# Patient Record
Sex: MALE | Race: WHITE | NOT HISPANIC OR LATINO | Employment: FULL TIME | URBAN - METROPOLITAN AREA
[De-identification: names, ages, dates, MRNs, and addresses within clinical notes are randomized per-mention and may not be internally consistent; named-entity substitution may affect disease eponyms.]

---

## 2019-01-24 ENCOUNTER — TELEPHONE (OUTPATIENT)
Dept: NEPHROLOGY | Facility: CLINIC | Age: 39
End: 2019-01-24

## 2019-01-24 NOTE — TELEPHONE ENCOUNTER
----- Message from Charissa Nesbitt sent at 1/24/2019 11:16 AM CST -----  Contact: pt  I made a consultl CKD 3     Tracy Collins MD [facility]    Going to fax referral and labs     appt 2/5    Thanks

## 2019-02-05 ENCOUNTER — OFFICE VISIT (OUTPATIENT)
Dept: NEPHROLOGY | Facility: CLINIC | Age: 39
End: 2019-02-05
Payer: COMMERCIAL

## 2019-02-05 VITALS
SYSTOLIC BLOOD PRESSURE: 158 MMHG | DIASTOLIC BLOOD PRESSURE: 94 MMHG | HEART RATE: 56 BPM | BODY MASS INDEX: 31.47 KG/M2 | HEIGHT: 73 IN | OXYGEN SATURATION: 98 % | WEIGHT: 237.44 LBS

## 2019-02-05 DIAGNOSIS — E10.21 TYPE 1 DIABETES MELLITUS WITH NEPHROPATHY: ICD-10-CM

## 2019-02-05 DIAGNOSIS — I10 ESSENTIAL HYPERTENSION: ICD-10-CM

## 2019-02-05 DIAGNOSIS — N18.30 CKD (CHRONIC KIDNEY DISEASE) STAGE 3, GFR 30-59 ML/MIN: Primary | ICD-10-CM

## 2019-02-05 PROCEDURE — 99244 OFF/OP CNSLTJ NEW/EST MOD 40: CPT | Mod: S$GLB,,, | Performed by: INTERNAL MEDICINE

## 2019-02-05 PROCEDURE — 99244 PR OFFICE CONSULTATION,LEVEL IV: ICD-10-PCS | Mod: S$GLB,,, | Performed by: INTERNAL MEDICINE

## 2019-02-05 PROCEDURE — 99999 PR PBB SHADOW E&M-EST. PATIENT-LVL III: CPT | Mod: PBBFAC,,, | Performed by: INTERNAL MEDICINE

## 2019-02-05 PROCEDURE — 99999 PR PBB SHADOW E&M-EST. PATIENT-LVL III: ICD-10-PCS | Mod: PBBFAC,,, | Performed by: INTERNAL MEDICINE

## 2019-02-05 RX ORDER — NIFEDIPINE 60 MG/1
TABLET, EXTENDED RELEASE ORAL
Refills: 3 | COMMUNITY
Start: 2018-12-10 | End: 2019-05-01 | Stop reason: SDUPTHER

## 2019-02-05 RX ORDER — VIT C/E/ZN/COPPR/LUTEIN/ZEAXAN 250MG-90MG
CAPSULE ORAL DAILY
COMMUNITY
End: 2023-08-18 | Stop reason: ALTCHOICE

## 2019-02-05 RX ORDER — ASPIRIN 81 MG/1
81 TABLET ORAL DAILY
COMMUNITY

## 2019-02-05 RX ORDER — TADALAFIL 5 MG/1
TABLET ORAL
Refills: 1 | COMMUNITY
Start: 2018-12-27

## 2019-02-05 RX ORDER — ATORVASTATIN CALCIUM 40 MG/1
TABLET, FILM COATED ORAL
Refills: 1 | COMMUNITY
Start: 2018-12-27 | End: 2021-02-26

## 2019-02-05 RX ORDER — LOSARTAN POTASSIUM 100 MG/1
TABLET ORAL
Refills: 1 | COMMUNITY
Start: 2019-01-14 | End: 2019-11-06 | Stop reason: SDUPTHER

## 2019-02-05 RX ORDER — CALCIUM CARBONATE/VITAMIN D3 500-10/5ML
400 LIQUID (ML) ORAL ONCE
COMMUNITY
End: 2021-02-26

## 2019-02-05 RX ORDER — INSULIN ASPART 100 [IU]/ML
INJECTION, SOLUTION INTRAVENOUS; SUBCUTANEOUS
Refills: 3 | COMMUNITY
Start: 2018-12-28 | End: 2023-04-26

## 2019-02-05 RX ORDER — NEBIVOLOL HYDROCHLORIDE 20 MG/1
TABLET ORAL
Refills: 3 | COMMUNITY
Start: 2019-01-23 | End: 2019-11-06 | Stop reason: SDUPTHER

## 2019-02-05 RX ORDER — FLASH GLUCOSE SCANNING READER
EACH MISCELLANEOUS
Refills: 0 | COMMUNITY
Start: 2018-12-21 | End: 2023-04-26

## 2019-02-05 RX ORDER — FENOFIBRATE 160 MG/1
TABLET ORAL
Refills: 1 | COMMUNITY
Start: 2018-11-27 | End: 2023-08-18

## 2019-02-05 RX ORDER — LECITHIN 1200 MG
1200 CAPSULE ORAL DAILY
COMMUNITY
End: 2023-11-29 | Stop reason: ALTCHOICE

## 2019-02-05 RX ORDER — FLASH GLUCOSE SENSOR
KIT MISCELLANEOUS
Refills: 5 | COMMUNITY
Start: 2018-12-21 | End: 2023-04-25

## 2019-02-05 RX ORDER — ACETAMINOPHEN 160 MG/5ML
200 SUSPENSION, ORAL (FINAL DOSE FORM) ORAL DAILY
COMMUNITY

## 2019-02-05 RX ORDER — GUAIFENESIN AND PHENYLEPHRINE HCL 400; 10 MG/1; MG/1
500 TABLET ORAL DAILY
COMMUNITY
End: 2023-11-29 | Stop reason: ALTCHOICE

## 2019-02-05 RX ORDER — NIFEDIPINE 30 MG/1
TABLET, EXTENDED RELEASE ORAL
Refills: 3 | COMMUNITY
Start: 2019-01-14 | End: 2019-05-01 | Stop reason: DRUGHIGH

## 2019-02-05 RX ORDER — LINEZOLID 600 MG/1
TABLET, FILM COATED ORAL
Refills: 1 | COMMUNITY
Start: 2019-01-07 | End: 2019-05-01

## 2019-02-05 RX ORDER — ACETAMINOPHEN 500 MG
5000 TABLET ORAL DAILY
COMMUNITY
End: 2019-05-01 | Stop reason: ALTCHOICE

## 2019-02-05 NOTE — LETTER
February 5, 2019      Lillie Gloria, JAVED  [address]  [facility] [address]           [facility] [address] [facility] [address] [place] [address]  Phone: [phone]          Patient: Dejuan Diaz Jr.   MR Number: [id number]   YOB: 1980   Date of Visit: 2/5/2019       Dear Lillie Gloria:    Thank you for referring Dejuan Diaz to me for evaluation. Attached you will find relevant portions of my assessment and plan of care.    If you have questions, please do not hesitate to call me. I look forward to following Dejuan Diaz along with you.    Sincerely,    Jose Mckinney MD    Enclosure  CC:  No Recipients    If you would like to receive this communication electronically, please contact [email or web address] or [phone] to request more information on ix[employer] Link access.    For providers and/or their staff who would like to refer a patient to [facility], please contact us through our one-stop-shop provider referral line, Brian Gama, at [phone].    If you feel you have received this communication in error or would no longer like to receive these types of communications, please e-mail [email or web address]

## 2019-02-05 NOTE — PROGRESS NOTES
Subjective:       Patient ID: Dejuan Diaz Jr. is a 38 y.o. White male who presents for new patient evaluation for chronic renal failure.    Dejuan Diaz Jr. is referred by Elizabeth Oshea MD to be evaluated for chronic renal failure.  He reports that he started having more headaches and elevated blood pressure since March of last year.  His creatinine was as high as 2.45 in November but has more recently dropped to 1.6.  He has no uremic or urinary symptoms and is in his usual state of health.  He does occasionally take NSAIDs.      Review of Systems   Constitutional: Negative for appetite change, chills and fever.   Eyes: Negative for visual disturbance.   Respiratory: Positive for shortness of breath (VICTORIA on occasion). Negative for cough.    Cardiovascular: Positive for leg swelling. Negative for chest pain.   Gastrointestinal: Negative for diarrhea, nausea and vomiting.   Genitourinary: Negative for difficulty urinating, dysuria and hematuria.        ED   Musculoskeletal: Positive for gait problem (R toe). Negative for myalgias.   Skin: Negative for rash.   Neurological: Positive for headaches.   Psychiatric/Behavioral: Negative for sleep disturbance.       The past medical, family and social histories were reviewed for this encounter.     History reviewed. No pertinent past medical history.  History reviewed. No pertinent surgical history.  Social History     Socioeconomic History    Marital status:      Spouse name: Not on file    Number of children: Not on file    Years of education: Not on file    Highest education level: Not on file   Social Needs    Financial resource strain: Not on file    Food insecurity - worry: Not on file    Food insecurity - inability: Not on file    Transportation needs - medical: Not on file    Transportation needs - non-medical: Not on file   Occupational History    Not on file   Tobacco Use    Smoking status: Never Smoker    Smokeless tobacco: Never Used  "  Substance and Sexual Activity    Alcohol use: No     Frequency: Never    Drug use: No    Sexual activity: Yes   Other Topics Concern    Not on file   Social History Narrative    Not on file     Current Outpatient Medications   Medication Sig    apple cider vinegar 600 mg Cap Take 1,200 mg by mouth once daily.    aspirin (ECOTRIN) 81 MG EC tablet Take 81 mg by mouth once daily.    atorvastatin (LIPITOR) 40 MG tablet TK 1 T PO D    BYSTOLIC 20 mg Tab TK ONE T PO BID    cholecalciferol, vitamin D3, (VITAMIN D3) 5,000 unit Tab Take 5,000 Units by mouth once daily.    cinnamon bark (CINNAMON ORAL) Take 2,000 mg by mouth once daily.    coenzyme Q10 200 mg capsule Take 200 mg by mouth once daily.    fenofibrate 160 MG Tab TK 1 T PO D    FREESTYLE MARK 14 DAY READER Misc USE UTD    FREESTYLE MARK 14 DAY SENSOR Kit USE UTD. CHANGE SENSOR EVERY 14 DAYS    linezolid (ZYVOX) 600 mg Tab TK 1 T PO Q 12 H FOR 30 DAYS    losartan (COZAAR) 100 MG tablet TK 1 T PO D    magnesium oxide 400 mg Cap Take 400 mg by mouth once.    multivit-min/folic/vit K/lycop (ONE-A-DAY MEN'S MULTIVITAMIN ORAL) Take by mouth once daily.    NIFEdipine (PROCARDIA-XL) 30 MG (OSM) 24 hr tablet TK 1 T PO D IN THE EVENING. CONT 60 MG AM DOSE    NIFEdipine (PROCARDIA-XL) 60 MG (OSM) 24 hr tablet TK 1 T PO D  IN THE MORNING - TK WITH 30 MG ATN    NOVOLOG U-100 INSULIN ASPART 100 unit/mL injection Ss through pump    omega-3 fatty acids-fish oil 360-1,200 mg Cap Take by mouth once daily.    tadalafil (CIALIS) 5 MG tablet TK 1 T PO D PRF ERECTILE DYSFUNCTION    turmeric root extract 500 mg Cap Take 500 mg by mouth once daily.     No current facility-administered medications for this visit.        BP (!) 158/94 (BP Location: Left arm, Patient Position: Sitting)   Pulse (!) 56   Ht 6' 1" (1.854 m)   Wt 107.7 kg (237 lb 7 oz)   SpO2 98%   BMI 31.33 kg/m²     Objective:      Physical Exam   Constitutional: He is oriented to person, " place, and time. He appears well-developed and well-nourished. No distress.   HENT:   Head: Normocephalic and atraumatic.   Eyes: Conjunctivae are normal.   Neck: Neck supple. No JVD present.   Cardiovascular: Normal rate, regular rhythm and normal heart sounds. Exam reveals no gallop and no friction rub.   No murmur heard.  Pulmonary/Chest: Effort normal and breath sounds normal. No respiratory distress. He has no wheezes. He has no rales.   Abdominal: Soft. Bowel sounds are normal. He exhibits no distension. There is no tenderness.   Musculoskeletal: He exhibits no edema (1+ BLE).   Neurological: He is alert and oriented to person, place, and time.   Skin: Skin is warm and dry. No rash noted.   Psychiatric: He has a normal mood and affect.   Vitals reviewed.      Assessment:       1. CKD (chronic kidney disease) stage 3, GFR 30-59 ml/min    2. Essential hypertension    3. Type 1 diabetes mellitus with nephropathy        Plan:   Return to clinic in 3 months.  Labs for next visit include rp, pth, ua, upc.  He gets his labs at [facility].  Baseline creatinine is unclear.  Renal US shows R 11.4 cm, L 12.2 cm.  We discussed dietary modification and weight loss.  Please avoid or minimize all NSAIDS (ibuprofen, motrin, aleve, indocin, naprosyn) to minimize the risk to your kidneys.  Aspirin in a dose of 325 mg or less a day is likely the safest with regards to kidney function.  If you are able to take aspirin and do not have any bleeding problems or ulcers, this may be your best therapy.  Alternatively, acetaminophen (Tylenol) is the safer than NSAIDs with regards to kidney function.  I would ask you take this as directed on the bottle.  It is best to stay under 2 grams a day (4-500 mg tablets a day maximum).

## 2019-04-25 ENCOUNTER — TELEPHONE (OUTPATIENT)
Dept: NEPHROLOGY | Facility: CLINIC | Age: 39
End: 2019-04-25

## 2019-04-26 LAB
ALBUMIN SERPL-MCNC: 4.3 G/DL (ref 3.6–5.1)
APPEARANCE UR: CLEAR
BILIRUB UR QL STRIP: NEGATIVE
BUN SERPL-MCNC: 29 MG/DL (ref 7–25)
BUN/CREAT SERPL: 11 (CALC) (ref 6–22)
CALCIUM SERPL-MCNC: 9.5 MG/DL (ref 8.6–10.3)
CHLORIDE SERPL-SCNC: 106 MMOL/L (ref 98–110)
CO2 SERPL-SCNC: 30 MMOL/L (ref 20–32)
COLOR UR: YELLOW
CREAT SERPL-MCNC: 2.74 MG/DL (ref 0.6–1.35)
CREAT UR-MCNC: 102 MG/DL (ref 20–320)
GFRSERPLBLD MDRD-ARVRAT: 28 ML/MIN/1.73M2
GLUCOSE SERPL-MCNC: 152 MG/DL (ref 65–99)
GLUCOSE UR QL STRIP: ABNORMAL
HGB UR QL STRIP: ABNORMAL
KETONES UR QL STRIP: NEGATIVE
LEUKOCYTE ESTERASE UR QL STRIP: NEGATIVE
NITRITE UR QL STRIP: NEGATIVE
PH UR STRIP: 5.5 [PH] (ref 5–8)
PHOSPHATE SERPL-MCNC: 3.9 MG/DL (ref 2.5–4.5)
POTASSIUM SERPL-SCNC: 5 MMOL/L (ref 3.5–5.3)
PROT UR QL STRIP: ABNORMAL
PROT UR-MCNC: 171 MG/DL (ref 5–25)
PROT/CREAT UR: 1676 MG/G CREAT (ref 22–128)
PTH-INTACT SERPL-MCNC: 422 PG/ML (ref 14–64)
REDUCING SUBS UR STRIP-ACNC: ABNORMAL %
SODIUM SERPL-SCNC: 142 MMOL/L (ref 135–146)
SP GR UR STRIP: 1.01 (ref 1–1.03)

## 2019-05-01 ENCOUNTER — OFFICE VISIT (OUTPATIENT)
Dept: NEPHROLOGY | Facility: CLINIC | Age: 39
End: 2019-05-01
Payer: COMMERCIAL

## 2019-05-01 VITALS
WEIGHT: 234.81 LBS | OXYGEN SATURATION: 99 % | DIASTOLIC BLOOD PRESSURE: 94 MMHG | HEART RATE: 62 BPM | BODY MASS INDEX: 31.12 KG/M2 | HEIGHT: 73 IN | SYSTOLIC BLOOD PRESSURE: 148 MMHG

## 2019-05-01 DIAGNOSIS — N18.30 CKD (CHRONIC KIDNEY DISEASE) STAGE 3, GFR 30-59 ML/MIN: Primary | ICD-10-CM

## 2019-05-01 DIAGNOSIS — I10 ESSENTIAL HYPERTENSION: ICD-10-CM

## 2019-05-01 DIAGNOSIS — E10.21 TYPE 1 DIABETES MELLITUS WITH NEPHROPATHY: ICD-10-CM

## 2019-05-01 PROCEDURE — 99214 PR OFFICE/OUTPT VISIT, EST, LEVL IV, 30-39 MIN: ICD-10-PCS | Mod: S$GLB,,, | Performed by: INTERNAL MEDICINE

## 2019-05-01 PROCEDURE — 99999 PR PBB SHADOW E&M-EST. PATIENT-LVL III: CPT | Mod: PBBFAC,,, | Performed by: INTERNAL MEDICINE

## 2019-05-01 PROCEDURE — 99999 PR PBB SHADOW E&M-EST. PATIENT-LVL III: ICD-10-PCS | Mod: PBBFAC,,, | Performed by: INTERNAL MEDICINE

## 2019-05-01 PROCEDURE — 99214 OFFICE O/P EST MOD 30 MIN: CPT | Mod: S$GLB,,, | Performed by: INTERNAL MEDICINE

## 2019-05-01 RX ORDER — CALCITRIOL 0.25 UG/1
0.25 CAPSULE ORAL DAILY
Qty: 90 CAPSULE | Refills: 1 | Status: SHIPPED | OUTPATIENT
Start: 2019-05-01 | End: 2019-10-29 | Stop reason: SDUPTHER

## 2019-05-01 RX ORDER — NIFEDIPINE 60 MG/1
60 TABLET, EXTENDED RELEASE ORAL 2 TIMES DAILY
Qty: 180 TABLET | Refills: 3 | Status: SHIPPED | OUTPATIENT
Start: 2019-05-01 | End: 2019-11-06 | Stop reason: SDUPTHER

## 2019-05-01 NOTE — PROGRESS NOTES
"Subjective:       Patient ID: Dejuan Diaz Jr. is a 38 y.o. White male who presents for new patient evaluation for chronic renal failure.    His creatinine was as high as 2.45 in November but has more recently dropped to 1.6.  He has no uremic or urinary symptoms and is in his usual state of health.  His blood pressure at home has been 150-160/.  He has fatigue but his headaches have improved.  He has not been drinking as much fluids as he needs and has been a bit more short of breath at times.      Review of Systems   Constitutional: Negative for appetite change, chills and fever.   Eyes: Negative for visual disturbance.   Respiratory: Positive for shortness of breath (VICTORIA on occasion). Negative for cough.    Cardiovascular: Positive for leg swelling. Negative for chest pain.   Gastrointestinal: Negative for diarrhea, nausea and vomiting.   Genitourinary: Negative for difficulty urinating, dysuria and hematuria.        ED   Musculoskeletal: Positive for gait problem (R toe). Negative for myalgias.   Skin: Negative for rash.   Neurological: Positive for headaches.   Psychiatric/Behavioral: Negative for sleep disturbance.       The past medical, family and social histories were reviewed for this encounter.     BP (!) 148/94   Pulse 62   Ht 6' 1" (1.854 m)   Wt 106.5 kg (234 lb 12.6 oz)   SpO2 99%   BMI 30.98 kg/m²     Objective:      Physical Exam   Constitutional: He is oriented to person, place, and time. He appears well-developed and well-nourished. No distress.   HENT:   Head: Normocephalic and atraumatic.   Eyes: Conjunctivae are normal.   Neck: Neck supple. No JVD present.   Cardiovascular: Normal rate, regular rhythm and normal heart sounds. Exam reveals no gallop and no friction rub.   No murmur heard.  Pulmonary/Chest: Effort normal and breath sounds normal. No respiratory distress. He has no wheezes. He has no rales.   Abdominal: Soft. Bowel sounds are normal. He exhibits no distension. There " is no tenderness.   Musculoskeletal: He exhibits no edema (1+ BLE).   Neurological: He is alert and oriented to person, place, and time.   Skin: Skin is warm and dry. No rash noted.   Psychiatric: He has a normal mood and affect.   Vitals reviewed.      Assessment:       1. CKD (chronic kidney disease) stage 3, GFR 30-59 ml/min    2. Essential hypertension    3. Type 1 diabetes mellitus with nephropathy        Plan:   Return to clinic in 3 months.  Labs for next visit include rp, pth, ua, upc.  He gets his labs at [facility].  Baseline creatinine is unclear.  UPC is 1.6.  PTH is 422 with a calcium of 9.5.  Renal US shows R 11.4 cm, L 12.2 cm.  We discussed dietary modification and weight loss.  Please avoid or minimize all NSAIDS (ibuprofen, motrin, aleve, indocin, naprosyn) to minimize the risk to your kidneys.  Aspirin in a dose of 325 mg or less a day is likely the safest with regards to kidney function.  If you are able to take aspirin and do not have any bleeding problems or ulcers, this may be your best therapy.  Alternatively, acetaminophen (Tylenol) is the safer than NSAIDs with regards to kidney function.  I would ask you take this as directed on the bottle.  It is best to stay under 2 grams a day (4-500 mg tablets a day maximum).  Rocaltrol 0.25 mcg daily.  Increase procardia to 60 mg BID.  Stop ergocalciferol.

## 2019-05-02 ENCOUNTER — TELEPHONE (OUTPATIENT)
Dept: NEPHROLOGY | Facility: CLINIC | Age: 39
End: 2019-05-02

## 2019-07-01 ENCOUNTER — PATIENT MESSAGE (OUTPATIENT)
Dept: NEPHROLOGY | Facility: CLINIC | Age: 39
End: 2019-07-01

## 2019-08-02 LAB
ALBUMIN SERPL-MCNC: 4.1 G/DL (ref 3.6–5.1)
APPEARANCE UR: CLEAR
BASOPHILS # BLD AUTO: 22 CELLS/UL (ref 0–200)
BASOPHILS NFR BLD AUTO: 0.5 %
BILIRUB UR QL STRIP: NEGATIVE
BLASTS # BLD: ABNORMAL CELLS/UL
BLASTS NFR BLD MANUAL: ABNORMAL %
BUN SERPL-MCNC: 41 MG/DL (ref 7–25)
BUN/CREAT SERPL: 14 (CALC) (ref 6–22)
CALCIUM SERPL-MCNC: 8.9 MG/DL (ref 8.6–10.3)
CHLORIDE SERPL-SCNC: 107 MMOL/L (ref 98–110)
CO2 SERPL-SCNC: 25 MMOL/L (ref 20–32)
COLOR UR: YELLOW
CREAT SERPL-MCNC: 2.93 MG/DL (ref 0.6–1.35)
EOSINOPHIL # BLD AUTO: 219 CELLS/UL (ref 15–500)
EOSINOPHIL NFR BLD AUTO: 5.1 %
ERYTHROCYTE [DISTWIDTH] IN BLOOD BY AUTOMATED COUNT: 13.2 % (ref 11–15)
GFRSERPLBLD MDRD-ARVRAT: 26 ML/MIN/1.73M2
GLUCOSE SERPL-MCNC: 188 MG/DL (ref 65–99)
GLUCOSE UR QL STRIP: ABNORMAL
HCT VFR BLD AUTO: 32.2 % (ref 38.5–50)
HGB BLD-MCNC: 10.8 G/DL (ref 13.2–17.1)
HGB UR QL STRIP: ABNORMAL
KETONES UR QL STRIP: NEGATIVE
LEUKOCYTE ESTERASE UR QL STRIP: NEGATIVE
LYMPHOCYTES # BLD AUTO: 1148 CELLS/UL (ref 850–3900)
LYMPHOCYTES NFR BLD AUTO: 26.7 %
MCH RBC QN AUTO: 29.3 PG (ref 27–33)
MCHC RBC AUTO-ENTMCNC: 33.5 G/DL (ref 32–36)
MCV RBC AUTO: 87.5 FL (ref 80–100)
METAMYELOCYTES # BLD: ABNORMAL CELLS/UL
METAMYELOCYTES NFR BLD MANUAL: ABNORMAL %
MONOCYTES # BLD AUTO: 396 CELLS/UL (ref 200–950)
MONOCYTES NFR BLD AUTO: 9.2 %
MYELOCYTES # BLD: ABNORMAL CELLS/UL
MYELOCYTES NFR BLD MANUAL: ABNORMAL %
NEUTROPHILS # BLD AUTO: 2516 CELLS/UL (ref 1500–7800)
NEUTROPHILS NFR BLD AUTO: 58.5 %
NEUTS BAND # BLD: ABNORMAL CELLS/UL (ref 0–750)
NEUTS BAND NFR BLD MANUAL: ABNORMAL %
NITRITE UR QL STRIP: NEGATIVE
NRBC # BLD: ABNORMAL CELLS/UL
NRBC BLD-RTO: ABNORMAL /100 WBC
PH UR STRIP: ABNORMAL [PH] (ref 5–8)
PHOSPHATE SERPL-MCNC: 3 MG/DL (ref 2.5–4.5)
PLATELET # BLD AUTO: 203 THOUSAND/UL (ref 140–400)
PMV BLD REES-ECKER: 10.8 FL (ref 7.5–12.5)
POTASSIUM SERPL-SCNC: 4.8 MMOL/L (ref 3.5–5.3)
PROMYELOCYTES # BLD: ABNORMAL CELLS/UL
PROMYELOCYTES NFR BLD MANUAL: ABNORMAL %
PROT UR QL STRIP: ABNORMAL
PTH-INTACT SERPL-MCNC: 69 PG/ML (ref 14–64)
RBC # BLD AUTO: 3.68 MILLION/UL (ref 4.2–5.8)
SERVICE CMNT-IMP: ABNORMAL
SODIUM SERPL-SCNC: 138 MMOL/L (ref 135–146)
SP GR UR STRIP: 1.01 (ref 1–1.03)
VARIANT LYMPHS NFR BLD: ABNORMAL % (ref 0–10)
WBC # BLD AUTO: 4.3 THOUSAND/UL (ref 3.8–10.8)

## 2019-08-07 ENCOUNTER — OFFICE VISIT (OUTPATIENT)
Dept: NEPHROLOGY | Facility: CLINIC | Age: 39
End: 2019-08-07
Payer: COMMERCIAL

## 2019-08-07 ENCOUNTER — PATIENT MESSAGE (OUTPATIENT)
Dept: NEPHROLOGY | Facility: CLINIC | Age: 39
End: 2019-08-07

## 2019-08-07 VITALS
DIASTOLIC BLOOD PRESSURE: 100 MMHG | OXYGEN SATURATION: 99 % | HEART RATE: 77 BPM | WEIGHT: 240.06 LBS | SYSTOLIC BLOOD PRESSURE: 170 MMHG | BODY MASS INDEX: 31.82 KG/M2 | HEIGHT: 73 IN

## 2019-08-07 DIAGNOSIS — N25.81 SECONDARY RENAL HYPERPARATHYROIDISM: ICD-10-CM

## 2019-08-07 DIAGNOSIS — I10 ESSENTIAL HYPERTENSION: ICD-10-CM

## 2019-08-07 DIAGNOSIS — E10.21 TYPE 1 DIABETES MELLITUS WITH NEPHROPATHY: ICD-10-CM

## 2019-08-07 DIAGNOSIS — N17.9 ACUTE RENAL FAILURE, UNSPECIFIED ACUTE RENAL FAILURE TYPE: ICD-10-CM

## 2019-08-07 DIAGNOSIS — N18.30 CKD (CHRONIC KIDNEY DISEASE) STAGE 3, GFR 30-59 ML/MIN: Primary | ICD-10-CM

## 2019-08-07 PROCEDURE — 99214 PR OFFICE/OUTPT VISIT, EST, LEVL IV, 30-39 MIN: ICD-10-PCS | Mod: S$GLB,,, | Performed by: INTERNAL MEDICINE

## 2019-08-07 PROCEDURE — 99999 PR PBB SHADOW E&M-EST. PATIENT-LVL III: CPT | Mod: PBBFAC,,, | Performed by: INTERNAL MEDICINE

## 2019-08-07 PROCEDURE — 99999 PR PBB SHADOW E&M-EST. PATIENT-LVL III: ICD-10-PCS | Mod: PBBFAC,,, | Performed by: INTERNAL MEDICINE

## 2019-08-07 PROCEDURE — 99214 OFFICE O/P EST MOD 30 MIN: CPT | Mod: S$GLB,,, | Performed by: INTERNAL MEDICINE

## 2019-08-07 NOTE — PROGRESS NOTES
"Subjective:       Patient ID: Dejuan Diaz Jr. is a 38 y.o. White male who presents for new patient evaluation for chronic renal failure.    His creatinine was as high as 2.45 in November but has more recently dropped to 1.6.  He has no uremic or urinary symptoms and is in his usual state of health.  His blood pressure at home has been 140s/90s.  He has fatigue but his headaches have improved.        Review of Systems   Constitutional: Positive for fatigue. Negative for appetite change, chills and fever.   Eyes: Negative for visual disturbance.   Respiratory: Positive for shortness of breath (VICTORIA on occasion). Negative for cough.    Cardiovascular: Positive for leg swelling. Negative for chest pain.   Gastrointestinal: Negative for diarrhea, nausea and vomiting.   Genitourinary: Negative for difficulty urinating, dysuria and hematuria.        ED   Musculoskeletal: Negative for myalgias.   Skin: Negative for rash.   Neurological: Negative for headaches.   Psychiatric/Behavioral: Negative for sleep disturbance.       The past medical, family and social histories were reviewed for this encounter.     BP (!) 170/100 (BP Location: Left arm, Patient Position: Sitting)   Pulse 77   Ht 6' 1" (1.854 m)   Wt 108.9 kg (240 lb 1.3 oz)   SpO2 99%   BMI 31.67 kg/m²     Objective:      Physical Exam   Constitutional: He is oriented to person, place, and time. He appears well-developed and well-nourished. No distress.   HENT:   Head: Normocephalic and atraumatic.   Eyes: Conjunctivae are normal.   Neck: Neck supple. No JVD present.   Cardiovascular: Normal rate, regular rhythm and normal heart sounds. Exam reveals no gallop and no friction rub.   No murmur heard.  Pulmonary/Chest: Effort normal and breath sounds normal. No respiratory distress. He has no wheezes. He has no rales.   Abdominal: Soft. Bowel sounds are normal. He exhibits no distension. There is no tenderness.   Musculoskeletal: He exhibits no edema (1+ BLE). "   Neurological: He is alert and oriented to person, place, and time.   Skin: Skin is warm and dry. No rash noted.   Psychiatric: He has a normal mood and affect.   Vitals reviewed.      Assessment:       1. CKD (chronic kidney disease) stage 3, GFR 30-59 ml/min    2. Essential hypertension    3. Type 1 diabetes mellitus with nephropathy        Plan:   Return to clinic in 3 months.  Labs for next visit include rp, pth, upc.  He gets his labs at [facility].  Rp in 2 weeks.  We will need to contact him with results.  Baseline creatinine is unclear.  He has had labile function for no obviuos reason.  He does not have CHF, obstructive uropathy, chronic NSAID use.  UPC is 1.6.  PTH is 69 with a calcium of 8.9.  Renal US shows R 11.4 cm, L 12.2 cm.  We discussed dietary modification and weight loss.  Please avoid or minimize all NSAIDS (ibuprofen, motrin, aleve, indocin, naprosyn) to minimize the risk to your kidneys.  Aspirin in a dose of 325 mg or less a day is likely the safest with regards to kidney function.  If you are able to take aspirin and do not have any bleeding problems or ulcers, this may be your best therapy.  Alternatively, acetaminophen (Tylenol) is the safer than NSAIDs with regards to kidney function.  I would ask you take this as directed on the bottle.  It is best to stay under 2 grams a day (4-500 mg tablets a day maximum).  Change calcitriol to QOD.

## 2019-10-29 RX ORDER — CALCITRIOL 0.25 UG/1
CAPSULE ORAL
Qty: 90 CAPSULE | Refills: 1 | Status: SHIPPED | OUTPATIENT
Start: 2019-10-29 | End: 2020-04-30

## 2019-10-30 ENCOUNTER — PATIENT MESSAGE (OUTPATIENT)
Dept: NEPHROLOGY | Facility: CLINIC | Age: 39
End: 2019-10-30

## 2019-10-30 LAB
ALBUMIN SERPL-MCNC: 3.9 G/DL (ref 3.6–5.1)
BUN SERPL-MCNC: 36 MG/DL (ref 7–25)
BUN/CREAT SERPL: 13 (CALC) (ref 6–22)
CALCIUM SERPL-MCNC: 9.1 MG/DL (ref 8.6–10.3)
CHLORIDE SERPL-SCNC: 110 MMOL/L (ref 98–110)
CO2 SERPL-SCNC: 25 MMOL/L (ref 20–32)
CREAT SERPL-MCNC: 2.84 MG/DL (ref 0.6–1.35)
CREAT UR-MCNC: 83 MG/DL (ref 20–320)
GFRSERPLBLD MDRD-ARVRAT: 27 ML/MIN/1.73M2
GLUCOSE SERPL-MCNC: 129 MG/DL (ref 65–99)
PHOSPHATE SERPL-MCNC: 3.3 MG/DL (ref 2.5–4.5)
POTASSIUM SERPL-SCNC: 4.4 MMOL/L (ref 3.5–5.3)
PROT UR-MCNC: 128 MG/DL (ref 5–25)
PROT/CREAT UR: 1.54 MG/MG CREAT (ref 0.02–0.13)
PROT/CREAT UR: 1542 MG/G CREAT (ref 22–128)
PTH-INTACT SERPL-MCNC: 85 PG/ML (ref 14–64)
SODIUM SERPL-SCNC: 141 MMOL/L (ref 135–146)

## 2019-11-06 ENCOUNTER — OFFICE VISIT (OUTPATIENT)
Dept: NEPHROLOGY | Facility: CLINIC | Age: 39
End: 2019-11-06
Payer: COMMERCIAL

## 2019-11-06 VITALS
SYSTOLIC BLOOD PRESSURE: 177 MMHG | HEIGHT: 73 IN | OXYGEN SATURATION: 100 % | WEIGHT: 233.69 LBS | BODY MASS INDEX: 30.97 KG/M2 | HEART RATE: 60 BPM | DIASTOLIC BLOOD PRESSURE: 101 MMHG

## 2019-11-06 DIAGNOSIS — N25.81 SECONDARY RENAL HYPERPARATHYROIDISM: ICD-10-CM

## 2019-11-06 DIAGNOSIS — E10.21 TYPE 1 DIABETES MELLITUS WITH NEPHROPATHY: ICD-10-CM

## 2019-11-06 DIAGNOSIS — N18.30 CKD (CHRONIC KIDNEY DISEASE) STAGE 3, GFR 30-59 ML/MIN: Primary | ICD-10-CM

## 2019-11-06 DIAGNOSIS — I10 ESSENTIAL HYPERTENSION: ICD-10-CM

## 2019-11-06 DIAGNOSIS — R80.9 PROTEINURIA, UNSPECIFIED TYPE: ICD-10-CM

## 2019-11-06 PROCEDURE — 99999 PR PBB SHADOW E&M-EST. PATIENT-LVL III: CPT | Mod: PBBFAC,,, | Performed by: INTERNAL MEDICINE

## 2019-11-06 PROCEDURE — 99999 PR PBB SHADOW E&M-EST. PATIENT-LVL III: ICD-10-PCS | Mod: PBBFAC,,, | Performed by: INTERNAL MEDICINE

## 2019-11-06 PROCEDURE — 99214 OFFICE O/P EST MOD 30 MIN: CPT | Mod: S$GLB,,, | Performed by: INTERNAL MEDICINE

## 2019-11-06 PROCEDURE — 3008F BODY MASS INDEX DOCD: CPT | Mod: S$GLB,,, | Performed by: INTERNAL MEDICINE

## 2019-11-06 PROCEDURE — 3008F PR BODY MASS INDEX (BMI) DOCUMENTED: ICD-10-PCS | Mod: S$GLB,,, | Performed by: INTERNAL MEDICINE

## 2019-11-06 PROCEDURE — 99214 PR OFFICE/OUTPT VISIT, EST, LEVL IV, 30-39 MIN: ICD-10-PCS | Mod: S$GLB,,, | Performed by: INTERNAL MEDICINE

## 2019-11-06 RX ORDER — FUROSEMIDE 40 MG/1
40 TABLET ORAL 2 TIMES DAILY
Qty: 180 TABLET | Refills: 1 | Status: SHIPPED | OUTPATIENT
Start: 2019-11-06 | End: 2020-06-08

## 2019-11-06 RX ORDER — NIFEDIPINE 60 MG/1
60 TABLET, EXTENDED RELEASE ORAL 2 TIMES DAILY
Qty: 180 TABLET | Refills: 1 | Status: SHIPPED | OUTPATIENT
Start: 2019-11-06 | End: 2020-06-08

## 2019-11-06 RX ORDER — NEBIVOLOL HYDROCHLORIDE 20 MG/1
20 TABLET ORAL 2 TIMES DAILY
Qty: 180 TABLET | Refills: 1 | Status: SHIPPED | OUTPATIENT
Start: 2019-11-06 | End: 2021-01-05 | Stop reason: SDUPTHER

## 2019-11-06 RX ORDER — LOSARTAN POTASSIUM 100 MG/1
100 TABLET ORAL DAILY
Qty: 90 TABLET | Refills: 1 | Status: SHIPPED | OUTPATIENT
Start: 2019-11-06 | End: 2021-02-26 | Stop reason: SDUPTHER

## 2019-11-06 NOTE — PROGRESS NOTES
"Subjective:       Patient ID: Dejuan Diaz Jr. is a 38 y.o. White male who presents for new patient evaluation for chronic renal failure.    He has no uremic or urinary symptoms and is in his usual state of health.  His blood pressure at home has been 170s/90s.  He has fatigue at times but his headaches have improved.        Review of Systems   Constitutional: Positive for fatigue. Negative for appetite change, chills and fever.   Eyes: Negative for visual disturbance.   Respiratory: Positive for shortness of breath (VICTORIA on occasion). Negative for cough.    Cardiovascular: Positive for leg swelling. Negative for chest pain.   Gastrointestinal: Negative for diarrhea, nausea and vomiting.   Genitourinary: Negative for difficulty urinating, dysuria and hematuria.        ED   Musculoskeletal: Negative for myalgias.   Skin: Negative for rash.   Neurological: Negative for headaches.   Psychiatric/Behavioral: Negative for sleep disturbance.       The past medical, family and social histories were reviewed for this encounter.     BP (!) 177/101   Pulse 60   Ht 6' 1" (1.854 m)   Wt 106 kg (233 lb 11 oz)   SpO2 100%   BMI 30.83 kg/m²     Objective:      Physical Exam   Constitutional: He is oriented to person, place, and time. He appears well-developed and well-nourished. No distress.   HENT:   Head: Normocephalic and atraumatic.   Eyes: Conjunctivae are normal.   Neck: Neck supple. No JVD present.   Cardiovascular: Normal rate, regular rhythm and normal heart sounds. Exam reveals no gallop and no friction rub.   No murmur heard.  Pulmonary/Chest: Effort normal and breath sounds normal. No respiratory distress. He has no wheezes. He has no rales.   Abdominal: Soft. Bowel sounds are normal. He exhibits no distension. There is no tenderness.   Musculoskeletal: He exhibits no edema (1+ BLE).   Neurological: He is alert and oriented to person, place, and time.   Skin: Skin is warm and dry. No rash noted.   Psychiatric: He " has a normal mood and affect.   Vitals reviewed.      Assessment:       1. CKD (chronic kidney disease) stage 3, GFR 30-59 ml/min    2. Essential hypertension    3. Type 1 diabetes mellitus with nephropathy    4. Secondary renal hyperparathyroidism    5. Proteinuria, unspecified type        Plan:   Return to clinic in 3 months.  Labs for next visit include rp, pth, upc.  He gets his labs at [facility] in [place].  Baseline creatinine is unclear.  He has had labile function for no obviuos reason.  He does not have CHF, obstructive uropathy, chronic NSAID use.  UPC is 1.0.  PTH is 271 with a calcium of 9.4.  Renal US shows R 11.4 cm, L 12.2 cm.  We discussed dietary modification and weight loss.  Please avoid or minimize all NSAIDS (ibuprofen, motrin, aleve, indocin, naprosyn) to minimize the risk to your kidneys.  Aspirin in a dose of 325 mg or less a day is likely the safest with regards to kidney function.  If you are able to take aspirin and do not have any bleeding problems or ulcers, this may be your best therapy.  Alternatively, acetaminophen (Tylenol) is the safer than NSAIDs with regards to kidney function.  I would ask you take this as directed on the bottle.  It is best to stay under 2 grams a day (4-500 mg tablets a day maximum).  Transplant referral.  [facility] referral.  Lasix 40 mg daily.  We discussed kidney transplant versus kidney/pancreas transplant.

## 2020-02-01 LAB
ALBUMIN SERPL-MCNC: 4.2 G/DL (ref 3.6–5.1)
BUN SERPL-MCNC: 50 MG/DL (ref 7–25)
BUN/CREAT SERPL: 17 (CALC) (ref 6–22)
CALCIUM SERPL-MCNC: 9.1 MG/DL (ref 8.6–10.3)
CHLORIDE SERPL-SCNC: 104 MMOL/L (ref 98–110)
CO2 SERPL-SCNC: 25 MMOL/L (ref 20–32)
CREAT SERPL-MCNC: 2.94 MG/DL (ref 0.6–1.35)
CREAT UR-MCNC: 67 MG/DL (ref 20–320)
GFRSERPLBLD MDRD-ARVRAT: 26 ML/MIN/1.73M2
GLUCOSE SERPL-MCNC: 173 MG/DL (ref 65–99)
PHOSPHATE SERPL-MCNC: 3.3 MG/DL (ref 2.5–4.5)
POTASSIUM SERPL-SCNC: 4.7 MMOL/L (ref 3.5–5.3)
PROT UR-MCNC: 64 MG/DL (ref 5–25)
PROT/CREAT UR: 0.95 MG/MG CREAT (ref 0.02–0.13)
PROT/CREAT UR: 955 MG/G CREAT (ref 22–128)
PTH-INTACT SERPL-MCNC: 93 PG/ML (ref 14–64)
SODIUM SERPL-SCNC: 140 MMOL/L (ref 135–146)

## 2020-02-02 ENCOUNTER — PATIENT MESSAGE (OUTPATIENT)
Dept: NEPHROLOGY | Facility: CLINIC | Age: 40
End: 2020-02-02

## 2020-02-05 ENCOUNTER — OFFICE VISIT (OUTPATIENT)
Dept: NEPHROLOGY | Facility: CLINIC | Age: 40
End: 2020-02-05
Payer: COMMERCIAL

## 2020-02-05 VITALS
HEART RATE: 60 BPM | WEIGHT: 231.25 LBS | OXYGEN SATURATION: 98 % | DIASTOLIC BLOOD PRESSURE: 83 MMHG | HEIGHT: 73 IN | BODY MASS INDEX: 30.65 KG/M2 | SYSTOLIC BLOOD PRESSURE: 144 MMHG

## 2020-02-05 DIAGNOSIS — N18.30 CKD (CHRONIC KIDNEY DISEASE) STAGE 3, GFR 30-59 ML/MIN: Primary | ICD-10-CM

## 2020-02-05 DIAGNOSIS — R80.9 PROTEINURIA, UNSPECIFIED TYPE: ICD-10-CM

## 2020-02-05 DIAGNOSIS — N25.81 SECONDARY RENAL HYPERPARATHYROIDISM: ICD-10-CM

## 2020-02-05 DIAGNOSIS — E10.21 TYPE 1 DIABETES MELLITUS WITH NEPHROPATHY: ICD-10-CM

## 2020-02-05 DIAGNOSIS — I10 ESSENTIAL HYPERTENSION: ICD-10-CM

## 2020-02-05 PROCEDURE — 3008F BODY MASS INDEX DOCD: CPT | Mod: S$GLB,,, | Performed by: INTERNAL MEDICINE

## 2020-02-05 PROCEDURE — 99214 PR OFFICE/OUTPT VISIT, EST, LEVL IV, 30-39 MIN: ICD-10-PCS | Mod: S$GLB,,, | Performed by: INTERNAL MEDICINE

## 2020-02-05 PROCEDURE — 99214 OFFICE O/P EST MOD 30 MIN: CPT | Mod: S$GLB,,, | Performed by: INTERNAL MEDICINE

## 2020-02-05 PROCEDURE — 99999 PR PBB SHADOW E&M-EST. PATIENT-LVL III: ICD-10-PCS | Mod: PBBFAC,,, | Performed by: INTERNAL MEDICINE

## 2020-02-05 PROCEDURE — 99999 PR PBB SHADOW E&M-EST. PATIENT-LVL III: CPT | Mod: PBBFAC,,, | Performed by: INTERNAL MEDICINE

## 2020-02-05 PROCEDURE — 3008F PR BODY MASS INDEX (BMI) DOCUMENTED: ICD-10-PCS | Mod: S$GLB,,, | Performed by: INTERNAL MEDICINE

## 2020-02-05 NOTE — PROGRESS NOTES
"Subjective:       Patient ID: Dejuan Diaz Jr. is a 39 y.o. White male who presents for new patient evaluation for chronic renal failure.    He has no uremic or urinary symptoms and is in his usual state of health.  His blood pressure at home has been 150s-160s/80s.  His edema is improved on the lasix.      Review of Systems   Constitutional: Positive for fatigue. Negative for appetite change, chills and fever.   Eyes: Negative for visual disturbance.   Respiratory: Positive for shortness of breath (VICTORIA on occasion). Negative for cough.    Cardiovascular: Positive for leg swelling. Negative for chest pain.   Gastrointestinal: Negative for diarrhea, nausea and vomiting.   Genitourinary: Negative for difficulty urinating, dysuria and hematuria.        ED   Musculoskeletal: Negative for myalgias.   Skin: Negative for rash.   Neurological: Negative for headaches.   Psychiatric/Behavioral: Negative for sleep disturbance.       The past medical, family and social histories were reviewed for this encounter.     BP (!) 144/83   Pulse 60   Ht 6' 1" (1.854 m)   Wt 104.9 kg (231 lb 4.2 oz)   SpO2 98%   BMI 30.51 kg/m²     Objective:      Physical Exam   Constitutional: He is oriented to person, place, and time. He appears well-developed and well-nourished. No distress.   HENT:   Head: Normocephalic and atraumatic.   Eyes: Conjunctivae are normal.   Neck: Neck supple. No JVD present.   Cardiovascular: Normal rate, regular rhythm and normal heart sounds. Exam reveals no gallop and no friction rub.   No murmur heard.  Pulmonary/Chest: Effort normal and breath sounds normal. No respiratory distress. He has no wheezes. He has no rales.   Abdominal: Soft. Bowel sounds are normal. He exhibits no distension. There is no tenderness.   Musculoskeletal: He exhibits edema (trace-1+ BLE).   Neurological: He is alert and oriented to person, place, and time.   Skin: Skin is warm and dry. No rash noted.   Psychiatric: He has a normal " mood and affect.   Vitals reviewed.      Assessment:       1. CKD (chronic kidney disease) stage 3, GFR 30-59 ml/min    2. Essential hypertension    3. Type 1 diabetes mellitus with nephropathy    4. Secondary renal hyperparathyroidism    5. Proteinuria, unspecified type        Plan:   Return to clinic in 3 months.  Labs for next visit include rp, pth, upc, cbc.  He gets his labs at [facility] in [place].  Baseline creatinine is unclear.  He has had labile function for no obviuos reason.  He does not have CHF, obstructive uropathy, chronic NSAID use.  UPC is 1.0.  PTH is 93 with a calcium of 9.1.  Renal US shows R 11.4 cm, L 12.2 cm.  We discussed dietary modification and weight loss.  Please avoid or minimize all NSAIDS (ibuprofen, motrin, aleve, indocin, naprosyn) to minimize the risk to your kidneys.  Aspirin in a dose of 325 mg or less a day is likely the safest with regards to kidney function.  If you are able to take aspirin and do not have any bleeding problems or ulcers, this may be your best therapy.  Alternatively, acetaminophen (Tylenol) is the safer than NSAIDs with regards to kidney function.  I would ask you take this as directed on the bottle.  It is best to stay under 2 grams a day (4-500 mg tablets a day maximum).  Transplant referral when GFR < 20.  He has seen [facility].  We discussed kidney transplant versus kidney/pancreas transplant.

## 2020-04-30 RX ORDER — CALCITRIOL 0.25 UG/1
CAPSULE ORAL
Qty: 90 CAPSULE | Refills: 1 | Status: SHIPPED | OUTPATIENT
Start: 2020-04-30 | End: 2021-02-26 | Stop reason: SDUPTHER

## 2020-05-13 ENCOUNTER — PATIENT MESSAGE (OUTPATIENT)
Dept: NEPHROLOGY | Facility: CLINIC | Age: 40
End: 2020-05-13

## 2020-05-14 ENCOUNTER — OFFICE VISIT (OUTPATIENT)
Dept: NEPHROLOGY | Facility: CLINIC | Age: 40
End: 2020-05-14
Payer: COMMERCIAL

## 2020-05-14 VITALS — DIASTOLIC BLOOD PRESSURE: 80 MMHG | SYSTOLIC BLOOD PRESSURE: 140 MMHG

## 2020-05-14 DIAGNOSIS — I10 ESSENTIAL HYPERTENSION: ICD-10-CM

## 2020-05-14 DIAGNOSIS — E10.21 TYPE 1 DIABETES MELLITUS WITH NEPHROPATHY: ICD-10-CM

## 2020-05-14 DIAGNOSIS — R80.9 PROTEINURIA, UNSPECIFIED TYPE: ICD-10-CM

## 2020-05-14 DIAGNOSIS — N18.4 CHRONIC KIDNEY DISEASE, STAGE IV (SEVERE): Primary | ICD-10-CM

## 2020-05-14 DIAGNOSIS — N25.81 SECONDARY RENAL HYPERPARATHYROIDISM: ICD-10-CM

## 2020-05-14 LAB
ALBUMIN SERPL-MCNC: 4 G/DL (ref 3.6–5.1)
BASOPHILS # BLD AUTO: 41 CELLS/UL (ref 0–200)
BASOPHILS NFR BLD AUTO: 0.9 %
BLASTS # BLD: ABNORMAL CELLS/UL
BLASTS NFR BLD MANUAL: ABNORMAL %
BUN SERPL-MCNC: 64 MG/DL (ref 7–25)
BUN/CREAT SERPL: 20 (CALC) (ref 6–22)
CALCIUM SERPL-MCNC: 8.9 MG/DL (ref 8.6–10.3)
CHLORIDE SERPL-SCNC: 107 MMOL/L (ref 98–110)
CO2 SERPL-SCNC: 22 MMOL/L (ref 20–32)
CREAT SERPL-MCNC: 3.25 MG/DL (ref 0.6–1.35)
CREAT UR-MCNC: 106 MG/DL (ref 20–320)
EOSINOPHIL # BLD AUTO: 51 CELLS/UL (ref 15–500)
EOSINOPHIL NFR BLD AUTO: 1.1 %
ERYTHROCYTE [DISTWIDTH] IN BLOOD BY AUTOMATED COUNT: 13.1 % (ref 11–15)
GFRSERPLBLD MDRD-ARVRAT: 23 ML/MIN/1.73M2
GLUCOSE SERPL-MCNC: 259 MG/DL (ref 65–99)
HCT VFR BLD AUTO: 32.3 % (ref 38.5–50)
HGB BLD-MCNC: 11 G/DL (ref 13.2–17.1)
LYMPHOCYTES # BLD AUTO: 1366 CELLS/UL (ref 850–3900)
LYMPHOCYTES NFR BLD AUTO: 29.7 %
MCH RBC QN AUTO: 29.5 PG (ref 27–33)
MCHC RBC AUTO-ENTMCNC: 34.1 G/DL (ref 32–36)
MCV RBC AUTO: 86.6 FL (ref 80–100)
METAMYELOCYTES # BLD: ABNORMAL CELLS/UL
METAMYELOCYTES NFR BLD MANUAL: ABNORMAL %
MONOCYTES # BLD AUTO: 428 CELLS/UL (ref 200–950)
MONOCYTES NFR BLD AUTO: 9.3 %
MYELOCYTES # BLD: ABNORMAL CELLS/UL
MYELOCYTES NFR BLD MANUAL: ABNORMAL %
NEUTROPHILS # BLD AUTO: 2714 CELLS/UL (ref 1500–7800)
NEUTROPHILS NFR BLD AUTO: 59 %
NEUTS BAND # BLD: ABNORMAL CELLS/UL (ref 0–750)
NEUTS BAND NFR BLD MANUAL: ABNORMAL %
NRBC # BLD: ABNORMAL CELLS/UL
NRBC BLD-RTO: ABNORMAL /100 WBC
PHOSPHATE SERPL-MCNC: 4.2 MG/DL (ref 2.5–4.5)
PLATELET # BLD AUTO: 224 THOUSAND/UL (ref 140–400)
PMV BLD REES-ECKER: 11.6 FL (ref 7.5–12.5)
POTASSIUM SERPL-SCNC: 5.2 MMOL/L (ref 3.5–5.3)
PROMYELOCYTES # BLD: ABNORMAL CELLS/UL
PROMYELOCYTES NFR BLD MANUAL: ABNORMAL %
PROT UR-MCNC: 29 MG/DL (ref 5–25)
PROT/CREAT UR: 0.27 MG/MG CREAT (ref 0.02–0.13)
PROT/CREAT UR: 274 MG/G CREAT (ref 22–128)
RBC # BLD AUTO: 3.73 MILLION/UL (ref 4.2–5.8)
SERVICE CMNT-IMP: ABNORMAL
SODIUM SERPL-SCNC: 138 MMOL/L (ref 135–146)
VARIANT LYMPHS NFR BLD: ABNORMAL % (ref 0–10)
WBC # BLD AUTO: 4.6 THOUSAND/UL (ref 3.8–10.8)

## 2020-05-14 PROCEDURE — 99442 PR PHYSICIAN TELEPHONE EVALUATION 11-20 MIN: ICD-10-PCS | Mod: 95,,, | Performed by: INTERNAL MEDICINE

## 2020-05-14 PROCEDURE — 99442 PR PHYSICIAN TELEPHONE EVALUATION 11-20 MIN: CPT | Mod: 95,,, | Performed by: INTERNAL MEDICINE

## 2020-05-14 NOTE — PROGRESS NOTES
Subjective:       Patient ID: Dejuan Diaz Jr. is a 39 y.o. White male who presents for return patient evaluation for chronic renal failure.    The patient location is:  Patient Home   The chief complaint leading to consultation is: ckd  Visit type: Virtual visit with synchronous audio  Total time spent with patient: 15 minutes  Each patient to whom he or she provides medical services by telemedicine is:  (1) informed of the relationship between the physician and patient and the respective role of any other health care provider with respect to management of the patient; and (2) notified that he or she may decline to receive medical services by telemedicine and may withdraw from such care at any time.     He has no uremic or urinary symptoms and is in his usual state of health.  There have been no recent illnesses, hospitalizations or procedures.  He has been feeling better and reports that he has significantly less stress since he is not doing his usual job duties.  His blood pressure is much better as well.      Review of Systems   Constitutional: Positive for fatigue. Negative for appetite change, chills and fever.   Eyes: Negative for visual disturbance.   Respiratory: Positive for shortness of breath (VICTORIA on occasion). Negative for cough.    Cardiovascular: Positive for leg swelling. Negative for chest pain.   Gastrointestinal: Negative for diarrhea, nausea and vomiting.   Genitourinary: Negative for difficulty urinating, dysuria and hematuria.        ED   Musculoskeletal: Negative for myalgias.   Skin: Negative for rash.   Neurological: Negative for headaches.   Psychiatric/Behavioral: Negative for sleep disturbance.       The past medical, family and social histories were reviewed for this encounter.     BP (!) 140/80     Objective:      Physical Exam   Vitals reviewed.     Assessment:       1. Chronic kidney disease, stage IV (severe)    2. Essential hypertension    3. Type 1 diabetes mellitus with  nephropathy    4. Secondary renal hyperparathyroidism    5. Proteinuria, unspecified type        Plan:   Return to clinic in 2 months.  Labs for next visit include rp, pth, upc, cbc.  He gets his labs at [facility] in [place].  Baseline creatinine is unclear.  He has had labile function for no obviuos reason.  He does not have CHF, obstructive uropathy, chronic NSAID use.  UPC is 1.0.  PTH is 93 with a calcium of 9.1.  Renal US shows R 11.4 cm, L 12.2 cm.  We discussed dietary modification and weight loss.  Please avoid or minimize all NSAIDS (ibuprofen, motrin, aleve, indocin, naprosyn) to minimize the risk to your kidneys.  Aspirin in a dose of 325 mg or less a day is likely the safest with regards to kidney function.  If you are able to take aspirin and do not have any bleeding problems or ulcers, this may be your best therapy.  Alternatively, acetaminophen (Tylenol) is the safer than NSAIDs with regards to kidney function.  I would ask you take this as directed on the bottle.  It is best to stay under 2 grams a day (4-500 mg tablets a day maximum).  Transplant referral when GFR < 20.  He has seen [facility].  We discussed kidney transplant versus kidney/pancreas transplant.

## 2020-05-15 LAB — PTH-INTACT SERPL-MCNC: 131 PG/ML (ref 14–64)

## 2020-06-08 ENCOUNTER — DOCUMENTATION ONLY (OUTPATIENT)
Dept: NEPHROLOGY | Facility: CLINIC | Age: 40
End: 2020-06-08

## 2020-08-06 ENCOUNTER — PATIENT MESSAGE (OUTPATIENT)
Dept: NEPHROLOGY | Facility: CLINIC | Age: 40
End: 2020-08-06

## 2020-09-11 ENCOUNTER — PATIENT MESSAGE (OUTPATIENT)
Dept: NEPHROLOGY | Facility: CLINIC | Age: 40
End: 2020-09-11

## 2020-09-11 RX ORDER — DOXYCYCLINE 100 MG/1
100 CAPSULE ORAL 2 TIMES DAILY
Qty: 14 CAPSULE | Refills: 0 | Status: SHIPPED | OUTPATIENT
Start: 2020-09-11 | End: 2023-04-25 | Stop reason: ALTCHOICE

## 2020-09-11 NOTE — TELEPHONE ENCOUNTER
Left a message for the pt to call us back or send us a my chart message on the day and time preferred for the f/u visit and if he had to have another audio only visit.

## 2020-10-05 ENCOUNTER — TELEPHONE (OUTPATIENT)
Dept: NEPHROLOGY | Facility: CLINIC | Age: 40
End: 2020-10-05

## 2020-10-05 NOTE — TELEPHONE ENCOUNTER
Left a message for the pt to call us to let us know if he had gotten his labs done at [facility] for Dr Mckinney.  He has a virtual visit scheduled for today at 4:20 pm

## 2020-12-30 RX ORDER — NEBIVOLOL HYDROCHLORIDE 20 MG/1
20 TABLET ORAL 2 TIMES DAILY
Qty: 180 TABLET | Refills: 1 | Status: CANCELLED | OUTPATIENT
Start: 2020-12-30

## 2020-12-31 RX ORDER — BISOPROLOL FUMARATE 10 MG/1
TABLET, FILM COATED ORAL
OUTPATIENT
Start: 2020-12-31 | End: 2021-12-31

## 2021-01-05 ENCOUNTER — PATIENT MESSAGE (OUTPATIENT)
Dept: ADMINISTRATIVE | Facility: OTHER | Age: 41
End: 2021-01-05

## 2021-01-05 DIAGNOSIS — N18.4 CHRONIC KIDNEY DISEASE, STAGE IV (SEVERE): Primary | ICD-10-CM

## 2021-01-05 RX ORDER — NEBIVOLOL HYDROCHLORIDE 20 MG/1
20 TABLET ORAL DAILY
Qty: 90 TABLET | Refills: 1 | Status: SHIPPED | OUTPATIENT
Start: 2021-01-05 | End: 2021-02-26 | Stop reason: SDUPTHER

## 2021-01-05 RX ORDER — NEBIVOLOL HYDROCHLORIDE 20 MG/1
20 TABLET ORAL 2 TIMES DAILY
Qty: 180 TABLET | Refills: 1 | Status: CANCELLED | OUTPATIENT
Start: 2021-01-05

## 2021-01-28 ENCOUNTER — TELEPHONE (OUTPATIENT)
Dept: NEPHROLOGY | Facility: CLINIC | Age: 41
End: 2021-01-28

## 2021-01-29 LAB
ALBUMIN SERPL-MCNC: 4.2 G/DL (ref 3.6–5.1)
BUN SERPL-MCNC: 40 MG/DL (ref 7–25)
BUN/CREAT SERPL: 15 (CALC) (ref 6–22)
CALCIUM SERPL-MCNC: 9 MG/DL (ref 8.6–10.3)
CHLORIDE SERPL-SCNC: 101 MMOL/L (ref 98–110)
CO2 SERPL-SCNC: 26 MMOL/L (ref 20–32)
CREAT SERPL-MCNC: 2.62 MG/DL (ref 0.6–1.35)
GFRSERPLBLD MDRD-ARVRAT: 29 ML/MIN/1.73M2
GLUCOSE SERPL-MCNC: 422 MG/DL (ref 65–99)
PHOSPHATE SERPL-MCNC: 3.7 MG/DL (ref 2.5–4.5)
POTASSIUM SERPL-SCNC: 4.9 MMOL/L (ref 3.5–5.3)
SODIUM SERPL-SCNC: 133 MMOL/L (ref 135–146)

## 2021-02-02 ENCOUNTER — PATIENT MESSAGE (OUTPATIENT)
Dept: NEPHROLOGY | Facility: CLINIC | Age: 41
End: 2021-02-02

## 2021-02-26 ENCOUNTER — OFFICE VISIT (OUTPATIENT)
Dept: NEPHROLOGY | Facility: CLINIC | Age: 41
End: 2021-02-26
Payer: COMMERCIAL

## 2021-02-26 DIAGNOSIS — I10 ESSENTIAL HYPERTENSION: ICD-10-CM

## 2021-02-26 DIAGNOSIS — E10.21 TYPE 1 DIABETES MELLITUS WITH NEPHROPATHY: ICD-10-CM

## 2021-02-26 DIAGNOSIS — N25.81 SECONDARY RENAL HYPERPARATHYROIDISM: ICD-10-CM

## 2021-02-26 DIAGNOSIS — R80.9 PROTEINURIA, UNSPECIFIED TYPE: ICD-10-CM

## 2021-02-26 DIAGNOSIS — N18.4 CHRONIC KIDNEY DISEASE, STAGE IV (SEVERE): Primary | ICD-10-CM

## 2021-02-26 PROCEDURE — 99214 PR OFFICE/OUTPT VISIT, EST, LEVL IV, 30-39 MIN: ICD-10-PCS | Mod: 95,,, | Performed by: INTERNAL MEDICINE

## 2021-02-26 PROCEDURE — 99214 OFFICE O/P EST MOD 30 MIN: CPT | Mod: 95,,, | Performed by: INTERNAL MEDICINE

## 2021-02-26 RX ORDER — LOSARTAN POTASSIUM 100 MG/1
100 TABLET ORAL DAILY
Qty: 90 TABLET | Refills: 1 | Status: SHIPPED | OUTPATIENT
Start: 2021-02-26 | End: 2022-03-10

## 2021-02-26 RX ORDER — CALCITRIOL 0.25 UG/1
0.25 CAPSULE ORAL DAILY
Qty: 90 CAPSULE | Refills: 1 | Status: SHIPPED | OUTPATIENT
Start: 2021-02-26 | End: 2021-07-02 | Stop reason: SDUPTHER

## 2021-02-26 RX ORDER — NEBIVOLOL HYDROCHLORIDE 20 MG/1
20 TABLET ORAL DAILY
Qty: 90 TABLET | Refills: 1 | Status: SHIPPED | OUTPATIENT
Start: 2021-02-26 | End: 2021-07-02 | Stop reason: SDUPTHER

## 2021-02-26 RX ORDER — NIFEDIPINE 60 MG/1
60 TABLET, EXTENDED RELEASE ORAL 2 TIMES DAILY
Qty: 180 TABLET | Refills: 1 | Status: SHIPPED | OUTPATIENT
Start: 2021-02-26 | End: 2023-04-25

## 2021-02-26 RX ORDER — FUROSEMIDE 40 MG/1
TABLET ORAL
Qty: 180 TABLET | Refills: 1 | Status: SHIPPED | OUTPATIENT
Start: 2021-02-26 | End: 2023-08-07

## 2021-04-21 ENCOUNTER — PATIENT MESSAGE (OUTPATIENT)
Dept: NEPHROLOGY | Facility: CLINIC | Age: 41
End: 2021-04-21

## 2021-07-02 ENCOUNTER — OFFICE VISIT (OUTPATIENT)
Dept: NEPHROLOGY | Facility: CLINIC | Age: 41
End: 2021-07-02
Payer: COMMERCIAL

## 2021-07-02 DIAGNOSIS — N25.81 SECONDARY RENAL HYPERPARATHYROIDISM: ICD-10-CM

## 2021-07-02 DIAGNOSIS — E10.21 TYPE 1 DIABETES MELLITUS WITH NEPHROPATHY: ICD-10-CM

## 2021-07-02 DIAGNOSIS — I10 ESSENTIAL HYPERTENSION: ICD-10-CM

## 2021-07-02 DIAGNOSIS — N18.4 CHRONIC KIDNEY DISEASE, STAGE IV (SEVERE): Primary | ICD-10-CM

## 2021-07-02 PROCEDURE — 99214 OFFICE O/P EST MOD 30 MIN: CPT | Mod: 95,,, | Performed by: INTERNAL MEDICINE

## 2021-07-02 PROCEDURE — 99214 PR OFFICE/OUTPT VISIT, EST, LEVL IV, 30-39 MIN: ICD-10-PCS | Mod: 95,,, | Performed by: INTERNAL MEDICINE

## 2021-07-02 RX ORDER — NEBIVOLOL HYDROCHLORIDE 20 MG/1
20 TABLET ORAL DAILY
Qty: 90 TABLET | Refills: 1 | Status: SHIPPED | OUTPATIENT
Start: 2021-07-02 | End: 2023-08-23 | Stop reason: SDUPTHER

## 2021-07-02 RX ORDER — CALCITRIOL 0.25 UG/1
0.25 CAPSULE ORAL DAILY
Qty: 90 CAPSULE | Refills: 1 | Status: SHIPPED | OUTPATIENT
Start: 2021-07-02 | End: 2023-08-23 | Stop reason: SDUPTHER

## 2021-07-20 ENCOUNTER — PATIENT MESSAGE (OUTPATIENT)
Dept: NEPHROLOGY | Facility: CLINIC | Age: 41
End: 2021-07-20

## 2021-07-31 LAB
ALBUMIN SERPL-MCNC: 3.8 G/DL (ref 3.6–5.1)
BASOPHILS # BLD AUTO: 68 CELLS/UL (ref 0–200)
BASOPHILS NFR BLD AUTO: 1.1 %
BLASTS # BLD: ABNORMAL CELLS/UL
BLASTS NFR BLD MANUAL: ABNORMAL %
BUN SERPL-MCNC: 25 MG/DL (ref 7–25)
BUN/CREAT SERPL: 11 (CALC) (ref 6–22)
CALCIUM SERPL-MCNC: 8.6 MG/DL (ref 8.6–10.3)
CHLORIDE SERPL-SCNC: 107 MMOL/L (ref 98–110)
CO2 SERPL-SCNC: 25 MMOL/L (ref 20–32)
CREAT SERPL-MCNC: 2.32 MG/DL (ref 0.6–1.35)
CREAT UR-MCNC: 63 MG/DL (ref 20–320)
EOSINOPHIL # BLD AUTO: 112 CELLS/UL (ref 15–500)
EOSINOPHIL NFR BLD AUTO: 1.8 %
ERYTHROCYTE [DISTWIDTH] IN BLOOD BY AUTOMATED COUNT: 13.4 % (ref 11–15)
GLUCOSE SERPL-MCNC: 177 MG/DL (ref 65–139)
HCT VFR BLD AUTO: 33.5 % (ref 38.5–50)
HGB BLD-MCNC: 11.5 G/DL (ref 13.2–17.1)
LYMPHOCYTES # BLD AUTO: 1587 CELLS/UL (ref 850–3900)
LYMPHOCYTES NFR BLD AUTO: 25.6 %
MCH RBC QN AUTO: 29.6 PG (ref 27–33)
MCHC RBC AUTO-ENTMCNC: 34.3 G/DL (ref 32–36)
MCV RBC AUTO: 86.1 FL (ref 80–100)
METAMYELOCYTES # BLD: ABNORMAL CELLS/UL
METAMYELOCYTES NFR BLD MANUAL: ABNORMAL %
MONOCYTES # BLD AUTO: 391 CELLS/UL (ref 200–950)
MONOCYTES NFR BLD AUTO: 6.3 %
MYELOCYTES # BLD: ABNORMAL CELLS/UL
MYELOCYTES NFR BLD MANUAL: ABNORMAL %
NEUTROPHILS # BLD AUTO: 4042 CELLS/UL (ref 1500–7800)
NEUTROPHILS NFR BLD AUTO: 65.2 %
NEUTS BAND # BLD: ABNORMAL CELLS/UL (ref 0–750)
NEUTS BAND NFR BLD MANUAL: ABNORMAL %
NRBC # BLD: ABNORMAL CELLS/UL
NRBC BLD-RTO: ABNORMAL /100 WBC
PHOSPHATE SERPL-MCNC: 3.4 MG/DL (ref 2.5–4.5)
PLATELET # BLD AUTO: 329 THOUSAND/UL (ref 140–400)
PMV BLD REES-ECKER: 9.9 FL (ref 7.5–12.5)
POTASSIUM SERPL-SCNC: 4.7 MMOL/L (ref 3.5–5.3)
PROMYELOCYTES # BLD: ABNORMAL CELLS/UL
PROMYELOCYTES NFR BLD MANUAL: ABNORMAL %
PROT UR-MCNC: 157 MG/DL (ref 5–25)
PROT/CREAT UR: 2.49 MG/MG CREAT (ref 0.02–0.13)
PROT/CREAT UR: 2492 MG/G CREAT (ref 22–128)
RBC # BLD AUTO: 3.89 MILLION/UL (ref 4.2–5.8)
SERVICE CMNT-IMP: ABNORMAL
SODIUM SERPL-SCNC: 138 MMOL/L (ref 135–146)
VARIANT LYMPHS NFR BLD: ABNORMAL % (ref 0–10)
WBC # BLD AUTO: 6.2 THOUSAND/UL (ref 3.8–10.8)

## 2021-08-02 LAB — PTH-INTACT SERPL-MCNC: 146 PG/ML (ref 14–64)

## 2023-04-25 ENCOUNTER — OFFICE VISIT (OUTPATIENT)
Dept: NEPHROLOGY | Facility: CLINIC | Age: 43
End: 2023-04-25
Payer: COMMERCIAL

## 2023-04-25 DIAGNOSIS — N25.81 SECONDARY RENAL HYPERPARATHYROIDISM: ICD-10-CM

## 2023-04-25 DIAGNOSIS — N18.4 CHRONIC KIDNEY DISEASE, STAGE IV (SEVERE): Primary | ICD-10-CM

## 2023-04-25 DIAGNOSIS — I10 PRIMARY HYPERTENSION: ICD-10-CM

## 2023-04-25 DIAGNOSIS — E10.21 TYPE 1 DIABETES MELLITUS WITH NEPHROPATHY: ICD-10-CM

## 2023-04-25 PROCEDURE — 99213 OFFICE O/P EST LOW 20 MIN: CPT | Mod: PBBFAC,PN | Performed by: INTERNAL MEDICINE

## 2023-04-25 PROCEDURE — 99215 PR OFFICE/OUTPT VISIT, EST, LEVL V, 40-54 MIN: ICD-10-PCS | Mod: S$GLB,,, | Performed by: INTERNAL MEDICINE

## 2023-04-25 PROCEDURE — 99999 PR PBB SHADOW E&M-EST. PATIENT-LVL III: ICD-10-PCS | Mod: PBBFAC,,, | Performed by: INTERNAL MEDICINE

## 2023-04-25 PROCEDURE — 99999 PR PBB SHADOW E&M-EST. PATIENT-LVL III: CPT | Mod: PBBFAC,,, | Performed by: INTERNAL MEDICINE

## 2023-04-25 PROCEDURE — 99215 OFFICE O/P EST HI 40 MIN: CPT | Mod: S$GLB,,, | Performed by: INTERNAL MEDICINE

## 2023-04-25 RX ORDER — INSULIN LISPRO 100 [IU]/ML
INJECTION, SOLUTION INTRAVENOUS; SUBCUTANEOUS
COMMUNITY

## 2023-04-25 RX ORDER — ATORVASTATIN CALCIUM 40 MG/1
40 TABLET, FILM COATED ORAL DAILY
COMMUNITY

## 2023-04-25 RX ORDER — ICOSAPENT ETHYL 1000 MG/1
2 CAPSULE ORAL 4 TIMES DAILY
COMMUNITY

## 2023-04-25 NOTE — PROGRESS NOTES
Subjective:       Patient ID: Dejuan Diaz Jr. is a 42 y.o. White male who presents for return patient evaluation for chronic renal failure.      He has been lost to follow-up.  He has no uremic or urinary symptoms and is in his usual state of health.  He had COVID in April 2021.  He reports that he has been dealing with a divorce and both of his parents having cancer.  His diabetes is much better now that he is on an omnipod.  He has had more anxiety lately due to his family issues.  He is now travelling much more now due to a change in his job as well.        Review of Systems   Constitutional:  Positive for fatigue. Negative for appetite change, chills and fever.   Eyes:  Negative for visual disturbance.   Respiratory:  Positive for shortness of breath (mild with exertion). Negative for cough.    Cardiovascular:  Negative for chest pain and leg swelling.   Gastrointestinal:  Negative for diarrhea, nausea and vomiting.   Genitourinary:  Negative for difficulty urinating, dysuria and hematuria.        ED   Musculoskeletal:  Positive for gait problem (occasional leg heaviness). Negative for myalgias.   Skin:  Negative for rash.   Neurological:  Positive for headaches.   Psychiatric/Behavioral:  Negative for sleep disturbance. The patient is nervous/anxious.        The past medical, family and social histories were reviewed for this encounter.     The patient's last visit with me was on 7/2/2021.     There were no vitals taken for this visit.    Objective:      Physical Exam  Vitals reviewed.   Constitutional:       General: He is not in acute distress.     Appearance: He is well-developed.   HENT:      Head: Normocephalic and atraumatic.   Eyes:      General: No scleral icterus.  Pulmonary:      Effort: Pulmonary effort is normal. No respiratory distress.   Neurological:      Mental Status: He is alert and oriented to person, place, and time.   Psychiatric:         Mood and Affect: Mood normal.         Behavior:  Behavior normal.      Assessment:       1. Chronic kidney disease, stage IV (severe)    2. Primary hypertension    3. Type 1 diabetes mellitus with nephropathy    4. Secondary renal hyperparathyroidism        Lab Results   Component Value Date    CREATININE 2.32 (H) 07/30/2021    BUN 25 07/30/2021     07/30/2021    K 4.7 07/30/2021     07/30/2021    CO2 25 07/30/2021     Lab Results   Component Value Date     (H) 07/30/2021    CALCIUM 8.6 07/30/2021     Lab Results   Component Value Date    HCT 33.5 (L) 07/30/2021     No results found for: UTPCR    Plan:   Return to clinic in 4 months.  Labs for next visit and today include rp, pth, upc, cbc.  He gets his labs at [facility] in [place].  Baseline creatinine is unclear.  He has had labile function for no obviuos reason.  He does not have CHF, obstructive uropathy, chronic NSAID use.  UPC is 1.0.  PTH is 93 with a calcium of 9.1.  Renal US shows R 11.4 cm, L 12.2 cm.  We discussed dietary modification and weight loss.  Please avoid or minimize all NSAIDS (ibuprofen, motrin, aleve, indocin, naprosyn) to minimize the risk to your kidneys.  Aspirin in a dose of 325 mg or less a day is likely the safest with regards to kidney function.  If you are able to take aspirin and do not have any bleeding problems or ulcers, this may be your best therapy.  Alternatively, acetaminophen (Tylenol) is the safer than NSAIDs with regards to kidney function.  I would ask you take this as directed on the bottle.  It is best to stay under 2 grams a day (4-500 mg tablets a day maximum).  Transplant referral when GFR < 20.  He has seen [facility].  We discussed kidney transplant versus kidney/pancreas transplant.  We will need to get new labs on him.  He is on losartan and bystolic currently for blood pressure and is no longer on nifedipine.

## 2023-08-05 PROBLEM — I10 ESSENTIAL HYPERTENSION: Status: ACTIVE | Noted: 2023-08-05

## 2023-08-05 PROBLEM — E78.2 MIXED HYPERLIPIDEMIA: Status: ACTIVE | Noted: 2023-08-05

## 2023-08-05 NOTE — PROGRESS NOTES
Subjective:    Patient ID:  Dejuan Diaz Jr. is a 42 y.o. male who presents for evaluation of Hyperlipidemia, Hypertension, and Shortness of Breath      Problem List Items Addressed This Visit          Cardiac/Vascular    Essential hypertension - Primary    Mixed hyperlipidemia     Other Visit Diagnoses       VICTORIA (dyspnea on exertion)        Chronic kidney disease, stage IV (severe)                HPI    Referred by Dr. Mckinney    The patient states that he has been having issues with feeling poorly recently. BP fluctuating dramatically, 120s recently, today 200.    A left sided feeling he attributes anxiety     Personal history of heart attack or stroke - None that he is aware of  Family history of heart disease - Mom with CABG x 4, dad with stents both in their 60s    History reviewed. No pertinent past medical history.    History reviewed. No pertinent surgical history.    History reviewed. No pertinent family history.    Social History     Socioeconomic History    Marital status:    Tobacco Use    Smoking status: Never    Smokeless tobacco: Never   Substance and Sexual Activity    Alcohol use: No    Drug use: No    Sexual activity: Yes       Review of patient's allergies indicates:   Allergen Reactions    Codeine     Vancomycin analogues        Review of Systems   Constitutional: Negative for decreased appetite, fever and malaise/fatigue.   Eyes:  Negative for blurred vision.   Cardiovascular:  Negative for chest pain, dyspnea on exertion, irregular heartbeat and leg swelling.   Respiratory:  Negative for cough, hemoptysis, shortness of breath and wheezing.    Endocrine: Negative for cold intolerance and heat intolerance.   Hematologic/Lymphatic: Negative for bleeding problem.   Musculoskeletal:  Negative for muscle weakness and myalgias.   Gastrointestinal:  Negative for abdominal pain, constipation and diarrhea.   Genitourinary:  Negative for bladder incontinence.   Neurological:  Negative for  "dizziness and weakness.   Psychiatric/Behavioral:  Negative for depression.         Objective:     Vitals:    08/07/23 1101 08/07/23 1110   BP: (!) 205/105 (!) 198/101   BP Location: Left arm Right arm   Patient Position: Sitting Sitting   BP Method: Medium (Automatic) Large (Automatic)   Pulse: 62 62   Weight: 104 kg (229 lb 4.5 oz)    Height: 6' 1" (1.854 m)        BP Readings from Last 5 Encounters:   08/07/23 (!) 198/101   05/14/20 (!) 140/80   02/05/20 (!) 144/83   11/06/19 (!) 177/101   08/07/19 (!) 170/100        Physical Exam  Constitutional:       Appearance: He is well-developed.   HENT:      Head: Normocephalic and atraumatic.   Neck:      Vascular: No JVD.   Cardiovascular:      Rate and Rhythm: Normal rate and regular rhythm.      Heart sounds: Normal heart sounds. No murmur heard.     No friction rub. No gallop.   Pulmonary:      Effort: Pulmonary effort is normal. No respiratory distress.      Breath sounds: Normal breath sounds. No wheezing or rales.   Abdominal:      General: Bowel sounds are normal.      Palpations: Abdomen is soft.      Tenderness: There is no abdominal tenderness. There is no guarding or rebound.   Musculoskeletal:      Cervical back: Normal range of motion and neck supple.   Skin:     General: Skin is warm and dry.   Neurological:      Mental Status: He is alert and oriented to person, place, and time.   Psychiatric:         Behavior: Behavior normal.             Current Outpatient Medications   Medication Instructions    apple cider vinegar 1,200 mg, Oral, Daily    aspirin (ECOTRIN) 81 mg, Oral, Daily    atorvastatin (LIPITOR) 40 mg, Oral, Daily    blood-glucose meter,continuous (DEXCOM G6  MISC) Misc.(Non-Drug; Combo Route)    BYSTOLIC 20 mg, Oral, Daily    calcitRIOL (ROCALTROL) 0.25 mcg, Oral, Daily    cinnamon bark (CINNAMON ORAL) 2,000 mg, Oral, Daily    coenzyme Q10 200 mg, Oral, Daily    fenofibrate 160 MG Tab TK 1 T PO D    furosemide (LASIX) 40 MG tablet TAKE 1 " "TABLET(40 MG) BY MOUTH TWICE DAILY. DO NOT TAKE THE PM DOSE AFTER 3 PM    icosapent ethyL (VASCEPA) 2 g, Oral, 4 times daily    insulin lispro 100 unit/mL injection Subcutaneous, 3 times daily before meals, 100 units    insulin pump cart,auto,BT/cntr (OMNIPOD 5 G6 INTRO KIT, GEN 5, SUBQ) Subcutaneous, 200 units per pod    losartan (COZAAR) 100 MG tablet TAKE 1 TABLET(100 MG) BY MOUTH EVERY DAY    multivit-min/folic/vit K/lycop (ONE-A-DAY MEN'S MULTIVITAMIN ORAL) Oral, Daily    omega-3 fatty acids-fish oil 360-1,200 mg Cap Oral, Daily    tadalafil (CIALIS) 5 MG tablet TK 1 T PO D PRF ERECTILE DYSFUNCTION    turmeric root extract 500 mg, Oral, Daily       Lipid Panel:   No results found for: "CHOL", "HDL", "LDLCALC", "TRIG", "CHOLHDL"      The ASCVD Risk score (Lesvia DK, et al., 2019) failed to calculate for the following reasons:    Cannot find a previous HDL lab    Cannot find a previous total cholesterol lab    All pertinent labs, imaging, and EKGs reviewed.  Patient's most recent EKG tracing was personally interpreted by this provider.    Most Recent EKG Results  No results found for this or any previous visit.    Most Recent Echocardiogram Results  No results found for this or any previous visit.      Most Recent Nuclear Stress Test Results  No results found for this or any previous visit.      Most Recent Cardiac PET Stress Test Results  No results found for this or any previous visit.      Most Recent Cardiovascular Angiogram results  No results found for this or any previous visit.      Other Most Recent Cardiology Results  No results found for this or any previous visit.      OSH Lipid Panel    HDL 24  LDL 72           Assessment:       1. Essential hypertension    2. Mixed hyperlipidemia    3. VICTORIA (dyspnea on exertion)    4. Chronic kidney disease, stage IV (severe)         Plan:     Symptoms of atypical chest pain  BP very elevated today  No Danger signs   Most recent EKG reviewed personally "     Echocardiogram    Exercise Nuclear stress test   CT Calcium Score   Start amlodipine 2.5mg PO Daily - Educated on risks/benefits of medication   Continue aspirin 81 mg PO Daily  Continue atorvastatin 40 mg PO Daily   Continue Bystolic 20 mg PO Daily  Continue fenofibrate 160 mg PO Daily  Continue losartan 100 mg PO Daily  Taking Vascepa 2g 4 times day, being managed by Elizabeth Oshea    Continue other cardiac medications  Mediterranean Diet/Cardiovascular Exercise Program    Patient queried and all questions were answered.    F/u in 4-6 months to reassess      Signed:    Dick Pitts MD  8/7/2023 3:27 PM

## 2023-08-07 ENCOUNTER — OFFICE VISIT (OUTPATIENT)
Dept: CARDIOLOGY | Facility: CLINIC | Age: 43
End: 2023-08-07
Payer: COMMERCIAL

## 2023-08-07 VITALS
BODY MASS INDEX: 30.38 KG/M2 | HEART RATE: 62 BPM | HEIGHT: 73 IN | SYSTOLIC BLOOD PRESSURE: 198 MMHG | DIASTOLIC BLOOD PRESSURE: 101 MMHG | WEIGHT: 229.25 LBS

## 2023-08-07 DIAGNOSIS — R06.09 DOE (DYSPNEA ON EXERTION): ICD-10-CM

## 2023-08-07 DIAGNOSIS — N18.4 CHRONIC KIDNEY DISEASE, STAGE IV (SEVERE): ICD-10-CM

## 2023-08-07 DIAGNOSIS — R06.02 SHORTNESS OF BREATH: ICD-10-CM

## 2023-08-07 DIAGNOSIS — I10 ESSENTIAL HYPERTENSION: Primary | ICD-10-CM

## 2023-08-07 DIAGNOSIS — R07.89 ATYPICAL CHEST PAIN: ICD-10-CM

## 2023-08-07 DIAGNOSIS — Z13.6 ENCOUNTER FOR SCREENING FOR CARDIOVASCULAR DISORDERS: ICD-10-CM

## 2023-08-07 DIAGNOSIS — E78.2 MIXED HYPERLIPIDEMIA: ICD-10-CM

## 2023-08-07 PROCEDURE — 4010F ACE/ARB THERAPY RXD/TAKEN: CPT | Mod: CPTII,S$GLB,, | Performed by: INTERNAL MEDICINE

## 2023-08-07 PROCEDURE — 99204 PR OFFICE/OUTPT VISIT, NEW, LEVL IV, 45-59 MIN: ICD-10-PCS | Mod: 25,S$GLB,, | Performed by: INTERNAL MEDICINE

## 2023-08-07 PROCEDURE — 3080F DIAST BP >= 90 MM HG: CPT | Mod: CPTII,S$GLB,, | Performed by: INTERNAL MEDICINE

## 2023-08-07 PROCEDURE — 99204 OFFICE O/P NEW MOD 45 MIN: CPT | Mod: 25,S$GLB,, | Performed by: INTERNAL MEDICINE

## 2023-08-07 PROCEDURE — 3008F PR BODY MASS INDEX (BMI) DOCUMENTED: ICD-10-PCS | Mod: CPTII,S$GLB,, | Performed by: INTERNAL MEDICINE

## 2023-08-07 PROCEDURE — 99999 PR PBB SHADOW E&M-EST. PATIENT-LVL V: ICD-10-PCS | Mod: PBBFAC,,, | Performed by: INTERNAL MEDICINE

## 2023-08-07 PROCEDURE — 93005 ELECTROCARDIOGRAM TRACING: CPT | Mod: PO

## 2023-08-07 PROCEDURE — 3080F PR MOST RECENT DIASTOLIC BLOOD PRESSURE >= 90 MM HG: ICD-10-PCS | Mod: CPTII,S$GLB,, | Performed by: INTERNAL MEDICINE

## 2023-08-07 PROCEDURE — 3077F PR MOST RECENT SYSTOLIC BLOOD PRESSURE >= 140 MM HG: ICD-10-PCS | Mod: CPTII,S$GLB,, | Performed by: INTERNAL MEDICINE

## 2023-08-07 PROCEDURE — 93010 ELECTROCARDIOGRAM REPORT: CPT | Mod: S$GLB,,, | Performed by: INTERNAL MEDICINE

## 2023-08-07 PROCEDURE — 3066F NEPHROPATHY DOC TX: CPT | Mod: CPTII,S$GLB,, | Performed by: INTERNAL MEDICINE

## 2023-08-07 PROCEDURE — 3066F PR DOCUMENTATION OF TREATMENT FOR NEPHROPATHY: ICD-10-PCS | Mod: CPTII,S$GLB,, | Performed by: INTERNAL MEDICINE

## 2023-08-07 PROCEDURE — 99999 PR PBB SHADOW E&M-EST. PATIENT-LVL V: CPT | Mod: PBBFAC,,, | Performed by: INTERNAL MEDICINE

## 2023-08-07 PROCEDURE — 93010 EKG 12-LEAD: ICD-10-PCS | Mod: S$GLB,,, | Performed by: INTERNAL MEDICINE

## 2023-08-07 PROCEDURE — 1160F RVW MEDS BY RX/DR IN RCRD: CPT | Mod: CPTII,S$GLB,, | Performed by: INTERNAL MEDICINE

## 2023-08-07 PROCEDURE — 3008F BODY MASS INDEX DOCD: CPT | Mod: CPTII,S$GLB,, | Performed by: INTERNAL MEDICINE

## 2023-08-07 PROCEDURE — 3077F SYST BP >= 140 MM HG: CPT | Mod: CPTII,S$GLB,, | Performed by: INTERNAL MEDICINE

## 2023-08-07 PROCEDURE — 1159F PR MEDICATION LIST DOCUMENTED IN MEDICAL RECORD: ICD-10-PCS | Mod: CPTII,S$GLB,, | Performed by: INTERNAL MEDICINE

## 2023-08-07 PROCEDURE — 1159F MED LIST DOCD IN RCRD: CPT | Mod: CPTII,S$GLB,, | Performed by: INTERNAL MEDICINE

## 2023-08-07 PROCEDURE — 4010F PR ACE/ARB THEARPY RXD/TAKEN: ICD-10-PCS | Mod: CPTII,S$GLB,, | Performed by: INTERNAL MEDICINE

## 2023-08-07 PROCEDURE — 1160F PR REVIEW ALL MEDS BY PRESCRIBER/CLIN PHARMACIST DOCUMENTED: ICD-10-PCS | Mod: CPTII,S$GLB,, | Performed by: INTERNAL MEDICINE

## 2023-08-07 RX ORDER — AMLODIPINE BESYLATE 2.5 MG/1
2.5 TABLET ORAL DAILY
Qty: 90 TABLET | Refills: 3 | Status: SHIPPED | OUTPATIENT
Start: 2023-08-07 | End: 2023-08-18

## 2023-08-14 ENCOUNTER — LAB VISIT (OUTPATIENT)
Dept: LAB | Facility: CLINIC | Age: 43
End: 2023-08-14
Payer: COMMERCIAL

## 2023-08-14 DIAGNOSIS — N18.4 CHRONIC KIDNEY DISEASE, STAGE IV (SEVERE): ICD-10-CM

## 2023-08-14 DIAGNOSIS — I10 PRIMARY HYPERTENSION: ICD-10-CM

## 2023-08-14 DIAGNOSIS — E10.21 TYPE 1 DIABETES MELLITUS WITH NEPHROPATHY: ICD-10-CM

## 2023-08-14 LAB
ALBUMIN SERPL BCP-MCNC: 3.3 G/DL (ref 3.5–5.2)
ANION GAP SERPL CALC-SCNC: 11 MMOL/L (ref 8–16)
BUN SERPL-MCNC: 55 MG/DL (ref 6–20)
CALCIUM SERPL-MCNC: 8.5 MG/DL (ref 8.7–10.5)
CHLORIDE SERPL-SCNC: 114 MMOL/L (ref 95–110)
CO2 SERPL-SCNC: 16 MMOL/L (ref 23–29)
CREAT SERPL-MCNC: 5.6 MG/DL (ref 0.5–1.4)
CREAT UR-MCNC: 76.5 MG/DL (ref 23–375)
EST. GFR  (NO RACE VARIABLE): 12.2 ML/MIN/1.73 M^2
GLUCOSE SERPL-MCNC: 98 MG/DL (ref 70–110)
PHOSPHATE SERPL-MCNC: 5.9 MG/DL (ref 2.7–4.5)
POTASSIUM SERPL-SCNC: 4.7 MMOL/L (ref 3.5–5.1)
PROT UR-MCNC: 229 MG/DL (ref 0–15)
PROT/CREAT UR: 2.99 MG/G{CREAT} (ref 0–0.2)
PTH-INTACT SERPL-MCNC: 369.8 PG/ML (ref 9–77)
SODIUM SERPL-SCNC: 141 MMOL/L (ref 136–145)

## 2023-08-14 PROCEDURE — 80069 RENAL FUNCTION PANEL: CPT | Performed by: INTERNAL MEDICINE

## 2023-08-14 PROCEDURE — 84156 ASSAY OF PROTEIN URINE: CPT | Performed by: INTERNAL MEDICINE

## 2023-08-14 PROCEDURE — 83970 ASSAY OF PARATHORMONE: CPT | Performed by: INTERNAL MEDICINE

## 2023-08-16 ENCOUNTER — PATIENT MESSAGE (OUTPATIENT)
Dept: NEPHROLOGY | Facility: CLINIC | Age: 43
End: 2023-08-16
Payer: COMMERCIAL

## 2023-08-18 ENCOUNTER — OFFICE VISIT (OUTPATIENT)
Dept: NEPHROLOGY | Facility: CLINIC | Age: 43
End: 2023-08-18
Payer: COMMERCIAL

## 2023-08-18 VITALS
DIASTOLIC BLOOD PRESSURE: 92 MMHG | HEART RATE: 70 BPM | BODY MASS INDEX: 30.25 KG/M2 | HEIGHT: 73 IN | SYSTOLIC BLOOD PRESSURE: 182 MMHG

## 2023-08-18 DIAGNOSIS — N25.81 SECONDARY RENAL HYPERPARATHYROIDISM: ICD-10-CM

## 2023-08-18 DIAGNOSIS — E10.21 TYPE 1 DIABETES MELLITUS WITH NEPHROPATHY: ICD-10-CM

## 2023-08-18 DIAGNOSIS — N18.4 CHRONIC KIDNEY DISEASE, STAGE IV (SEVERE): Primary | ICD-10-CM

## 2023-08-18 DIAGNOSIS — I10 PRIMARY HYPERTENSION: ICD-10-CM

## 2023-08-18 DIAGNOSIS — N17.9 AKI (ACUTE KIDNEY INJURY): ICD-10-CM

## 2023-08-18 PROCEDURE — 3077F PR MOST RECENT SYSTOLIC BLOOD PRESSURE >= 140 MM HG: ICD-10-PCS | Mod: CPTII,S$GLB,, | Performed by: INTERNAL MEDICINE

## 2023-08-18 PROCEDURE — 3066F PR DOCUMENTATION OF TREATMENT FOR NEPHROPATHY: ICD-10-PCS | Mod: CPTII,S$GLB,, | Performed by: INTERNAL MEDICINE

## 2023-08-18 PROCEDURE — 3008F PR BODY MASS INDEX (BMI) DOCUMENTED: ICD-10-PCS | Mod: CPTII,S$GLB,, | Performed by: INTERNAL MEDICINE

## 2023-08-18 PROCEDURE — 3008F BODY MASS INDEX DOCD: CPT | Mod: CPTII,S$GLB,, | Performed by: INTERNAL MEDICINE

## 2023-08-18 PROCEDURE — 99214 OFFICE O/P EST MOD 30 MIN: CPT | Mod: S$GLB,,, | Performed by: INTERNAL MEDICINE

## 2023-08-18 PROCEDURE — 3080F PR MOST RECENT DIASTOLIC BLOOD PRESSURE >= 90 MM HG: ICD-10-PCS | Mod: CPTII,S$GLB,, | Performed by: INTERNAL MEDICINE

## 2023-08-18 PROCEDURE — 3066F NEPHROPATHY DOC TX: CPT | Mod: CPTII,S$GLB,, | Performed by: INTERNAL MEDICINE

## 2023-08-18 PROCEDURE — 3077F SYST BP >= 140 MM HG: CPT | Mod: CPTII,S$GLB,, | Performed by: INTERNAL MEDICINE

## 2023-08-18 PROCEDURE — 4010F PR ACE/ARB THEARPY RXD/TAKEN: ICD-10-PCS | Mod: CPTII,S$GLB,, | Performed by: INTERNAL MEDICINE

## 2023-08-18 PROCEDURE — 4010F ACE/ARB THERAPY RXD/TAKEN: CPT | Mod: CPTII,S$GLB,, | Performed by: INTERNAL MEDICINE

## 2023-08-18 PROCEDURE — 3080F DIAST BP >= 90 MM HG: CPT | Mod: CPTII,S$GLB,, | Performed by: INTERNAL MEDICINE

## 2023-08-18 PROCEDURE — 99999 PR PBB SHADOW E&M-EST. PATIENT-LVL III: ICD-10-PCS | Mod: PBBFAC,,, | Performed by: INTERNAL MEDICINE

## 2023-08-18 PROCEDURE — 1159F PR MEDICATION LIST DOCUMENTED IN MEDICAL RECORD: ICD-10-PCS | Mod: CPTII,S$GLB,, | Performed by: INTERNAL MEDICINE

## 2023-08-18 PROCEDURE — 99999 PR PBB SHADOW E&M-EST. PATIENT-LVL III: CPT | Mod: PBBFAC,,, | Performed by: INTERNAL MEDICINE

## 2023-08-18 PROCEDURE — 1159F MED LIST DOCD IN RCRD: CPT | Mod: CPTII,S$GLB,, | Performed by: INTERNAL MEDICINE

## 2023-08-18 PROCEDURE — 99214 PR OFFICE/OUTPT VISIT, EST, LEVL IV, 30-39 MIN: ICD-10-PCS | Mod: S$GLB,,, | Performed by: INTERNAL MEDICINE

## 2023-08-18 RX ORDER — AMLODIPINE BESYLATE 5 MG/1
5 TABLET ORAL DAILY
Qty: 90 TABLET | Refills: 1 | Status: SHIPPED | OUTPATIENT
Start: 2023-08-18 | End: 2023-11-03

## 2023-08-18 NOTE — PROGRESS NOTES
"    Subjective:       Patient ID: Dejuan Diaz Jr. is a 42 y.o. White male who presents for return patient evaluation for chronic renal failure.      He has been lost to follow-up and has not had labs for two years.  He has more fatigue and reports he occasionally has chest soreness that he associated with esophageal symptoms..  He had COVID in April 2021.  He reports that he has been dealing with a divorce and both of his parents having cancer.  His diabetes is much better now that he is on an omnipod.  He has had more anxiety lately due to his family issues.  He is now travelling much more now due to a change in his job as well.        Review of Systems   Constitutional:  Positive for fatigue. Negative for appetite change, chills and fever.   Eyes:  Negative for visual disturbance.   Respiratory:  Positive for shortness of breath (mild with exertion). Negative for cough.    Cardiovascular:  Positive for chest pain (at times). Negative for leg swelling.   Gastrointestinal:  Positive for nausea (rarely associated with eosphageal symptoms). Negative for diarrhea and vomiting.   Genitourinary:  Negative for difficulty urinating, dysuria and hematuria.        ED   Musculoskeletal:  Positive for gait problem (occasional leg heaviness). Negative for myalgias.   Skin:  Negative for rash.   Neurological:  Positive for headaches.   Psychiatric/Behavioral:  Negative for sleep disturbance. The patient is nervous/anxious.        The past medical, family and social histories were reviewed for this encounter.    BP (!) 182/92 (BP Location: Left arm, Patient Position: Sitting, BP Method: X-Large (Manual))   Pulse 70   Ht 6' 1" (1.854 m)   BMI 30.25 kg/m²     Objective:      Physical Exam  Vitals reviewed.   Constitutional:       General: He is not in acute distress.     Appearance: He is well-developed.   HENT:      Head: Normocephalic and atraumatic.   Eyes:      General: No scleral icterus.     Conjunctiva/sclera: " Conjunctivae normal.   Neck:      Vascular: No JVD.   Cardiovascular:      Rate and Rhythm: Normal rate and regular rhythm.      Heart sounds: Normal heart sounds. No murmur heard.     No friction rub. No gallop.   Pulmonary:      Effort: Pulmonary effort is normal. No respiratory distress.      Breath sounds: Normal breath sounds. No wheezing.   Abdominal:      General: Bowel sounds are normal. There is no distension.      Palpations: Abdomen is soft.      Tenderness: There is no abdominal tenderness.   Musculoskeletal:      Cervical back: Normal range of motion.      Right lower leg: No edema.      Left lower leg: No edema.   Skin:     General: Skin is warm and dry.      Findings: No rash.   Neurological:      Mental Status: He is alert and oriented to person, place, and time.   Psychiatric:         Mood and Affect: Mood normal.         Behavior: Behavior normal.        Assessment:       1. Chronic kidney disease, stage IV (severe)    2. Primary hypertension    3. Type 1 diabetes mellitus with nephropathy    4. Secondary renal hyperparathyroidism    5. JONATHAN (acute kidney injury)        Lab Results   Component Value Date    CREATININE 5.6 (H) 08/14/2023    BUN 55 (H) 08/14/2023     08/14/2023    K 4.7 08/14/2023     (H) 08/14/2023    CO2 16 (L) 08/14/2023     Lab Results   Component Value Date    .8 (H) 08/14/2023    CALCIUM 8.5 (L) 08/14/2023    PHOS 5.9 (H) 08/14/2023     Lab Results   Component Value Date    HCT 33.5 (L) 07/30/2021     Prot/Creat Ratio, Urine   Date Value Ref Range Status   08/14/2023 2.99 (H) 0.00 - 0.20 Final       Plan:   Return to clinic in 1 month.  Labs for next visit include rp.  RP, ua micro US next week.  He gets his labs at [facility] in [place].  Baseline creatinine is unclear.  He has had labile function for no obviuos reason.  He does not have CHF, obstructive uropathy, chronic NSAID use.  UPC is 1.0.  PTH is 369 with a calcium of 8.5.  Renal US shows R 11.4 cm, L 12.2  cm.  We discussed dietary modification and weight loss.  Please avoid or minimize all NSAIDS (ibuprofen, motrin, aleve, indocin, naprosyn) to minimize the risk to your kidneys.  Aspirin in a dose of 325 mg or less a day is likely the safest with regards to kidney function.  If you are able to take aspirin and do not have any bleeding problems or ulcers, this may be your best therapy.  Alternatively, acetaminophen (Tylenol) is the safer than NSAIDs with regards to kidney function.  I would ask you take this as directed on the bottle.  It is best to stay under 2 grams a day (4-500 mg tablets a day maximum).  Transplant referral when GFR < 20.  He has seen [facility].  We discussed kidney transplant versus kidney/pancreas transplant.  He now has acidosis that is worsened.  KTM referral.

## 2023-08-25 ENCOUNTER — HOSPITAL ENCOUNTER (OUTPATIENT)
Dept: RADIOLOGY | Facility: HOSPITAL | Age: 43
Discharge: HOME | End: 2023-08-25
Attending: INTERNAL MEDICINE
Payer: COMMERCIAL

## 2023-08-25 DIAGNOSIS — N18.4 CHRONIC KIDNEY DISEASE, STAGE IV (SEVERE): ICD-10-CM

## 2023-08-25 PROCEDURE — 76770 US RETROPERITONEAL COMPLETE: ICD-10-PCS | Mod: 26,,, | Performed by: RADIOLOGY

## 2023-08-25 PROCEDURE — 76770 US EXAM ABDO BACK WALL COMP: CPT | Mod: TC

## 2023-08-25 PROCEDURE — 76770 US EXAM ABDO BACK WALL COMP: CPT | Mod: 26,,, | Performed by: RADIOLOGY

## 2023-08-25 RX ORDER — NEBIVOLOL HYDROCHLORIDE 20 MG/1
20 TABLET ORAL DAILY
Qty: 90 TABLET | Refills: 1 | Status: SHIPPED | OUTPATIENT
Start: 2023-08-25 | End: 2023-08-29

## 2023-08-25 RX ORDER — CALCITRIOL 0.25 UG/1
0.25 CAPSULE ORAL DAILY
Qty: 90 CAPSULE | Refills: 1 | Status: SHIPPED | OUTPATIENT
Start: 2023-08-25 | End: 2023-11-03 | Stop reason: SDUPTHER

## 2023-08-28 ENCOUNTER — TELEPHONE (OUTPATIENT)
Dept: NEPHROLOGY | Facility: CLINIC | Age: 43
End: 2023-08-28

## 2023-08-28 ENCOUNTER — PATIENT MESSAGE (OUTPATIENT)
Dept: CARDIOLOGY | Facility: HOSPITAL | Age: 43
End: 2023-08-28
Payer: COMMERCIAL

## 2023-08-28 NOTE — TELEPHONE ENCOUNTER
----- Message from Roxanne Virk sent at 8/28/2023  4:26 PM CDT -----  Regarding: RX Refill Request  Contact: [facility] at [phone]  Type:  RX Refill Request    Who Called:    [id number] - [place], [place] - [address]  [address]  [place] [address]  Phone: [phone] Fax: [phone]    Additional Information:  pharmacy needs to have the code changed to have prescription filled as a generic. Please call and advise. Thank you      BYSTOLIC 20 mg Tab 90 tablet 1 8/25/2023    Sig - Route: Take 20 mg by mouth once daily. - Oral   Sent to pharmacy as: BYSTOLIC 20 mg Tab   Notes to Pharmacy: Corrected SIG   E-Prescribing Status: Receipt confirmed by pharmacy (8/25/2023  2:47 PM CDT)

## 2023-08-29 RX ORDER — NEBIVOLOL HYDROCHLORIDE 20 MG/1
1 TABLET ORAL
Qty: 90 EACH | Refills: 1 | Status: SHIPPED | OUTPATIENT
Start: 2023-08-29 | End: 2023-09-06 | Stop reason: SDUPTHER

## 2023-08-30 ENCOUNTER — HOSPITAL ENCOUNTER (OUTPATIENT)
Dept: RADIOLOGY | Facility: HOSPITAL | Age: 43
Discharge: HOME | End: 2023-08-30
Attending: INTERNAL MEDICINE
Payer: COMMERCIAL

## 2023-08-30 ENCOUNTER — CLINICAL SUPPORT (OUTPATIENT)
Dept: CARDIOLOGY | Facility: HOSPITAL | Age: 43
End: 2023-08-30
Attending: INTERNAL MEDICINE
Payer: COMMERCIAL

## 2023-08-30 ENCOUNTER — PATIENT MESSAGE (OUTPATIENT)
Dept: NEPHROLOGY | Facility: CLINIC | Age: 43
End: 2023-08-30
Payer: COMMERCIAL

## 2023-08-30 ENCOUNTER — TELEPHONE (OUTPATIENT)
Dept: NEPHROLOGY | Facility: CLINIC | Age: 43
End: 2023-08-30
Payer: COMMERCIAL

## 2023-08-30 VITALS — WEIGHT: 229 LBS | HEIGHT: 73 IN | BODY MASS INDEX: 30.35 KG/M2

## 2023-08-30 VITALS — HEIGHT: 73 IN | WEIGHT: 229 LBS | BODY MASS INDEX: 30.35 KG/M2

## 2023-08-30 DIAGNOSIS — R06.02 SHORTNESS OF BREATH: ICD-10-CM

## 2023-08-30 DIAGNOSIS — Z13.6 ENCOUNTER FOR SCREENING FOR CARDIOVASCULAR DISORDERS: ICD-10-CM

## 2023-08-30 DIAGNOSIS — R07.89 ATYPICAL CHEST PAIN: ICD-10-CM

## 2023-08-30 LAB
ASCENDING AORTA: 3.41 CM
AV INDEX (PROSTH): 0.75
AV MEAN GRADIENT: 4 MMHG
AV PEAK GRADIENT: 7 MMHG
AV VALVE AREA BY VELOCITY RATIO: 3.02 CM²
AV VALVE AREA: 3.09 CM²
AV VELOCITY RATIO: 0.73
BSA FOR ECHO PROCEDURE: 2.31 M2
CV ECHO LV RWT: 0.48 CM
CV STRESS BASE HR: 85 BPM
DIASTOLIC BLOOD PRESSURE: 91 MMHG
DOP CALC AO PEAK VEL: 1.31 M/S
DOP CALC AO VTI: 29 CM
DOP CALC LVOT AREA: 4.1 CM2
DOP CALC LVOT DIAMETER: 2.29 CM
DOP CALC LVOT PEAK VEL: 0.96 M/S
DOP CALC LVOT STROKE VOLUME: 89.74 CM3
DOP CALCLVOT PEAK VEL VTI: 21.8 CM
E WAVE DECELERATION TIME: 185.35 MSEC
E/A RATIO: 1.12
E/E' RATIO: 9.25 M/S
ECHO LV POSTERIOR WALL: 1.1 CM (ref 0.6–1.1)
FRACTIONAL SHORTENING: 34 % (ref 28–44)
INTERVENTRICULAR SEPTUM: 1.2 CM (ref 0.6–1.1)
IVRT: 137.01 MSEC
LA MAJOR: 4.74 CM
LA MINOR: 3.96 CM
LA WIDTH: 5 CM
LEFT ATRIUM SIZE: 3.94 CM
LEFT ATRIUM VOLUME INDEX: 31.7 ML/M2
LEFT ATRIUM VOLUME: 72.26 CM3
LEFT INTERNAL DIMENSION IN SYSTOLE: 3.01 CM (ref 2.1–4)
LEFT VENTRICLE DIASTOLIC VOLUME INDEX: 42.35 ML/M2
LEFT VENTRICLE DIASTOLIC VOLUME: 96.56 ML
LEFT VENTRICLE MASS INDEX: 84 G/M2
LEFT VENTRICLE SYSTOLIC VOLUME INDEX: 15.4 ML/M2
LEFT VENTRICLE SYSTOLIC VOLUME: 35.2 ML
LEFT VENTRICULAR INTERNAL DIMENSION IN DIASTOLE: 4.58 CM (ref 3.5–6)
LEFT VENTRICULAR MASS: 191.62 G
LV LATERAL E/E' RATIO: 8.22 M/S
LV SEPTAL E/E' RATIO: 10.57 M/S
LVOT MG: 2.33 MMHG
LVOT MV: 0.72 CM/S
MV PEAK A VEL: 0.66 M/S
MV PEAK E VEL: 0.74 M/S
OHS CV CPX 1 MINUTE RECOVERY HEART RATE: 125 BPM
OHS CV CPX 85 PERCENT MAX PREDICTED HEART RATE MALE: 151
OHS CV CPX ESTIMATED METS: 11
OHS CV CPX MAX PREDICTED HEART RATE: 178
OHS CV CPX PATIENT IS FEMALE: 0
OHS CV CPX PATIENT IS MALE: 1
OHS CV CPX PEAK DIASTOLIC BLOOD PRESSURE: 60 MMHG
OHS CV CPX PEAK HEAR RATE: 151 BPM
OHS CV CPX PEAK RATE PRESSURE PRODUCT: NORMAL
OHS CV CPX PEAK SYSTOLIC BLOOD PRESSURE: 205 MMHG
OHS CV CPX PERCENT MAX PREDICTED HEART RATE ACHIEVED: 85
OHS CV CPX RATE PRESSURE PRODUCT PRESENTING: NORMAL
PISA TR MAX VEL: 2.8 M/S
PULM VEIN S/D RATIO: 1.2
PV PEAK D VEL: 0.44 M/S
PV PEAK S VEL: 0.53 M/S
RA MAJOR: 4.17 CM
RA PRESSURE ESTIMATED: 3 MMHG
RA WIDTH: 4.2 CM
RIGHT VENTRICULAR END-DIASTOLIC DIMENSION: 4.56 CM
RIGHT VENTRICULAR LENGTH IN DIASTOLE (APICAL 4-CHAMBER VIEW): 6.99 CM
RV MID DIAMA: 2.83 CM
RV TB RVSP: 6 MMHG
SINUS: 2.89 CM
STJ: 2.45 CM
STRESS ECHO POST EXERCISE DUR MIN: 7 MINUTES
STRESS ECHO POST EXERCISE DUR SEC: 0 SECONDS
SYSTOLIC BLOOD PRESSURE: 198 MMHG
TDI LATERAL: 0.09 M/S
TDI SEPTAL: 0.07 M/S
TDI: 0.08 M/S
TR MAX PG: 31 MMHG
TRICUSPID ANNULAR PLANE SYSTOLIC EXCURSION: 2.91 CM
TV REST PULMONARY ARTERY PRESSURE: 34 MMHG
Z-SCORE OF LEFT VENTRICULAR DIMENSION IN END DIASTOLE: -6.29
Z-SCORE OF LEFT VENTRICULAR DIMENSION IN END SYSTOLE: -4.31

## 2023-08-30 PROCEDURE — 78452 NUCLEAR STRESS - CARDIOLOGY INTERPRETED (CUPID ONLY): ICD-10-PCS | Mod: 26,,, | Performed by: INTERNAL MEDICINE

## 2023-08-30 PROCEDURE — 93017 CV STRESS TEST TRACING ONLY: CPT | Mod: PO

## 2023-08-30 PROCEDURE — 93306 ECHO (CUPID ONLY): ICD-10-PCS | Mod: 26,,, | Performed by: INTERNAL MEDICINE

## 2023-08-30 PROCEDURE — 93018 PR CARDIAC STRESS TST,INTERP/REPT ONLY: ICD-10-PCS | Mod: ,,, | Performed by: INTERNAL MEDICINE

## 2023-08-30 PROCEDURE — 75571 CT HRT W/O DYE W/CA TEST: CPT | Mod: 26,,, | Performed by: RADIOLOGY

## 2023-08-30 PROCEDURE — 93306 TTE W/DOPPLER COMPLETE: CPT | Mod: PO

## 2023-08-30 PROCEDURE — 93018 CV STRESS TEST I&R ONLY: CPT | Mod: ,,, | Performed by: INTERNAL MEDICINE

## 2023-08-30 PROCEDURE — 75571 CT CALCIUM SCORING CARDIAC: ICD-10-PCS | Mod: 26,,, | Performed by: RADIOLOGY

## 2023-08-30 PROCEDURE — A9502 TC99M TETROFOSMIN: HCPCS | Mod: PO

## 2023-08-30 PROCEDURE — 93016 CV STRESS TEST SUPVJ ONLY: CPT | Mod: ,,, | Performed by: INTERNAL MEDICINE

## 2023-08-30 PROCEDURE — 78452 HT MUSCLE IMAGE SPECT MULT: CPT | Mod: PO

## 2023-08-30 PROCEDURE — 75571 CT HRT W/O DYE W/CA TEST: CPT | Mod: TC,PO

## 2023-08-30 PROCEDURE — 93016 NUCLEAR STRESS - CARDIOLOGY INTERPRETED (CUPID ONLY): ICD-10-PCS | Mod: ,,, | Performed by: INTERNAL MEDICINE

## 2023-08-30 PROCEDURE — 93306 TTE W/DOPPLER COMPLETE: CPT | Mod: 26,,, | Performed by: INTERNAL MEDICINE

## 2023-08-30 PROCEDURE — 78452 HT MUSCLE IMAGE SPECT MULT: CPT | Mod: 26,,, | Performed by: INTERNAL MEDICINE

## 2023-08-30 NOTE — TELEPHONE ENCOUNTER
----- Message from Kiara Denton sent at 8/30/2023  1:00 PM CDT -----  Contact: pt  Type:  Patient Returning Call    Who Called:pt  Who Left Message for Patient:ashley  Does the patient know what this is regarding?:asking about test results  Would the patient rather a call back or a response via [facility]? Call back  Best Call Back Number:[phone]    Additional Information: Please call to advise  Thanks

## 2023-08-31 RX ORDER — CEFUROXIME AXETIL 250 MG/1
250 TABLET ORAL EVERY 12 HOURS
Qty: 14 TABLET | Refills: 1 | Status: SHIPPED | OUTPATIENT
Start: 2023-08-31 | End: 2023-09-06 | Stop reason: SDUPTHER

## 2023-08-31 RX ORDER — NEBIVOLOL HYDROCHLORIDE 20 MG/1
1 TABLET ORAL
Qty: 90 EACH | Refills: 1 | Status: CANCELLED | OUTPATIENT
Start: 2023-08-31

## 2023-08-31 RX ORDER — CEFUROXIME AXETIL 250 MG/1
250 TABLET ORAL EVERY 12 HOURS
Qty: 14 TABLET | Refills: 1 | Status: CANCELLED | OUTPATIENT
Start: 2023-08-31

## 2023-09-06 ENCOUNTER — PATIENT MESSAGE (OUTPATIENT)
Dept: NEPHROLOGY | Facility: CLINIC | Age: 43
End: 2023-09-06
Payer: COMMERCIAL

## 2023-09-06 NOTE — TELEPHONE ENCOUNTER
The ciftin needs to resent to the [facility] in [place] and the bystolic needs to be generic. I am sending this message to .Please advise.

## 2023-09-07 ENCOUNTER — TELEPHONE (OUTPATIENT)
Dept: TRANSPLANT | Facility: CLINIC | Age: 43
End: 2023-09-07
Payer: COMMERCIAL

## 2023-09-07 RX ORDER — NEBIVOLOL HYDROCHLORIDE 20 MG/1
1 TABLET ORAL DAILY
Qty: 90 EACH | Refills: 1 | Status: SHIPPED | OUTPATIENT
Start: 2023-09-07 | End: 2023-09-19 | Stop reason: SDUPTHER

## 2023-09-07 RX ORDER — CEFUROXIME AXETIL 250 MG/1
250 TABLET ORAL EVERY 12 HOURS
Qty: 14 TABLET | Refills: 1 | Status: SHIPPED | OUTPATIENT
Start: 2023-09-07 | End: 2023-11-10 | Stop reason: SDUPTHER

## 2023-09-07 NOTE — LETTER
September 7, 2023    Jose Mckinney  [address] [address] [address]  Phone: [phone]  Fax: [phone]    Dear Dr. Jose Mckinney:    Patient: Dejuan Diaz Jr.  MR Number [id number]  Date of Birth 1980    Thank you for your referral of Dejuan Diaz Jr. to our transplant program.      Your patient's information will be screened by our Referral Nurse Coordinators to determine initial medical candidacy and if any further records are required. We will contact you if further information is needed to process the referral.     Once all needed information is received it will be forwarded to our Transplant Financial Coordinators who will obtain insurance authorization. Upon insurance approval, your patient will be contacted by our  to schedule their appointments with the transplant team. When appointments are made you will receive a copy of the appointment letter that your patient will receive.     This process may take two to six weeks to complete.     In the event your patient is not a candidate a copy of our transplant programs opinion including reasons will be returned to you to comply with your yearly review regulations. A copy of that letter will also be sent to the patient.     The following information is needed to process a referral:  Medicare form 2728 for all patients on dialysis.  Dental clearance is required if your patient has primary Medicaid.  Current immunization record.  This information can be faxed to [phone].  Current Dietician evaluation can be faxed to [phone].    Thank you again for your trust in our program and the care of your patient.  If there is anything we can do for you or your staff, please feel free to contact us at [phone].    Sincerely,   [facility] [place]  Kidney & Kidney/Pancreas Transplant Team  [facility] [place], [place] [id number] [phone]

## 2023-09-07 NOTE — TELEPHONE ENCOUNTER
Contacted patient to review initial intake information. Patient reports the followin. Can you walk up a flight of stairs without getting short of breath or stopping? yes  2. Can you walk one block without getting short of breath or having to stop? yes  3. Do you use oxygen? no  4. Do you use a cane, walker, or wheel chair to assist in mobility? no  5. Have you been hospitalized or had recent surgery in the last 6 months? no   A. Stroke   B. Heart surgery or heart catheterization   C. Broken bone  6. Do you have any cuts, open sores (ulcers), or wounds anywhere on your body? No   7. Do you go to dialysis? no  8. Preferred appointment day? anyday  9. Caregiver? Not sure yet    Patient is aware the next steps will include completing records and compliance verification. Patient is aware once provider review and insurance authorization is received we will contact patient to schedule initial visit. Patient is aware that initial visit will begin prior to / at 7 am and will conclude at approximately 3 pm on date of appointment. All questions answered at this time.

## 2023-09-18 ENCOUNTER — TELEPHONE (OUTPATIENT)
Dept: TRANSPLANT | Facility: CLINIC | Age: 43
End: 2023-09-18

## 2023-09-19 ENCOUNTER — OFFICE VISIT (OUTPATIENT)
Dept: NEPHROLOGY | Facility: CLINIC | Age: 43
End: 2023-09-19
Payer: COMMERCIAL

## 2023-09-19 VITALS — SYSTOLIC BLOOD PRESSURE: 172 MMHG | BODY MASS INDEX: 30.21 KG/M2 | HEIGHT: 73 IN | DIASTOLIC BLOOD PRESSURE: 100 MMHG

## 2023-09-19 DIAGNOSIS — E10.21 TYPE 1 DIABETES MELLITUS WITH NEPHROPATHY: ICD-10-CM

## 2023-09-19 DIAGNOSIS — N18.5 CKD (CHRONIC KIDNEY DISEASE) STAGE 5, GFR LESS THAN 15 ML/MIN: Primary | ICD-10-CM

## 2023-09-19 DIAGNOSIS — N25.81 SECONDARY RENAL HYPERPARATHYROIDISM: ICD-10-CM

## 2023-09-19 DIAGNOSIS — I10 PRIMARY HYPERTENSION: ICD-10-CM

## 2023-09-19 PROCEDURE — 3066F PR DOCUMENTATION OF TREATMENT FOR NEPHROPATHY: ICD-10-PCS | Mod: CPTII,S$GLB,, | Performed by: INTERNAL MEDICINE

## 2023-09-19 PROCEDURE — 1159F MED LIST DOCD IN RCRD: CPT | Mod: CPTII,S$GLB,, | Performed by: INTERNAL MEDICINE

## 2023-09-19 PROCEDURE — 3080F DIAST BP >= 90 MM HG: CPT | Mod: CPTII,S$GLB,, | Performed by: INTERNAL MEDICINE

## 2023-09-19 PROCEDURE — 99999 PR PBB SHADOW E&M-EST. PATIENT-LVL III: ICD-10-PCS | Mod: PBBFAC,,, | Performed by: INTERNAL MEDICINE

## 2023-09-19 PROCEDURE — 3077F PR MOST RECENT SYSTOLIC BLOOD PRESSURE >= 140 MM HG: ICD-10-PCS | Mod: CPTII,S$GLB,, | Performed by: INTERNAL MEDICINE

## 2023-09-19 PROCEDURE — 99999 PR PBB SHADOW E&M-EST. PATIENT-LVL III: CPT | Mod: PBBFAC,,, | Performed by: INTERNAL MEDICINE

## 2023-09-19 PROCEDURE — 99214 OFFICE O/P EST MOD 30 MIN: CPT | Mod: S$GLB,,, | Performed by: INTERNAL MEDICINE

## 2023-09-19 PROCEDURE — 3008F BODY MASS INDEX DOCD: CPT | Mod: CPTII,S$GLB,, | Performed by: INTERNAL MEDICINE

## 2023-09-19 PROCEDURE — 4010F ACE/ARB THERAPY RXD/TAKEN: CPT | Mod: CPTII,S$GLB,, | Performed by: INTERNAL MEDICINE

## 2023-09-19 PROCEDURE — 99214 PR OFFICE/OUTPT VISIT, EST, LEVL IV, 30-39 MIN: ICD-10-PCS | Mod: S$GLB,,, | Performed by: INTERNAL MEDICINE

## 2023-09-19 PROCEDURE — 3077F SYST BP >= 140 MM HG: CPT | Mod: CPTII,S$GLB,, | Performed by: INTERNAL MEDICINE

## 2023-09-19 PROCEDURE — 3066F NEPHROPATHY DOC TX: CPT | Mod: CPTII,S$GLB,, | Performed by: INTERNAL MEDICINE

## 2023-09-19 PROCEDURE — 1159F PR MEDICATION LIST DOCUMENTED IN MEDICAL RECORD: ICD-10-PCS | Mod: CPTII,S$GLB,, | Performed by: INTERNAL MEDICINE

## 2023-09-19 PROCEDURE — 3080F PR MOST RECENT DIASTOLIC BLOOD PRESSURE >= 90 MM HG: ICD-10-PCS | Mod: CPTII,S$GLB,, | Performed by: INTERNAL MEDICINE

## 2023-09-19 PROCEDURE — 4010F PR ACE/ARB THEARPY RXD/TAKEN: ICD-10-PCS | Mod: CPTII,S$GLB,, | Performed by: INTERNAL MEDICINE

## 2023-09-19 PROCEDURE — 3008F PR BODY MASS INDEX (BMI) DOCUMENTED: ICD-10-PCS | Mod: CPTII,S$GLB,, | Performed by: INTERNAL MEDICINE

## 2023-09-19 RX ORDER — NEBIVOLOL 20 MG/1
20 TABLET ORAL DAILY
Qty: 90 TABLET | Refills: 3 | Status: SHIPPED | OUTPATIENT
Start: 2023-09-19 | End: 2024-09-18

## 2023-09-19 RX ORDER — INSULIN LISPRO 100 [IU]/ML
INJECTION, SOLUTION INTRAVENOUS; SUBCUTANEOUS
COMMUNITY
Start: 2023-02-23

## 2023-09-19 RX ORDER — BLOOD-GLUCOSE SENSOR
EACH MISCELLANEOUS
COMMUNITY
Start: 2023-08-17

## 2023-09-19 NOTE — PROGRESS NOTES
"      Subjective:       Patient ID: Dejuan Diaz Jr. is a 42 y.o. White male who presents for return patient evaluation for chronic renal failure.      He has ba fair appetite and has noticed he has had arthralgias lately.  He has not been taking bystolic lately due to his insurance not covering the name brand.  He had COVID in April 2021.  He reports that he has been dealing with a divorce and both of his parents having cancer.  His diabetes is much better now that he is on an omnipod.  He has had more anxiety lately due to his family issues.  He is now travelling much more now due to a change in his job as well.        Review of Systems   Constitutional:  Positive for fatigue. Negative for appetite change, chills and fever.   Eyes:  Negative for visual disturbance.   Respiratory:  Positive for shortness of breath (mild with exertion). Negative for cough.    Cardiovascular:  Positive for chest pain (at times). Negative for leg swelling.   Gastrointestinal:  Positive for nausea (rarely associated with eosphageal symptoms). Negative for diarrhea and vomiting.   Genitourinary:  Negative for difficulty urinating, dysuria and hematuria.        ED   Musculoskeletal:  Positive for gait problem (occasional leg heaviness). Negative for myalgias.   Skin:  Negative for rash.   Neurological:  Positive for headaches.   Psychiatric/Behavioral:  Negative for sleep disturbance. The patient is nervous/anxious.        The past medical, family and social histories were reviewed for this encounter.    BP (!) 172/100 (BP Location: Right arm, Patient Position: Sitting, BP Method: Large (Manual))   Ht 6' 1" (1.854 m)   BMI 30.21 kg/m²     Objective:      Physical Exam  Vitals reviewed.   Constitutional:       General: He is not in acute distress.     Appearance: He is well-developed.   HENT:      Head: Normocephalic and atraumatic.   Eyes:      General: No scleral icterus.     Conjunctiva/sclera: Conjunctivae normal.   Neck:      " Vascular: No JVD.   Cardiovascular:      Rate and Rhythm: Normal rate and regular rhythm.      Heart sounds: Normal heart sounds. No murmur heard.     No friction rub. No gallop.   Pulmonary:      Effort: Pulmonary effort is normal. No respiratory distress.      Breath sounds: Normal breath sounds. No wheezing.   Abdominal:      General: Bowel sounds are normal. There is no distension.      Palpations: Abdomen is soft.      Tenderness: There is no abdominal tenderness.   Musculoskeletal:      Cervical back: Normal range of motion.      Right lower leg: No edema.      Left lower leg: No edema.   Skin:     General: Skin is warm and dry.      Findings: No rash.   Neurological:      Mental Status: He is alert and oriented to person, place, and time.   Psychiatric:         Mood and Affect: Mood normal.         Behavior: Behavior normal.        Assessment:       1. CKD (chronic kidney disease) stage 5, GFR less than 15 ml/min    2. Primary hypertension    3. Type 1 diabetes mellitus with nephropathy    4. Secondary renal hyperparathyroidism          Lab Results   Component Value Date    CREATININE 5.3 (H) 08/25/2023    BUN 50 (H) 08/25/2023     08/25/2023    K 4.8 08/25/2023     (H) 08/25/2023    CO2 21 (L) 08/25/2023     Lab Results   Component Value Date    .8 (H) 08/14/2023    CALCIUM 8.8 08/25/2023    PHOS 4.2 08/25/2023     Lab Results   Component Value Date    HCT 33.5 (L) 07/30/2021     Prot/Creat Ratio, Urine   Date Value Ref Range Status   08/14/2023 2.99 (H) 0.00 - 0.20 Final       Plan:   Return to clinic in 4-6 wks.  Labs for next visit include rp.  He gets his labs at [facility] in [place].  Baseline creatinine is unclear.  He has had labile function for no obvious reason.  He does not have CHF, obstructive uropathy, chronic NSAID use.  UPC is 3.0.  PTH is 369 with a calcium of 8.8.  Renal US shows R 11.4 cm, L 12.2 cm.  Please avoid or minimize all NSAIDS (ibuprofen, motrin, aleve, indocin,  naprosyn) to minimize the risk to your kidneys.  Aspirin in a dose of 325 mg or less a day is likely the safest with regards to kidney function.  If you are able to take aspirin and do not have any bleeding problems or ulcers, this may be your best therapy.  Alternatively, acetaminophen (Tylenol) is the safer than NSAIDs with regards to kidney function.  I would ask you take this as directed on the bottle.  It is best to stay under 2 grams a day (4-500 mg tablets a day maximum).  He has seen [facility].  We discussed kidney transplant versus kidney/pancreas transplant.  KTM referral has been done.

## 2023-10-06 ENCOUNTER — TELEPHONE (OUTPATIENT)
Dept: TRANSPLANT | Facility: CLINIC | Age: 43
End: 2023-10-06
Payer: COMMERCIAL

## 2023-10-06 DIAGNOSIS — Z76.82 ORGAN TRANSPLANT CANDIDATE: Primary | ICD-10-CM

## 2023-10-27 ENCOUNTER — LAB VISIT (OUTPATIENT)
Dept: LAB | Facility: HOSPITAL | Age: 43
End: 2023-10-27
Attending: INTERNAL MEDICINE
Payer: COMMERCIAL

## 2023-10-27 DIAGNOSIS — N18.5 CKD (CHRONIC KIDNEY DISEASE) STAGE 5, GFR LESS THAN 15 ML/MIN: ICD-10-CM

## 2023-10-27 LAB
ALBUMIN SERPL BCP-MCNC: 3.2 G/DL (ref 3.5–5.2)
ANION GAP SERPL CALC-SCNC: 13 MMOL/L (ref 8–16)
BUN SERPL-MCNC: 62 MG/DL (ref 6–20)
CALCIUM SERPL-MCNC: 8.4 MG/DL (ref 8.7–10.5)
CHLORIDE SERPL-SCNC: 110 MMOL/L (ref 95–110)
CO2 SERPL-SCNC: 18 MMOL/L (ref 23–29)
CREAT SERPL-MCNC: 6.5 MG/DL (ref 0.5–1.4)
EST. GFR  (NO RACE VARIABLE): 10.2 ML/MIN/1.73 M^2
GLUCOSE SERPL-MCNC: 90 MG/DL (ref 70–110)
PHOSPHATE SERPL-MCNC: 5.2 MG/DL (ref 2.7–4.5)
POTASSIUM SERPL-SCNC: 3.9 MMOL/L (ref 3.5–5.1)
SODIUM SERPL-SCNC: 141 MMOL/L (ref 136–145)

## 2023-10-27 PROCEDURE — 80069 RENAL FUNCTION PANEL: CPT | Mod: TXP | Performed by: INTERNAL MEDICINE

## 2023-10-27 PROCEDURE — 36415 COLL VENOUS BLD VENIPUNCTURE: CPT | Mod: NTX | Performed by: INTERNAL MEDICINE

## 2023-10-30 ENCOUNTER — PATIENT MESSAGE (OUTPATIENT)
Dept: NEPHROLOGY | Facility: CLINIC | Age: 43
End: 2023-10-30
Payer: COMMERCIAL

## 2023-11-03 ENCOUNTER — OFFICE VISIT (OUTPATIENT)
Dept: NEPHROLOGY | Facility: CLINIC | Age: 43
End: 2023-11-03
Payer: COMMERCIAL

## 2023-11-03 DIAGNOSIS — E10.21 TYPE 1 DIABETES MELLITUS WITH NEPHROPATHY: ICD-10-CM

## 2023-11-03 DIAGNOSIS — I10 PRIMARY HYPERTENSION: ICD-10-CM

## 2023-11-03 DIAGNOSIS — N18.5 CKD (CHRONIC KIDNEY DISEASE) STAGE 5, GFR LESS THAN 15 ML/MIN: Primary | ICD-10-CM

## 2023-11-03 DIAGNOSIS — N25.81 SECONDARY RENAL HYPERPARATHYROIDISM: ICD-10-CM

## 2023-11-03 PROCEDURE — 1159F MED LIST DOCD IN RCRD: CPT | Mod: CPTII,95,, | Performed by: INTERNAL MEDICINE

## 2023-11-03 PROCEDURE — 4010F ACE/ARB THERAPY RXD/TAKEN: CPT | Mod: CPTII,95,, | Performed by: INTERNAL MEDICINE

## 2023-11-03 PROCEDURE — 99214 OFFICE O/P EST MOD 30 MIN: CPT | Mod: 95,,, | Performed by: INTERNAL MEDICINE

## 2023-11-03 PROCEDURE — 99214 PR OFFICE/OUTPT VISIT, EST, LEVL IV, 30-39 MIN: ICD-10-PCS | Mod: 95,,, | Performed by: INTERNAL MEDICINE

## 2023-11-03 PROCEDURE — 3066F PR DOCUMENTATION OF TREATMENT FOR NEPHROPATHY: ICD-10-PCS | Mod: CPTII,95,, | Performed by: INTERNAL MEDICINE

## 2023-11-03 PROCEDURE — 3066F NEPHROPATHY DOC TX: CPT | Mod: CPTII,95,, | Performed by: INTERNAL MEDICINE

## 2023-11-03 PROCEDURE — 4010F PR ACE/ARB THEARPY RXD/TAKEN: ICD-10-PCS | Mod: CPTII,95,, | Performed by: INTERNAL MEDICINE

## 2023-11-03 PROCEDURE — 1160F RVW MEDS BY RX/DR IN RCRD: CPT | Mod: CPTII,95,, | Performed by: INTERNAL MEDICINE

## 2023-11-03 PROCEDURE — 1159F PR MEDICATION LIST DOCUMENTED IN MEDICAL RECORD: ICD-10-PCS | Mod: CPTII,95,, | Performed by: INTERNAL MEDICINE

## 2023-11-03 PROCEDURE — 1160F PR REVIEW ALL MEDS BY PRESCRIBER/CLIN PHARMACIST DOCUMENTED: ICD-10-PCS | Mod: CPTII,95,, | Performed by: INTERNAL MEDICINE

## 2023-11-03 RX ORDER — CALCITRIOL 0.25 UG/1
0.25 CAPSULE ORAL DAILY
Qty: 90 CAPSULE | Refills: 1 | Status: SHIPPED | OUTPATIENT
Start: 2023-11-03 | End: 2024-03-08 | Stop reason: SDUPTHER

## 2023-11-03 RX ORDER — AMLODIPINE BESYLATE 10 MG/1
10 TABLET ORAL DAILY
Qty: 90 TABLET | Refills: 1 | Status: SHIPPED | OUTPATIENT
Start: 2023-11-03 | End: 2024-11-02

## 2023-11-03 NOTE — PROGRESS NOTES
Subjective:       Patient ID: Dejuan Diaz Jr. is a 42 y.o. White male who presents for return patient evaluation for chronic renal failure.    The patient location is:  Patient Home   The chief complaint leading to consultation is: CKD  Visit type: Virtual visit with synchronous audio and video  Total time spent with patient: 12 minutes  Each patient to whom he or she provides medical services by telemedicine is:  (1) informed of the relationship between the physician and patient and the respective role of any other health care provider with respect to management of the patient; and (2) notified that he or she may decline to receive medical services by telemedicine and may withdraw from such care at any time.        He had COVID in April 2021.  He reports that he has been dealing with a divorce and both of his parents having cancer.  His diabetes is much better now that he is on an omnipod.  He has had more anxiety lately due to his family issues.  He is now travelling much more now due to a change in his job as well.   His blood pressure at home has been 140s-150/90s.      Review of Systems   Constitutional:  Positive for fatigue. Negative for appetite change, chills and fever.   Eyes:  Negative for visual disturbance.   Respiratory:  Positive for shortness of breath (mild with exertion). Negative for cough.    Cardiovascular:  Positive for chest pain (at times). Negative for leg swelling.   Gastrointestinal:  Positive for nausea (rarely associated with eosphageal symptoms). Negative for diarrhea and vomiting.   Genitourinary:  Negative for difficulty urinating, dysuria and hematuria.        ED   Musculoskeletal:  Positive for gait problem (occasional leg heaviness). Negative for myalgias.   Skin:  Negative for rash.   Neurological:  Positive for headaches.   Psychiatric/Behavioral:  Negative for sleep disturbance. The patient is nervous/anxious.        The past medical, family and social histories were  reviewed for this encounter.    There were no vitals taken for this visit.    Objective:      Physical Exam  Vitals reviewed.   Constitutional:       General: He is not in acute distress.     Appearance: He is well-developed.   HENT:      Head: Normocephalic and atraumatic.   Eyes:      General: No scleral icterus.  Pulmonary:      Effort: Pulmonary effort is normal. No respiratory distress.   Neurological:      Mental Status: He is alert and oriented to person, place, and time.   Psychiatric:         Mood and Affect: Mood normal.         Behavior: Behavior normal.        Assessment:       No diagnosis found.        Lab Results   Component Value Date    CREATININE 6.5 (H) 10/27/2023    BUN 62 (H) 10/27/2023     10/27/2023    K 3.9 10/27/2023     10/27/2023    CO2 18 (L) 10/27/2023     Lab Results   Component Value Date    .8 (H) 08/14/2023    CALCIUM 8.4 (L) 10/27/2023    PHOS 5.2 (H) 10/27/2023     Lab Results   Component Value Date    HCT 33.5 (L) 07/30/2021     Prot/Creat Ratio, Urine   Date Value Ref Range Status   08/14/2023 2.99 (H) 0.00 - 0.20 Final       Plan:   Return to clinic in 4-6 wks.  Labs for next visit include rp, pth.  He gets his labs at [facility] in [place].  Baseline creatinine is unclear.  He has had labile function for no obvious reason.  He does not have CHF, obstructive uropathy, chronic NSAID use.  UPC is 3.0.  PTH is 369 with a calcium of 8.8.  Renal US shows R 11.4 cm, L 12.2 cm.  Please avoid or minimize all NSAIDS (ibuprofen, motrin, aleve, indocin, naprosyn) to minimize the risk to your kidneys.  Aspirin in a dose of 325 mg or less a day is likely the safest with regards to kidney function.  If you are able to take aspirin and do not have any bleeding problems or ulcers, this may be your best therapy.  Alternatively, acetaminophen (Tylenol) is the safer than NSAIDs with regards to kidney function.  I would ask you take this as directed on the bottle.  It is best to  stay under 2 grams a day (4-500 mg tablets a day maximum).  ESRD Treatment Choices.  We discussed kidney transplant versus kidney/pancreas transplant.  KTM referral has been done.

## 2023-11-10 ENCOUNTER — PATIENT MESSAGE (OUTPATIENT)
Dept: NEPHROLOGY | Facility: CLINIC | Age: 43
End: 2023-11-10
Payer: COMMERCIAL

## 2023-11-10 RX ORDER — CEFUROXIME AXETIL 250 MG/1
250 TABLET ORAL EVERY 12 HOURS
Qty: 14 TABLET | Refills: 1 | Status: SHIPPED | OUTPATIENT
Start: 2023-11-10 | End: 2023-11-29 | Stop reason: ALTCHOICE

## 2023-11-13 ENCOUNTER — TELEPHONE (OUTPATIENT)
Dept: NEPHROLOGY | Facility: CLINIC | Age: 43
End: 2023-11-13
Payer: COMMERCIAL

## 2023-11-14 ENCOUNTER — CLINICAL SUPPORT (OUTPATIENT)
Dept: NEPHROLOGY | Facility: CLINIC | Age: 43
End: 2023-11-14
Payer: COMMERCIAL

## 2023-11-14 ENCOUNTER — PATIENT MESSAGE (OUTPATIENT)
Dept: NEPHROLOGY | Facility: CLINIC | Age: 43
End: 2023-11-14

## 2023-11-14 DIAGNOSIS — N18.5 CKD (CHRONIC KIDNEY DISEASE) STAGE 5, GFR LESS THAN 15 ML/MIN: ICD-10-CM

## 2023-11-14 NOTE — Clinical Note
Hi Dr Mckinney - more detailed notes from our visit today. Pt is most interested in kidney transplant and possibly PD, if he is able to have a smaller cycler. He seems also open to APD with traditional cycler and possibly working with his employer to have a little less travel. Thank you!

## 2023-11-14 NOTE — PROGRESS NOTES
Dejuan Diaz Jr. was referred to Advanced CKD education by Dr. Mckinney    The patient attended class in an individual setting accompanied by his support person, Naz (called in by phone) . Class was conducted virtually via [facility] telehealth platform.     Lifestyle/Hobbies/Personal Values: pt travels appx 5 out of 7 nights per week, staying at different hotels nightly for work. 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  Psychosocial support: Naz  Living Situation: Patient lives with his parents, who moved in with him recently  Preferred learning style: not assessed    Advanced Education (Lesson 4, 5, 6)    Lesson 4 Choices for Treating Advanced Kidney Disease (The Basics)  Lesson Objectives  By the end of each session, participants will be able to:  Identify the four treatment choices for kidney failure  State how hemodialysis and peritoneal dialysis differ in frequency of treatment   Topics & Points Covered  You have treatment choices, and you can change your mind.  No renal replacement therapy also known as conservative management  Active medical care without RRT  Palliative care when necessary  Complications can be managed, but can't compensate for uremia  End of life choices - living will - telling family  Can change your mind  Hemodialysis (HD)  Two types  In-center (most common)  Home hemo: treatments are done at home, requires assistance, you have more control, can be harder on the helpers.  How it works  PROs and CONs  The diet (in-center is most restrictive, only 3 times/week, build up between treatments)  Lifestyle: social aspects  Safety/infection  Peritoneal dialysis (PD)  Two types  Continuous ambulatory PD  Continuous cycling PD  How it works  PROs and CONs  Storage space for supplies (delivered monthly)  Diet (blood is cleaned every day, diet is less restrictive)  Safety/infection control  Kidney  transplant  Types   or living donor  Eligibility  Transplant evaluation  Waiting list  Still MUST take medications including anti-rejection medications  PROs and CONs  The diet (least restrictive)  Cultural aspects of different choices  Be sensitive to cultural beliefs in regards to all of the available options  Outcome Assessment Questions  Patient states that PD is done 7 nights per week.  Patient states that PD can be done by gravity (CAPD) or by machine (APD).  Patient states that IHD is done 3x per week.  Patient states that IHD has more dietary restrictions than PD.    Lesson 5 Getting Ready for Treatment (Beyond the Basics)  Lesson Objectives  By the end of each session, participants will be able to:  Express a benefit of having access surgery months before starting dialysis  Report two ways that the non-dominant arm should be protected for dialysis access  Topics & Points Covered  When does dialysis start?  Benefits of early access placement, including choice in type of dialysis (informed decision)  Hemodialysis access  Fistula, graft, catheter  PROS and CONS of access type  How to prepare for surgery and care for the access  Peritoneal dialysis access  Catheter  How to prepare for surgery and care for the exit site  Transplant evaluation  Referral/paperwork for transplant center  Surgical referral  Immunizations, other lab tests  Emotional preparation/acceptance (depression)  Cultural considerations  Be sensitive to your audience's cultural beliefs and norms  Transportation considerations    Lesson 6 Living with Advanced Kidney Disease  Lesson Objectives  By the end of each session, participants will be able to:  Identify that diet recommendations and medications may change when dialysis treatment begins  Identify that Medicare has a special program that may pay for the cost of treatment  Distinguish between palliative care and hospice care  Explain active medical management without  dialysis  Describe the importance of creating an Advance Directive and Living Will  Topics & Points Covered  What to expect once you start dialysis  Will take time to feel better (won't happen after first treatment)  Medications may change  Reviewed signs and symptoms of uremia  Basic diet changes for dialysis  More protein is needed due to protein losses during dialysis  Maintain low sodium and phosphorus  May need potassium and fluid restrictions with hemodialysis  Dietitian is part of dialysis team  Financial concerns  Employment and rehabilitation  Cost of treatments  Cost of medications  Transportation   is part of dialysis team  Quality of life  Sexual function  Transplantation  Waiting for transplant  If transplant fails  Advance Care Planning  Deferred  Outcome Assessment Questions  Where does a donated kidney come from? Patient states a living or  donor  How long do you have to take anti-rejection medications after getting a transplant? Patient states medications are life-long    Message sent to pt in portal:  Hello,     It was great meeting with you and Naz today for our class.      As I mentioned, there are many video resources available to you. Please see the links below for more information on each type of dialysis:    Peritoneal Dialysis   [email or web address]   [email or web address]     Hemodialysis (In-Center and Home Hemodialysis)  [email or web address]   https://www.[email or web address]/watch?[email or web address]     Transplant   [email or web address]     Kidney Information   www.Values of n      Also, I looked into some of your questions:  1- What is the weight lifting restriction when you have a PD catheter? 30 pounds max, still waiting on reply but one recent study found that it was safe to lift objects weighing 6 lbs or less with the arm with the fistula (vascular access in the arm). Would recommend following up  with Dr. Mckinney for more specifics.  2 - Can I exercise on dialysis? There are many safe exercises that can be done on dialysis. Recommend following up with Dr. Mckinney for specifics.  3 - What is the average time needed to get to the hospital when a kidney becomes available? Varies by available kidney, average of about 4 hours from the time of notification. Recommend asking each individual transplant center based on your location  3. Is the new, smaller CAPD machine (LiberDi) available for trial yet? Spoke with Dr. Mckinney, who will follow up on that with you.  4. Can a hybrid model of HD/PD be followed? MANUELA Judd reports that traveling to different centers for HD requires 3 weeks advance notice to the home clinic  (who would likely be coordinating the travel). Would be very unlikely that this model would be sustainable for the clinic, given the amount of travel involved. Would recommend calling a local dialysis clinic and ask to speak with the  for more information about their specific capabilities. Also, recommend discussing further with Dr. Mckinney.    At the end of this session, the ESRD treatment modality the patient was most interested in was: Kidney transplant. 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  Areas of information that primary nephrology provider should reinforce during follow-up visit includes: exercise/weight lifting restrictions, possibility of CAPD/LiberDi . Pt would also like to know when it would be appropriate to apply for Medicare.     The content of these lessons are adapted from the Kidney Disease Education (KDE) Services benefit as defined by the Centers for Medicare & Medicaid Services.    100 minutes spent educating patient on the above content.

## 2023-11-14 NOTE — PROGRESS NOTES
Verified demographics, appt times, lab work including HIV and Hepatitis which is required for transplantation. Informed patient that a caregiver is required and to bring a light snack. Assessed for  needs. Noted patient does not require . Updated in Epic:  health/surgical history, allergies, and family history. Patient states understanding , given opportunity to ask questions and all questions answered. Reminded patient to refer to appointment slips and education information sent previously.

## 2023-11-21 ENCOUNTER — TELEPHONE (OUTPATIENT)
Dept: TRANSPLANT | Facility: CLINIC | Age: 43
End: 2023-11-21
Payer: COMMERCIAL

## 2023-11-28 ENCOUNTER — TELEPHONE (OUTPATIENT)
Dept: TRANSPLANT | Facility: CLINIC | Age: 43
End: 2023-11-28
Payer: COMMERCIAL

## 2023-11-28 NOTE — TELEPHONE ENCOUNTER
MA notes per Pre Dialysis adherence form     FOR THE PAST THREE MONTHS:    0-Appt Adhrence  0-No show    No concerns with labs, care giving, transportation, or mental health    Per Dr. Mckinney, no issues with compliance    Desiree Moreno  Abdominal Transplant MA

## 2023-11-29 ENCOUNTER — HOSPITAL ENCOUNTER (OUTPATIENT)
Dept: RADIOLOGY | Facility: HOSPITAL | Age: 43
Discharge: HOME | End: 2023-11-29
Payer: COMMERCIAL

## 2023-11-29 ENCOUNTER — OFFICE VISIT (OUTPATIENT)
Dept: TRANSPLANT | Facility: CLINIC | Age: 43
End: 2023-11-29
Payer: COMMERCIAL

## 2023-11-29 ENCOUNTER — CLINICAL SUPPORT (OUTPATIENT)
Dept: INFECTIOUS DISEASES | Facility: CLINIC | Age: 43
End: 2023-11-29
Payer: COMMERCIAL

## 2023-11-29 VITALS
SYSTOLIC BLOOD PRESSURE: 148 MMHG | HEIGHT: 72 IN | BODY MASS INDEX: 29.86 KG/M2 | OXYGEN SATURATION: 100 % | TEMPERATURE: 97 F | HEART RATE: 61 BPM | RESPIRATION RATE: 16 BRPM | DIASTOLIC BLOOD PRESSURE: 89 MMHG | WEIGHT: 220.44 LBS

## 2023-11-29 DIAGNOSIS — N18.5 CKD (CHRONIC KIDNEY DISEASE), STAGE V: ICD-10-CM

## 2023-11-29 DIAGNOSIS — Z76.82 ORGAN TRANSPLANT CANDIDATE: ICD-10-CM

## 2023-11-29 DIAGNOSIS — I10 ESSENTIAL HYPERTENSION: ICD-10-CM

## 2023-11-29 DIAGNOSIS — Z01.818 PRE-TRANSPLANT EVALUATION FOR KIDNEY TRANSPLANT: Primary | ICD-10-CM

## 2023-11-29 DIAGNOSIS — E10.21 DIABETIC NEPHROPATHY ASSOCIATED WITH TYPE 1 DIABETES MELLITUS: ICD-10-CM

## 2023-11-29 DIAGNOSIS — Z79.4 LONG TERM CURRENT USE OF INSULIN: ICD-10-CM

## 2023-11-29 DIAGNOSIS — Z01.818 PRE-TRANSPLANT EVALUATION FOR KIDNEY TRANSPLANT: ICD-10-CM

## 2023-11-29 PROBLEM — E78.5 HYPERLIPIDEMIA: Status: ACTIVE | Noted: 2023-11-29

## 2023-11-29 PROCEDURE — 72170 XR PELVIS ROUTINE AP: ICD-10-PCS | Mod: 26,TXP,, | Performed by: RADIOLOGY

## 2023-11-29 PROCEDURE — 90715 TDAP VACCINE GREATER THAN OR EQUAL TO 7YO IM: ICD-10-PCS | Mod: S$GLB,TXP,, | Performed by: INTERNAL MEDICINE

## 2023-11-29 PROCEDURE — 99999 PR PBB SHADOW E&M-EST. PATIENT-LVL I: CPT | Mod: PBBFAC,TXP,,

## 2023-11-29 PROCEDURE — 90739 HEPATITIS B (RECOMBINANT) ADJUVANTED, 2 DOSE: ICD-10-PCS | Mod: S$GLB,TXP,, | Performed by: INTERNAL MEDICINE

## 2023-11-29 PROCEDURE — 90472 PR IMMUNIZ,ADMIN,EACH ADDL: ICD-10-PCS | Mod: S$GLB,TXP,, | Performed by: INTERNAL MEDICINE

## 2023-11-29 PROCEDURE — 93978 US DOPP ILIACS BILATERAL: ICD-10-PCS | Mod: 26,TXP,, | Performed by: RADIOLOGY

## 2023-11-29 PROCEDURE — 71046 X-RAY EXAM CHEST 2 VIEWS: CPT | Mod: 26,TXP,, | Performed by: RADIOLOGY

## 2023-11-29 PROCEDURE — 99204 PR OFFICE/OUTPT VISIT, NEW, LEVL IV, 45-59 MIN: ICD-10-PCS | Mod: 25,S$GLB,TXP, | Performed by: PHYSICIAN ASSISTANT

## 2023-11-29 PROCEDURE — 72170 X-RAY EXAM OF PELVIS: CPT | Mod: 26,TXP,, | Performed by: RADIOLOGY

## 2023-11-29 PROCEDURE — 90715 TDAP VACCINE 7 YRS/> IM: CPT | Mod: S$GLB,TXP,, | Performed by: INTERNAL MEDICINE

## 2023-11-29 PROCEDURE — 99204 OFFICE O/P NEW MOD 45 MIN: CPT | Mod: S$GLB,TXP,, | Performed by: SURGERY

## 2023-11-29 PROCEDURE — 90739 HEPB VACC 2/4 DOSE ADULT IM: CPT | Mod: S$GLB,TXP,, | Performed by: INTERNAL MEDICINE

## 2023-11-29 PROCEDURE — 93978 VASCULAR STUDY: CPT | Mod: 26,TXP,, | Performed by: RADIOLOGY

## 2023-11-29 PROCEDURE — 93978 VASCULAR STUDY: CPT | Mod: TC,TXP

## 2023-11-29 PROCEDURE — 90471 PR IMMUNIZ ADMIN,1 SINGLE/COMB VAC/TOXOID: ICD-10-PCS | Mod: S$GLB,TXP,, | Performed by: INTERNAL MEDICINE

## 2023-11-29 PROCEDURE — 90471 IMMUNIZATION ADMIN: CPT | Mod: S$GLB,TXP,, | Performed by: INTERNAL MEDICINE

## 2023-11-29 PROCEDURE — 90472 IMMUNIZATION ADMIN EACH ADD: CPT | Mod: S$GLB,TXP,, | Performed by: INTERNAL MEDICINE

## 2023-11-29 PROCEDURE — 99999 PR PBB SHADOW E&M-EST. PATIENT-LVL I: ICD-10-PCS | Mod: PBBFAC,TXP,,

## 2023-11-29 PROCEDURE — 97802 PR MED NUTR THER, 1ST, INDIV, EA 15 MIN: ICD-10-PCS | Mod: S$GLB,TXP,,

## 2023-11-29 PROCEDURE — 99999 PR PBB SHADOW E&M-EST. PATIENT-LVL V: CPT | Mod: PBBFAC,TXP,, | Performed by: NURSE PRACTITIONER

## 2023-11-29 PROCEDURE — 71046 XR CHEST PA AND LATERAL: ICD-10-PCS | Mod: 26,TXP,, | Performed by: RADIOLOGY

## 2023-11-29 PROCEDURE — 90632 HEPATITIS A VACCINE ADULT IM: ICD-10-PCS | Mod: S$GLB,TXP,, | Performed by: INTERNAL MEDICINE

## 2023-11-29 PROCEDURE — 99205 OFFICE O/P NEW HI 60 MIN: CPT | Mod: S$GLB,TXP,, | Performed by: NURSE PRACTITIONER

## 2023-11-29 PROCEDURE — 97802 MEDICAL NUTRITION INDIV IN: CPT | Mod: S$GLB,TXP,,

## 2023-11-29 PROCEDURE — 72170 X-RAY EXAM OF PELVIS: CPT | Mod: TC,TXP

## 2023-11-29 PROCEDURE — 90632 HEPA VACCINE ADULT IM: CPT | Mod: S$GLB,TXP,, | Performed by: INTERNAL MEDICINE

## 2023-11-29 PROCEDURE — 99205 PR OFFICE/OUTPT VISIT, NEW, LEVL V, 60-74 MIN: ICD-10-PCS | Mod: S$GLB,TXP,, | Performed by: NURSE PRACTITIONER

## 2023-11-29 PROCEDURE — 99204 OFFICE O/P NEW MOD 45 MIN: CPT | Mod: 25,S$GLB,TXP, | Performed by: PHYSICIAN ASSISTANT

## 2023-11-29 PROCEDURE — 71046 X-RAY EXAM CHEST 2 VIEWS: CPT | Mod: TC,TXP

## 2023-11-29 PROCEDURE — 99204 PR OFFICE/OUTPT VISIT, NEW, LEVL IV, 45-59 MIN: ICD-10-PCS | Mod: S$GLB,TXP,, | Performed by: SURGERY

## 2023-11-29 PROCEDURE — 99999 PR PBB SHADOW E&M-EST. PATIENT-LVL V: ICD-10-PCS | Mod: PBBFAC,TXP,, | Performed by: NURSE PRACTITIONER

## 2023-11-29 NOTE — PROGRESS NOTES
PRE-TRANSPLANT INFECTIOUS DISEASE CONSULT    Reason for Visit:  Pre-transplant evaluation  Referring Provider: Dr. Jose Mckinney     History of Present Illness:    42 y.o. male with a history of ESRD 2/2 HTN, DMII presents for pre-kidney transplant evaluation. Pt is pre dialysis     Infectious History:  Recent hospital admissions: No  Recent infections: Yes UTI  Recent or current antibiotic use: Yes took cefdinir x 1 week for UTI in August. Denies having any symptoms   History of recurrent infections *(sinus / pneumonia / UTI / SBP)*: No  History of diabetic foot wound or bone/joint infection: Yes had to see wound care for left hallux ulcer. Healed. No osteomyelitis   Recent dental infections, issues or procedures: No  History of chicken pox: Yes  History of shingles: No  History of STI: No  History of COVID infection: Yes denies hospital admission     History of Immunosuppression:  Prior chemotherapy / immunosuppression: No  Prior transplant: No  History of splenectomy: No    Tuberculosis:  Prior screening for latent TB: Yes  Prior diagnosis of latent TB: No  Risk factors for TB *known exposure, incarceration, homelessness*: No    Geographical exposures:  Currently lives in [place] with parents   Lived in the following states: none  Lived or travelled to the [facility] [place]: No  International travel: No  Travel-associated illness: No    Social/Environmental:  Occupation:   and    Pets: Yes out door dog   Livestock: Yes, cows   Fishing / hunting: Yes fishing. Outdoor activities   Hobbies: none  Water: [place]  Consumption of raw/undercooked meat or seafood?  Yes  Tobacco: No  Alcohol: none   Recreational drug use:  No  Sexual partners: single, MSW    Past Histories:   Past Medical History:   Diagnosis Date    Anemia     Diabetes mellitus     Diabetes mellitus type I     Disorder of kidney and ureter     GERD (gastroesophageal reflux disease)     Hypertension     Proteinuria      Past Surgical  History:   Procedure Laterality Date    INNER EAR SURGERY      for tinnitus     Family History   Problem Relation Age of Onset    Kidney disease Mother     Heart disease Mother     Diabetes Mother     Cancer Father     Heart disease Sister      Social History     Tobacco Use    Smoking status: Never    Smokeless tobacco: Never   Substance Use Topics    Alcohol use: No    Drug use: No     Review of patient's allergies indicates:   Allergen Reactions    Codeine     Vancomycin analogues          Immunization History:  Received all childhood vaccines: Yes  All household members receive annual flu vaccine: No  All household members are up to date on COVID vaccine: No      Current antibiotics:  Antibiotics (From admission, onward)      None              Review of Systems  Review of Systems   Constitutional: Negative for chills, decreased appetite, fever, malaise/fatigue, night sweats, weight gain and weight loss.   HENT:  Negative for congestion, ear pain, hearing loss, hoarse voice, sore throat and tinnitus.    Eyes:  Negative for blurred vision, pain, vision loss in left eye, vision loss in right eye and visual disturbance.   Cardiovascular:  Negative for chest pain, dyspnea on exertion, leg swelling and palpitations.   Respiratory:  Negative for cough, shortness of breath, sputum production and wheezing.    Skin:  Negative for dry skin, itching, rash and suspicious lesions.   Musculoskeletal:  Negative for back pain, joint pain, myalgias and neck pain.   Gastrointestinal:  Negative for abdominal pain, constipation, diarrhea, heartburn, nausea and vomiting.   Genitourinary:  Negative for dysuria, flank pain, frequency, hematuria, hesitancy and urgency.   Neurological:  Negative for dizziness, headaches, numbness, paresthesias and weakness.   Psychiatric/Behavioral:  Negative for depression and memory loss. The patient does not have insomnia and is not nervous/anxious.           Objective  Physical Exam  Vitals and  "nursing note reviewed.   Constitutional:       General: He is not in acute distress.     Appearance: He is well-developed. He is not diaphoretic.   HENT:      Head: Normocephalic and atraumatic.   Eyes:      Pupils: Pupils are equal, round, and reactive to light.   Cardiovascular:      Rate and Rhythm: Normal rate and regular rhythm.      Heart sounds: Normal heart sounds. No murmur heard.     No friction rub. No gallop.   Pulmonary:      Effort: Pulmonary effort is normal. No respiratory distress.      Breath sounds: Normal breath sounds. No wheezing or rales.   Chest:      Chest wall: No tenderness.   Abdominal:      General: Bowel sounds are normal. There is no distension.      Palpations: There is no mass.      Tenderness: There is no abdominal tenderness. There is no guarding.   Musculoskeletal:         General: No deformity. Normal range of motion.      Cervical back: Normal range of motion and neck supple.   Skin:     General: Skin is warm and dry.      Findings: No erythema or rash.   Neurological:      Mental Status: He is alert and oriented to person, place, and time.   Psychiatric:         Behavior: Behavior normal.         Thought Content: Thought content normal.         Judgment: Judgment normal.           Labs:    CBC:   Lab Results   Component Value Date    WBC 5.04 11/29/2023    HGB 9.5 (L) 11/29/2023    HCT 27.5 (L) 11/29/2023    MCV 85 11/29/2023     11/29/2023    GRAN 3.1 11/29/2023    GRAN 61.5 11/29/2023    LYMPH 1.3 11/29/2023    LYMPH 25.2 11/29/2023    MONO 0.4 11/29/2023    MONO 8.5 11/29/2023    EOSINOPHIL 3.0 11/29/2023       Syphilis screening: No results found for: "RPR", "PRPQ", "FTAABS"     TB screening: No results found for: "TBGOLDPLUS", "TSPOTSCREN"    HIV screening:   Lab Results   Component Value Date    [id number] Non-reactive 11/29/2023       Strongyloides IgG: No results found for: "STRONGANTIGG"    Hepatitis Serologies:   Lab Results   Component Value Date    HEPAIGG " "Non-reactive 11/29/2023    HEPBCAB Non-reactive 11/29/2023    HEPBSAB <3.00 11/29/2023    HEPBSAB Non-reactive 11/29/2023    HEPCAB Non-reactive 11/29/2023        Varicella IgG: No results found for: "VARICELLAINT"      Immunization History   Administered Date(s) Administered    Pneumococcal Polysaccharide - 23 Valent 06/21/2018          Assessment and Plan    1. Risks of Infection: Available serologies were reviewed. No unusual risks of infection or significant barriers to transplantation were identified from the infectious disease standpoint given the information available at this time.    - Acute infectious issues: None   - Pending serologies: Quantiferon gold / T-spot, RPR, Strongyloides IgG, and VZV IgG   - Please call if any pending serologic testing is positive.    2. Immunizations:  Based on the patient's immunization history and serologies, the following immunizations are recommended:  - Hepatitis A    Patient does not have immunity to hepatitis A    Vaccination ordered today: Yes   - Hepatitis B    Patient does not have immunity to hepatitis B    Vaccination ordered today: Yes   - COVID    Current CDC vaccination recommendations were discussed with the patient   - Annual high dose influenza     Vaccination ordered today: Yes   - Prevnar 20    Vaccination ordered today: Yes   - Tdap    Vaccination ordered today: Yes   - Shingrix    Vaccination ordered today: Yes    Recommended Pre-Transplant Immunization Schedule   Vaccine  0m 1m 2m 6m   Pneumococcal conjugate vaccine (Prevnar 20) X      Tetanus-diphtheria-pertussis (Tdap)* X      Hepatitis A Vaccine (Havrix)** X   X   Hepatitis B Vaccine (Heplisav)** X X     Influenza (annual) X      Zoster Recombinant Vaccine (Shingrix) X  X           *Administer booster every 10 years.       **Administer if no immunity demonstrated on serologies               Patient will receive vaccines today in ID clinic, and was provided with a prescription to receive all subsequent " vaccine doses at local pharmacy.    3. Counseling:   I discussed with the patient the risk for increased susceptibility to infections following transplantation including increased risk for infection right after transplant and if rejection should occur.  The patient has been counseled on the importance of vaccinations to decrease risk of infection and severe illness. Specific guidance has been provided to the patient regarding the patient's occupation, hobbies and activities to avoid future infectious complications.     4. Transplant Candidacy: Based on available information, there are no identified significant barriers to transplantation from an infectious disease standpoint.  Final determination of transplant candidacy will be made once evaluation is complete and reviewed by the Selection Committee.      Follow up with infectious disease as needed.       The total time for evaluation and management services performed on 11/29/2023 was greater than 35 minutes.

## 2023-11-29 NOTE — PROGRESS NOTES
TRANSPLANT NUTRITIONAL ASSESSMENT    Referring Provider: Jose Mckinney MD     Reason for Visit: Pre-kidney transplant work-up (pre-dialysis)    Age: 42 y.o.  Sex: male    Patient Active Problem List   Diagnosis    Essential hypertension    Mixed hyperlipidemia    Long term current use of insulin    Hypertension    Hyperlipidemia    Pre-transplant evaluation for kidney transplant     Past Medical History:   Diagnosis Date    Anemia     Diabetes mellitus     Diabetes mellitus type I     Disorder of kidney and ureter     GERD (gastroesophageal reflux disease)     Hypertension     Proteinuria      Lab Results   Component Value Date     (H) 2023    K 4.5 2023    PHOS 4.7 (H) 2023    CHOL 165 2023    HDL 19 (L) 2023    TRIG 940 (H) 2023    ALBUMIN 3.4 (L) 2023    HGBA1C 6.1 (H) 2023    CALCIUM 8.3 (L) 2023     Other Pertinent Labs: N/A    Current Outpatient Medications   Medication Sig    amLODIPine (NORVASC) 10 MG tablet Take 1 tablet (10 mg total) by mouth once daily.    aspirin (ECOTRIN) 81 MG EC tablet Take 81 mg by mouth once daily.    atorvastatin (LIPITOR) 40 MG tablet Take 40 mg by mouth once daily.    blood-glucose meter,continuous (DEXCOM G6  MISC) by Misc.(Non-Drug; Combo Route) route.    blood-glucose transmitter (DEXCOM G6 TRANSMITTER MISC)   Dexcom G6 transmitter, See Instructions, uad with dexcom G6 sensor; change transmitter q 90 days, # 1 EA, 3 Refill(s), Pharmacy: [facility], 187, cm, 23 14:55:00 CDT, Height/Length Measured, 105.1, kg, 23 14:55:00 CDT, Weight Dosing    calcitRIOL (ROCALTROL) 0.25 MCG Cap Take 1 capsule (0.25 mcg total) by mouth once daily.    coenzyme Q10 200 mg capsule Take 200 mg by mouth once daily.    DEXCOM G6 SENSOR Adelina SMARTSI Each Topical Every 10 Days    icosapent ethyL (VASCEPA) 1 gram Cap Take 2 g by mouth 4 (four) times daily.    insulin lispro (HUMALOG U-100 INSULIN) 100 unit/mL  "injection   See Instructions, for use in insulin pump; max daily dose 100 units, # 90 mL, 1 Refill(s), Maintenance, Pharmacy: [facility], 187, cm, 02/23/23 11:24:00 CST, Height/Length Measured, 102.5, kg, 02/23/23 11:24:00 CST, Weight Dosing    insulin lispro 100 unit/mL injection Inject into the skin 3 (three) times daily before meals. 100 units    insulin pump cart,auto,BT/cntr (OMNIPOD 5 G6 INTRO KIT, GEN 5, SUBQ) Inject into the skin. 200 units per pod    insulin pump cart,auto,BT/cntr (OMNIPOD 5 G6 INTRO KIT, GEN 5, SUBQ)   Omnipod 5 G6 Intro Kit (Gen 5), See Instructions, use as directed, # 1 EA, 0 Refill(s), Pharmacy: [facility], 187, cm, 12/30/22 9:14:00 CST, Height/Length Measured, 102.3, kg, 12/30/22 9:14:00 CST, Weight Dosing    losartan (COZAAR) 100 MG tablet TAKE 1 TABLET(100 MG) BY MOUTH EVERY DAY    multivit-min/folic/vit K/lycop (ONE-A-DAY MEN'S MULTIVITAMIN ORAL) Take by mouth once daily.    nebivoloL (BYSTOLIC) 20 mg Tab Take 20 mg by mouth once daily.    tadalafil (CIALIS) 5 MG tablet TK 1 T PO D PRF ERECTILE DYSFUNCTION     No current facility-administered medications for this visit.     Allergies: Codeine and Vancomycin analogues    Ht Readings from Last 1 Encounters:   11/29/23 6' 0.05" (1.83 m)     Wt Readings from Last 1 Encounters:   11/29/23 100 kg (220 lb 7.4 oz)      BMI: Body mass index is 29.86 kg/m².    Usual Weight: 213-220 lb   Weight Change/Time: stable  Current Diet: diabetic   Appetite/Current Intake: fair   Exercise/Physical Activity: functional in ADLs; farms  Nutritional/Herbal Supplements: MVI  Chewing/Swallowing Problems: none  Symptoms: nausea occasionally, relieved with zofran     Estimated Kcal Need: 2000 kcal/day (20 kcal/kg)   Estimated Protein Need: 80-90 gm/day (0.8-0.9 gm/kg)     Nutritional History:   Pt and caregiver present. Consumes takeout/restaurant meals "98% of the time" 2/2 traveling for work. Consumes vegetables 4-5x/wk including okra, " peas, beans, turnip greens, and salad.     Diet Recall    Morning: waffle house grill cheese    Midday: [employer] salad (caesar salad with chicken or chicken salad salad)     Evening: chicken breast with steamed broccoli     Snacks: pork skins, peanuts    Desserts: peanut m&ms when sugar is low, cookie occasionally     Beverages: 3-4 16.9 oz bottled water/day, sprite zero, bottled unsweet tea       Seasonings: salt, Gustavo's     Restaurants/Fast Food: very often       Nutritional Diagnoses  Problem: food- and nutrition-related knowledge deficit  Etiology: RT need for reenforcement of previous education on pre-kidney transplant nutrition recommendations  Symptoms: AEB diet recall and questions from pt     Educational Need? yes  Barriers: none identified  Discussed with: patient and caregiver  Interventions: Patient taught nutrition information regarding Pre-kidney transplant work-up (pre-dialysis). Renal Nutrition Therapy packet reviewed (high/low food sources of K, Phos and protein, low sodium and fluid intake, emphasis on moderate protein intake). Encouraged physical activity daily, regular exercise as tolerated, stay mobile.  Goals/Recommendations: diet adherence and limit intake of high phosphorus foods  Actions Taken: instruct/provide written information  Patient and/or family comprehend instructions: yes  Outcome: Verbalizes understanding  Monitoring: Contact information provided, will f/u in clinic and communicate with the care team as needed.     Counseling Time: 20 minutes

## 2023-11-29 NOTE — PROGRESS NOTES
Transplant Recipient Adult Psychosocial Assessment    Dejuan Noble [facility] [place] [address]  Telephone Information:   Mobile [phone]   Home  [phone] (home)  Work  There is no work phone number on file.  E-mail  [email or web address]    Sex: male  YOB: 1980  Age: 42 y.o.    Encounter Date: 11/29/2023  [place] Citizen: yes  Primary Language: English   Needed: no    Emergency Contact:  Name: Naz Graham  Relationship: significant other  Address: [address] [place], [place] [address]  Phone Numbers:  [phone] (mobile)    Family/Social Support:   Number of dependents/: N/A  Marital history:  once and currently unmarried.  Other family dynamics: Patient has 1 sister and resides with parents.    Household Composition:  Name: Dejuan RITAEn Salvadorkaylee, Sr.  Age: 78  Relationship: father  Does person drive? yes    Name: Manuela Francokaylee  Age: 78  Relationship: mother  Does person drive? no      Do you and your caregivers have access to reliable transportation? yes  PRIMARY CAREGIVER: Naz Graham will be primary caregiver, phone number [phone]     Able to take time off work without financial concerns: yes.     Additional Significant Others who will Assist with Transplant:  Name: Ginger Morelris  Age: 51  City: [place] State: [place]  Relationship: sister  Does person drive? yes      Living Will: no .  Healthcare Power of : no  Advance Directives on file: <<no information> per medical record.  Verbally reviewed LW/HCPA information.   provided patient with copy of LW/HCPA documents and provided education on completion of forms    Living Donors: No.    Highest Education Level: Post-College Graduate Degree  Reading Ability: college  Reports difficulty with: hearing and reports having tinnitus from baseball injury.  Learns Best By:  multisensory engagement     Status: no  VA Benefits: no     Working for Income: yes  If yes, working  activity level: Working Full Time  Spouse/Significant Other Employment: Reports working full-time in  and having PTO available.     Disabled: no    Monthly Income:  Salary/Wages: $5,000    Insurance:   Payer/Plan Subscr  Sex Relation Sub. Ins. ID Effective Group Num   1. GENERIC COMME* FRED TORRES* 1980 Male Self [id number] 10/5/23                                    [address] [facility], [address], [place] [address]   2. UNITED MEDICA* FRED TORRES* 1980 Male Self [id number]                                    [address] [id number]     Primary Insurance (for UNOS reporting): Private Insurance  Secondary Insurance (for UNOS reporting): None    Dialysis Adherence: Patient reports being pre-dialysis.      Infusion Service: patient utilizing? no  Home Health: patient utilizing? no  DME:  [facility]  Pulmonary/Cardiac Rehab: patient denies.   ADLS:  Pt reports independent with all ADLS, drives, and handles own medication management.      Adherence: Adherence education and counseling provided.     Per History Section:  Past Medical History:   Diagnosis Date    Anemia     Diabetes mellitus     Diabetes mellitus type I     Disorder of kidney and ureter     GERD (gastroesophageal reflux disease)     Hypertension     Proteinuria      Social History     Tobacco Use    Smoking status: Never    Smokeless tobacco: Never   Substance Use Topics    Alcohol use: No     Social History     Substance and Sexual Activity   Drug Use No     Social History     Substance and Sexual Activity   Sexual Activity Yes       Per Today's Psychosocial:  Tobacco: none, patient denies any use.  Alcohol: Patient reports having one or two drinks per year.  Patient does not plan to quit.  Illicit Drugs/Non-prescribed Medications: none, patient denies any use.      Arrests/DWI/Treatment/Rehab: patient denies    Psychiatric History:    Mental Health:  patient denies.  Psychiatrist/Counselor: patient  denies.  Medications:  patient denies.  Suicide/Homicide Issues: patient denies hx of SI/HI and denies experiencing SI/HI at time of assessment.     Knowledge: Patient states having clear understanding and realistic expectations regarding the potential risks and potential benefits of organ transplantation and organ donation and agrees to discuss with health care team members and support system members, as well as to utilize available resources and express questions and/or concerns in order to further facilitate the pt informed decision-making.  Resources and information provided and reviewed.     Patient is aware of [facility]'s affiliation and/or partnership with agencies in home health care, LTAC, SNF, [facility] and other hospitals and clinics.    Understanding: Patient reports having a clear understanding of the many lifetime commitments involved with being a transplant recipient, including costs, compliance, medications, lab work, procedures, appointments, concrete and financial planning, preparedness, timely and appropriate communication of concerns, abstinence (ETOH, tobacco, illicit non-prescribed drugs), adherence to all health care team recommendations, support system and caregiver involvement, appropriate and timely resource utilization and follow-through, mental health counseling as needed/recommended, and patient and caregiver responsibilities.  Social Service Handbook, resources and detailed educational information provided and reviewed.  Educational information provided.    Patient also reports current and expected compliance with health care regime, and patient states having a clear understanding of the importance of compliance.  Patient reports a clear understanding that risks and benefits may be involved with organ transplantation and with organ donation.  Patient also reports clear understanding that psychosocial risk factors may affect patient, and include but are not limited to feelings of depression,  "generalized anxiety, anxiety regarding dependence on others, post traumatic stress disorder, feelings of guilt and other emotional and/or mental concerns, and/or exacerbation of existing mental health concerns.  Detailed resources provided and discussed.  Patient agrees to access appropriate resources in a timely manner as needed and/or as recommended, and to communicate concerns appropriately.  Patient also reports a clear understanding of treatment options available.      Patient and caregiver received education in a group setting.   reviewed education, provided additional information, and answered questions.    Feelings or Concerns:     Coping: Identify Patient & Caregiver Strategies to [place]:   1. 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 At the time of surgery - patient reports not foreseeing need to cope at time of surgery.   4. During post-Transplant & Recovery Period - patient reports not foreseeing need to cope during post-transplant recovery.    Goals: Patient reports looking forward to increased levels of energy.  Patient referred to Vocational Rehabilitation.    Interview Behavior: Patient and caregiver present as alert and oriented x 4, pleasant, good eye contact, well groomed, recall good, concentration/judgement good, average intelligence, calm, communicative, cooperative, and asking and answering questions appropriately.           Transplant Social Work - Candidacy  Assessment/Plan:     Psychosocial Suitability: Patient presents as a suitable candidate for kidney transplant at this time. Based on psychosocial risk factors, patient presents as low risk, due to financial stability, adequate insurance, adequate caregiver plans, abstinence from tobacco use, ETOH misuse, and use of illicit substances . " Patient and caregiver present without acute mental health concerns.    Recommendations/Additional Comments:  recommends patient fundraise, continue abstaining from consuming harmful substances, and remain open to mental health counseling - if needed.    Brandon Martinez LCSW

## 2023-11-29 NOTE — PROGRESS NOTES
Transplant Surgery  Kidney and Kidney PancreasTransplant Recipient Evaluation    Referring Physician: Jose Mckinney  Current Nephrologist: Jose Mckinney    Subjective:     Reason for Visit: evaluate transplant candidacy    History of Present Illness: Dejuan Diaz is a 42 y.o. year old male undergoing transplant evaluation.    Dialysis History: Dejuan is pre-dialysis.      Transplant History: N/A    Etiology of Renal Disease: Diabetes Mellitus - Type I (based on medical records from referral).    External provider notes reviewed: No    Review of Systems   Constitutional:  Negative for fatigue.   HENT:  Negative for drooling, postnasal drip and sore throat.    Eyes:  Negative for discharge and itching.   Respiratory:  Negative for choking and stridor.    Gastrointestinal:  Negative for rectal pain.   Endocrine: Negative for polydipsia.   Genitourinary:  Negative for enuresis and genital sores.   Musculoskeletal:  Negative for back pain, neck pain and neck stiffness.   Allergic/Immunologic: Negative for immunocompromised state.   Neurological:  Negative for facial asymmetry and numbness.   Hematological:  Negative for adenopathy.   Psychiatric/Behavioral:  Negative for behavioral problems, self-injury and suicidal ideas.    Objective:     Physical Exam:  Constitutional:   Vitals reviewed: yes   Well-nourished and well-groomed: yes  Eyes:   Sclerae icteric: no   Extraocular movements intact: yes  GI:    Bowel sounds normal: yes   Tenderness: no    If yes, quadrant/location: not applicable   Palpable masses: no    If yes, quadrant/location: not applicable   Hepatosplenomegaly: no   Ascites: no   Hernia: no    If yes, type/location: not applicable   Surgical scars: no    If yes, type/location: not applicable  Resp:   Effort normal: yes   Breath sounds normal: yes    CV:   Regular rate and rhythm: yes   Heart sounds normal: yes   Femoral pulses normal: yes   Extremities edematous: no  Skin:   Rashes or lesions  present: no    If yes, describe:not applicable   Jaundice:: no    Musculoskeletal:   Gait normal: yes   Strength normal: yes  Psych:   Oriented to person, place, and time: yes   Affect and mood normal: yes    Additional comments: not applicable    Diagnostics:  The following labs have been reviewed: CBC  CMP    Counseling: We provided Dejuan Diaz  with a group education session today.  We discussed kidney transplantation at length with him, including risks, potential complications, and alternatives in the management of his renal failure.  The discussion included complications related to anesthesia, bleeding, infection, primary nonfunction, and ATN.  I discussed the typical postoperative course, length of hospitalization, the need for long-term immunosuppression, and the need for long-term routine follow-up.  I discussed living-donor and -donor transplantation and the relative advantages and disadvantages of each.  I also discussed average waiting times for both living donation and  donation.  I discussed national and center-specific survival rates.  I also mentioned the potential benefit of multicenter listing to candidates listed with centers within more than one organ procurement organization.  All questions were answered.    Patient advised that it is recommended that all transplant candidates, and their close contacts and household members receive Covid vaccination.    Final determination of transplant candidacy will be made once evaluation is complete and reviewed by the Kidney & Kidney/Pancreas Selection Committee.    Coronavirus disease (COVID-19) caused by severe acute respiratory virus coronavirus 2 (SARS-C0V 2) is associated with increased mortality in solid organ transplant recipients (SOT) compared to non-transplant patients. Vaccine responses to vaccination are depressed against SARS-CoV2 compared to normal individuals but improve with third vaccination doses. Vaccination prior to  SOT provides both the best opportunity for transplant candidates to develop protective immunity and to reduce the risk of serious COVID19 infections post transplantation. Organ transplant candidates at [facility] will be required to receive SARS-CoV-2 vaccination prior to being listed with a an active status, whenever possible. Exceptions will be made for disability related reasons or for sincerely held [employer] beliefs.          Transplant Surgery - Candidacy   Assessment/Plan:   Dejuan Diaz Jr. is pre-dialysis with CKD stage 4 (GFR 15-29 mL/min). I see no surgical contraindication to placing a kidney transplant. Based on available information, Dejuan Diaz Jr. is a suitable kidney or combined kidney pancreas transplant candidate. We had a long discussion about the risks and benefit of combined kidney pancreas transplant - at this time he would prefer to obtain a live donor kidney transplant if possible.     Additional testing to be completed according to the Written Order Guidelines for Adult Pre-kidney and Pancreas Transplant Evaluation (KI-02).  Interpretation of tests and discussion of patient management involves all members of the multidisciplinary transplant team.    Raul Jean MD

## 2023-11-29 NOTE — PROGRESS NOTES
Transplant Nephrology  Kidney Transplant Recipient Evaluation    Referring Physician: Jose Mckinney  Current Nephrologist: Jose Mckinney    Subjective:   CC:  Initial evaluation of kidney transplant candidacy.    HPI:  Mr. Diaz is a 42 y.o. year old White male who has presented to be evaluated as a potential kidney transplant recipient.  He has advanced kidney disease secondary to diabetic nephropathy, does not have a dialysis access.     DM1 since age 28, has been on insulin since diagnosis. Blood sugars much easier to control now with omnipod and dexcom monitor. Using about 25-30 units insulin daily. Denies retinopathy, neuropathy, or gastroparesis.     Had echo and stress test done in August, both acceptable.    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 Looks great, not frail. Has several potential living donors.    Denies MI, CVA, DVT/PE, or malignancy history.    Current Outpatient Medications   Medication Sig Dispense Refill    amLODIPine (NORVASC) 10 MG tablet Take 1 tablet (10 mg total) by mouth once daily. 90 tablet 1    aspirin (ECOTRIN) 81 MG EC tablet Take 81 mg by mouth once daily.      atorvastatin (LIPITOR) 40 MG tablet Take 40 mg by mouth once daily.      blood-glucose meter,continuous (DEXCOM G6  MISC) by Misc.(Non-Drug; Combo Route) route.      blood-glucose transmitter (DEXCOM G6 TRANSMITTER MISC)   Dexcom G6 transmitter, See Instructions, uad with dexcom G6 sensor; change transmitter q 90 days, # 1 EA, 3 Refill(s), Pharmacy: [facility], 187, cm, 23 14:55:00 CDT, Height/Length Measured, 105.1, kg, 23 14:55:00 CDT, Weight Dosing      calcitRIOL (ROCALTROL) 0.25 MCG Cap Take 1 capsule (0.25 mcg total) by mouth once daily. 90 capsule 1    coenzyme Q10 200 mg capsule Take 200 mg by mouth once daily.      DEXCOM G6 SENSOR Adelina SMARTSI Each Topical Every 10 Days      icosapent ethyL (VASCEPA) 1 gram Cap Take 2 g  by mouth 4 (four) times daily.      insulin lispro (HUMALOG U-100 INSULIN) 100 unit/mL injection   See Instructions, for use in insulin pump; max daily dose 100 units, # 90 mL, 1 Refill(s), Maintenance, Pharmacy: [facility], 187, cm, 02/23/23 11:24:00 CST, Height/Length Measured, 102.5, kg, 02/23/23 11:24:00 CST, Weight Dosing      insulin lispro 100 unit/mL injection Inject into the skin 3 (three) times daily before meals. 100 units      insulin pump cart,auto,BT/cntr (OMNIPOD 5 G6 INTRO KIT, GEN 5, SUBQ) Inject into the skin. 200 units per pod      insulin pump cart,auto,BT/cntr (OMNIPOD 5 G6 INTRO KIT, GEN 5, SUBQ)   Omnipod 5 G6 Intro Kit (Gen 5), See Instructions, use as directed, # 1 EA, 0 Refill(s), Pharmacy: [facility], 187, cm, 12/30/22 9:14:00 CST, Height/Length Measured, 102.3, kg, 12/30/22 9:14:00 CST, Weight Dosing      losartan (COZAAR) 100 MG tablet TAKE 1 TABLET(100 MG) BY MOUTH EVERY DAY 90 tablet 1    multivit-min/folic/vit K/lycop (ONE-A-DAY MEN'S MULTIVITAMIN ORAL) Take by mouth once daily.      nebivoloL (BYSTOLIC) 20 mg Tab Take 20 mg by mouth once daily. 90 tablet 3    tadalafil (CIALIS) 5 MG tablet TK 1 T PO D PRF ERECTILE DYSFUNCTION  1     No current facility-administered medications for this visit.       Past Medical History:   Diagnosis Date    Anemia     Diabetes mellitus     Diabetes mellitus type I     Disorder of kidney and ureter     GERD (gastroesophageal reflux disease)     Hypertension     Proteinuria      Past Medical and Surgical History: Mr. Diaz  has a past medical history of Anemia, Diabetes mellitus, Diabetes mellitus type I, Disorder of kidney and ureter, GERD (gastroesophageal reflux disease), Hypertension, and Proteinuria.  He has a past surgical history that includes Inner ear surgery.    Past Social and Family History: Mr. Diaz reports that he has never smoked. He has never used smokeless tobacco. He reports that he does not drink alcohol and  "does not use drugs. His family history includes Cancer in his father; Diabetes in his mother; Heart disease in his mother and sister; Kidney disease in his mother.    Review of Systems   Constitutional:  Positive for fatigue. Negative for activity change, appetite change and fever.   HENT:  Negative for congestion, mouth sores and sore throat.    Eyes:  Negative for visual disturbance.   Respiratory:  Negative for cough, chest tightness and shortness of breath.    Cardiovascular:  Negative for chest pain, palpitations and leg swelling.   Gastrointestinal:  Negative for abdominal distention, abdominal pain, constipation, diarrhea and nausea.   Genitourinary:  Negative for difficulty urinating, frequency and hematuria.   Musculoskeletal:  Negative for arthralgias and gait problem.   Skin:  Negative for wound.   Allergic/Immunologic: Negative for environmental allergies, food allergies and immunocompromised state.   Neurological:  Negative for dizziness, weakness and numbness.   Psychiatric/Behavioral:  Negative for sleep disturbance. The patient is not nervous/anxious.        Objective:   Blood pressure (!) 148/89, pulse 61, temperature 97.3 °F (36.3 °C), temperature source Temporal, resp. rate 16, height 6' 0.05" (1.83 m), weight 100 kg (220 lb 7.4 oz), SpO2 100 %.body mass index is 29.86 kg/m².    Physical Exam  Vitals and nursing note reviewed.   Constitutional:       Appearance: Normal appearance.   HENT:      Head: Normocephalic.   Cardiovascular:      Rate and Rhythm: Normal rate and regular rhythm.      Heart sounds: Normal heart sounds.   Pulmonary:      Effort: Pulmonary effort is normal.      Breath sounds: Normal breath sounds.   Abdominal:      General: Bowel sounds are normal. There is no distension.      Palpations: Abdomen is soft.      Tenderness: There is no abdominal tenderness.   Musculoskeletal:         General: Normal range of motion.   Skin:     General: Skin is warm and dry.   Neurological:      " General: No focal deficit present.      Mental Status: He is alert.   Psychiatric:         Behavior: Behavior normal.         Labs:  Lab Results   Component Value Date    WBC 5.04 11/29/2023    HGB 9.5 (L) 11/29/2023    HCT 27.5 (L) 11/29/2023     11/29/2023    K 4.5 11/29/2023     (H) 11/29/2023    CO2 17 (L) 11/29/2023    BUN 49 (H) 11/29/2023    CREATININE 5.8 (H) 11/29/2023    EGFRNORACEVR 11.7 (A) 11/29/2023    CALCIUM 8.3 (L) 11/29/2023    PHOS 4.7 (H) 11/29/2023    ALBUMIN 3.4 (L) 11/29/2023    AST 21 11/29/2023    ALT 19 11/29/2023    UTPCR 2.99 (H) 08/14/2023    .6 (H) 11/29/2023       Lab Results   Component Value Date    BILIRUBINUA Negative 08/25/2023    PROTEINUA 3+ (A) 08/25/2023    NITRITE Negative 08/25/2023    RBCUA 10 (H) 08/25/2023    WBCUA 4 08/25/2023     Labs were reviewed with the patient.    Assessment:     1. Pre-transplant evaluation for kidney transplant    2. CKD (chronic kidney disease), stage V    3. Diabetic nephropathy associated with type 1 diabetes mellitus    4. Essential hypertension    5. Long term current use of insulin    6. BMI 29.0-29.9,adult      Plan:   42 y.o. year old White male who has presented to be evaluated as a potential kidney transplant recipient.  He has advanced kidney disease secondary to diabetic nephropathy. Echo, stress, and renal US all done in August and acceptable. Will be ready for selection following afternoon imaging. OK to start donor work up to hopefully avoid dialysis initiation. We did discuss possibility of listing for pancreas transplant in the future after living donor kidney transplant.       Transplant Candidacy:   Based on available information, Mr. Diaz is an excellent kidney transplant candidate.   Meets center eligibility for accepting HCV+ donor offer - Yes.  Patient educated on HCV+ donors. Dejuan is willing to accept HCV+ donor offer - Yes   Patient is a candidate for KDPI > 85 kidney donor offer - No (weight > 88  kg).  Final determination of transplant candidacy will be made once workup is complete and reviewed by the selection committee.    Patient advised that it is recommended that all transplant candidates, and their close contacts and household members receive Covid vaccination.    UNOS Patient Status  Functional Status: 90% - Able to carry on normal activity: minor symptoms of disease    Annemarie Corona, NP

## 2023-11-29 NOTE — LETTER
December 1, 2023        Jose Mckinney  [address]  [address]  [place] [facility] [place] [address]  Phone: [phone]  Fax: [phone]             [facility] 1st Fl  [address]  [facility] [address]  Phone: [phone]   Patient: Dejuan Diaz Jr.   MR Number: [id number]   YOB: 1980   Date of Visit: 11/29/2023       Dear Dr. Jose Mckinney    Thank you for referring Dejuan Diaz to me for evaluation. Attached you will find relevant portions of my assessment and plan of care.    If you have questions, please do not hesitate to call me. I look forward to following Dejuan Diaz along with you.    Sincerely,    Annemarie Corona, JAVED    Enclosure    If you would like to receive this communication electronically, please contact [email or web address] or [phone] to request [employer] access.    [employer] is a tool which provides read-only access to select patient information with whom you have a relationship. Its easy to use and provides real time access to review your patients record including encounter summaries, notes, results, and demographic information.    If you feel you have received this communication in error or would no longer like to receive these types of communications, please e-mail [email or web address]

## 2023-11-29 NOTE — PROGRESS NOTES
Patient received the Tdap and the first Hep A vaccine in the left deltoid, and the Heplisav B vaccine in the right deltoid.  Pt tolerated well. Pt asked to wait in the clinic 15 minutes after injection in the event of an allergic reaction. Pt verbalized understanding. Pt left in NAD.

## 2023-12-28 ENCOUNTER — CLINICAL SUPPORT (OUTPATIENT)
Dept: INFECTIOUS DISEASES | Facility: CLINIC | Age: 43
End: 2023-12-28
Payer: COMMERCIAL

## 2023-12-28 DIAGNOSIS — Z01.818 PRE-TRANSPLANT EVALUATION FOR KIDNEY TRANSPLANT: Primary | ICD-10-CM

## 2023-12-28 DIAGNOSIS — Z01.818 PRE-TRANSPLANT EVALUATION FOR KIDNEY TRANSPLANT: ICD-10-CM

## 2023-12-28 PROCEDURE — 99999 PR PBB SHADOW E&M-EST. PATIENT-LVL I: ICD-10-PCS | Mod: PBBFAC,TXP,,

## 2023-12-28 PROCEDURE — 90471 PR IMMUNIZ ADMIN,1 SINGLE/COMB VAC/TOXOID: ICD-10-PCS | Mod: S$GLB,TXP,, | Performed by: INTERNAL MEDICINE

## 2023-12-28 PROCEDURE — 99999 PR PBB SHADOW E&M-EST. PATIENT-LVL I: CPT | Mod: PBBFAC,TXP,,

## 2023-12-28 PROCEDURE — 90739 HEPATITIS B (RECOMBINANT) ADJUVANTED, 2 DOSE: ICD-10-PCS | Mod: S$GLB,TXP,, | Performed by: INTERNAL MEDICINE

## 2023-12-28 PROCEDURE — 90471 IMMUNIZATION ADMIN: CPT | Mod: S$GLB,TXP,, | Performed by: INTERNAL MEDICINE

## 2023-12-28 PROCEDURE — 90739 HEPB VACC 2/4 DOSE ADULT IM: CPT | Mod: S$GLB,TXP,, | Performed by: INTERNAL MEDICINE

## 2023-12-28 NOTE — PROGRESS NOTES
Patient received 2nd Heplisav B IM to the right deltoid.  Tolerated well and left in NAD. Message sent to provider for orders on shingrix and prevnar 20

## 2023-12-29 ENCOUNTER — TELEPHONE (OUTPATIENT)
Dept: INFECTIOUS DISEASES | Facility: CLINIC | Age: 43
End: 2023-12-29
Payer: COMMERCIAL

## 2023-12-29 NOTE — TELEPHONE ENCOUNTER
Nia Boucher PA-C Fernandez, Tiffany L., LPN  Orders placed for shingrix and prevnar 20. Pt lives in [place]. He may get these at a local pharmacy for travel convenience if he wishes to.    He may have had first shingrix dose at outside pharmacy? Would clarify this with the patient.    Thank you          Previous Messages       ----- Message -----  From: aDnya Ford LPN  Sent: 12/28/2023  10:14 AM CST  To: Nia Boucher PA-C  Subject: Inpatient Notes                                  Patient comments of appt said 2nd shingrix and prevnar 20 but not orders in system.  I do not see that he had first shingrix either.  Please add and let me know if you want him to get I will need to call him to schedule     I left a message for patient with return number incase he would like to schedule here

## 2024-01-03 ENCOUNTER — PATIENT MESSAGE (OUTPATIENT)
Dept: TRANSPLANT | Facility: CLINIC | Age: 44
End: 2024-01-03
Payer: COMMERCIAL

## 2024-01-03 ENCOUNTER — TELEPHONE (OUTPATIENT)
Dept: TRANSPLANT | Facility: CLINIC | Age: 44
End: 2024-01-03
Payer: COMMERCIAL

## 2024-01-03 ENCOUNTER — TELEPHONE (OUTPATIENT)
Dept: CARDIOLOGY | Facility: CLINIC | Age: 44
End: 2024-01-03
Payer: COMMERCIAL

## 2024-01-03 DIAGNOSIS — I10 HYPERTENSION, UNSPECIFIED TYPE: ICD-10-CM

## 2024-01-03 DIAGNOSIS — Z79.4 LONG TERM CURRENT USE OF INSULIN: ICD-10-CM

## 2024-01-03 DIAGNOSIS — Z76.82 ORGAN TRANSPLANT CANDIDATE: Primary | ICD-10-CM

## 2024-01-03 DIAGNOSIS — E78.2 MIXED HYPERLIPIDEMIA: ICD-10-CM

## 2024-01-03 DIAGNOSIS — Z01.818 PRE-TRANSPLANT EVALUATION FOR KIDNEY TRANSPLANT: ICD-10-CM

## 2024-01-03 NOTE — TELEPHONE ENCOUNTER
----- Message from Dick Pitts MD sent at 1/3/2024  2:55 PM CST -----  Regarding: RE: Other  Echo and stress test reviewed.    As long as no significant chest pain or shortness of breath with exertion, patient is at acceptable risk to proceed from a Cardiology standpoint.    Continue current cardiac meds  Okay to hold aspirin 5-7 days prior to procedure, restart as soon as safe postprocedure.    Will address lipids and TGs at next clinic visit    -Dick    ----- Message -----  From: Juliana Acharya RN  Sent: 1/3/2024   1:53 PM CST  To: Dick Pitts MD  Subject: Other

## 2024-01-03 NOTE — PROGRESS NOTES
INITIAL PATIENT EDUCATION NOTE     Mr. Dejuan Diaz Jr. was seen in pre-kidney transplant clinic for evaluation for kidney, kidney/pancreas or pancreas only transplant.  The patient attended an individual video education session that discussed/reviewed the following aspects of transplantation: evaluation including diagnostic and laboratory testing,(Chemistries, Hematology, Serologies including HIV and Hepatitis and HLA) required for transplantation and selection committee process, UNOS waitlist management/multiple listings, types of organs offered (KDPI < 85%, KDPI > 85%, PHS risk, DCD, HCV+, HIV+ for HIV+ recipients and enbloc/dual), financial aspects, surgical procedures, dietary instruction pre- and post-transplant, health maintenance pre- and post-transplant, post-transplant hospitalization and outpatient follow-up, potential to participate in a research protocol, and medication management and side effects.  A question and answer session was provided after the presentation.    The patient was seen by all members of the multi-disciplinary team to include: Nephrologist/NANCY, Surgeon, , Transplant Coordinator, , Pharmacist and Dietician (if applicable).    The patient reviewed and signed all consents for evaluation which were witnessed and sent to scanning into the [facility] chart.    The patient was given an education book and plan for further evaluation based on his individual assessment.      Reviewed program requirement for complete COVID vaccination with documentation prior to listing.  COVID education information reviewed with patient. Patient encouraged to be up to date on all vaccinations.     The patient was informed that the transplant team would manage immediate post op pain. If the patient requires long term pain management, they will need to have that pain management addressed by their PCP or previous provider who wrote for long term pain medicines.    The patient was  encouraged to call with any questions or concerns.

## 2024-01-05 ENCOUNTER — TELEPHONE (OUTPATIENT)
Dept: TRANSPLANT | Facility: CLINIC | Age: 44
End: 2024-01-05
Payer: COMMERCIAL

## 2024-01-05 ENCOUNTER — PATIENT MESSAGE (OUTPATIENT)
Dept: TRANSPLANT | Facility: CLINIC | Age: 44
End: 2024-01-05
Payer: COMMERCIAL

## 2024-01-08 ENCOUNTER — HOSPITAL ENCOUNTER (OUTPATIENT)
Dept: RADIOLOGY | Facility: HOSPITAL | Age: 44
Discharge: HOME | End: 2024-01-08
Attending: INTERNAL MEDICINE
Payer: COMMERCIAL

## 2024-01-08 ENCOUNTER — PATIENT MESSAGE (OUTPATIENT)
Dept: TRANSPLANT | Facility: CLINIC | Age: 44
End: 2024-01-08
Payer: COMMERCIAL

## 2024-01-08 ENCOUNTER — OFFICE VISIT (OUTPATIENT)
Dept: CARDIOLOGY | Facility: CLINIC | Age: 44
End: 2024-01-08
Payer: COMMERCIAL

## 2024-01-08 VITALS
HEIGHT: 72 IN | WEIGHT: 223.13 LBS | DIASTOLIC BLOOD PRESSURE: 93 MMHG | SYSTOLIC BLOOD PRESSURE: 161 MMHG | HEART RATE: 65 BPM | BODY MASS INDEX: 30.22 KG/M2

## 2024-01-08 DIAGNOSIS — Z79.4 LONG TERM CURRENT USE OF INSULIN: ICD-10-CM

## 2024-01-08 DIAGNOSIS — E78.2 MIXED HYPERLIPIDEMIA: ICD-10-CM

## 2024-01-08 DIAGNOSIS — I10 ESSENTIAL HYPERTENSION: Primary | ICD-10-CM

## 2024-01-08 DIAGNOSIS — Z01.818 PRE-TRANSPLANT EVALUATION FOR KIDNEY TRANSPLANT: ICD-10-CM

## 2024-01-08 DIAGNOSIS — I10 PRIMARY HYPERTENSION: ICD-10-CM

## 2024-01-08 DIAGNOSIS — Z76.82 ORGAN TRANSPLANT CANDIDATE: ICD-10-CM

## 2024-01-08 DIAGNOSIS — I10 HYPERTENSION, UNSPECIFIED TYPE: ICD-10-CM

## 2024-01-08 PROCEDURE — 99999 PR PBB SHADOW E&M-EST. PATIENT-LVL IV: CPT | Mod: PBBFAC,TXP,, | Performed by: INTERNAL MEDICINE

## 2024-01-08 PROCEDURE — 74176 CT ABD & PELVIS W/O CONTRAST: CPT | Mod: TC,PO,TXP

## 2024-01-08 PROCEDURE — 3080F DIAST BP >= 90 MM HG: CPT | Mod: CPTII,NTX,S$GLB, | Performed by: INTERNAL MEDICINE

## 2024-01-08 PROCEDURE — 1159F MED LIST DOCD IN RCRD: CPT | Mod: CPTII,NTX,S$GLB, | Performed by: INTERNAL MEDICINE

## 2024-01-08 PROCEDURE — 99214 OFFICE O/P EST MOD 30 MIN: CPT | Mod: NTX,S$GLB,, | Performed by: INTERNAL MEDICINE

## 2024-01-08 PROCEDURE — 93005 ELECTROCARDIOGRAM TRACING: CPT | Mod: PO,TXP

## 2024-01-08 PROCEDURE — 1160F RVW MEDS BY RX/DR IN RCRD: CPT | Mod: CPTII,NTX,S$GLB, | Performed by: INTERNAL MEDICINE

## 2024-01-08 PROCEDURE — 3008F BODY MASS INDEX DOCD: CPT | Mod: CPTII,NTX,S$GLB, | Performed by: INTERNAL MEDICINE

## 2024-01-08 PROCEDURE — 93010 ELECTROCARDIOGRAM REPORT: CPT | Mod: NTX,S$GLB,, | Performed by: INTERNAL MEDICINE

## 2024-01-08 PROCEDURE — 3077F SYST BP >= 140 MM HG: CPT | Mod: CPTII,NTX,S$GLB, | Performed by: INTERNAL MEDICINE

## 2024-01-08 PROCEDURE — 74176 CT ABD & PELVIS W/O CONTRAST: CPT | Mod: 26,TXP,, | Performed by: RADIOLOGY

## 2024-01-08 NOTE — PROGRESS NOTES
Subjective:    Patient ID:  Dejuan Diaz Jr. is a 43 y.o. male patient here for evaluation Follow-up (6 MONTH   UPDATED CLEARENCE)      History of Present Illness:  Follow-up test results.  Nuclear perfusion imaging via GXT negative for ischemia.  Echo with preserved ejection fraction.  Calcium score was 98.  Risk factors include dyslipidemia, diabetes mellitus.  Main issue with lipid panel is elevated triglycerides of 940.     No significant dyspnea.  No angina.  No arrhythmia.  No PND orthopnea.  No edema.  No major weight gains.        Review of patient's allergies indicates:   Allergen Reactions    Codeine     Vancomycin analogues        Past Medical History:   Diagnosis Date    Anemia     Diabetes mellitus     Diabetes mellitus type I     Disorder of kidney and ureter     GERD (gastroesophageal reflux disease)     Hypertension     Proteinuria      Past Surgical History:   Procedure Laterality Date    INNER EAR SURGERY      for tinnitus     Social History     Tobacco Use    Smoking status: Never    Smokeless tobacco: Never   Substance Use Topics    Alcohol use: No    Drug use: No        Review of Systems:    As noted in HPI in addition      REVIEW OF SYSTEMS  Review of Systems   Constitutional: Positive for malaise/fatigue. Negative for decreased appetite, diaphoresis, night sweats, weight gain and weight loss.   HENT:  Negative for nosebleeds and odynophagia.    Eyes:  Negative for double vision and photophobia.   Cardiovascular:  Negative for chest pain, claudication, cyanosis, dyspnea on exertion, irregular heartbeat, leg swelling, near-syncope, orthopnea, palpitations, paroxysmal nocturnal dyspnea and syncope.   Respiratory:  Negative for cough, hemoptysis, shortness of breath and wheezing.    Hematologic/Lymphatic: Negative for adenopathy.   Skin:  Negative for flushing, skin cancer and suspicious lesions.   Musculoskeletal:  Negative for gout, myalgias and neck pain.   Gastrointestinal:  Negative for  abdominal pain, heartburn, hematemesis and hematochezia.   Genitourinary:  Negative for bladder incontinence, hesitancy and nocturia.   Neurological:  Negative for focal weakness, headaches, light-headedness and paresthesias.   Psychiatric/Behavioral:  Negative for memory loss and substance abuse.               Objective:        Vitals:    01/08/24 1307   BP: (!) 161/93   Pulse: 65       Lab Results   Component Value Date    WBC 5.04 11/29/2023    HGB 9.5 (L) 11/29/2023    HCT 27.5 (L) 11/29/2023     11/29/2023    CHOL 165 11/29/2023    TRIG 940 (H) 11/29/2023    HDL 19 (L) 11/29/2023    ALT 19 11/29/2023    AST 21 11/29/2023     11/29/2023    K 4.5 11/29/2023     (H) 11/29/2023    CREATININE 5.8 (H) 11/29/2023    BUN 49 (H) 11/29/2023    CO2 17 (L) 11/29/2023    PSA 0.60 11/29/2023    INR 0.9 11/29/2023    HGBA1C 6.1 (H) 11/29/2023        ECHOCARDIOGRAM RESULTS  Results for orders placed in visit on 08/30/23    Echo    Interpretation Summary    Left Ventricle: The left ventricle is normal in size. There is concentric remodeling. Normal wall motion. There is normal systolic function with a visually estimated ejection fraction of 60 - 65%. There is normal diastolic function.    Right Ventricle: Normal right ventricular cavity size. Wall thickness is normal. Right ventricle wall motion  is normal. Systolic function is normal.    Aortic Valve: The aortic valve is a trileaflet valve.    Pulmonary Artery: The estimated pulmonary artery systolic pressure is 34 mmHg.    IVC/SVC: Normal venous pressure at 3 mmHg.        CURRENT/PREVIOUS VISIT EKG  Results for orders placed or performed in visit on 08/07/23   IN OFFICE EKG 12-LEAD (to [place])    Collection Time: 08/07/23 10:03 AM    Narrative    Test Reason : Z00.0    Vent. Rate : 059 BPM     Atrial Rate : 059 BPM     P-R Int : 148 ms          QRS Dur : 092 ms      QT Int : 406 ms       P-R-T Axes : 069 042 064 degrees     QTc Int : 401 ms    Sinus  bradycardia  Otherwise normal ECG  No previous ECGs available  Confirmed by Gisselle JUDD, Dick HERNADEZ ([id number]) on 8/7/2023 9:08:19 PM    Referred By: FABIEN HERNANDEZ           Confirmed By:Dick Pitts MD     No valid procedures specified.   Results for orders placed during the hospital encounter of 08/30/23    Nuclear Stress - Cardiology Interpreted    Interpretation Summary    Normal myocardial perfusion scan. There is no evidence of myocardial ischemia or infarction.    There is a mild intensity fixed perfusion abnormality in the anteroseptal wall of the left ventricle, secondary to soft tissue attenuation.    The visually estimated ejection fraction is normal at rest.    There is normal wall motion at rest.    The ECG portion of the study is negative for ischemia.    The patient exercised for 7 minutes 0 seconds on a high ramp protocol, corresponding to a functional capacity of 11.0 METS, achieving a peak heart rate of 151 bpm, which is 85 % of the age predicted maximum heart rate. Their exercise capacity was average.    During stress, rare PVCs are noted.    The exercise capacity was average.    There are no prior studies for comparison.    No valid procedures specified.    PHYSICAL EXAM  CONSTITUTIONAL: Well built, well nourished in no apparent distress  NECK: no carotid bruit, no JVD  LUNGS: CTA  CHEST WALL: no tenderness,  HEART: regular rate and rhythm, S1, S2 normal, no murmur, click, rub or gallop   ABDOMEN: soft, non-tender; bowel sounds normal; no masses,  no organomegaly  EXTREMITIES: Extremities normal, no edema, no calf tenderness noted  NEURO: AAO X 3    I HAVE REVIEWED :    The vital signs, nurses notes, and all the pertinent radiology and labs.         Current Outpatient Medications   Medication Instructions    amLODIPine (NORVASC) 10 mg, Oral, Daily    aspirin (ECOTRIN) 81 mg, Oral, Daily    atorvastatin (LIPITOR) 40 mg, Oral, Daily    blood-glucose meter,continuous (DEXCOM G6  MISC)  Misc.(Non-Drug; Combo Route)    blood-glucose transmitter (DEXCOM G6 TRANSMITTER MISC)   Dexcom G6 transmitter, See Instructions, uad with dexcom G6 sensor; change transmitter q 90 days, # 1 EA, 3 Refill(s), Pharmacy: [facility], 187, cm, 23 14:55:00 CDT, Height/Length Measured, 105.1, kg, 23 14:55:00 CDT, Weight Dosing    calcitRIOL (ROCALTROL) 0.25 mcg, Oral, Daily    coenzyme Q10 200 mg, Oral, Daily    DEXCOM G6 SENSOR Adelina SMARTSI Each Topical Every 10 Days    icosapent ethyL (VASCEPA) 2 g, Oral, 4 times daily    insulin lispro (HUMALOG U-100 INSULIN) 100 unit/mL injection   See Instructions, for use in insulin pump; max daily dose 100 units, # 90 mL, 1 Refill(s), Maintenance, Pharmacy: [facility], 187, cm, 23 11:24:00 CST, Height/Length Measured, 102.5, kg, 23 11:24:00 CST, Weight Dosing    insulin lispro 100 unit/mL injection Subcutaneous, 3 times daily before meals, 100 units    insulin pump cart,auto,BT/cntr (OMNIPOD 5 G6 INTRO KIT, GEN 5, SUBQ) Subcutaneous, 200 units per pod    insulin pump cart,auto,BT/cntr (OMNIPOD 5 G6 INTRO KIT, GEN 5, SUBQ)   Omnipod 5 G6 Intro Kit (Gen 5), See Instructions, use as directed, # 1 EA, 0 Refill(s), Pharmacy: [facility], 187, cm, 22 9:14:00 CST, Height/Length Measured, 102.3, kg, 22 9:14:00 CST, Weight Dosing    losartan (COZAAR) 100 MG tablet TAKE 1 TABLET(100 MG) BY MOUTH EVERY DAY    multivit-min/folic/vit K/lycop (ONE-A-DAY MEN'S MULTIVITAMIN ORAL) Oral, Daily    nebivoloL (BYSTOLIC) 20 mg, Oral, Daily    tadalafil (CIALIS) 5 MG tablet TK 1 T PO D PRF ERECTILE DYSFUNCTION          Assessment:   Pre evaluation, kidney transplant list.  Unremarkable nuclear GXT perfusion imaging with normal echo.  Calcium score of 98.  Dyslipidemia with high triglyceride level.  Last hemoglobin A1c less than 7.        Plan:   Currently insulin dependent.  Patient on Vascepa and Lipitor.  Continue aggressive risk factor  modification.  Cleared for kidney transplant list per Cardiology.  Follow up again with us in about 6 months.  Continue aggressive risk factor modification.          No follow-ups on file.

## 2024-01-09 ENCOUNTER — TELEPHONE (OUTPATIENT)
Dept: TRANSPLANT | Facility: CLINIC | Age: 44
End: 2024-01-09
Payer: COMMERCIAL

## 2024-01-09 NOTE — PROGRESS NOTES
PHARM.D. PRE-TRANSPLANT NOTE:    This patient's medication therapy was evaluated as part of his pre-transplant evaluation.      The following general pharmacologic concerns were noted: aspirin may increase the risk of perioperative bleeding,     The following concerns for post-operative pain management were noted: none    The following pharmacologic concerns related to HCV therapy were noted: none      This patient's medication profile was reviewed for considerations for DAA Hepatitis C therapy:    [x]  No current inducers of CYP 3A4 or PGP  [x]  No amiodarone on this patient's EMR profile in the last 24 months  [x]  No past or current atrial fibrillation on this patient's EMR profile       Current Outpatient Medications   Medication Sig Dispense Refill    amLODIPine (NORVASC) 10 MG tablet Take 1 tablet (10 mg total) by mouth once daily. 90 tablet 1    aspirin (ECOTRIN) 81 MG EC tablet Take 81 mg by mouth once daily.      atorvastatin (LIPITOR) 40 MG tablet Take 40 mg by mouth once daily.      blood-glucose meter,continuous (DEXCOM G6  MISC) by Misc.(Non-Drug; Combo Route) route.      blood-glucose transmitter (DEXCOM G6 TRANSMITTER MISC)   Dexcom G6 transmitter, See Instructions, uad with dexcom G6 sensor; change transmitter q 90 days, # 1 EA, 3 Refill(s), Pharmacy: [facility], 187, cm, 23 14:55:00 CDT, Height/Length Measured, 105.1, kg, 23 14:55:00 CDT, Weight Dosing      calcitRIOL (ROCALTROL) 0.25 MCG Cap Take 1 capsule (0.25 mcg total) by mouth once daily. 90 capsule 1    coenzyme Q10 200 mg capsule Take 200 mg by mouth once daily.      DEXCOM G6 SENSOR Adelina SMARTSI Each Topical Every 10 Days      icosapent ethyL (VASCEPA) 1 gram Cap Take 2 g by mouth 4 (four) times daily.      insulin lispro (HUMALOG U-100 INSULIN) 100 unit/mL injection   See Instructions, for use in insulin pump; max daily dose 100 units, # 90 mL, 1 Refill(s), Maintenance, Pharmacy: [facility], 187,  cm, 02/23/23 11:24:00 CST, Height/Length Measured, 102.5, kg, 02/23/23 11:24:00 CST, Weight Dosing      insulin lispro 100 unit/mL injection Inject into the skin 3 (three) times daily before meals. 100 units      insulin pump cart,auto,BT/cntr (OMNIPOD 5 G6 INTRO KIT, GEN 5, SUBQ) Inject into the skin. 200 units per pod      insulin pump cart,auto,BT/cntr (OMNIPOD 5 G6 INTRO KIT, GEN 5, SUBQ)   Omnipod 5 G6 Intro Kit (Gen 5), See Instructions, use as directed, # 1 EA, 0 Refill(s), Pharmacy: [facility], 187, cm, 12/30/22 9:14:00 CST, Height/Length Measured, 102.3, kg, 12/30/22 9:14:00 CST, Weight Dosing      losartan (COZAAR) 100 MG tablet TAKE 1 TABLET(100 MG) BY MOUTH EVERY DAY 90 tablet 1    multivit-min/folic/vit K/lycop (ONE-A-DAY MEN'S MULTIVITAMIN ORAL) Take by mouth once daily.      nebivoloL (BYSTOLIC) 20 mg Tab Take 20 mg by mouth once daily. 90 tablet 3    tadalafil (CIALIS) 5 MG tablet TK 1 T PO D PRF ERECTILE DYSFUNCTION  1     No current facility-administered medications for this visit.           I am available for consultation and can be contacted, as needed by the other members of the Transplant team.

## 2024-01-12 ENCOUNTER — COMMITTEE REVIEW (OUTPATIENT)
Dept: TRANSPLANT | Facility: CLINIC | Age: 44
End: 2024-01-12
Payer: COMMERCIAL

## 2024-01-12 NOTE — COMMITTEE REVIEW
Native Organ Dx: Diabetes Mellitus - Type II      SELECTION COMMITTEE NOTE    Dejuan Diaz was presented at selection committee on 1/12/24.  Patient met selection criteria for kidney or KP transplant related to CKD, Stage 5 due to    Diabetes Mellitus - Type II.  No absolute contraindications to transplant at this time.  Patient will be placed on the cadaveric wait list pending final financial approval from insurance company.  Patient will return to clinic for routine appointment in 1 year (or 6 months if KP). Patient does not meet criteria for High KDPI kidney offer. Patient meets HCV+ kidney offer. Patient does not meet criteria for dual/enbloc, due to weight  .  Planned immunosuppression Thymo.    Prefer to transplant with kidney only with LD. Clarify if patient wants  KP listing if no LRD    Note written by Juliana Acharya RN    ===============================================    I was present at the meeting and attest to the decision of the committee.    Al Martell  01/17/2024

## 2024-01-12 NOTE — LETTER
January 17, 2024    Jose Mckinney  [address] [address] [address]  Phone: [phone]  Fax: [phone]     Dear Dr. Jose Mckinney:    Patient: Dejuan Diaz Jr.   MR Number: [id number]   YOB: 1980     Your patient, Dejuan Diaz Jr., was recently discussed at the [facility] Selection Committee meeting on 1/12/2024. I am happy to inform you that Dejuan has been approved for transplantation.  He has met selection criteria for a kidney and pancreas  or kidney transplant related to CKD stage 5 secondary to primary diagnosis of Diabetes Mellitus - Type II. Your patient will be placed on the cadaveric wait list pending final financial approval from insurance company.     We appreciate your confidence in allowing us to participate in your patients care.  If you have any questions or concerns, please do not hesitate to contact me.    Sincerely,      Maritza Hinton MD  Medical Director, Kidney & Kidney/Pancreas Transplantation

## 2024-01-23 ENCOUNTER — TELEPHONE (OUTPATIENT)
Dept: NEPHROLOGY | Facility: CLINIC | Age: 44
End: 2024-01-23
Payer: COMMERCIAL

## 2024-01-23 ENCOUNTER — LAB VISIT (OUTPATIENT)
Dept: LAB | Facility: HOSPITAL | Age: 44
End: 2024-01-23
Payer: COMMERCIAL

## 2024-01-23 DIAGNOSIS — N18.5 CKD (CHRONIC KIDNEY DISEASE) STAGE 5, GFR LESS THAN 15 ML/MIN: ICD-10-CM

## 2024-01-23 LAB
ALBUMIN SERPL BCP-MCNC: 3.4 G/DL (ref 3.5–5.2)
ANION GAP SERPL CALC-SCNC: 12 MMOL/L (ref 8–16)
BUN SERPL-MCNC: 59 MG/DL (ref 6–20)
CALCIUM SERPL-MCNC: 8.2 MG/DL (ref 8.7–10.5)
CHLORIDE SERPL-SCNC: 111 MMOL/L (ref 95–110)
CO2 SERPL-SCNC: 19 MMOL/L (ref 23–29)
CREAT SERPL-MCNC: 6.5 MG/DL (ref 0.5–1.4)
EST. GFR  (NO RACE VARIABLE): 10.1 ML/MIN/1.73 M^2
GLUCOSE SERPL-MCNC: 82 MG/DL (ref 70–110)
PHOSPHATE SERPL-MCNC: 5.3 MG/DL (ref 2.7–4.5)
POTASSIUM SERPL-SCNC: 5.3 MMOL/L (ref 3.5–5.1)
SODIUM SERPL-SCNC: 142 MMOL/L (ref 136–145)

## 2024-01-23 PROCEDURE — 83970 ASSAY OF PARATHORMONE: CPT | Mod: NTX | Performed by: INTERNAL MEDICINE

## 2024-01-23 PROCEDURE — 36415 COLL VENOUS BLD VENIPUNCTURE: CPT | Mod: NTX | Performed by: INTERNAL MEDICINE

## 2024-01-23 PROCEDURE — 84156 ASSAY OF PROTEIN URINE: CPT | Mod: NTX | Performed by: INTERNAL MEDICINE

## 2024-01-23 PROCEDURE — 80069 RENAL FUNCTION PANEL: CPT | Mod: NTX | Performed by: INTERNAL MEDICINE

## 2024-01-24 LAB
CREAT UR-MCNC: 72.6 MG/DL (ref 23–375)
PROT UR-MCNC: 343 MG/DL (ref 0–15)
PROT/CREAT UR: 4.72 MG/G{CREAT} (ref 0–0.2)
PTH-INTACT SERPL-MCNC: 405.1 PG/ML (ref 9–77)

## 2024-01-25 ENCOUNTER — OFFICE VISIT (OUTPATIENT)
Dept: NEPHROLOGY | Facility: CLINIC | Age: 44
End: 2024-01-25
Payer: COMMERCIAL

## 2024-01-25 DIAGNOSIS — E10.21 TYPE 1 DIABETES MELLITUS WITH NEPHROPATHY: ICD-10-CM

## 2024-01-25 DIAGNOSIS — I10 PRIMARY HYPERTENSION: ICD-10-CM

## 2024-01-25 DIAGNOSIS — N25.81 SECONDARY RENAL HYPERPARATHYROIDISM: ICD-10-CM

## 2024-01-25 DIAGNOSIS — N18.5 CKD (CHRONIC KIDNEY DISEASE) STAGE 5, GFR LESS THAN 15 ML/MIN: Primary | ICD-10-CM

## 2024-01-25 PROCEDURE — 3066F NEPHROPATHY DOC TX: CPT | Mod: CPTII,95,, | Performed by: INTERNAL MEDICINE

## 2024-01-25 PROCEDURE — 99214 OFFICE O/P EST MOD 30 MIN: CPT | Mod: 95,,, | Performed by: INTERNAL MEDICINE

## 2024-01-25 PROCEDURE — 1160F RVW MEDS BY RX/DR IN RCRD: CPT | Mod: CPTII,95,, | Performed by: INTERNAL MEDICINE

## 2024-01-25 PROCEDURE — 1159F MED LIST DOCD IN RCRD: CPT | Mod: CPTII,95,, | Performed by: INTERNAL MEDICINE

## 2024-01-25 RX ORDER — ONDANSETRON 4 MG/1
4 TABLET, ORALLY DISINTEGRATING ORAL EVERY 12 HOURS PRN
Qty: 30 TABLET | Refills: 1 | Status: SHIPPED | OUTPATIENT
Start: 2024-01-25 | End: 2024-03-08 | Stop reason: SDUPTHER

## 2024-01-25 NOTE — PROGRESS NOTES
Subjective:       Patient ID: Dejuan Diaz Jr. is a 43 y.o. White male who presents for return patient evaluation for chronic renal failure.    The patient location is:  Patient Home   The chief complaint leading to consultation is: CKD  Visit type: Virtual visit with synchronous audio and video  Total time spent with patient: 12 minutes  Each patient to whom he or she provides medical services by telemedicine is:  (1) informed of the relationship between the physician and patient and the respective role of any other health care provider with respect to management of the patient; and (2) notified that he or she may decline to receive medical services by telemedicine and may withdraw from such care at any time.        He had COVID in April 2021.  He reports that he has been dealing with a divorce and both of his parents having cancer.  His diabetes is much better now that he is on an omnipod.  He has had more anxiety lately due to his family issues.  He is now travelling much more now due to a change in his job as well.   His blood pressure at home has been 140s-150/90s.      Review of Systems   Constitutional:  Positive for fatigue. Negative for appetite change, chills and fever.   Eyes:  Negative for visual disturbance.   Respiratory:  Positive for shortness of breath (mild with exertion). Negative for cough.    Cardiovascular:  Positive for chest pain (at times). Negative for leg swelling.   Gastrointestinal:  Positive for nausea (rarely associated with eosphageal symptoms). Negative for diarrhea and vomiting.   Genitourinary:  Negative for difficulty urinating, dysuria and hematuria.        ED   Musculoskeletal:  Positive for gait problem (occasional leg heaviness). Negative for myalgias.   Skin:  Negative for rash.   Neurological:  Positive for headaches.   Psychiatric/Behavioral:  Negative for sleep disturbance. The patient is nervous/anxious.        The past medical, family and social histories  were reviewed for this encounter.    There were no vitals taken for this visit.    Objective:      Physical Exam  Vitals reviewed.   Constitutional:       General: He is not in acute distress.     Appearance: He is well-developed.   HENT:      Head: Normocephalic and atraumatic.   Eyes:      General: No scleral icterus.  Pulmonary:      Effort: Pulmonary effort is normal. No respiratory distress.   Neurological:      Mental Status: He is alert and oriented to person, place, and time.   Psychiatric:         Mood and Affect: Mood normal.         Behavior: Behavior normal.        Assessment:       1. CKD (chronic kidney disease) stage 5, GFR less than 15 ml/min    2. Primary hypertension    3. Type 1 diabetes mellitus with nephropathy    4. Secondary renal hyperparathyroidism          Lab Results   Component Value Date    CREATININE 6.5 (H) 01/23/2024    BUN 59 (H) 01/23/2024     01/23/2024    K 5.3 (H) 01/23/2024     (H) 01/23/2024    CO2 19 (L) 01/23/2024     Lab Results   Component Value Date    .1 (H) 01/23/2024    CALCIUM 8.2 (L) 01/23/2024    PHOS 5.3 (H) 01/23/2024     Lab Results   Component Value Date    HCT 27.5 (L) 11/29/2023     Prot/Creat Ratio, Urine   Date Value Ref Range Status   01/23/2024 4.72 (H) 0.00 - 0.20 Final   08/14/2023 2.99 (H) 0.00 - 0.20 Final       Plan:   Return to clinic in 4-6 wks.  Labs for next visit include rp, pth.  [place] [facility] [place]  UACR 1 mo.  Baseline creatinine is unclear.  He has had labile function for no obvious reason.  He does not have CHF, obstructive uropathy, chronic NSAID use.  UPC is 3.0.  PTH is 405 with a calcium of 8.2.  Renal US shows R 11.4 cm, L 12.2 cm.  Please avoid or minimize all NSAIDS (ibuprofen, motrin, aleve, indocin, naprosyn) to minimize the risk to your kidneys.  Aspirin in a dose of 325 mg or less a day is likely the safest with regards to kidney function.  If you are able to take aspirin and do not have  any bleeding problems or ulcers, this may be your best therapy.  Alternatively, acetaminophen (Tylenol) is the safer than NSAIDs with regards to kidney function.  I would ask you take this as directed on the bottle.  It is best to stay under 2 grams a day (4-500 mg tablets a day maximum).  ESRD Treatment Choices.  We plan to do PD if he needs it.  We discussed kidney transplant versus kidney/pancreas transplant.  KTM referral has been done.    Computed KFRE 2-Year unavailable. Necessary lab results were not found in the last year.    Computed KFRE 5-Year unavailable. Necessary lab results were not found in the last year.

## 2024-01-30 ENCOUNTER — PATIENT MESSAGE (OUTPATIENT)
Dept: TRANSPLANT | Facility: CLINIC | Age: 44
End: 2024-01-30
Payer: COMMERCIAL

## 2024-02-02 ENCOUNTER — LAB VISIT (OUTPATIENT)
Dept: LAB | Facility: HOSPITAL | Age: 44
End: 2024-02-02
Payer: COMMERCIAL

## 2024-02-02 DIAGNOSIS — Z76.82 ORGAN TRANSPLANT CANDIDATE: Primary | ICD-10-CM

## 2024-02-02 DIAGNOSIS — Z76.82 ORGAN TRANSPLANT CANDIDATE: ICD-10-CM

## 2024-02-02 PROCEDURE — 86825 HLA X-MATH NON-CYTOTOXIC: CPT | Mod: PO,TXP | Performed by: NURSE PRACTITIONER

## 2024-02-02 PROCEDURE — 86825 HLA X-MATH NON-CYTOTOXIC: CPT | Mod: 91,PO,TXP | Performed by: NURSE PRACTITIONER

## 2024-02-02 PROCEDURE — 86826 HLA X-MATCH NONCYTOTOXC ADDL: CPT | Mod: 91,PO,TXP | Performed by: NURSE PRACTITIONER

## 2024-02-02 PROCEDURE — 86826 HLA X-MATCH NONCYTOTOXC ADDL: CPT | Mod: PO,TXP | Performed by: NURSE PRACTITIONER

## 2024-02-12 LAB
B CELL RESULTS - XM ALLO: NEGATIVE
B CELL RESULTS - XM ALLO: NEGATIVE
B MCS AVERAGE - XM ALLO: 75.5
B MCS AVERAGE - XM ALLO: 78.3
DONOR MRN: NORMAL
DONOR MRN: NORMAL
FXMAL TESTING DATE: NORMAL
FXMAL TESTING DATE: NORMAL
HLA FLOW CROSSMATCH (ALLO) INTERPRETATION: NORMAL
SERUM COLLECTION DT - XM ALLO: NORMAL
SERUM COLLECTION DT - XM ALLO: NORMAL
T CELL RESULTS - XM ALLO: NEGATIVE
T CELL RESULTS - XM ALLO: NEGATIVE
T MCS AVERAGE - XM ALLO: 4.5
T MCS AVERAGE - XM ALLO: 8.4

## 2024-02-16 ENCOUNTER — TELEPHONE (OUTPATIENT)
Dept: TRANSPLANT | Facility: CLINIC | Age: 44
End: 2024-02-16
Payer: COMMERCIAL

## 2024-02-16 DIAGNOSIS — Z76.82 ORGAN TRANSPLANT CANDIDATE: Primary | ICD-10-CM

## 2024-02-16 NOTE — LETTER
2024  Dejuan Diaz  [address] [facility] [place] [address]        Dear Dejuan Diaz:  MRN: [id number]    Congratulations! On 2024, you were placed on  the waiting list for a  donor transplant.    Your candidacy for kidney and kidney with pancreas transplant is based on the following criteria: CKD4.    Your transplant coordinator while on the waiting list is Shana Lai RN. They can be reached at [phone] or [phone] with any questions.      What to do now?    Ask your living donors to call and begin testing  Give your donors our phone number, [phone]  Make sure your donors have your name and date of birth when they call  You will get transplanted much faster if you have a living donor    Have your blood sent to our Transplant Lab every month  If you are on dialysis - our Transplant Lab will work with your dialysis unit to send your blood every month  If you are not on dialysis   If you live near an [facility] lab, we will schedule you to have blood drawn every month  If you do not live near an [facility] lab, you will be sent blood kits in the mail. You will need to take a kit to your local lab or doctor to have your blood drawn every month and mail to the Transplant Lab.     Call us with ANY CHANGES  Phone numbers - we must be able to reach you anytime of the day or night when a kidney is available  Address  Insurance coverage  Dialysis unit or kidney doctor  [place]: if you have surgery, stay in the hospital, have to get blood, or have an infection    Review your Kidney/Kidney-Pancreas Transplant Guide   This will give you detailed information about what happens when  you are on the waiting list   you are called when a kidney is available    The [facility] has a transplant surgeon and physician available 365 days a year, 24 hours a day to coordinate organ acceptance, procurement, surgical placement and to address urgent patient care issues.   You will be notified in writing of any changes to our Transplant Centers staffing plan that would impact your ability to receive a transplant.    Attached is a letter from the [employer] for Organ Sharing (UNOS). It describes the services and information offered to patients by UN[employer] and the Organ Procurement and Transplant Network. We look forward to working with you while on the waiting list.     We would like to inform you of an important OPTN (Organ Procurement and Transplant Network) Policy change that may affect the waiting time for some candidates on our waiting list.     Waiting time is important in identifying who receives offers for kidneys. A long wait time may increase your chance of getting an offer. Wait time is based on a test called eGFR that tells how well your kidneys are working. Wait time could also be based on how long you have been on dialysis. Government and health officials have changed the way this test is used. Before this year, hospitals used an eGFR that would include your race. For Black or  Americans, this eGFR could have shown that their kidneys were working better than they were.    Because of this change, we are looking at everyones record and assessing waiting time for people who are eligible. We will be reviewing everyones medical records and will contact you if you are eligible.     Who can I talk to if I have a question?  You can contact us if you have questions or send a message through [facility].     Please give us time to answer your questions. We are working on this for many patients.    How can I learn more about eGFR and this policy change?  Go to OPTN website > Patients > Kidney > FAQ: Understanding race-neutral eGFR calculations  Full URL: https://optn.transplant.hrsa.gov/patients/by-organ/kidney/understanding-the-proposal-to-require-race-neutral-egfr-calculations/  Call the Organ Procurement and Transplantation Network (OPTN) toll-free patient services line  at 1-368.612.1748    Congratulations,  Your Transplant Partner  [facility]   [facility] [place] [address]  [phone]                                                                The Organ Procurement and Transplantation Network   Toll-free patient services line: 1-755.920.2074  Your resource for organ transplant information      Staffed 8:30 am - 5:00 pm ET Monday - Friday   Leave a message 24/7 to receive a call back    The Organ Procurement and Transplantation Network (OPTN) is the national transplant system. It makes the policies that decide how donated organs are matched to patients waiting for a transplant. The OPTN:    Makes sure donated organs get matched to people on the transplant waiting list  Tells people about the donation and transplant processes  Makes sure that the public knows about the need for more organ and tissue donations    The OPTN has a free patient services line that you can call to:  Get more information about:   o Organ donation and organ transplants   o Donation and transplant policies  Get an information kit with:   o A list of transplant hospitals   o Waiting list information  Talk about any questions you may have about your transplant hospital or organ procurement organization. The staff will do their best to help you or point you to others who may help.  Find out how you can volunteer with the OPTN and help shape transplant policy    The patient services line number is: 1-484-134-2897    Patient services line staff CANNOT answer questions about your own medical care, including:  Waiting list status  Test results  Medical records  You will need to call your transplant hospital for this information.    The following websites have more information about transplantation and donation:  OPTN: https://optn.transplant.[facility].gov/  For potential living donors and transplant recipients:   o Living with transplant:  https://www.transplantliving.org/   o Living donation process: https://optn.transplant.hrsa.gov/living-donation/     o Financial assistance: https://www.livingdonorassistance.org/  Transplantation data: https://www.srtr.org/  Organ donation: https://www.organdonor.gov/    Volunteer with the OPTN: https://optn.transplant.hrsa.gov/get-involved

## 2024-02-16 NOTE — TELEPHONE ENCOUNTER
"  KIDNEY/PANCREAS WAIT LISTING NOTE    Date of Financial clearance to list (check approval of both organs): 2024    N/[facility]:     Organ: Kidney and Pancreas AND isolated Kidney    Last Name: Joe  First Name: Dejuan     : 1980    Gender:     male   MRN#: [id number]                                   State of Permanent Residence:  [address] [place] [id number]    Ethnicity: Not  or /a   Race:      White    CLINICAL INFORMATION   Candidate Medical Urgency Status: Active (1)  Number of Previous Kidney Transplants: 0  Number of Previous Pancreas Transplants:0  Number of Previous Solid Organ Transplants: 0  Did you enter number of previous kidney or other solid organ transplants? Yes  Is this Candidate a Prior Living Donor: No  (If yes, please generate letter to [facility] with patient's date of donation, recipient SSN, signed by Surgical Director after patient is listed in order to receive priority points).      ABO  ABO Blood Group:   A NEG     ABO Confirmation: (THESE DATES MUST BE PRIOR TO THE LIST DATE AND SUPPORTED BY SEPARATE LAB REPORTS)    Internal Results    Lab Results   Component Value Date    GROUPTRH A NEG 2024    GROUPTRH A NEG 2023     No results found for: "ABO"    External Results    ABO Date 1:    ABO Date 2  Are either of these ABO results based on External Labs? No  (If Yes, STOP and go to source document in Media Tab for verification).    VITALS  Height:  6'  Date taken:2023  Weight: 218  Date taken: 24  (Use height from Transplant clinic visits only).  Did you enter height/weight? Yes    HLA    Class I:  Lab Results   Component Value Date    PQQF1KJ 3 2023    BAOS2XS 11 2023    URNE7KZ 7 2023    GWTJ5RT 35 2023    EHXXS2DL 6 2023    RFZUE7GU XX 2023    BQJXP5ZZ 4 2023    JUERI6WQ 7 2023       Class II:  Lab Results   Component Value Date    NGJDOL16CG 103 2023    YKLIEV21KR 15 " "11/29/2023    NJRIDM702KX 51 11/29/2023    AKFVOJ9771 XX 11/29/2023    IFJNG5VD 5 11/29/2023    IWWCZ5YB 6 11/29/2023       Tested for HLA Antibodies: Yes, antibodies detected     If result is "Positive" antibodies are detected     If result is "Negative or questionable" no antibodies detected    No results found for: "CIPRAS", "CIIPRAS"    DIALYSIS INFORMATION  Is patient Pre-Dialysis: Yes     Report GFR being used as the criteria for placement on the kidney list. If not, leave blank  GFR < or = 20 ml/min? Yes  If Yes, Specify value  _11.7__   ml/min     Initial date GFR became 20 or less: 11/29/23  Is GFR obtained from an Outside lab Result? No  (If YES verify with source document scanned into media)    If patient on Dialysis:    Is candidate currently on dialysis for ESRD? n/a  If Yes,  Date Chronic Dialysis Started:   (Not currently on dialysis)  (verify with source document in Media Tab)   Dialysis Unit Name: (Not currently on dialysis)    DIABETES INFORMATION  Primary Native Kidney Diagnosis: Diabetes Mellitus - Type II  Primary Native Pancreas Diagnosis: Diabetes Mellitus - Type II (Pancreas)   C-Peptide Value -   Lab Results   Component Value Date    CPEPTIDE 4.52 01/08/2024     Is candidate currently on Insulin: Yes. Date Insulin started - 12/1/2008, Total insulin dosage in units - 30/day, and Insulin duration of use - 5555 days    FOR NON-KIDNEY DEPARTMENT USE ONLY:  Additional Organs Registered? none    Maximum Acceptable Number of HLA Mismatches  ABDR:     6      (0-6)               AB:               (0-4)  ADR:   _____  (0-4)              BDR: _____ (0-4)  A:        _____  (0-2)              B:      _____ (0-2)          DR: ______ (0-2)    Will Recipient Accept?   Accept HBcAB Positive Organ:            Yes  Accept HBV KAVITA Positive Organ:        No  Accept HCV Antibody Positive Organ: Yes   Accept HCV KAVITA Positive Organ: Yes    Dual Kidney and En Bloc Opt In : No  Dual  Local:   No  Dual Import:   " No  En Bloc Local:   No  En Bloc Import: No     Accept KDPI > 85: Single: No     Local: No     Import: No  Accept KDPI > 85: Dual: No     Local: No     Import: No    Unacceptible Antigens  If yes, list     Lab Results   Component Value Date    EU5ZKDM B37 01/08/2024    CIABCLM WEAK A*11:02, A34 11/29/2023    CIIAB DQ7 01/08/2024     ### DO NOT LIST IF ANTIGEN VALUE WEAK ###    Hep B Vaccine series completed: no    Blood Type x2 was verified by myself and Antionette.  Blood type determination and reporting was completed according to the programs protocols and OPTN requirements.    TCR Information  Citizenship - [place] Citizen  Highest education level - Post-College Graduate Degree  Functional status - 90% - able to carry on normal activity, minor symptoms of disease  Working for income - yes  Previous Pancreas [facility] - Unknown  Source of payment - Private Insurance  Any previous malignancy - No  Total serum albumin:   Lab Results   Component Value Date    ALBUMIN 3.4 (L) 01/23/2024     Exhausted vascular access - no  Exhausted peritoneal access - no  HbA1C -   Lab Results   Component Value Date    HGBA1C 6.1 (H) 11/29/2023

## 2024-02-19 ENCOUNTER — TELEPHONE (OUTPATIENT)
Dept: TRANSPLANT | Facility: CLINIC | Age: 44
End: 2024-02-19
Payer: COMMERCIAL

## 2024-02-19 ENCOUNTER — LAB VISIT (OUTPATIENT)
Dept: LAB | Facility: HOSPITAL | Age: 44
End: 2024-02-19
Attending: INTERNAL MEDICINE
Payer: COMMERCIAL

## 2024-02-19 VITALS — BODY MASS INDEX: 29.53 KG/M2 | WEIGHT: 218 LBS | HEIGHT: 72 IN

## 2024-02-19 DIAGNOSIS — Z76.82 ORGAN TRANSPLANT CANDIDATE: ICD-10-CM

## 2024-02-19 PROCEDURE — 86833 HLA CLASS II HIGH DEFIN QUAL: CPT | Mod: TXP | Performed by: INTERNAL MEDICINE

## 2024-02-19 PROCEDURE — 86832 HLA CLASS I HIGH DEFIN QUAL: CPT | Mod: TXP | Performed by: INTERNAL MEDICINE

## 2024-02-20 DIAGNOSIS — Z76.82 AWAITING ORGAN TRANSPLANT STATUS: Primary | ICD-10-CM

## 2024-02-23 ENCOUNTER — LAB VISIT (OUTPATIENT)
Dept: LAB | Facility: HOSPITAL | Age: 44
End: 2024-02-23
Attending: INTERNAL MEDICINE
Payer: COMMERCIAL

## 2024-02-23 DIAGNOSIS — N18.5 CKD (CHRONIC KIDNEY DISEASE) STAGE 5, GFR LESS THAN 15 ML/MIN: ICD-10-CM

## 2024-02-23 LAB
ALBUMIN SERPL BCP-MCNC: 3.2 G/DL (ref 3.5–5.2)
ALBUMIN/CREAT UR: 2819.6 UG/MG (ref 0–30)
ANION GAP SERPL CALC-SCNC: 11 MMOL/L (ref 8–16)
BUN SERPL-MCNC: 69 MG/DL (ref 6–20)
CALCIUM SERPL-MCNC: 8.1 MG/DL (ref 8.7–10.5)
CHLORIDE SERPL-SCNC: 113 MMOL/L (ref 95–110)
CO2 SERPL-SCNC: 16 MMOL/L (ref 23–29)
CREAT SERPL-MCNC: 7.3 MG/DL (ref 0.5–1.4)
CREAT UR-MCNC: 51 MG/DL (ref 23–375)
EST. GFR  (NO RACE VARIABLE): 8.8 ML/MIN/1.73 M^2
GLUCOSE SERPL-MCNC: 174 MG/DL (ref 70–110)
MICROALBUMIN UR DL<=1MG/L-MCNC: 1438 UG/ML
PHOSPHATE SERPL-MCNC: 4.8 MG/DL (ref 2.7–4.5)
POTASSIUM SERPL-SCNC: 5.5 MMOL/L (ref 3.5–5.1)
SODIUM SERPL-SCNC: 140 MMOL/L (ref 136–145)

## 2024-02-23 PROCEDURE — 82043 UR ALBUMIN QUANTITATIVE: CPT | Mod: TXP | Performed by: INTERNAL MEDICINE

## 2024-02-23 PROCEDURE — 80069 RENAL FUNCTION PANEL: CPT | Mod: TXP | Performed by: INTERNAL MEDICINE

## 2024-02-23 PROCEDURE — 36415 COLL VENOUS BLD VENIPUNCTURE: CPT | Mod: NTX | Performed by: INTERNAL MEDICINE

## 2024-02-29 ENCOUNTER — EPISODE CHANGES (OUTPATIENT)
Dept: TRANSPLANT | Facility: CLINIC | Age: 44
End: 2024-02-29

## 2024-03-06 LAB — HPRA INTERPRETATION: NORMAL

## 2024-03-08 ENCOUNTER — TELEPHONE (OUTPATIENT)
Dept: TRANSPLANT | Facility: CLINIC | Age: 44
End: 2024-03-08
Payer: COMMERCIAL

## 2024-03-08 ENCOUNTER — OFFICE VISIT (OUTPATIENT)
Dept: NEPHROLOGY | Facility: CLINIC | Age: 44
End: 2024-03-08
Payer: COMMERCIAL

## 2024-03-08 VITALS
DIASTOLIC BLOOD PRESSURE: 96 MMHG | OXYGEN SATURATION: 99 % | HEART RATE: 62 BPM | BODY MASS INDEX: 29.57 KG/M2 | HEIGHT: 72 IN | SYSTOLIC BLOOD PRESSURE: 150 MMHG

## 2024-03-08 DIAGNOSIS — E10.21 TYPE 1 DIABETES MELLITUS WITH NEPHROPATHY: ICD-10-CM

## 2024-03-08 DIAGNOSIS — N18.5 CKD (CHRONIC KIDNEY DISEASE) STAGE 5, GFR LESS THAN 15 ML/MIN: Primary | ICD-10-CM

## 2024-03-08 DIAGNOSIS — I10 PRIMARY HYPERTENSION: ICD-10-CM

## 2024-03-08 DIAGNOSIS — N25.81 SECONDARY RENAL HYPERPARATHYROIDISM: ICD-10-CM

## 2024-03-08 PROCEDURE — 99214 OFFICE O/P EST MOD 30 MIN: CPT | Mod: S$GLB,,, | Performed by: INTERNAL MEDICINE

## 2024-03-08 PROCEDURE — 99999 PR PBB SHADOW E&M-EST. PATIENT-LVL III: CPT | Mod: PBBFAC,,, | Performed by: INTERNAL MEDICINE

## 2024-03-08 RX ORDER — ONDANSETRON 4 MG/1
4 TABLET, ORALLY DISINTEGRATING ORAL EVERY 12 HOURS PRN
Qty: 30 TABLET | Refills: 1 | Status: SHIPPED | OUTPATIENT
Start: 2024-03-08 | End: 2024-06-10 | Stop reason: SDUPTHER

## 2024-03-08 RX ORDER — SODIUM BICARBONATE 650 MG/1
650 TABLET ORAL DAILY
Qty: 30 TABLET | Refills: 5 | Status: SHIPPED | OUTPATIENT
Start: 2024-03-08 | End: 2025-03-08

## 2024-03-08 RX ORDER — CALCITRIOL 0.25 UG/1
0.25 CAPSULE ORAL DAILY
Qty: 90 CAPSULE | Refills: 1 | Status: SHIPPED | OUTPATIENT
Start: 2024-03-08

## 2024-03-08 RX ORDER — CALCIUM ACETATE 667 MG/1
1334 TABLET ORAL
Qty: 300 TABLET | Refills: 1 | Status: SHIPPED | OUTPATIENT
Start: 2024-03-08 | End: 2024-04-02 | Stop reason: SDUPTHER

## 2024-03-08 NOTE — PROGRESS NOTES
Subjective:       Patient ID: Dejuan Diaz Jr. is a 43 y.o. White male who presents for return patient evaluation for chronic renal failure.      He had COVID in April 2021.  He reports that he has been dealing with a divorce and both of his parents having cancer.  His diabetes is much better now that he is on an omnipod.  He has no new uremic symptoms.  He is now travelling much more now due to a change in his job as well.   His blood pressure at home has been 140s-150/90s.      Review of Systems   Constitutional:  Positive for fatigue. Negative for appetite change, chills and fever.   Eyes:  Negative for visual disturbance.   Respiratory:  Positive for shortness of breath (mild with exertion). Negative for cough.    Cardiovascular:  Positive for chest pain (at times). Negative for leg swelling.   Gastrointestinal:  Positive for nausea (rarely associated with eosphageal symptoms). Negative for diarrhea and vomiting.   Genitourinary:  Negative for difficulty urinating, dysuria and hematuria.        ED   Musculoskeletal:  Positive for gait problem (occasional leg heaviness). Negative for myalgias.   Skin:  Negative for rash.   Neurological:  Positive for headaches.   Psychiatric/Behavioral:  Negative for sleep disturbance. The patient is nervous/anxious.        The past medical, family and social histories were reviewed for this encounter.    BP (!) 150/96 (BP Location: Right arm, Patient Position: Sitting, BP Method: Large (Manual))   Pulse 62   Ht 6' (1.829 m)   SpO2 99%   BMI 29.57 kg/m²     Objective:      Physical Exam  Vitals reviewed.   Constitutional:       General: He is not in acute distress.     Appearance: He is well-developed.   HENT:      Head: Normocephalic and atraumatic.   Eyes:      General: No scleral icterus.  Pulmonary:      Effort: Pulmonary effort is normal. No respiratory distress.   Neurological:      Mental Status: He is alert and oriented to person, place, and time.    Psychiatric:         Mood and Affect: Mood normal.         Behavior: Behavior normal.        Assessment:       1. CKD (chronic kidney disease) stage 5, GFR less than 15 ml/min    2. Primary hypertension    3. Type 1 diabetes mellitus with nephropathy    4. Secondary renal hyperparathyroidism          Lab Results   Component Value Date    CREATININE 7.3 (H) 02/23/2024    BUN 69 (H) 02/23/2024     02/23/2024    K 5.5 (H) 02/23/2024     (H) 02/23/2024    CO2 16 (L) 02/23/2024     Lab Results   Component Value Date    .1 (H) 01/23/2024    CALCIUM 8.1 (L) 02/23/2024    PHOS 4.8 (H) 02/23/2024     Lab Results   Component Value Date    HCT 27.5 (L) 11/29/2023     Prot/Creat Ratio, Urine   Date Value Ref Range Status   01/23/2024 4.72 (H) 0.00 - 0.20 Final   08/14/2023 2.99 (H) 0.00 - 0.20 Final       Plan:   Return to clinic in 4-6 wks.  Labs for next visit include rp, pth.  He gets his labs at [facility] in [place].  UACR 1 mo.  UPC is 3.0.  PTH is 405 with a calcium of 8.2.  restart calcitriol - he ran out a month ago.  Renal US shows R 11.4 cm, L 12.2 cm.  Please avoid or minimize all NSAIDS (ibuprofen, motrin, aleve, indocin, naprosyn) to minimize the risk to your kidneys.  Aspirin in a dose of 325 mg or less a day is likely the safest with regards to kidney function.  If you are able to take aspirin and do not have any bleeding problems or ulcers, this may be your best therapy.  Alternatively, acetaminophen (Tylenol) is the safer than NSAIDs with regards to kidney function.  I would ask you take this as directed on the bottle.  It is best to stay under 2 grams a day (4-500 mg tablets a day maximum).  ESRD Treatment Choices.  We plan to do PD if he needs it.  We discussed kidney transplant versus kidney/pancreas transplant.  KTM referral has been done.  We are trying to go straight to transplant.  NaHCO3 650 mg daily.  Phoslo 1 pill TID PC.    KFRE 2-Year: 97.8% at 2/23/2024  8:49 AM  Calculated  from:  Serum Creatinine: 7.3 mg/dL at 2/23/2024  8:49 AM  Urine Albumin Creatinine Ratio: 2,819.6 ug/mg at 2/23/2024  8:49 AM  Age: 43 years  Sex: Male at 2/23/2024  8:49 AM  Has CKD-3 to CKD-5: No    KFRE 5-Year: 100% at 2/23/2024  8:49 AM  Calculated from:  Serum Creatinine: 7.3 mg/dL at 2/23/2024  8:49 AM  Urine Albumin Creatinine Ratio: 2,819.6 ug/mg at 2/23/2024  8:49 AM  Age: 43 years  Sex: Male at 2/23/2024  8:49 AM  Has CKD-3 to CKD-5: No

## 2024-03-13 DIAGNOSIS — Z76.82 AWAITING ORGAN TRANSPLANT STATUS: Primary | ICD-10-CM

## 2024-03-25 ENCOUNTER — LAB VISIT (OUTPATIENT)
Dept: LAB | Facility: HOSPITAL | Age: 44
End: 2024-03-25
Payer: COMMERCIAL

## 2024-03-25 DIAGNOSIS — Z76.82 AWAITING ORGAN TRANSPLANT STATUS: ICD-10-CM

## 2024-03-25 LAB
B CELL RESULTS - XM ALLO: NEGATIVE
B CELL RESULTS - XM ALLO: NEGATIVE
B MCS AVERAGE - XM ALLO: 31.9
B MCS AVERAGE - XM ALLO: 38.4
DONOR MRN: NORMAL
DONOR MRN: NORMAL
FXMAL TESTING DATE: NORMAL
FXMAL TESTING DATE: NORMAL
HLA FLOW CROSSMATCH (ALLO) INTERPRETATION: NORMAL
SERUM COLLECTION DT - XM ALLO: NORMAL
SERUM COLLECTION DT - XM ALLO: NORMAL
T CELL RESULTS - XM ALLO: NEGATIVE
T CELL RESULTS - XM ALLO: NEGATIVE
T MCS AVERAGE - XM ALLO: 18.7
T MCS AVERAGE - XM ALLO: 20.6

## 2024-03-25 PROCEDURE — 86825 HLA X-MATH NON-CYTOTOXIC: CPT | Mod: PO,TXP | Performed by: NURSE PRACTITIONER

## 2024-03-25 PROCEDURE — 86826 HLA X-MATCH NONCYTOTOXC ADDL: CPT | Mod: PO,TXP | Performed by: NURSE PRACTITIONER

## 2024-03-25 PROCEDURE — 86826 HLA X-MATCH NONCYTOTOXC ADDL: CPT | Mod: 91,PO,TXP | Performed by: NURSE PRACTITIONER

## 2024-03-25 PROCEDURE — 86825 HLA X-MATH NON-CYTOTOXIC: CPT | Mod: 91,PO,TXP | Performed by: NURSE PRACTITIONER

## 2024-04-02 RX ORDER — CALCIUM ACETATE 667 MG/1
1334 TABLET ORAL
Qty: 300 TABLET | Refills: 2 | Status: SHIPPED | OUTPATIENT
Start: 2024-04-02 | End: 2024-08-30

## 2024-04-08 ENCOUNTER — LAB VISIT (OUTPATIENT)
Dept: LAB | Facility: HOSPITAL | Age: 44
End: 2024-04-08
Attending: INTERNAL MEDICINE
Payer: COMMERCIAL

## 2024-04-08 DIAGNOSIS — N18.5 CKD (CHRONIC KIDNEY DISEASE) STAGE 5, GFR LESS THAN 15 ML/MIN: ICD-10-CM

## 2024-04-08 DIAGNOSIS — N18.4 CHRONIC KIDNEY DISEASE, STAGE IV (SEVERE): ICD-10-CM

## 2024-04-08 DIAGNOSIS — E10.21 TYPE 1 DIABETES MELLITUS WITH NEPHROPATHY: ICD-10-CM

## 2024-04-08 DIAGNOSIS — I10 PRIMARY HYPERTENSION: ICD-10-CM

## 2024-04-08 LAB
ALBUMIN SERPL BCP-MCNC: 3.5 G/DL (ref 3.5–5.2)
ANION GAP SERPL CALC-SCNC: 9 MMOL/L (ref 8–16)
BUN SERPL-MCNC: 68 MG/DL (ref 6–20)
CALCIUM SERPL-MCNC: 8.4 MG/DL (ref 8.7–10.5)
CHLORIDE SERPL-SCNC: 114 MMOL/L (ref 95–110)
CO2 SERPL-SCNC: 18 MMOL/L (ref 23–29)
CREAT SERPL-MCNC: 7.8 MG/DL (ref 0.5–1.4)
CREAT UR-MCNC: 66.3 MG/DL (ref 23–375)
EST. GFR  (NO RACE VARIABLE): 8.1 ML/MIN/1.73 M^2
GLUCOSE SERPL-MCNC: 123 MG/DL (ref 70–110)
PHOSPHATE SERPL-MCNC: 6.6 MG/DL (ref 2.7–4.5)
POTASSIUM SERPL-SCNC: 5.4 MMOL/L (ref 3.5–5.1)
PROT UR-MCNC: 183 MG/DL (ref 0–15)
PROT/CREAT UR: 2.76 MG/G{CREAT} (ref 0–0.2)
PTH-INTACT SERPL-MCNC: 313.9 PG/ML (ref 9–77)
SODIUM SERPL-SCNC: 141 MMOL/L (ref 136–145)

## 2024-04-08 PROCEDURE — 36415 COLL VENOUS BLD VENIPUNCTURE: CPT | Mod: TXP | Performed by: INTERNAL MEDICINE

## 2024-04-08 PROCEDURE — 82570 ASSAY OF URINE CREATININE: CPT | Mod: TXP | Performed by: INTERNAL MEDICINE

## 2024-04-08 PROCEDURE — 83970 ASSAY OF PARATHORMONE: CPT | Mod: TXP | Performed by: INTERNAL MEDICINE

## 2024-04-08 PROCEDURE — 80069 RENAL FUNCTION PANEL: CPT | Mod: TXP | Performed by: INTERNAL MEDICINE

## 2024-04-10 ENCOUNTER — PATIENT MESSAGE (OUTPATIENT)
Dept: NEPHROLOGY | Facility: CLINIC | Age: 44
End: 2024-04-10
Payer: COMMERCIAL

## 2024-04-10 ENCOUNTER — PATIENT MESSAGE (OUTPATIENT)
Dept: TRANSPLANT | Facility: CLINIC | Age: 44
End: 2024-04-10
Payer: COMMERCIAL

## 2024-04-16 ENCOUNTER — TELEPHONE (OUTPATIENT)
Dept: TRANSPLANT | Facility: CLINIC | Age: 44
End: 2024-04-16
Payer: COMMERCIAL

## 2024-04-17 ENCOUNTER — HOSPITAL ENCOUNTER (INPATIENT)
Facility: HOSPITAL | Age: 44
LOS: 1 days | Discharge: HOME | DRG: 683 | End: 2024-04-18
Attending: SURGERY | Admitting: SURGERY
Payer: COMMERCIAL

## 2024-04-17 DIAGNOSIS — I10 ESSENTIAL HYPERTENSION: ICD-10-CM

## 2024-04-17 DIAGNOSIS — E10.22 TYPE 1 DIABETES MELLITUS WITH STAGE 5 CHRONIC KIDNEY DISEASE NOT ON CHRONIC DIALYSIS: ICD-10-CM

## 2024-04-17 DIAGNOSIS — N18.6 ESRD (END STAGE RENAL DISEASE): Primary | ICD-10-CM

## 2024-04-17 DIAGNOSIS — Z01.818 PRE-OP EVALUATION: ICD-10-CM

## 2024-04-17 DIAGNOSIS — N18.5 TYPE 1 DIABETES MELLITUS WITH STAGE 5 CHRONIC KIDNEY DISEASE NOT ON CHRONIC DIALYSIS: ICD-10-CM

## 2024-04-17 DIAGNOSIS — N18.5 STAGE 5 CHRONIC KIDNEY DISEASE NOT ON CHRONIC DIALYSIS: ICD-10-CM

## 2024-04-17 PROBLEM — E10.9 TYPE 1 DIABETES MELLITUS: Status: ACTIVE | Noted: 2024-04-17

## 2024-04-17 LAB
ABO + RH BLD: NORMAL
ALBUMIN SERPL BCP-MCNC: 3.7 G/DL (ref 3.5–5.2)
ALP SERPL-CCNC: 93 U/L (ref 55–135)
ALT SERPL W/O P-5'-P-CCNC: 35 U/L (ref 10–44)
AMYLASE SERPL-CCNC: 46 U/L (ref 20–110)
ANION GAP SERPL CALC-SCNC: 11 MMOL/L (ref 8–16)
APTT PPP: 27.1 SEC (ref 21–32)
AST SERPL-CCNC: 28 U/L (ref 10–40)
BASOPHILS # BLD AUTO: 0.04 K/UL (ref 0–0.2)
BASOPHILS NFR BLD: 0.6 % (ref 0–1.9)
BILIRUB SERPL-MCNC: 0.4 MG/DL (ref 0.1–1)
BLD GP AB SCN CELLS X3 SERPL QL: NORMAL
BUN SERPL-MCNC: 65 MG/DL (ref 6–20)
CALCIUM SERPL-MCNC: 8.7 MG/DL (ref 8.7–10.5)
CHLORIDE SERPL-SCNC: 112 MMOL/L (ref 95–110)
CO2 SERPL-SCNC: 19 MMOL/L (ref 23–29)
CREAT SERPL-MCNC: 7.5 MG/DL (ref 0.5–1.4)
DIFFERENTIAL METHOD BLD: ABNORMAL
EOSINOPHIL # BLD AUTO: 0.1 K/UL (ref 0–0.5)
EOSINOPHIL NFR BLD: 1.5 % (ref 0–8)
ERYTHROCYTE [DISTWIDTH] IN BLOOD BY AUTOMATED COUNT: 13.2 % (ref 11.5–14.5)
EST. GFR  (NO RACE VARIABLE): 8.5 ML/MIN/1.73 M^2
ESTIMATED AVG GLUCOSE: 117 MG/DL (ref 68–131)
GLUCOSE SERPL-MCNC: 116 MG/DL (ref 70–110)
HBA1C MFR BLD: 5.7 % (ref 4–5.6)
HBV CORE AB SERPL QL IA: NORMAL
HBV CORE IGM SERPL QL IA: NORMAL
HBV SURFACE AG SERPL QL IA: NORMAL
HCT VFR BLD AUTO: 28.2 % (ref 40–54)
HCV AB SERPL QL IA: NORMAL
HGB BLD-MCNC: 9.3 G/DL (ref 14–18)
HIV 1+2 AB+HIV1 P24 AG SERPL QL IA: NORMAL
IMM GRANULOCYTES # BLD AUTO: 0.03 K/UL (ref 0–0.04)
IMM GRANULOCYTES NFR BLD AUTO: 0.5 % (ref 0–0.5)
INR PPP: 1 (ref 0.8–1.2)
LIPASE SERPL-CCNC: 25 U/L (ref 4–60)
LYMPHOCYTES # BLD AUTO: 1.5 K/UL (ref 1–4.8)
LYMPHOCYTES NFR BLD: 23.2 % (ref 18–48)
MAGNESIUM SERPL-MCNC: 1.8 MG/DL (ref 1.6–2.6)
MCH RBC QN AUTO: 29.3 PG (ref 27–31)
MCHC RBC AUTO-ENTMCNC: 33 G/DL (ref 32–36)
MCV RBC AUTO: 89 FL (ref 82–98)
MONOCYTES # BLD AUTO: 0.6 K/UL (ref 0.3–1)
MONOCYTES NFR BLD: 8.3 % (ref 4–15)
NEUTROPHILS # BLD AUTO: 4.4 K/UL (ref 1.8–7.7)
NEUTROPHILS NFR BLD: 65.9 % (ref 38–73)
NRBC BLD-RTO: 0 /100 WBC
PLATELET # BLD AUTO: 212 K/UL (ref 150–450)
PMV BLD AUTO: 10.8 FL (ref 9.2–12.9)
POTASSIUM SERPL-SCNC: 5.1 MMOL/L (ref 3.5–5.1)
PROT SERPL-MCNC: 7.2 G/DL (ref 6–8.4)
PROTHROMBIN TIME: 10.9 SEC (ref 9–12.5)
RBC # BLD AUTO: 3.17 M/UL (ref 4.6–6.2)
SARS-COV-2 RDRP RESP QL NAA+PROBE: NEGATIVE
SODIUM SERPL-SCNC: 142 MMOL/L (ref 136–145)
SPECIMEN OUTDATE: NORMAL
WBC # BLD AUTO: 6.6 K/UL (ref 3.9–12.7)

## 2024-04-17 PROCEDURE — U0002 COVID-19 LAB TEST NON-CDC: HCPCS | Mod: NTX | Performed by: PHYSICIAN ASSISTANT

## 2024-04-17 PROCEDURE — 83036 HEMOGLOBIN GLYCOSYLATED A1C: CPT | Mod: NTX | Performed by: PHYSICIAN ASSISTANT

## 2024-04-17 PROCEDURE — 86901 BLOOD TYPING SEROLOGIC RH(D): CPT | Mod: NTX | Performed by: PHYSICIAN ASSISTANT

## 2024-04-17 PROCEDURE — 86704 HEP B CORE ANTIBODY TOTAL: CPT | Mod: NTX | Performed by: PHYSICIAN ASSISTANT

## 2024-04-17 PROCEDURE — 36415 COLL VENOUS BLD VENIPUNCTURE: CPT | Mod: NTX | Performed by: PHYSICIAN ASSISTANT

## 2024-04-17 PROCEDURE — 63600175 PHARM REV CODE 636 W HCPCS: Mod: NTX | Performed by: NURSE PRACTITIONER

## 2024-04-17 PROCEDURE — 93010 ELECTROCARDIOGRAM REPORT: CPT | Mod: NTX,,, | Performed by: INTERNAL MEDICINE

## 2024-04-17 PROCEDURE — 82150 ASSAY OF AMYLASE: CPT | Mod: NTX | Performed by: PHYSICIAN ASSISTANT

## 2024-04-17 PROCEDURE — 85610 PROTHROMBIN TIME: CPT | Mod: NTX | Performed by: PHYSICIAN ASSISTANT

## 2024-04-17 PROCEDURE — 86833 HLA CLASS II HIGH DEFIN QUAL: CPT | Mod: TXP | Performed by: PHYSICIAN ASSISTANT

## 2024-04-17 PROCEDURE — 86706 HEP B SURFACE ANTIBODY: CPT | Mod: NTX | Performed by: PHYSICIAN ASSISTANT

## 2024-04-17 PROCEDURE — 63600175 PHARM REV CODE 636 W HCPCS: Mod: NTX | Performed by: PHYSICIAN ASSISTANT

## 2024-04-17 PROCEDURE — 86705 HEP B CORE ANTIBODY IGM: CPT | Mod: NTX | Performed by: PHYSICIAN ASSISTANT

## 2024-04-17 PROCEDURE — 25000003 PHARM REV CODE 250: Mod: NTX | Performed by: PHYSICIAN ASSISTANT

## 2024-04-17 PROCEDURE — 20600001 HC STEP DOWN PRIVATE ROOM: Mod: NTX

## 2024-04-17 PROCEDURE — 85730 THROMBOPLASTIN TIME PARTIAL: CPT | Mod: NTX | Performed by: PHYSICIAN ASSISTANT

## 2024-04-17 PROCEDURE — 87517 HEPATITIS B DNA QUANT: CPT | Mod: NTX | Performed by: PHYSICIAN ASSISTANT

## 2024-04-17 PROCEDURE — 93005 ELECTROCARDIOGRAM TRACING: CPT | Mod: NTX

## 2024-04-17 PROCEDURE — 87340 HEPATITIS B SURFACE AG IA: CPT | Mod: NTX | Performed by: PHYSICIAN ASSISTANT

## 2024-04-17 PROCEDURE — 80053 COMPREHEN METABOLIC PANEL: CPT | Mod: NTX | Performed by: PHYSICIAN ASSISTANT

## 2024-04-17 PROCEDURE — 87389 HIV-1 AG W/HIV-1&-2 AB AG IA: CPT | Mod: NTX | Performed by: PHYSICIAN ASSISTANT

## 2024-04-17 PROCEDURE — 27000207 HC ISOLATION: Mod: NTX

## 2024-04-17 PROCEDURE — 83735 ASSAY OF MAGNESIUM: CPT | Mod: NTX | Performed by: PHYSICIAN ASSISTANT

## 2024-04-17 PROCEDURE — 86832 HLA CLASS I HIGH DEFIN QUAL: CPT | Mod: TXP | Performed by: PHYSICIAN ASSISTANT

## 2024-04-17 PROCEDURE — 86977 RBC SERUM PRETX INCUBJ/INHIB: CPT | Mod: TXP | Performed by: PHYSICIAN ASSISTANT

## 2024-04-17 PROCEDURE — 85025 COMPLETE CBC W/AUTO DIFF WBC: CPT | Mod: NTX | Performed by: PHYSICIAN ASSISTANT

## 2024-04-17 PROCEDURE — 83690 ASSAY OF LIPASE: CPT | Mod: NTX | Performed by: PHYSICIAN ASSISTANT

## 2024-04-17 PROCEDURE — 87522 HEPATITIS C REVRS TRNSCRPJ: CPT | Mod: NTX | Performed by: PHYSICIAN ASSISTANT

## 2024-04-17 PROCEDURE — 86644 CMV ANTIBODY: CPT | Mod: NTX | Performed by: PHYSICIAN ASSISTANT

## 2024-04-17 PROCEDURE — 86803 HEPATITIS C AB TEST: CPT | Mod: NTX | Performed by: PHYSICIAN ASSISTANT

## 2024-04-17 RX ORDER — MUPIROCIN 20 MG/G
OINTMENT TOPICAL
Status: DISCONTINUED | OUTPATIENT
Start: 2024-04-17 | End: 2024-04-18 | Stop reason: HOSPADM

## 2024-04-17 RX ORDER — IBUPROFEN 200 MG
24 TABLET ORAL
Status: DISCONTINUED | OUTPATIENT
Start: 2024-04-17 | End: 2024-04-18 | Stop reason: HOSPADM

## 2024-04-17 RX ORDER — SODIUM CHLORIDE 0.9 % (FLUSH) 0.9 %
10 SYRINGE (ML) INJECTION
Status: CANCELLED | OUTPATIENT
Start: 2024-04-17

## 2024-04-17 RX ORDER — IBUPROFEN 200 MG
16 TABLET ORAL
Status: DISCONTINUED | OUTPATIENT
Start: 2024-04-17 | End: 2024-04-18 | Stop reason: HOSPADM

## 2024-04-17 RX ORDER — INSULIN ASPART 100 [IU]/ML
3 INJECTION, SOLUTION INTRAVENOUS; SUBCUTANEOUS CONTINUOUS
Status: DISCONTINUED | OUTPATIENT
Start: 2024-04-17 | End: 2024-04-18 | Stop reason: HOSPADM

## 2024-04-17 RX ORDER — SODIUM CHLORIDE 0.9 % (FLUSH) 0.9 %
10 SYRINGE (ML) INJECTION
Status: DISCONTINUED | OUTPATIENT
Start: 2024-04-17 | End: 2024-04-18 | Stop reason: HOSPADM

## 2024-04-17 RX ORDER — MUPIROCIN 20 MG/G
OINTMENT TOPICAL
Status: CANCELLED | OUTPATIENT
Start: 2024-04-17

## 2024-04-17 RX ORDER — ACETAMINOPHEN 650 MG/20.3ML
650 LIQUID ORAL ONCE
Status: DISCONTINUED | OUTPATIENT
Start: 2024-04-17 | End: 2024-04-18 | Stop reason: HOSPADM

## 2024-04-17 RX ORDER — HYDRALAZINE HYDROCHLORIDE 20 MG/ML
10 INJECTION INTRAMUSCULAR; INTRAVENOUS EVERY 8 HOURS PRN
Status: DISCONTINUED | OUTPATIENT
Start: 2024-04-17 | End: 2024-04-18 | Stop reason: HOSPADM

## 2024-04-17 RX ORDER — NAPROXEN SODIUM 220 MG/1
81 TABLET, FILM COATED ORAL ONCE
Status: COMPLETED | OUTPATIENT
Start: 2024-04-17 | End: 2024-04-18

## 2024-04-17 RX ORDER — GLUCAGON 1 MG
1 KIT INJECTION
Status: DISCONTINUED | OUTPATIENT
Start: 2024-04-17 | End: 2024-04-18 | Stop reason: HOSPADM

## 2024-04-17 RX ORDER — DIPHENHYDRAMINE HYDROCHLORIDE 50 MG/ML
50 INJECTION INTRAMUSCULAR; INTRAVENOUS ONCE
Status: DISCONTINUED | OUTPATIENT
Start: 2024-04-17 | End: 2024-04-18 | Stop reason: HOSPADM

## 2024-04-17 RX ORDER — HYDROXYZINE HYDROCHLORIDE 25 MG/1
25 TABLET, FILM COATED ORAL ONCE
Status: DISCONTINUED | OUTPATIENT
Start: 2024-04-17 | End: 2024-04-17

## 2024-04-17 RX ORDER — HEPARIN SODIUM 5000 [USP'U]/ML
5000 INJECTION, SOLUTION INTRAVENOUS; SUBCUTANEOUS ONCE
Status: COMPLETED | OUTPATIENT
Start: 2024-04-17 | End: 2024-04-18

## 2024-04-17 RX ORDER — ALPRAZOLAM 0.25 MG/1
0.25 TABLET ORAL ONCE AS NEEDED
Status: COMPLETED | OUTPATIENT
Start: 2024-04-17 | End: 2024-04-17

## 2024-04-17 RX ADMIN — INSULIN ASPART 3 UNITS/HR: 100 INJECTION, SOLUTION INTRAVENOUS; SUBCUTANEOUS at 11:04

## 2024-04-17 RX ADMIN — HYDRALAZINE HYDROCHLORIDE 10 MG: 20 INJECTION, SOLUTION INTRAMUSCULAR; INTRAVENOUS at 10:04

## 2024-04-17 RX ADMIN — ALPRAZOLAM 0.25 MG: 0.25 TABLET ORAL at 10:04

## 2024-04-17 NOTE — TELEPHONE ENCOUNTER
ON-CALL NOTE    [facility]# [id number]    Notified by Levy Abraham, , that Dejuan Diaz is eligible for kidney/pancreas offer.  Spoke with patient and identified no acute medical issues with telephone assessment. Protocol script read to patient regarding N/A, standard donor offer. Patient verbalized understanding, all questions answered, patient accepts organ offer. Notified by Levy Abraham that virtual crossmatch is negative from sample date February 19, 2024. Patient will provide a fresh sample on admit for retrospective.  Patient reports no sensitizing event since last blood sample for PRA received. Notified [facility] in HLA Lab to perform a retrospective  crossmatch per guideline. Orders placed.    Patient was asked if they have had a positive COVID-19 test or if they have any signs or symptoms. Informed patient that they will be tested for COVID-19 upon arrival to the hospital, unless have a previous positive result. If tested and result is positive, the transplant will not be able to occur, they will be inactivated on the wait list for 21 days per protocol and required to quarantine.     Patient notified of plan to remain at home on standby as Primary and await further instructions. Patient also instructed to hold ASA until further instructed. All questions answered to patient's satisfaction.    04/16/2024 @ 2245 pm- Patient phoned and informed no new updates and remain at home as Primary and await updates. Patient verbalized understanding.    4/17/2024 @ 0920 am- Report given to Jhoan RAMEY RN

## 2024-04-18 ENCOUNTER — TELEPHONE (OUTPATIENT)
Dept: TRANSPLANT | Facility: CLINIC | Age: 44
End: 2024-04-18
Payer: COMMERCIAL

## 2024-04-18 VITALS
HEART RATE: 70 BPM | HEIGHT: 72 IN | DIASTOLIC BLOOD PRESSURE: 82 MMHG | TEMPERATURE: 98 F | OXYGEN SATURATION: 98 % | BODY MASS INDEX: 30.42 KG/M2 | WEIGHT: 224.63 LBS | SYSTOLIC BLOOD PRESSURE: 144 MMHG | RESPIRATION RATE: 18 BRPM

## 2024-04-18 PROBLEM — N18.6 ESRD (END STAGE RENAL DISEASE): Status: ACTIVE | Noted: 2024-04-18

## 2024-04-18 LAB
ANION GAP SERPL CALC-SCNC: 9 MMOL/L (ref 8–16)
BUN SERPL-MCNC: 62 MG/DL (ref 6–20)
CALCIUM SERPL-MCNC: 8.7 MG/DL (ref 8.7–10.5)
CHLORIDE SERPL-SCNC: 115 MMOL/L (ref 95–110)
CO2 SERPL-SCNC: 20 MMOL/L (ref 23–29)
CREAT SERPL-MCNC: 7.3 MG/DL (ref 0.5–1.4)
EST. GFR  (NO RACE VARIABLE): 8.8 ML/MIN/1.73 M^2
GLUCOSE SERPL-MCNC: 62 MG/DL (ref 70–110)
HCV RNA SERPL QL NAA+PROBE: NOT DETECTED
HCV RNA SPEC NAA+PROBE-ACNC: NOT DETECTED IU/ML
HEPATITIS B VIRUS DNA: NORMAL
HEPATITIS B VIRUS PCR, QUANT: NOT DETECTED IU/ML
OHS QRS DURATION: 88 MS
OHS QTC CALCULATION: 416 MS
POTASSIUM SERPL-SCNC: 5 MMOL/L (ref 3.5–5.1)
SODIUM SERPL-SCNC: 144 MMOL/L (ref 136–145)

## 2024-04-18 PROCEDURE — 99235 HOSP IP/OBS SAME DATE MOD 70: CPT | Mod: 57,NTX,, | Performed by: CLINICAL NURSE SPECIALIST

## 2024-04-18 PROCEDURE — 25000003 PHARM REV CODE 250: Mod: NTX | Performed by: PHYSICIAN ASSISTANT

## 2024-04-18 PROCEDURE — 80048 BASIC METABOLIC PNL TOTAL CA: CPT | Mod: NTX | Performed by: CLINICAL NURSE SPECIALIST

## 2024-04-18 PROCEDURE — 63600175 PHARM REV CODE 636 W HCPCS: Mod: NTX | Performed by: PHYSICIAN ASSISTANT

## 2024-04-18 PROCEDURE — 99499 UNLISTED E&M SERVICE: CPT | Mod: NTX,,, | Performed by: SURGERY

## 2024-04-18 PROCEDURE — 36415 COLL VENOUS BLD VENIPUNCTURE: CPT | Mod: NTX | Performed by: CLINICAL NURSE SPECIALIST

## 2024-04-18 RX ADMIN — HEPARIN SODIUM 5000 UNITS: 5000 INJECTION INTRAVENOUS; SUBCUTANEOUS at 04:04

## 2024-04-18 RX ADMIN — ASPIRIN 81 MG CHEWABLE TABLET 81 MG: 81 TABLET CHEWABLE at 04:04

## 2024-04-18 NOTE — HPI
Mr. Diaz is a 43 y.o. 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 He reports being in his usual state of health. He has had DM1 since age 28, has been on insulin since diagnosis. Patient has insulin pump, endocrine consulted for management. Pre op labs and imaging reviewed. To OR at 10 AM

## 2024-04-18 NOTE — CARE UPDATE
"Care Update    Admitted for kidney/ Pancreas Transplant. T1DM on Omnipod and Dexcom.   Lab Results   Component Value Date    HGBA1C 5.7 (H) 04/17/2024     No results found for: "POCTGLUCOSE"    Endocrinology consulted for BG management.   BG goal 140-180    - Continue use of home insulin pump   - BG checks q4hr  - Hypoglycemia protocol in place  - If blood glucose greater than 300, please ask patient not to eat food or drink anything other than water until correctional insulin has brought it back below 250    ** Please notify Endocrine for any change and/or advance in diet**  ** Please call Endocrine for any BG related issues **    Discharge Planning:   TBD. Please notify endocrinology prior to discharge.      Norm Lee DNP, FNP-C  Department of Endocrinology  Inpatient Glycemic Management     "

## 2024-04-18 NOTE — DISCHARGE SUMMARY
[facility] - Transplant Stepdown  Kidney Transplant  Discharge Summary    Patient Name: Dejuan Diaz Jr.  MRN: [id number]  Admission Date: 4/17/2024  Hospital Length of Stay: 1 days  Discharge Date and Time:  04/18/2024 8:55 AM  Attending Physician: Raul Jean MD   Discharging Provider: Raymundo Almonte NP  Primary Care Provider: Val, Primary Doctor    HPI:   Mr. Diaz is a 43 y.o. 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 He reports being in his usual state of health. He has had DM1 since age 28, has been on insulin since diagnosis. Patient has insulin pump, endocrine consulted for management. Pre op labs and imaging reviewed. To OR at 10 AM        Hospital Course:    Patient admitted as primary candidate for combined kidney/pancreas transplant. However, transplant cancelled due to poor donor organ quality. Patient and family notified and verbalize understanding. Patient to resume home medications and follow-up labs and appointments as previously scheduled. Patient is stable for discharge.     Goals of Care Treatment Preferences:  Code Status: Full Code      Final Active Diagnoses:    Diagnosis Date Noted POA    PRINCIPAL PROBLEM:  Stage 5 chronic kidney disease not on chronic dialysis [N18.5] 04/17/2024 Yes    ESRD (end stage renal disease) [N18.6] 04/18/2024 Unknown    Type 1 diabetes mellitus [E10.9] 04/17/2024 Yes    Essential hypertension [I10] 08/05/2023 Yes      Problems Resolved During this Admission:       Treatments: See above    Consults (From admission, onward)          Status Ordering Provider     Inpatient consult to Endocrinology  Once        Provider:  (Not yet assigned)    FRANKO Leone            Pending Diagnostic Studies:       Procedure Component Value Units Date/Time    Cytomegalovirus antibody, IgG [[id number]] Collected: 04/17/24 2158    Order Status: Sent Lab Status: In process Updated: 04/17/24      Specimen: Blood      Donor Crossmatch with PRA [[id number]] Resulted: 24    Order Status: Sent Lab Status: In process Updated: 24    Specimen: Blood     HEPATITIS B VIRAL DNA, QUANTITATIVE [[id number]] Collected: 24    Order Status: Sent Lab Status: In process Updated: 24    Specimen: Blood     Hepatitis B Surface Antibody, Qual/Quant [[id number]] Collected: 24    Order Status: Sent Lab Status: In process Updated: 24    Specimen: Blood     Hepatitis C RNA, quantitative, PCR [[id number]] Collected: 24    Order Status: Sent Lab Status: In process Updated: 24    Specimen: Blood           Significant Diagnostic Studies: Labs: CMP   Recent Labs   Lab 24  0748    144   K 5.1 5.0   * 115*   CO2 19* 20*   * 62*   BUN 65* 62*   CREATININE 7.5* 7.3*   CALCIUM 8.7 8.7   PROT 7.2  --    ALBUMIN 3.7  --    BILITOT 0.4  --    ALKPHOS 93  --    AST 28  --    ALT 35  --    ANIONGAP 11 9    and CBC   Recent Labs   Lab 24   WBC 6.60   HGB 9.3*   HCT 28.2*          Discharged Condition: stable    Disposition: Home or Self Care      Patient Instructions:      Diet diabetic     Notify your health care provider if you experience any of the following:  temperature >100.4     Notify your health care provider if you experience any of the following:  persistent nausea and vomiting or diarrhea     Notify your health care provider if you experience any of the following:  severe uncontrolled pain     Notify your health care provider if you experience any of the following:  redness, tenderness, or signs of infection (pain, swelling, redness, odor or green/yellow discharge around incision site)     Notify your health care provider if you experience any of the following:  difficulty breathing or increased cough     Notify your health care provider if you experience any of the following:   severe persistent headache     Notify your health care provider if you experience any of the following:  worsening rash     Notify your health care provider if you experience any of the following:  persistent dizziness, light-headedness, or visual disturbances     Notify your health care provider if you experience any of the following:  increased confusion or weakness     Notify your health care provider if you experience any of the following:   Order Comments: For any concerning signs or symptoms. If you have not received your scheduled follow-up within 24 hours of your discharge or for any questions or concerns, please call [phone] for further assistance.     Activity as tolerated     Medications:  Reconciled Home Medications:      Medication List        ASK your doctor about these medications      amLODIPine 10 MG tablet  Commonly known as: NORVASC  Take 1 tablet (10 mg total) by mouth once daily.     aspirin 81 MG EC tablet  Commonly known as: ECOTRIN  Take 81 mg by mouth once daily.     atorvastatin 40 MG tablet  Commonly known as: LIPITOR  Take 40 mg by mouth once daily.     calcitRIOL 0.25 MCG Cap  Commonly known as: ROCALTROL  Take 1 capsule (0.25 mcg total) by mouth once daily.     calcium acetate(phosphat bind) 667 mg tablet  Commonly known as: PHOSLO  Take 2 tablets (1,334 mg total) by mouth 3 (three) times daily with meals. Take with meals and snacks     coenzyme Q10 200 mg capsule  Take 200 mg by mouth once daily.     DEXCOM G6  MISC  by Misc.(Non-Drug; Combo Route) route.     DEXCOM G6 SENSOR Adelina  Generic drug: blood-glucose sensor  SMARTSI Each Topical Every 10 Days     DEXCOM G6 TRANSMITTER MISC  Dexcom G6 transmitter, See Instructions, uad with dexcom G6 sensor; change transmitter q 90 days, # 1 EA, 3 Refill(s), Pharmacy: [facility], 187, cm, 23 14:55:00 CDT, Height/Length Measured, 105.1, kg, 23 14:55:00 CDT, Weight Dosing     * insulin lispro 100 unit/mL  injection  Inject into the skin 3 (three) times daily before meals. 100 units     * HumaLOG U-100 Insulin 100 unit/mL injection  Generic drug: insulin lispro  See Instructions, for use in insulin pump; max daily dose 100 units, # 90 mL, 1 Refill(s), Maintenance, Pharmacy: [facility], 187, cm, 02/23/23 11:24:00 CST, Height/Length Measured, 102.5, kg, 02/23/23 11:24:00 CST, Weight Dosing     losartan 100 MG tablet  Commonly known as: COZAAR  TAKE 1 TABLET(100 MG) BY MOUTH EVERY DAY     nebivoloL 20 mg Tab  Commonly known as: BYSTOLIC  Take 20 mg by mouth once daily.     * OMNIPOD 5 G6 INTRO KIT (GEN 5) SUBQ  Inject into the skin. 200 units per pod     * OMNIPOD 5 G6 INTRO KIT (GEN 5) SUBQ  Omnipod 5 G6 Intro Kit (Gen 5), See Instructions, use as directed, # 1 EA, 0 Refill(s), Pharmacy: [facility], 187, cm, 12/30/22 9:14:00 CST, Height/Length Measured, 102.3, kg, 12/30/22 9:14:00 CST, Weight Dosing     ondansetron 4 MG Tbdl  Commonly known as: ZOFRAN-ODT  Take 1 tablet (4 mg total) by mouth every 12 (twelve) hours as needed (nausea).     ONE-A-DAY MEN'S MULTIVITAMIN ORAL  Take by mouth once daily.     sodium bicarbonate 650 MG tablet  Take 1 tablet (650 mg total) by mouth Daily.     tadalafiL 5 MG tablet  Commonly known as: CIALIS  TK 1 T PO D PRF ERECTILE DYSFUNCTION     VASCEPA 1 gram Cap  Generic drug: icosapent ethyL  Take 2 g by mouth 4 (four) times daily.           * This list has 4 medication(s) that are the same as other medications prescribed for you. Read the directions carefully, and ask your doctor or other care provider to review them with you.                Time spent caring for patient (Greater than 1/2 spent in direct face-to-face contact): > 30 minutes    Raymundo Almonte NP  Kidney Transplant  [facility] - Transplant Stepdown

## 2024-04-18 NOTE — PLAN OF CARE
Pt AAO. NPO since MN for kidney/pancreas at 10am.  BP elevated on arrival- see previous note. Endocrine following- dexcom and omnipod on pt. Accuchecks Q4- pt using his dexcom for readings- WNL.  K+ 5.1- PA Aware.  Xanax x 1 for anxiety - relief obtained.  Awaiting consents to be signed.  Family at bedside - questions answered. Aspirin and heparin given per orders.

## 2024-04-18 NOTE — TELEPHONE ENCOUNTER
Late entry from 4/17/24 at 0930:    ON-CALL NOTE    UNOS# [id number]    0930:  Case assumed from Pia Gilbert RN, at 0930.    10:00:  Patient updated on progress of case.    1600:  Patient updated on progress of case.    1935:  Notification from Levy Phelps that donor OR is now set of 0400 on 4/18/24.  Patient to be admitted for potential KP transplant.  Recipient OR for 1000 on 4/18/24.  All parties notified of patient's impending arrival to [facility] by 2130.      4/18/24 at 0815:  Notification from Levy Abraham, , that pancreas is unsuitable for re-transplant.  Patient has been informed of this directly by Dr. Jean.  NP requested to complete orders for discharge.  HLA notified to cancel previously ordered retrospective crossmatch.

## 2024-04-18 NOTE — ASSESSMENT & PLAN NOTE
- Pre op labs and imaging to be reviewed prior to surgery  - Thymo induction  - NPO at midnight  - To OR at 10 AM

## 2024-04-18 NOTE — SUBJECTIVE & OBJECTIVE
Subjective:     Chief Complaint/Reason for Admission: Kidney/pancreas transplant    History of Present Illness:  Mr. Diaz is a 43 y.o. 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 He reports being in his usual state of health. He has had DM1 since age 28, has been on insulin since diagnosis. Patient has insulin pump, endocrine consulted for management. Pre op labs and imaging reviewed. To OR at 10 AM    Dialysis History: Mr. Zaidi with CKD5, dialysis not yet started  who is pre-dialysis    Previous Transplant: no    Current Facility-Administered Medications   Medication Dose Route Frequency Provider Last Rate Last Admin    acetaminophen oral solution 650 mg  650 mg Per NG tube Once Serrato, Melisa LOPEZ PA-C        ampicillin-sulbactam (UNASYN) 3 g in sodium chloride 0.9 % 100 mL IVPB (MB+)  3 g Intravenous On Call Procedure Serrato, Melisa LOPEZ PA-C        antithymocyte globulin (rabbit) 125 mg, hydrocortisone sodium succinate (SOLU-CORTEF) 20 mg in sodium chloride 0.9% 500 mL (FOR PERIPHERAL LINE ADMINISTRATION ONLY)  1.5 mg/kg (Adjusted) Intravenous Once Serrato, Melisa LOPEZ PA-C        dextrose 10% bolus 125 mL 125 mL  12.5 g Intravenous PRN Norm Lee DNP, FNP        dextrose 10% bolus 250 mL 250 mL  25 g Intravenous PRN Norm Lee DNP, FNP        diphenhydrAMINE injection 50 mg  50 mg Intravenous Once Serrato, Melisa LOPEZ PA-C        glucagon (human recombinant) injection 1 mg  1 mg Intramuscular PRN Norm Lee DNP, FNP        glucose chewable tablet 16 g  16 g Oral PRN Norm Lee DNP, FNP        glucose chewable tablet 24 g  24 g Oral PRN Norm Lee DNP, FNP        hydrALAZINE injection 10 mg  10 mg Intravenous Q8H PRN Serrato, Melisa LOPEZ PA-C   10 mg at 04/17/24 2242    insulin aspart U-100 insulin pump from home  3 Units/hr Subcutaneous Continuous Norm Lee DNP, FNP 0.03 mL/hr at 04/17/24  2345 3 Units/hr at 04/17/24 2345    methylPREDNISolone sodium succinate (SOLU-MEDROL) 500 mg in dextrose 5 % (D5W) 100 mL IVPB  500 mg Intravenous Once Serrato, Melisa LOPEZ PA-C        mupirocin 2 % ointment   Nasal On Call Procedure Serrato, Melisa LOPEZ PA-C        sodium chloride 0.9% flush 10 mL  10 mL Intravenous PRN Serrato, Melisa LOPEZ PA-C           Review of patient's allergies indicates:   Allergen Reactions    Codeine     Vancomycin analogues        Past Medical History:   Diagnosis Date    Anemia     Diabetes mellitus type II     Disorder of kidney and ureter     GERD (gastroesophageal reflux disease)     Hypertension     Proteinuria      Past Surgical History:   Procedure Laterality Date    INNER EAR SURGERY      for tinnitus     Family History       Problem Relation (Age of Onset)    Cancer Father    Diabetes Mother    Heart disease Mother, Sister    Kidney disease Mother          Tobacco Use    Smoking status: Never    Smokeless tobacco: Never   Substance and Sexual Activity    Alcohol use: No    Drug use: No    Sexual activity: Yes        Review of Systems   Constitutional:  Negative for chills and fever.   HENT: Negative.     Respiratory:  Negative for cough and shortness of breath.    Cardiovascular:  Negative for chest pain and leg swelling.   Gastrointestinal:  Negative for abdominal pain, diarrhea and nausea.   Genitourinary:  Negative for dysuria.   Skin:  Negative for wound.   Allergic/Immunologic: Negative for immunocompromised state.   Neurological:  Negative for weakness and headaches.   Psychiatric/Behavioral:  Negative for agitation and confusion.      Objective:     Vital Signs (Most Recent):  Temp: 98.2 °F (36.8 °C) (04/17/24 2312)  Pulse: 70 (04/18/24 0415)  Resp: 18 (04/18/24 0415)  BP: (!) 144/82 (04/18/24 0415)  SpO2: 98 % (04/18/24 0415)  Height: 6' (182.9 cm)  Weight: 101.9 kg (224 lb 10.4 oz)  Body mass index is 30.47 kg/m².      Physical Exam  Vitals and nursing note reviewed.    Constitutional:       Appearance: Normal appearance.   HENT:      Mouth/Throat:      Pharynx: Oropharynx is clear.   Cardiovascular:      Rate and Rhythm: Normal rate.      Pulses: Normal pulses.   Pulmonary:      Effort: Pulmonary effort is normal.   Abdominal:      General: Bowel sounds are normal.      Palpations: Abdomen is soft.      Comments: Insulin pump LLQ   Skin:     General: Skin is warm.   Neurological:      Mental Status: He is alert and oriented to person, place, and time. Mental status is at baseline.   Psychiatric:         Mood and Affect: Mood normal.          Laboratory  CBC:   Recent Labs   Lab 04/17/24  2158   WBC 6.60   RBC 3.17*   HGB 9.3*   HCT 28.2*      MCV 89   MCH 29.3   MCHC 33.0     CMP:   Recent Labs   Lab 04/17/24  2158   *   CALCIUM 8.7   ALBUMIN 3.7   PROT 7.2      K 5.1   CO2 19*   *   BUN 65*   CREATININE 7.5*   ALKPHOS 93   ALT 35   AST 28       Diagnostic Results:  CXR reviewed    Patient was SARS-CoV-2 /COVID-19 tested with negative results.

## 2024-04-18 NOTE — NURSING
Patient discharged to home as ordered.  Kidney pancreas transplant surgery cancelled.  Discharging with no changes to medications or activity.  Reviewed when to follow up.  Patient stayed to meet with living donor coordinator prior to discharge per his request.

## 2024-04-18 NOTE — PROGRESS NOTES
PT admitted to unit with sister and girlfriend at side. Oriented to room and call light.  BP elevtaed on admit- PA Aware and ordered prn for BP control as well as anxiety. Endo cx for dexcom and omnipod in place. Pt to be NPO at midnight-verbalized understanding.  Skin intact. Allergy band placed. Covid swab sent.

## 2024-04-18 NOTE — PROGRESS NOTES
Nurses Note -- 4 Eyes      4/18/2024   12:24 AM      Skin assessed during: Admit      [x] No Altered Skin Integrity Present    []Prevention Measures Documented      [] Yes- Altered Skin Integrity Present or Discovered   [] LDA Added if Not in Epic (Describe Wound)   [] New Altered Skin Integrity was Present on Admit and Documented in LDA   [] Wound Image Taken    Wound Care Consulted? No    Attending Nurse:   Jonn Velazquez RN/Staff Member:   Bijan GARZA

## 2024-04-18 NOTE — ASSESSMENT & PLAN NOTE
- On home insulin pump  - Endocrine consulted for help with management  - Appreciate their assistance

## 2024-04-18 NOTE — PLAN OF CARE
Problem: Adult Inpatient Plan of Care  Goal: Plan of Care Review  Outcome: Met  Goal: Patient-Specific Goal (Individualized)  Outcome: Met  Goal: Absence of Hospital-Acquired Illness or Injury  Outcome: Met  Goal: Optimal Comfort and Wellbeing  Outcome: Met  Goal: Readiness for Transition of Care  Outcome: Met     Problem: Diabetes Comorbidity  Goal: Blood Glucose Level Within Targeted Range  Outcome: Met     Problem: Neutropenia  Goal: Absence of Infection  Outcome: Met

## 2024-04-18 NOTE — H&P
[facility]  Kidney Transplant  H&P      Subjective:     Chief Complaint/Reason for Admission: Kidney/pancreas transplant    History of Present Illness:  Mr. Diaz is a 43 y.o. 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 He reports being in his usual state of health. He has had DM1 since age 28, has been on insulin since diagnosis. Patient has insulin pump, endocrine consulted for management. Pre op labs and imaging reviewed. To OR at 10 AM    Dialysis History: Mr. Zaidi with CKD5, dialysis not yet started  who is pre-dialysis    Previous Transplant: no    Current Facility-Administered Medications   Medication Dose Route Frequency Provider Last Rate Last Admin    acetaminophen oral solution 650 mg  650 mg Per NG tube Once Serrato, Melisa LOPEZ PA-C        ampicillin-sulbactam (UNASYN) 3 g in sodium chloride 0.9 % 100 mL IVPB (MB+)  3 g Intravenous On Call Procedure Serrato, Melisa LOPEZ PA-C        antithymocyte globulin (rabbit) 125 mg, hydrocortisone sodium succinate (SOLU-CORTEF) 20 mg in sodium chloride 0.9% 500 mL (FOR PERIPHERAL LINE ADMINISTRATION ONLY)  1.5 mg/kg (Adjusted) Intravenous Once Serrato, Melisa LOPEZ PA-C        dextrose 10% bolus 125 mL 125 mL  12.5 g Intravenous PRN Norm Lee DNP, FNP        dextrose 10% bolus 250 mL 250 mL  25 g Intravenous PRN Norm Lee DNP, FNP        diphenhydrAMINE injection 50 mg  50 mg Intravenous Once Serrato, Melisa LOPEZ PA-C        glucagon (human recombinant) injection 1 mg  1 mg Intramuscular PRN Norm Lee DNP, FNP        glucose chewable tablet 16 g  16 g Oral PRN Norm Lee DNP, FNP        glucose chewable tablet 24 g  24 g Oral PRN Norm Lee DNP, FNP        hydrALAZINE injection 10 mg  10 mg Intravenous Q8H PRN Serrato, Melisa LOPEZ PA-C   10 mg at 04/17/24 2242    insulin aspart U-100 insulin pump from home  3 Units/hr Subcutaneous  Continuous Norm Lee, DNP, FNP 0.03 mL/hr at 04/17/24 2345 3 Units/hr at 04/17/24 2345    methylPREDNISolone sodium succinate (SOLU-MEDROL) 500 mg in dextrose 5 % (D5W) 100 mL IVPB  500 mg Intravenous Once Serrato, Melisa LOPEZ PA-C        mupirocin 2 % ointment   Nasal On Call Procedure Serrato, Melisa LOPEZ PA-C        sodium chloride 0.9% flush 10 mL  10 mL Intravenous PRN Serrato, Meilsa LOPEZ PA-C           Review of patient's allergies indicates:   Allergen Reactions    Codeine     Vancomycin analogues        Past Medical History:   Diagnosis Date    Anemia     Diabetes mellitus type II     Disorder of kidney and ureter     GERD (gastroesophageal reflux disease)     Hypertension     Proteinuria      Past Surgical History:   Procedure Laterality Date    INNER EAR SURGERY      for tinnitus     Family History       Problem Relation (Age of Onset)    Cancer Father    Diabetes Mother    Heart disease Mother, Sister    Kidney disease Mother          Tobacco Use    Smoking status: Never    Smokeless tobacco: Never   Substance and Sexual Activity    Alcohol use: No    Drug use: No    Sexual activity: Yes        Review of Systems   Constitutional:  Negative for chills and fever.   HENT: Negative.     Respiratory:  Negative for cough and shortness of breath.    Cardiovascular:  Negative for chest pain and leg swelling.   Gastrointestinal:  Negative for abdominal pain, diarrhea and nausea.   Genitourinary:  Negative for dysuria.   Skin:  Negative for wound.   Allergic/Immunologic: Negative for immunocompromised state.   Neurological:  Negative for weakness and headaches.   Psychiatric/Behavioral:  Negative for agitation and confusion.      Objective:     Vital Signs (Most Recent):  Temp: 98.2 °F (36.8 °C) (04/17/24 2312)  Pulse: 70 (04/18/24 0415)  Resp: 18 (04/18/24 0415)  BP: (!) 144/82 (04/18/24 0415)  SpO2: 98 % (04/18/24 0415)  Height: 6' (182.9 cm)  Weight: 101.9 kg (224 lb 10.4 oz)  Body mass index is 30.47  kg/m².      Physical Exam  Vitals and nursing note reviewed.   Constitutional:       Appearance: Normal appearance.   HENT:      Mouth/Throat:      Pharynx: Oropharynx is clear.   Cardiovascular:      Rate and Rhythm: Normal rate.      Pulses: Normal pulses.   Pulmonary:      Effort: Pulmonary effort is normal.   Abdominal:      General: Bowel sounds are normal.      Palpations: Abdomen is soft.      Comments: Insulin pump LLQ   Skin:     General: Skin is warm.   Neurological:      Mental Status: He is alert and oriented to person, place, and time. Mental status is at baseline.   Psychiatric:         Mood and Affect: Mood normal.          Laboratory  CBC:   Recent Labs   Lab 04/17/24 2158   WBC 6.60   RBC 3.17*   HGB 9.3*   HCT 28.2*      MCV 89   MCH 29.3   MCHC 33.0     CMP:   Recent Labs   Lab 04/17/24 2158   *   CALCIUM 8.7   ALBUMIN 3.7   PROT 7.2      K 5.1   CO2 19*   *   BUN 65*   CREATININE 7.5*   ALKPHOS 93   ALT 35   AST 28       Diagnostic Results:  CXR reviewed    Patient was SARS-CoV-2 /COVID-19 tested with negative results.   Assessment/Plan:     Cardiac/Vascular  Essential hypertension  - Will assess restarting home antihypertensives post op      Renal/  * Stage 5 chronic kidney disease not on chronic dialysis  - Pre op labs and imaging to be reviewed prior to surgery  - Thymo induction  - NPO at midnight  - To OR at 10 AM      Endocrine  Type 1 diabetes mellitus  - On home insulin pump  - Endocrine consulted for help with management  - Appreciate their assistance          The patient presents for kidney/pancreas transplant.  There are no apparent contraindications to proceeding with the planned transplant.  The patient understands that the transplant could potentially be cancelled pending detailed assessment of the donor organ.  He will receive Thymoglobulin induction.  A complete discussion of the transplant procedure, including risks, complications, and alternatives,  as well as any donor-specific risk factors requiring specific disclosure, will be carried out by the responsible staff surgeon prior to the procedure.     Discharge Planning:  Not suitable at this time    Medical decision making for this encounter includes review of pertinent labs and diagnostic studies, assessment and planning, discussions with consulting providers, discussion with patient/family, and participation in multidisciplinary rounds. Time spent caring for patient: 60 minutes    Raymundo Almonte NP  Kidney Transplant  [facility]

## 2024-04-19 LAB — CMV IGG SERPL QL IA: NORMAL

## 2024-04-20 LAB
HBV SURFACE AB SER QL IA: POSITIVE
HBV SURFACE AB SERPL IA-ACNC: 99 MIU/ML

## 2024-04-22 ENCOUNTER — PATIENT MESSAGE (OUTPATIENT)
Dept: NEPHROLOGY | Facility: CLINIC | Age: 44
End: 2024-04-22
Payer: COMMERCIAL

## 2024-05-02 ENCOUNTER — OFFICE VISIT (OUTPATIENT)
Dept: NEPHROLOGY | Facility: CLINIC | Age: 44
End: 2024-05-02
Payer: COMMERCIAL

## 2024-05-02 DIAGNOSIS — N25.81 SECONDARY RENAL HYPERPARATHYROIDISM: ICD-10-CM

## 2024-05-02 DIAGNOSIS — E10.21 TYPE 1 DIABETES MELLITUS WITH NEPHROPATHY: ICD-10-CM

## 2024-05-02 DIAGNOSIS — N18.5 CKD (CHRONIC KIDNEY DISEASE) STAGE 5, GFR LESS THAN 15 ML/MIN: Primary | ICD-10-CM

## 2024-05-02 DIAGNOSIS — I10 PRIMARY HYPERTENSION: ICD-10-CM

## 2024-05-02 PROCEDURE — 3066F NEPHROPATHY DOC TX: CPT | Mod: CPTII,95,, | Performed by: INTERNAL MEDICINE

## 2024-05-02 PROCEDURE — 1111F DSCHRG MED/CURRENT MED MERGE: CPT | Mod: CPTII,95,, | Performed by: INTERNAL MEDICINE

## 2024-05-02 PROCEDURE — 1160F RVW MEDS BY RX/DR IN RCRD: CPT | Mod: CPTII,95,, | Performed by: INTERNAL MEDICINE

## 2024-05-02 PROCEDURE — 1159F MED LIST DOCD IN RCRD: CPT | Mod: CPTII,95,, | Performed by: INTERNAL MEDICINE

## 2024-05-02 PROCEDURE — 99214 OFFICE O/P EST MOD 30 MIN: CPT | Mod: 95,,, | Performed by: INTERNAL MEDICINE

## 2024-05-02 PROCEDURE — 3044F HG A1C LEVEL LT 7.0%: CPT | Mod: CPTII,95,, | Performed by: INTERNAL MEDICINE

## 2024-05-02 PROCEDURE — 3062F POS MACROALBUMINURIA REV: CPT | Mod: CPTII,95,, | Performed by: INTERNAL MEDICINE

## 2024-05-02 RX ORDER — ALPRAZOLAM 0.5 MG/1
0.5 TABLET ORAL 2 TIMES DAILY PRN
Qty: 60 TABLET | Refills: 0 | Status: SHIPPED | OUTPATIENT
Start: 2024-05-02 | End: 2024-06-01

## 2024-05-02 NOTE — PROGRESS NOTES
Subjective:       Patient ID: Dejuan Diaz Jr. is a 43 y.o. White male who presents for return patient evaluation for chronic renal failure.    The patient location is:  Patient Home   The chief complaint leading to consultation is: CKD  Visit type: Virtual visit with synchronous audio and video  Total time spent with patient: 12 minutes  Each patient to whom he or she provides medical services by telemedicine is:  (1) informed of the relationship between the physician and patient and the respective role of any other health care provider with respect to management of the patient; and (2) notified that he or she may decline to receive medical services by telemedicine and may withdraw from such care at any time.        He had COVID in April 2021.  He reports that he has been dealing with a divorce and both of his parents having cancer.  His diabetes is much better now that he is on an omnipod.  He has no new uremic symptoms.  He is now travelling more now due to a change in his job as well.   His blood pressure at home has been 140s-150/90s.  His donor should be complete at the end of May.  He has been more anxious lately.  He is awaiting a transplant.  He has no new symptoms lately.      Review of Systems   Constitutional:  Positive for fatigue. Negative for appetite change, chills and fever.   Eyes:  Negative for visual disturbance.   Respiratory:  Positive for shortness of breath (mild with exertion). Negative for cough.    Cardiovascular:  Positive for chest pain (at times). Negative for leg swelling.   Gastrointestinal:  Positive for nausea (rarely associated with eosphageal symptoms). Negative for diarrhea and vomiting.   Genitourinary:  Negative for difficulty urinating, dysuria and hematuria.        ED   Musculoskeletal:  Positive for gait problem (occasional leg heaviness). Negative for myalgias.   Skin:  Negative for rash.   Neurological:  Positive for headaches.   Psychiatric/Behavioral:  Negative for  sleep disturbance. The patient is nervous/anxious.        The past medical, family and social histories were reviewed for this encounter.    There were no vitals taken for this visit.    Objective:      Physical Exam  Vitals reviewed.   Constitutional:       General: He is not in acute distress.     Appearance: He is well-developed.   HENT:      Head: Normocephalic and atraumatic.   Eyes:      General: No scleral icterus.  Pulmonary:      Effort: Pulmonary effort is normal. No respiratory distress.   Neurological:      Mental Status: He is alert and oriented to person, place, and time.   Psychiatric:         Mood and Affect: Mood normal.         Behavior: Behavior normal.        Assessment:       1. CKD (chronic kidney disease) stage 5, GFR less than 15 ml/min    2. Primary hypertension    3. Type 1 diabetes mellitus with nephropathy    4. Secondary renal hyperparathyroidism          Lab Results   Component Value Date    CREATININE 7.3 (H) 04/18/2024    BUN 62 (H) 04/18/2024     04/18/2024    K 5.0 04/18/2024     (H) 04/18/2024    CO2 20 (L) 04/18/2024     Lab Results   Component Value Date    .9 (H) 04/08/2024    CALCIUM 8.7 04/18/2024    PHOS 6.6 (H) 04/08/2024     Lab Results   Component Value Date    HCT 28.2 (L) 04/17/2024     Prot/Creat Ratio, Urine   Date Value Ref Range Status   04/08/2024 2.76 (H) 0.00 - 0.20 Final   01/23/2024 4.72 (H) 0.00 - 0.20 Final   08/14/2023 2.99 (H) 0.00 - 0.20 Final       Plan:   Return to clinic in 4-6 wks.  Labs for next visit include rp, pth.  He gets his labs at [facility] in [place].  UACR 1 mo.  UPC is 3.0.  PTH is 405 with a calcium of 8.2.  restart calcitriol - he ran out a month ago.  Renal US shows R 11.4 cm, L 12.2 cm.  Please avoid or minimize all NSAIDS (ibuprofen, motrin, aleve, indocin, naprosyn) to minimize the risk to your kidneys.  Aspirin in a dose of 325 mg or less a day is likely the safest with regards to kidney function.  If you are able  to take aspirin and do not have any bleeding problems or ulcers, this may be your best therapy.  Alternatively, acetaminophen (Tylenol) is the safer than NSAIDs with regards to kidney function.  I would ask you take this as directed on the bottle.  It is best to stay under 2 grams a day (4-500 mg tablets a day maximum).  ESRD Treatment Choices.  We plan to do PD if he needs it.  We discussed kidney transplant versus kidney/pancreas transplant.  KTM referral has been done.  We are trying to go straight to transplant.  Xanax 0.5 mg BID prn.    KFRE 2-Year: 97.8% at 4/18/2024  7:48 AM  Calculated from:  Serum Creatinine: 7.3 mg/dL at 4/18/2024  7:48 AM  Urine Albumin Creatinine Ratio: 2,819.6 ug/mg at 2/23/2024  8:49 AM  Age: 43 years  Sex: Male at 4/18/2024  7:48 AM  Has CKD-3 to CKD-5: Yes    KFRE 5-Year: 100% at 4/18/2024  7:48 AM  Calculated from:  Serum Creatinine: 7.3 mg/dL at 4/18/2024  7:48 AM  Urine Albumin Creatinine Ratio: 2,819.6 ug/mg at 2/23/2024  8:49 AM  Age: 43 years  Sex: Male at 4/18/2024  7:48 AM  Has CKD-3 to CKD-5: Yes

## 2024-05-03 RX ORDER — AMLODIPINE BESYLATE 10 MG/1
10 TABLET ORAL
Qty: 90 TABLET | Refills: 1 | Status: SHIPPED | OUTPATIENT
Start: 2024-05-03

## 2024-05-10 LAB
CLASS I ANTIBODIES - LUMINEX: NORMAL
CLASS I ANTIBODY COMMENTS - LUMINEX: NORMAL
CLASS II ANTIBODIES - LUMINEX: NORMAL
CLASS II ANTIBODY COMMENTS - LUMINEX: NORMAL
CPRA %: 37
SERUM COLLECTION DT - LUMINEX CLASS I: NORMAL
SERUM COLLECTION DT - LUMINEX CLASS II: NORMAL
SPCL1 TESTING DATE: NORMAL
SPCL2 TESTING DATE: NORMAL
SPLUA TESTING DATE: NORMAL

## 2024-05-23 ENCOUNTER — PATIENT MESSAGE (OUTPATIENT)
Dept: ADMINISTRATIVE | Facility: OTHER | Age: 44
End: 2024-05-23
Payer: COMMERCIAL

## 2024-05-24 ENCOUNTER — CLINICAL SUPPORT (OUTPATIENT)
Dept: INFECTIOUS DISEASES | Facility: CLINIC | Age: 44
End: 2024-05-24
Payer: COMMERCIAL

## 2024-05-24 ENCOUNTER — OFFICE VISIT (OUTPATIENT)
Dept: TRANSPLANT | Facility: CLINIC | Age: 44
End: 2024-05-24
Payer: COMMERCIAL

## 2024-05-24 VITALS
BODY MASS INDEX: 29.92 KG/M2 | OXYGEN SATURATION: 100 % | HEIGHT: 72 IN | RESPIRATION RATE: 18 BRPM | SYSTOLIC BLOOD PRESSURE: 167 MMHG | WEIGHT: 220.88 LBS | HEART RATE: 61 BPM | DIASTOLIC BLOOD PRESSURE: 88 MMHG | TEMPERATURE: 98 F

## 2024-05-24 DIAGNOSIS — Z01.818 PRE-TRANSPLANT EVALUATION FOR KIDNEY TRANSPLANT: ICD-10-CM

## 2024-05-24 DIAGNOSIS — E10.21 DIABETIC NEPHROPATHY ASSOCIATED WITH TYPE 1 DIABETES MELLITUS: ICD-10-CM

## 2024-05-24 DIAGNOSIS — I10 ESSENTIAL HYPERTENSION: ICD-10-CM

## 2024-05-24 DIAGNOSIS — Z76.82 PATIENT ON WAITING LIST FOR KIDNEY TRANSPLANT: Primary | ICD-10-CM

## 2024-05-24 DIAGNOSIS — N18.5 CKD (CHRONIC KIDNEY DISEASE), STAGE V: ICD-10-CM

## 2024-05-24 PROCEDURE — 99999 PR PBB SHADOW E&M-EST. PATIENT-LVL I: CPT | Mod: PBBFAC,TXP,,

## 2024-05-24 PROCEDURE — 90471 IMMUNIZATION ADMIN: CPT | Mod: S$GLB,TXP,, | Performed by: INTERNAL MEDICINE

## 2024-05-24 PROCEDURE — 90632 HEPA VACCINE ADULT IM: CPT | Mod: S$GLB,TXP,, | Performed by: INTERNAL MEDICINE

## 2024-05-24 PROCEDURE — 90472 IMMUNIZATION ADMIN EACH ADD: CPT | Mod: S$GLB,TXP,, | Performed by: INTERNAL MEDICINE

## 2024-05-24 PROCEDURE — 90677 PCV20 VACCINE IM: CPT | Mod: S$GLB,TXP,, | Performed by: INTERNAL MEDICINE

## 2024-05-24 PROCEDURE — 99215 OFFICE O/P EST HI 40 MIN: CPT | Mod: S$GLB,TXP,, | Performed by: NURSE PRACTITIONER

## 2024-05-24 PROCEDURE — 99999 PR PBB SHADOW E&M-EST. PATIENT-LVL V: CPT | Mod: PBBFAC,TXP,, | Performed by: NURSE PRACTITIONER

## 2024-05-24 NOTE — LETTER
May 27, 2024        Jose Mckinney  [address] [address]  [address]  [facility] [address]  Phone: [phone]  Fax: [phone]             [facility] 1st Fl  [address]  [facility] [address]  Phone: [phone]   Patient: Dejuan Diaz Jr.   MR Number: [id number]   YOB: 1980   Date of Visit: 5/24/2024       Dear Dr. Jose Mckinney    Thank you for referring Dejuan Diaz to me for evaluation. Attached you will find relevant portions of my assessment and plan of care.    If you have questions, please do not hesitate to call me. I look forward to following Dejuan Diaz along with you.    Sincerely,    Annemarie Corona, JAVED    Enclosure    If you would like to receive this communication electronically, please contact [email or web address] or [phone] to request [employer] access.    [employer] is a tool which provides read-only access to select patient information with whom you have a relationship. Its easy to use and provides real time access to review your patients record including encounter summaries, notes, results, and demographic information.    If you feel you have received this communication in error or would no longer like to receive these types of communications, please e-mail [email or web address]

## 2024-05-24 NOTE — PROGRESS NOTES
Patient received 2 vaccines IM to the right deltoid, Prevnar 20 posterior, and Hep A #2 anterior.  Tolerated well and left in NAD

## 2024-05-24 NOTE — PROGRESS NOTES
Kidney/Pancreas Transplant Recipient Reevalulation    Referring Physician: Jose Mckinney  Current Nephrologist: Jose Mckinney  Waitlist Status: active  Dialysis Start Date: (Not currently on dialysis)    Subjective:     CC:  Annual reassessment of kidney transplant candidacy.    HPI:  Mr. Diaz is a 43 y.o. year old White male with advanced kidney disease secondary to diabetic nephropathy (DM1 since age 28).  He has been on the wait list for a kidney and pancreas transplant at [facility] since 2/19/2024. Patient is currently pre-dialysis, does not have a dialysis access. Patient denies any recent hospitalizations or ED visits.    Called in for transplant last month but case cancelled due to poor organ quality.     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 Looks great, not frail. Has been more fatigued with kidney disease progression and feels like he is holding onto fluid in his abdomen. He is following closely with Dr. Mckinney and has an upcoming visit.     CXR 4/17/2024: Cardiopericardial silhouette remains mildly enlarged. Mild pulmonary vascular engorgement.  CT abd/pelvis 1/8/2024: favorable for transplant  Renal US 8/25/2023: medical renal disease.   Iliacs 11/29/2023: favorable for transplant   Echo 8/30/2023: EF 60-65%, PA 34  Stress 8/30/2023: no evidence of myocardial ischemia or infarction.     Current Outpatient Medications   Medication Sig Dispense Refill    ALPRAZolam (XANAX) 0.5 MG tablet Take 1 tablet (0.5 mg total) by mouth 2 (two) times daily as needed for Anxiety. 60 tablet 0    amLODIPine (NORVASC) 10 MG tablet Take 1 tablet by mouth once daily 90 tablet 1    aspirin (ECOTRIN) 81 MG EC tablet Take 81 mg by mouth once daily.      atorvastatin (LIPITOR) 40 MG tablet Take 40 mg by mouth once daily.      blood-glucose meter,continuous (DEXCOM G6  MISC) by Misc.(Non-Drug; Combo Route) route.      blood-glucose transmitter (DEXCOM G6  TRANSMITTER MISC)   Dexcom G6 transmitter, See Instructions, uad with dexcom G6 sensor; change transmitter q 90 days, # 1 EA, 3 Refill(s), Pharmacy: [facility], 187, cm, 23 14:55:00 CDT, Height/Length Measured, 105.1, kg, 23 14:55:00 CDT, Weight Dosing      calcitRIOL (ROCALTROL) 0.25 MCG Cap Take 1 capsule (0.25 mcg total) by mouth once daily. 90 capsule 1    calcium acetate,phosphat bind, (PHOSLO) 667 mg tablet Take 2 tablets (1,334 mg total) by mouth 3 (three) times daily with meals. Take with meals and snacks 300 tablet 2    coenzyme Q10 200 mg capsule Take 200 mg by mouth once daily.      DEXCOM G6 SENSOR Adelina SMARTSI Each Topical Every 10 Days      icosapent ethyL (VASCEPA) 1 gram Cap Take 2 g by mouth 4 (four) times daily.      insulin lispro (HUMALOG U-100 INSULIN) 100 unit/mL injection   See Instructions, for use in insulin pump; max daily dose 100 units, # 90 mL, 1 Refill(s), Maintenance, Pharmacy: [facility], 187, cm, 23 11:24:00 CST, Height/Length Measured, 102.5, kg, 23 11:24:00 CST, Weight Dosing      insulin lispro 100 unit/mL injection Inject into the skin 3 (three) times daily before meals. 100 units      insulin pump cart,auto,BT/cntr (OMNIPOD 5 G6 INTRO KIT, GEN 5, SUBQ) Inject into the skin. 200 units per pod      insulin pump cart,auto,BT/cntr (OMNIPOD 5 G6 INTRO KIT, GEN 5, SUBQ)   Omnipod 5 G6 Intro Kit (Gen 5), See Instructions, use as directed, # 1 EA, 0 Refill(s), Pharmacy: [facility], 187, cm, 22 9:14:00 CST, Height/Length Measured, 102.3, kg, 22 9:14:00 CST, Weight Dosing      losartan (COZAAR) 100 MG tablet TAKE 1 TABLET(100 MG) BY MOUTH EVERY DAY 90 tablet 1    multivit-min/folic/vit K/lycop (ONE-A-DAY MEN'S MULTIVITAMIN ORAL) Take by mouth once daily.      nebivoloL (BYSTOLIC) 20 mg Tab Take 20 mg by mouth once daily. 90 tablet 3    ondansetron (ZOFRAN-ODT) 4 MG TbDL Take 1 tablet (4 mg total) by mouth every 12  (twelve) hours as needed (nausea). 30 tablet 1    sodium bicarbonate 650 MG tablet Take 1 tablet (650 mg total) by mouth Daily. 30 tablet 5    tadalafil (CIALIS) 5 MG tablet TK 1 T PO D PRF ERECTILE DYSFUNCTION  1     No current facility-administered medications for this visit.       Past Medical History:   Diagnosis Date    Anemia     Diabetes mellitus type II     Disorder of kidney and ureter     GERD (gastroesophageal reflux disease)     Hypertension     Proteinuria        Review of Systems   Constitutional:  Positive for fatigue. Negative for activity change and fever.   Eyes:  Negative for visual disturbance.   Respiratory:  Negative for cough and shortness of breath.    Cardiovascular:  Negative for chest pain and leg swelling.   Gastrointestinal:  Negative for abdominal pain, constipation, diarrhea and nausea.   Genitourinary:  Negative for difficulty urinating, frequency and hematuria.   Musculoskeletal:  Negative for arthralgias and myalgias.   Skin:  Negative for wound.   Neurological:  Negative for weakness.   Psychiatric/Behavioral:  Negative for sleep disturbance.        Objective:   body mass index is 29.96 kg/m².  BP (!) 167/88 (BP Location: Left arm, Patient Position: Sitting, BP Method: Medium (Automatic))   Pulse 61   Temp 97.7 °F (36.5 °C) (Temporal)   Resp 18   Ht 6' (1.829 m)   Wt 100.2 kg (220 lb 14.4 oz)   SpO2 100%   BMI 29.96 kg/m²     Physical Exam  Vitals and nursing note reviewed.   Constitutional:       Appearance: Normal appearance.   Cardiovascular:      Rate and Rhythm: Normal rate and regular rhythm.      Heart sounds: Normal heart sounds.   Pulmonary:      Effort: Pulmonary effort is normal.      Breath sounds: Normal breath sounds.   Abdominal:      General: There is no distension.   Musculoskeletal:         General: Normal range of motion.   Skin:     General: Skin is warm and dry.   Neurological:      General: No focal deficit present.      Mental Status: He is alert.          Labs:  PSA, Screen (ng/mL)   Date Value   11/29/2023 0.60     Lab Results   Component Value Date    WBC 6.60 04/17/2024    HGB 9.3 (L) 04/17/2024    HCT 28.2 (L) 04/17/2024     04/18/2024    K 5.0 04/18/2024     (H) 04/18/2024    CO2 20 (L) 04/18/2024    BUN 62 (H) 04/18/2024    CREATININE 7.3 (H) 04/18/2024    EGFRNORACEVR 8.8 (A) 04/18/2024    CALCIUM 8.7 04/18/2024    PHOS 6.6 (H) 04/08/2024    MG 1.8 04/17/2024    ALBUMIN 3.7 04/17/2024    AST 28 04/17/2024    ALT 35 04/17/2024    UTPCR 2.76 (H) 04/08/2024    .9 (H) 04/08/2024       Lab Results   Component Value Date    BILIRUBINUA Negative 08/25/2023    AMYLASE 46 04/17/2024    LIPASE 25 04/17/2024    PROTEINUA 3+ (A) 08/25/2023    NITRITE Negative 08/25/2023    RBCUA 10 (H) 08/25/2023    WBCUA 4 08/25/2023       Lab Results   Component Value Date    CPRA 37 04/17/2024    HV8NKNP B37 04/17/2024    CIABCLM WEAK A*11:02 04/17/2024    CIIAB DQ7 04/17/2024    ABCMT WEAK DQ9, DP1 04/17/2024       Labs were reviewed with the patient.    Pre-transplant Workup:   Reviewed with the patient.    Assessment:     1. Patient on waiting list for kidney/pancreas transplant    2. CKD (chronic kidney disease), stage V    3. Diabetic nephropathy associated with type 1 diabetes mellitus    4. Essential hypertension    5. BMI 29.0-29.9,adult      Plan:   Would like to get HLA drawn with scheduled nephrology labs at [facility]       Transplant Candidacy:   Mr. Diaz is a suitable kidney and pancreas transplant candidate.  Meets center eligibility for accepting HCV+ donor offer - Yes.  Patient educated on HCV+ donors. Dejuan is willing  to accept HCV+ donor offer -  Yes   Patient is a candidate for KDPI > 85 kidney donor offer - No.  He remains in overall stable health, and will remain active on the transplant list.    Patient advised that it is recommended that all transplant candidates, and their close contacts and household members receive  Covid vaccination.    Annemarie Corona NP       Follow-up:   In addition to the tests noted in the plan, Mr. Diaz will continue to have reevaluation as per the standing pre-kidney transplant protocol:  Monthly blood for PRA  Annual return to clinic, except HIV positive, > 65 years of age, or pancreas transplant candidates who will be scheduled to see transplant every 6 months while in pre-transplant phase  Annual re-testing: CXR, EKG, yearly mammograms for women over 40 and PSA for males over 40, cardiology follow-up as recommended by initial cardiology pre-transplant evaluation  Renal ultrasound every 2 years  Baseline colonoscopy after age 50 and repeated as recommended    UNOS Patient Status  Functional Status: 60% - Requires occasional assistance but is able to care for needs  Physical Capacity: No Limitations

## 2024-05-24 NOTE — PROGRESS NOTES
WAITLIST  PATIENT EDUCATION NOTE    Mr. Dejuan Diaz Jr. was seen in pre-kidney transplant clinic for evaluation for kidney, kidney/pancreas or pancreas only transplant.  The patient attended a group education session that discussed/reviewed the following aspects of transplantation: evaluation and selection committee process, UNOS waitlist management/multiple listings, types of organs offered (KDPI < 85%, KDPI > 85%, PHS risk, DCD, HCV+, HIV+ for HIV+ recipients and enbloc/dual), financial aspects, surgical procedures, dietary instruction pre- and post-transplant, health maintenance pre- and post-transplant, post-transplant hospitalization and outpatient follow-up, potential to participate in a research protocol, and medication management and side effects.  A question and answer session was provided after the presentation.    The patient was seen by all members of the multi-disciplinary team to include: Nephrologist/PA, Surgeon, , Transplant Coordinator, , Pharmacist and Dietician (if applicable).    The patient reviewed and signed all consents for evaluation which were witnessed and sent to scanning into the [facility] chart.    The patient was given an education book and plan for further evaluation based on his individual assessment.      Reviewed program requirement for complete COVID vaccination with documentation prior to listing. COVID education information reviewed with patient. Patient encouraged to be up to date on all vaccinations.     The patient was encouraged to call with any questions or concerns.

## 2024-05-27 DIAGNOSIS — Z76.82 AWAITING ORGAN TRANSPLANT STATUS: Primary | ICD-10-CM

## 2024-05-27 NOTE — PROGRESS NOTES
YEARLY LIST MANAGEMENT NOTE    Dejuan Diaz's kidney transplant listing status reviewed.  Patient is due for follow-up appointments on 11/2024.  Appointments will be scheduled per protocol.

## 2024-05-28 ENCOUNTER — TELEPHONE (OUTPATIENT)
Dept: TRANSPLANT | Facility: CLINIC | Age: 44
End: 2024-05-28
Payer: COMMERCIAL

## 2024-05-28 NOTE — TELEPHONE ENCOUNTER
Spoke to pt confirming scheduled recurring monthly HLA lab appts (Jun - Nov [facility]). Appt reminders were mailed on 05/28/2024.

## 2024-05-29 ENCOUNTER — TELEPHONE (OUTPATIENT)
Dept: TRANSPLANT | Facility: CLINIC | Age: 44
End: 2024-05-29
Payer: COMMERCIAL

## 2024-05-29 NOTE — TELEPHONE ENCOUNTER
ON-CALL NOTE    OS# [id number]    Notified by Levy Mtz, , that Dejuan Diaz is eligible for kidney/pancreas offer.  Spoke with patient and identified no acute medical issues with telephone assessment. Protocol script read to patient regarding N/A, standard donor offer. Patient verbalized understanding, all questions answered, patient accepts organ offer. Notified by Levy Mtz that virtual crossmatch is negative.  Current sample of blood is available from date 4/17/2024 for crossmatch.  Patient reports no sensitizing event since last blood sample for PRA received. HLA Lab to perform a retrospective  crossmatch per guideline.    Patient was asked if they have had a positive COVID-19 test or if they have any signs or symptoms. Informed patient that they will be tested for COVID-19 upon arrival to the hospital, unless have a previous positive result. If tested and result is positive, the transplant will not be able to occur, they will be inactivated on the wait list for 21 days per protocol and required to quarantine.     Patient notified of plan to remain at home on standby and to hold next aspirin dose.  Patient states understanding.  Informed patient that if his BMI is above 30, he may be sent home and not receive transplant.

## 2024-05-30 ENCOUNTER — TELEPHONE (OUTPATIENT)
Dept: TRANSPLANT | Facility: CLINIC | Age: 44
End: 2024-05-30
Payer: COMMERCIAL

## 2024-05-30 NOTE — TELEPHONE ENCOUNTER
UNOS# [id number]    On call update:   Notified patient that the pancreas for this case was deemed not transplantable by our surgeons. Patient is released from this case and remains actively listed. Patient states understanding.

## 2024-06-10 DIAGNOSIS — N18.5 CKD (CHRONIC KIDNEY DISEASE) STAGE 5, GFR LESS THAN 15 ML/MIN: ICD-10-CM

## 2024-06-10 NOTE — PROGRESS NOTES
Transplant Recipient Adult Psychosocial Assessment    Dejuan Noble [facility] [place] [address]  Telephone Information:   Mobile [phone]   Home  [phone] (home)  Work  There is no work phone number on file.  E-mail  [email or web address]    Sex: male  YOB: 1980  Age: 43 y.o.    Encounter Date: 5/24/2024  [place] Citizen: yes  Primary Language: English   Needed: no    Emergency Contact:  Name: Naz Graham  Relationship: significant other  Address: [address] [place] [id number]  Phone Numbers:  [phone] (mobile)    Family/Social Support:   Number of dependents/: N/A  Marital history:  once and currently unmarried.  Other family dynamics: Patient has 1 sister and resides with parents.    Household Composition:  Name: Dejuan RITAEn Diaz, Sr.  Age: 79  Relationship: father  Does person drive? yes    Name: Manuela Francokaylee  Age: 79  Relationship: mother  Does person drive? no      Do you and your caregivers have access to reliable transportation? yes  PRIMARY CAREGIVER: Naz Graham will be primary caregiver, phone number [phone]     Able to take time off work without financial concerns: yes.     Additional Significant Others who will Assist with Transplant:  Name: Ginger Morelris  Age: 54  City: [place] State: [place]  Relationship: sister  Does person drive? yes      Living Will: no .  Healthcare Power of : no  Advance Directives on file: <<no information> per medical record.  Verbally reviewed LW/HCPA information.   provided patient with copy of LW/HCPA documents and provided education on completion of forms    Living Donors: No. Pt listed for     Highest Education Level: Post-College Graduate Degree  Reading Ability: college  Reports difficulty with: hearing and reports having tinnitus from baseball injury.  Learns Best By:  multisensory engagement     Status: no  VA Benefits: no     Working for Income:  yes  If yes, working activity level: Working Full Time  Spouse/Significant Other Employment: Reports working full-time in  and having PTO available.     Disabled: no    Monthly Income:  Salary/Wages: $5,000    Insurance:   Payer/Plan Subscr  Sex Relation Sub. Ins. ID Effective Group Num   1. GENERIC COMME* FRED TORRES* 1980 Male Self [id number] 10/5/23                                    [address], [address], [facility] [address]   2. UNITED MEDICA* FRED TORRES* 1980 Male Self [id number]                                   [address] [address]     Primary Insurance (for UNOS reporting): Private Insurance  Secondary Insurance (for UNOS reporting): None    Dialysis Adherence: Patient reports being pre-dialysis. Pt reports GFR 8    Infusion Service: patient utilizing? no  Home Health: patient utilizing? no  DME:  [facility]  Pulmonary/Cardiac Rehab: patient denies.   ADLS:  Pt reports independent with all ADLS, drives, and handles own medication management.      Adherence: Adherence education and counseling provided.     Per History Section:  Past Medical History:   Diagnosis Date    Anemia     Diabetes mellitus type II     Disorder of kidney and ureter     GERD (gastroesophageal reflux disease)     Hypertension     Proteinuria      Social History     Tobacco Use    Smoking status: Never    Smokeless tobacco: Never   Substance Use Topics    Alcohol use: No     Social History     Substance and Sexual Activity   Drug Use No     Social History     Substance and Sexual Activity   Sexual Activity Yes       Per Today's Psychosocial:  Tobacco: none, patient denies any use.  Alcohol: Patient reports having one or two drinks per year.  Patient does not plan to quit.  Illicit Drugs/Non-prescribed Medications: none, patient denies any use.      Arrests/DWI/Treatment/Rehab: patient denies    Psychiatric History:    Mental Health:  patient denies.  Psychiatrist/Counselor: patient  denies.  Medications:  patient denies.  Suicide/Homicide Issues: patient denies hx of SI/HI and denies experiencing SI/HI at time of assessment.     Knowledge: Patient states having clear understanding and realistic expectations regarding the potential risks and potential benefits of organ transplantation and organ donation and agrees to discuss with health care team members and support system members, as well as to utilize available resources and express questions and/or concerns in order to further facilitate the pt informed decision-making.  Resources and information provided and reviewed.     Patient is aware of [facility]'s affiliation and/or partnership with agencies in home health care, LTAC, SNF, [facility], and other hospitals and clinics.    Understanding: Patient reports having a clear understanding of the many lifetime commitments involved with being a transplant recipient, including costs, compliance, medications, lab work, procedures, appointments, concrete and financial planning, preparedness, timely and appropriate communication of concerns, abstinence (ETOH, tobacco, illicit non-prescribed drugs), adherence to all health care team recommendations, support system and caregiver involvement, appropriate and timely resource utilization and follow-through, mental health counseling as needed/recommended, and patient and caregiver responsibilities.  Social Service Handbook, resources and detailed educational information provided and reviewed.  Educational information provided.    Patient also reports current and expected compliance with health care regime, and patient states having a clear understanding of the importance of compliance.  Patient reports a clear understanding that risks and benefits may be involved with organ transplantation and with organ donation.  Patient also reports clear understanding that psychosocial risk factors may affect patient, and include but are not limited to feelings of depression,  "generalized anxiety, anxiety regarding dependence on others, post traumatic stress disorder, feelings of guilt and other emotional and/or mental concerns, and/or exacerbation of existing mental health concerns.  Detailed resources provided and discussed.  Patient agrees to access appropriate resources in a timely manner as needed and/or as recommended, and to communicate concerns appropriately.  Patient also reports a clear understanding of treatment options available.      Patient and caregiver received education in a group setting.   reviewed education, provided additional information, and answered questions.    Feelings or Concerns:     Coping: Identify Patient & Caregiver Strategies to [place]:   1. 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 At the time of surgery - patient reports not foreseeing need to cope at time of surgery.   4. During post-Transplant & Recovery Period - patient reports not foreseeing need to cope during post-transplant recovery.    Goals: Patient reports looking forward to increased levels of energy.  Patient referred to Vocational Rehabilitation.    Interview Behavior: Patient and caregiver present as alert and oriented x 4, pleasant, good eye contact, well groomed, recall good, concentration/judgement good, average intelligence, calm, communicative, cooperative, and asking and answering questions appropriately.           Transplant Social Work - Candidacy  Assessment/Plan:     Psychosocial Suitability: Patient presents as a suitable candidate for kidney transplant at this time. Based on psychosocial risk factors, patient presents as low risk, due to financial stability, adequate insurance, adequate caregiver plans, abstinence from tobacco use, ETOH misuse, and use of illicit substances . " Patient and caregiver present without acute mental health concerns.    Recommendations/Additional Comments:  recommends patient fundraise, continue abstaining from consuming harmful substances, and remain open to mental health counseling - if needed.    RAPHAEL Thornton, LMSW

## 2024-06-11 ENCOUNTER — LAB VISIT (OUTPATIENT)
Dept: LAB | Facility: HOSPITAL | Age: 44
End: 2024-06-11
Attending: INTERNAL MEDICINE
Payer: COMMERCIAL

## 2024-06-11 DIAGNOSIS — N18.5 CKD (CHRONIC KIDNEY DISEASE) STAGE 5, GFR LESS THAN 15 ML/MIN: ICD-10-CM

## 2024-06-11 LAB
ALBUMIN SERPL BCP-MCNC: 3.4 G/DL (ref 3.5–5.2)
ANION GAP SERPL CALC-SCNC: 9 MMOL/L (ref 8–16)
BASOPHILS # BLD AUTO: 0.06 K/UL (ref 0–0.2)
BASOPHILS NFR BLD: 1.1 % (ref 0–1.9)
BUN SERPL-MCNC: 64 MG/DL (ref 6–20)
CALCIUM SERPL-MCNC: 8 MG/DL (ref 8.7–10.5)
CHLORIDE SERPL-SCNC: 110 MMOL/L (ref 95–110)
CO2 SERPL-SCNC: 16 MMOL/L (ref 23–29)
CREAT SERPL-MCNC: 7.8 MG/DL (ref 0.5–1.4)
DIFFERENTIAL METHOD BLD: ABNORMAL
EOSINOPHIL # BLD AUTO: 0.1 K/UL (ref 0–0.5)
EOSINOPHIL NFR BLD: 1.9 % (ref 0–8)
ERYTHROCYTE [DISTWIDTH] IN BLOOD BY AUTOMATED COUNT: 12.3 % (ref 11.5–14.5)
EST. GFR  (NO RACE VARIABLE): 8.1 ML/MIN/1.73 M^2
GLUCOSE SERPL-MCNC: 132 MG/DL (ref 70–110)
HCT VFR BLD AUTO: 28.2 % (ref 40–54)
HGB BLD-MCNC: 9.5 G/DL (ref 14–18)
IMM GRANULOCYTES # BLD AUTO: 0.01 K/UL (ref 0–0.04)
IMM GRANULOCYTES NFR BLD AUTO: 0.2 % (ref 0–0.5)
LYMPHOCYTES # BLD AUTO: 1.2 K/UL (ref 1–4.8)
LYMPHOCYTES NFR BLD: 22.9 % (ref 18–48)
MCH RBC QN AUTO: 29.1 PG (ref 27–31)
MCHC RBC AUTO-ENTMCNC: 33.7 G/DL (ref 32–36)
MCV RBC AUTO: 86 FL (ref 82–98)
MONOCYTES # BLD AUTO: 0.4 K/UL (ref 0.3–1)
MONOCYTES NFR BLD: 8 % (ref 4–15)
NEUTROPHILS # BLD AUTO: 3.4 K/UL (ref 1.8–7.7)
NEUTROPHILS NFR BLD: 65.9 % (ref 38–73)
NRBC BLD-RTO: 0 /100 WBC
PHOSPHATE SERPL-MCNC: 6.5 MG/DL (ref 2.7–4.5)
PLATELET # BLD AUTO: 218 K/UL (ref 150–450)
PMV BLD AUTO: 10.7 FL (ref 9.2–12.9)
POTASSIUM SERPL-SCNC: 6 MMOL/L (ref 3.5–5.1)
RBC # BLD AUTO: 3.27 M/UL (ref 4.6–6.2)
SODIUM SERPL-SCNC: 135 MMOL/L (ref 136–145)
WBC # BLD AUTO: 5.23 K/UL (ref 3.9–12.7)

## 2024-06-11 PROCEDURE — 80069 RENAL FUNCTION PANEL: CPT | Mod: TXP | Performed by: INTERNAL MEDICINE

## 2024-06-11 PROCEDURE — 85025 COMPLETE CBC W/AUTO DIFF WBC: CPT | Mod: TXP | Performed by: INTERNAL MEDICINE

## 2024-06-11 PROCEDURE — 36415 COLL VENOUS BLD VENIPUNCTURE: CPT | Mod: TXP | Performed by: INTERNAL MEDICINE

## 2024-06-11 RX ORDER — ONDANSETRON 4 MG/1
4 TABLET, ORALLY DISINTEGRATING ORAL EVERY 12 HOURS PRN
Qty: 30 TABLET | Refills: 1 | Status: SHIPPED | OUTPATIENT
Start: 2024-06-11 | End: 2024-06-11

## 2024-06-11 RX ORDER — ONDANSETRON 4 MG/1
4 TABLET, ORALLY DISINTEGRATING ORAL EVERY 12 HOURS PRN
Qty: 30 TABLET | Refills: 1 | Status: SHIPPED | OUTPATIENT
Start: 2024-06-11

## 2024-06-13 ENCOUNTER — OFFICE VISIT (OUTPATIENT)
Dept: NEPHROLOGY | Facility: CLINIC | Age: 44
End: 2024-06-13
Payer: COMMERCIAL

## 2024-06-13 ENCOUNTER — PATIENT MESSAGE (OUTPATIENT)
Dept: TRANSPLANT | Facility: CLINIC | Age: 44
End: 2024-06-13
Payer: COMMERCIAL

## 2024-06-13 DIAGNOSIS — E10.21 TYPE 1 DIABETES MELLITUS WITH NEPHROPATHY: ICD-10-CM

## 2024-06-13 DIAGNOSIS — I10 PRIMARY HYPERTENSION: ICD-10-CM

## 2024-06-13 DIAGNOSIS — N25.81 SECONDARY RENAL HYPERPARATHYROIDISM: ICD-10-CM

## 2024-06-13 DIAGNOSIS — E83.39 HYPERPHOSPHATEMIA: ICD-10-CM

## 2024-06-13 DIAGNOSIS — N18.5 CKD (CHRONIC KIDNEY DISEASE) STAGE 5, GFR LESS THAN 15 ML/MIN: Primary | ICD-10-CM

## 2024-06-13 DIAGNOSIS — E87.5 HYPERKALEMIA: ICD-10-CM

## 2024-06-13 PROCEDURE — 3066F NEPHROPATHY DOC TX: CPT | Mod: CPTII,95,, | Performed by: INTERNAL MEDICINE

## 2024-06-13 PROCEDURE — 1160F RVW MEDS BY RX/DR IN RCRD: CPT | Mod: CPTII,95,, | Performed by: INTERNAL MEDICINE

## 2024-06-13 PROCEDURE — 3044F HG A1C LEVEL LT 7.0%: CPT | Mod: CPTII,95,, | Performed by: INTERNAL MEDICINE

## 2024-06-13 PROCEDURE — 99214 OFFICE O/P EST MOD 30 MIN: CPT | Mod: 95,,, | Performed by: INTERNAL MEDICINE

## 2024-06-13 PROCEDURE — 1159F MED LIST DOCD IN RCRD: CPT | Mod: CPTII,95,, | Performed by: INTERNAL MEDICINE

## 2024-06-13 PROCEDURE — 3062F POS MACROALBUMINURIA REV: CPT | Mod: CPTII,95,, | Performed by: INTERNAL MEDICINE

## 2024-06-13 NOTE — PROGRESS NOTES
Subjective:       Patient ID: Dejuan Diaz Jr. is a 43 y.o. White male who presents for return patient evaluation for chronic renal failure.    The patient location is:  Patient Home   The chief complaint leading to consultation is: CKD  Visit type: Virtual visit with synchronous audio and video  Total time spent with patient: 11 minutes  Each patient to whom he or she provides medical services by telemedicine is:  (1) informed of the relationship between the physician and patient and the respective role of any other health care provider with respect to management of the patient; and (2) notified that he or she may decline to receive medical services by telemedicine and may withdraw from such care at any time.        He had COVID in April 2021.  He reports that he has been dealing with a divorce and both of his parents having cancer.  His diabetes is much better now that he is on an omnipod.  He has no new uremic symptoms.  He is now travelling more now due to a change in his job as well.   His blood pressure at home has been 140s-150/90s.  His donor should be complete at the end of May.  He is awaiting a transplant and has had two calls lately.  He is having more nausea lately.  He is off of the aspirin.      Review of Systems   Constitutional:  Positive for fatigue. Negative for appetite change, chills and fever.   Eyes:  Negative for visual disturbance.   Respiratory:  Positive for shortness of breath (mild with exertion). Negative for cough.    Cardiovascular:  Positive for chest pain (at times). Negative for leg swelling.   Gastrointestinal:  Positive for nausea. Negative for diarrhea and vomiting.   Genitourinary:  Negative for difficulty urinating, dysuria and hematuria.        ED   Musculoskeletal:  Positive for gait problem (occasional leg heaviness). Negative for myalgias.   Skin:  Negative for rash.   Neurological:  Positive for headaches.   Psychiatric/Behavioral:  Negative for sleep disturbance. The  patient is nervous/anxious.        The past medical, family and social histories were reviewed for this encounter.    There were no vitals taken for this visit.    Objective:      Physical Exam  Vitals reviewed.   Constitutional:       General: He is not in acute distress.     Appearance: He is well-developed.   HENT:      Head: Normocephalic and atraumatic.   Eyes:      General: No scleral icterus.  Pulmonary:      Effort: Pulmonary effort is normal. No respiratory distress.   Neurological:      Mental Status: He is alert and oriented to person, place, and time.   Psychiatric:         Mood and Affect: Mood normal.         Behavior: Behavior normal.        Assessment:       1. CKD (chronic kidney disease) stage 5, GFR less than 15 ml/min    2. Primary hypertension    3. Type 1 diabetes mellitus with nephropathy    4. Secondary renal hyperparathyroidism          Lab Results   Component Value Date    CREATININE 7.8 (H) 06/11/2024    BUN 64 (H) 06/11/2024     (L) 06/11/2024    K 6.0 (H) 06/11/2024     06/11/2024    CO2 16 (L) 06/11/2024     Lab Results   Component Value Date    .9 (H) 04/08/2024    CALCIUM 8.0 (L) 06/11/2024    PHOS 6.5 (H) 06/11/2024     Lab Results   Component Value Date    HCT 28.2 (L) 06/11/2024     Prot/Creat Ratio, Urine   Date Value Ref Range Status   04/08/2024 2.76 (H) 0.00 - 0.20 Final   01/23/2024 4.72 (H) 0.00 - 0.20 Final   08/14/2023 2.99 (H) 0.00 - 0.20 Final       Plan:   Return to clinic in 4-6 wks.  Labs for next visit include rp, pth.  He gets his labs at [facility] in [place].  UACR 1 mo.  UPC is 3.0.  PTH is 405 with a calcium of 8.2.  restart calcitriol - he ran out a month ago.  Renal US shows R 11.4 cm, L 12.2 cm.  Please avoid or minimize all NSAIDS (ibuprofen, motrin, aleve, indocin, naprosyn) to minimize the risk to your kidneys.  Aspirin in a dose of 325 mg or less a day is likely the safest with regards to kidney function.  If you are able to take  aspirin and do not have any bleeding problems or ulcers, this may be your best therapy.  Alternatively, acetaminophen (Tylenol) is the safer than NSAIDs with regards to kidney function.  I would ask you take this as directed on the bottle.  It is best to stay under 2 grams a day (4-500 mg tablets a day maximum).  ESRD Treatment Choices.  We plan to do PD if he needs it.  We discussed kidney transplant versus kidney/pancreas transplant.  KTM referral has been done.  We are trying to go straight to transplant.  Xanax 0.5 mg BID prn.    KFRE 2-Year: 98.4% at 6/11/2024 10:42 AM  Calculated from:  Serum Creatinine: 7.8 mg/dL at 6/11/2024 10:42 AM  Urine Albumin Creatinine Ratio: 2,819.6 ug/mg at 2/23/2024  8:49 AM  Age: 43 years  Sex: Male at 6/11/2024 10:42 AM  Has CKD-3 to CKD-5: Yes    KFRE 5-Year: 100% at 6/11/2024 10:42 AM  Calculated from:  Serum Creatinine: 7.8 mg/dL at 6/11/2024 10:42 AM  Urine Albumin Creatinine Ratio: 2,819.6 ug/mg at 2/23/2024  8:49 AM  Age: 43 years  Sex: Male at 6/11/2024 10:42 AM  Has CKD-3 to CKD-5: Yes

## 2024-06-14 ENCOUNTER — DOCUMENTATION ONLY (OUTPATIENT)
Dept: NEPHROLOGY | Facility: CLINIC | Age: 44
End: 2024-06-14
Payer: COMMERCIAL

## 2024-06-14 ENCOUNTER — LAB VISIT (OUTPATIENT)
Dept: LAB | Facility: HOSPITAL | Age: 44
End: 2024-06-14
Attending: INTERNAL MEDICINE
Payer: COMMERCIAL

## 2024-06-14 ENCOUNTER — HOSPITAL ENCOUNTER (EMERGENCY)
Facility: HOSPITAL | Age: 44
Discharge: HOME | End: 2024-06-14
Attending: EMERGENCY MEDICINE
Payer: COMMERCIAL

## 2024-06-14 ENCOUNTER — CLINICAL SUPPORT (OUTPATIENT)
Dept: INFECTIOUS DISEASES | Facility: CLINIC | Age: 44
End: 2024-06-14
Payer: COMMERCIAL

## 2024-06-14 ENCOUNTER — TELEPHONE (OUTPATIENT)
Dept: NEPHROLOGY | Facility: CLINIC | Age: 44
End: 2024-06-14
Payer: COMMERCIAL

## 2024-06-14 VITALS
DIASTOLIC BLOOD PRESSURE: 89 MMHG | SYSTOLIC BLOOD PRESSURE: 171 MMHG | WEIGHT: 220 LBS | RESPIRATION RATE: 20 BRPM | BODY MASS INDEX: 29.16 KG/M2 | HEART RATE: 70 BPM | OXYGEN SATURATION: 100 % | TEMPERATURE: 98 F | HEIGHT: 73 IN

## 2024-06-14 DIAGNOSIS — N18.5 CKD (CHRONIC KIDNEY DISEASE) STAGE 5, GFR LESS THAN 15 ML/MIN: ICD-10-CM

## 2024-06-14 DIAGNOSIS — E87.5 HYPERKALEMIA: Primary | ICD-10-CM

## 2024-06-14 DIAGNOSIS — Z01.818 PRE-TRANSPLANT EVALUATION FOR KIDNEY TRANSPLANT: ICD-10-CM

## 2024-06-14 LAB
ALBUMIN SERPL BCP-MCNC: 3.8 G/DL (ref 3.5–5.2)
ANION GAP SERPL CALC-SCNC: 11 MMOL/L (ref 8–16)
ANION GAP SERPL CALC-SCNC: 9 MMOL/L (ref 8–16)
BUN SERPL-MCNC: 69 MG/DL (ref 6–20)
BUN SERPL-MCNC: 71 MG/DL (ref 6–20)
CALCIUM SERPL-MCNC: 8.4 MG/DL (ref 8.7–10.5)
CALCIUM SERPL-MCNC: 8.8 MG/DL (ref 8.7–10.5)
CHLORIDE SERPL-SCNC: 111 MMOL/L (ref 95–110)
CHLORIDE SERPL-SCNC: 113 MMOL/L (ref 95–110)
CO2 SERPL-SCNC: 17 MMOL/L (ref 23–29)
CO2 SERPL-SCNC: 18 MMOL/L (ref 23–29)
CREAT SERPL-MCNC: 8.7 MG/DL (ref 0.5–1.4)
CREAT SERPL-MCNC: 8.8 MG/DL (ref 0.5–1.4)
EST. GFR  (NO RACE VARIABLE): 7 ML/MIN/1.73 M^2
EST. GFR  (NO RACE VARIABLE): 7.1 ML/MIN/1.73 M^2
GLUCOSE SERPL-MCNC: 198 MG/DL (ref 70–110)
GLUCOSE SERPL-MCNC: 93 MG/DL (ref 70–110)
PHOSPHATE SERPL-MCNC: 6.2 MG/DL (ref 2.7–4.5)
POCT GLUCOSE: 136 MG/DL (ref 70–110)
POCT GLUCOSE: 203 MG/DL (ref 70–110)
POCT GLUCOSE: 62 MG/DL (ref 70–110)
POCT GLUCOSE: 84 MG/DL (ref 70–110)
POTASSIUM SERPL-SCNC: 5.5 MMOL/L (ref 3.5–5.1)
POTASSIUM SERPL-SCNC: 6.5 MMOL/L (ref 3.5–5.1)
SODIUM SERPL-SCNC: 139 MMOL/L (ref 136–145)
SODIUM SERPL-SCNC: 140 MMOL/L (ref 136–145)

## 2024-06-14 PROCEDURE — 82962 GLUCOSE BLOOD TEST: CPT | Mod: NTX

## 2024-06-14 PROCEDURE — 90471 IMMUNIZATION ADMIN: CPT | Mod: S$GLB,TXP,, | Performed by: INTERNAL MEDICINE

## 2024-06-14 PROCEDURE — 63600175 PHARM REV CODE 636 W HCPCS: Mod: NTX | Performed by: EMERGENCY MEDICINE

## 2024-06-14 PROCEDURE — 96375 TX/PRO/DX INJ NEW DRUG ADDON: CPT | Mod: NTX

## 2024-06-14 PROCEDURE — 99291 CRITICAL CARE FIRST HOUR: CPT | Mod: 25,NTX

## 2024-06-14 PROCEDURE — 93010 ELECTROCARDIOGRAM REPORT: CPT | Mod: NTX,,, | Performed by: INTERNAL MEDICINE

## 2024-06-14 PROCEDURE — 93005 ELECTROCARDIOGRAM TRACING: CPT | Mod: NTX

## 2024-06-14 PROCEDURE — 90750 HZV VACC RECOMBINANT IM: CPT | Mod: S$GLB,TXP,, | Performed by: INTERNAL MEDICINE

## 2024-06-14 PROCEDURE — 80048 BASIC METABOLIC PNL TOTAL CA: CPT | Mod: NTX | Performed by: EMERGENCY MEDICINE

## 2024-06-14 PROCEDURE — 96365 THER/PROPH/DIAG IV INF INIT: CPT | Mod: NTX

## 2024-06-14 PROCEDURE — 99999 PR PBB SHADOW E&M-EST. PATIENT-LVL I: CPT | Mod: PBBFAC,TXP,,

## 2024-06-14 PROCEDURE — 36415 COLL VENOUS BLD VENIPUNCTURE: CPT | Mod: NTX | Performed by: INTERNAL MEDICINE

## 2024-06-14 PROCEDURE — 80069 RENAL FUNCTION PANEL: CPT | Mod: TXP | Performed by: INTERNAL MEDICINE

## 2024-06-14 PROCEDURE — 25000003 PHARM REV CODE 250: Mod: NTX | Performed by: EMERGENCY MEDICINE

## 2024-06-14 RX ORDER — INDOMETHACIN 25 MG/1
25 CAPSULE ORAL
Status: COMPLETED | OUTPATIENT
Start: 2024-06-14 | End: 2024-06-14

## 2024-06-14 RX ADMIN — SODIUM BICARBONATE 25 MEQ: 84 INJECTION, SOLUTION INTRAVENOUS at 05:06

## 2024-06-14 RX ADMIN — DEXTROSE MONOHYDRATE 50 G: 25 INJECTION, SOLUTION INTRAVENOUS at 05:06

## 2024-06-14 RX ADMIN — CALCIUM GLUCONATE 1 G: 98 INJECTION, SOLUTION INTRAVENOUS at 05:06

## 2024-06-14 RX ADMIN — SODIUM ZIRCONIUM CYCLOSILICATE 10 G: 5 POWDER, FOR SUSPENSION ORAL at 05:06

## 2024-06-14 RX ADMIN — HUMAN INSULIN 9.98 UNITS: 100 INJECTION, SOLUTION SUBCUTANEOUS at 05:06

## 2024-06-14 NOTE — TELEPHONE ENCOUNTER
----- Message from Kayleen Arevalo sent at 6/14/2024 12:50 PM CDT -----  Regarding: critical value  Contact: Ginger  Type:  Needs Medical Advice    Who Called: Ginger with [facility]    Would the patient rather a call back or a response via [facility]? All back  Best Call Back Number: [phone]  Additional Information: sts she has a critical value that she needs to give--please advise

## 2024-06-14 NOTE — ED PROVIDER NOTES
History     Chief Complaint   Patient presents with    Abnormal labs     Pt. States he had bloodwork today and his nephrologist called to inform him that his potassium is 6.5.     HPI:  Dejaun Diaz Jr. is a 43 y.o. male with PMH as below who presents to the [facility] emergency department for evaluation of hyperkalemia 6.5 noted by nephrologist. Patient is a CKD5 patient on the kidney transplant list with [facility]. He is aware of low K+ diet.         External medical record reviewed: Note from Dr. Jose Mckinney, nephrologist, from 3:05 pm today:            Prelim potassium is 6.5 from today.        I did contact him to go to ER now to get medication to lower it acutely.     He understood and agreed to go.     He will need veltassa or lokelma three times a week to help maintain a normal potassium for safety.              PCP: No, Primary Doctor    Review of patient's allergies indicates:   Allergen Reactions    Codeine     Vancomycin analogues       Past Medical History:   Diagnosis Date    Anemia     Diabetes mellitus type II     Disorder of kidney and ureter     GERD (gastroesophageal reflux disease)     Hypertension     Proteinuria      Past Surgical History:   Procedure Laterality Date    INNER EAR SURGERY      for tinnitus    KIDNEY TRANSPLANT N/A 4/18/2024    Procedure: TRANSPLANT, KIDNEY;  Surgeon: Raul Jean MD;  Location: [address];  Service: Transplant;  Laterality: N/A;    TRANSPLANTATION OF PANCREAS N/A 4/18/2024    Procedure: TRANSPLANT, PANCREAS;  Surgeon: Raul Jean MD;  Location: [facility] OR [address] [place] [address];  Service: Transplant;  Laterality: N/A;       Family History   Problem Relation Name Age of Onset    Kidney disease Mother      Heart disease Mother      Diabetes Mother      Cancer Father      Heart disease Sister       Social History     Tobacco Use    Smoking status: Never    Smokeless tobacco: Never   Substance and Sexual Activity    Alcohol use: No    Drug use: No    Sexual  activity: Yes      Review of Systems     Review of Systems   Constitutional:  Positive for fatigue.   HENT: Negative.     Eyes: Negative.    Respiratory: Negative.     Cardiovascular: Negative.    Gastrointestinal:  Positive for nausea.   Endocrine: Negative.    Genitourinary: Negative.    Musculoskeletal: Negative.    Skin: Negative.    Allergic/Immunologic: Negative.    Neurological: Negative.    Hematological: Negative.    Psychiatric/Behavioral: Negative.     All other systems reviewed and are negative.       Physical Exam     Initial Vitals [06/14/24 1646]   BP Pulse Resp Temp SpO2   (!) 186/93 64 16 98.2 °F (36.8 °C) 100 %      MAP       --          Nursing notes and vital signs reviewed.  Constitutional: Patient is in no acute distress.   Head: Normocephalic. Atraumatic.   Eyes:  Conjunctivae are not pale. No scleral icterus.   ENT: Mucous membranes moist.   Neck: Supple.   Cardiovascular: Regular rate. Regular rhythm.   Pulmonary: No respiratory distress.   Abdominal: Non-distended.   Musculoskeletal: Moves all extremities. No obvious deformities.   Skin: Warm and dry.   Neurological:  Alert, awake, and appropriate. Normal speech. No acute lateralizing neurologic deficits appreciated.   Psychiatric: Normal affect.       ED Course   Critical Care    Date/Time: 6/14/2024 5:54 PM    Performed by: Uriel Stone MD  Authorized by: Uriel Stone MD  Direct patient critical care time: 15 minutes  Additional history critical care time: 5 minutes  Ordering / reviewing critical care time: 7 minutes  Documentation critical care time: 7 minutes  Total critical care time (exclusive of procedural time) : 34 minutes  Critical care time was exclusive of separately billable procedures and treating other patients and teaching time.  Critical care was necessary to treat or prevent imminent or life-threatening deterioration of the following conditions: renal failure (hyperkalemia).  Critical care was time spent  "personally by me on the following activities: blood draw for specimens, development of treatment plan with patient or surrogate, interpretation of cardiac output measurements, evaluation of patient's response to treatment, examination of patient, obtaining history from patient or surrogate, ordering and performing treatments and interventions, ordering and review of laboratory studies, ordering and review of radiographic studies, pulse oximetry, re-evaluation of patient's condition and review of old charts.        Vitals:    06/14/24 1646 06/14/24 1730   BP: (!) 186/93 (!) 188/87   Pulse: 64 63   Resp: 16 16   Temp: 98.2 °F (36.8 °C)    TempSrc: Oral    SpO2: 100% 100%   Weight: 99.8 kg (220 lb)    Height: 6' 1" (1.854 m)      Lab Results Interpreted as Abnormal:  Labs Reviewed   POCT GLUCOSE - Abnormal; Notable for the following components:       Result Value    POCT Glucose 136 (*)     All other components within normal limits   POCT GLUCOSE - Abnormal; Notable for the following components:    POCT Glucose 203 (*)     All other components within normal limits   BASIC METABOLIC PANEL   POCT GLUCOSE MONITORING CONTINUOUS      All Lab Results:  Results for orders placed or performed during the hospital encounter of 06/14/24   POCT glucose   Result Value Ref Range    POCT Glucose 136 (H) 70 - 110 mg/dL   POCT glucose   Result Value Ref Range    POCT Glucose 203 (H) 70 - 110 mg/dL     Imaging Results    None        ED Physician's independent review of the above imaging: agree with radiologist,     The EKG was ordered, reviewed, and independently interpreted by the ED Physician:  Rhythm: normal sinus  Rate: 60 bpm  No ST-T changes concerning for acute ischemia  Mildly peaked T waves    The emergency physician reviewed the vital signs and test results, which are outlined above.     ED Discussion      Patient care was handed off to Dr. Cabrera at 6:00 pm pending repeat BMP post-hyperkalemia treatment.      The patient is " stable.  Repeat potassium demonstrates 5.5.  Better than the initial.  I will discharge home the patient at this time.  I will prescribe him Veltessa 3 times a week and he will follow up with his nephrologist.  The patient understands and agrees with this plan.  He understands to maintain a low potassium diet.  He understands for that return emergency with any concerns.      ED Medication(s) Administered:  Medications   dextrose 50% injection 50 g (50 g Intravenous Given 6/14/24 1714)     And   dextrose 50% injection 25 g (has no administration in time range)     And   insulin regular injection 9.98 Units 0.0998 mL (9.98 Units Intravenous Given 6/14/24 1715)   calcium gluconate 1 g in dextrose 5 % in water (D5W) 50 mL IVPB (MB+) (1 g Intravenous New Bag 6/14/24 1721)   sodium zirconium cyclosilicate packet 10 g (10 g Oral Given 6/14/24 1715)   sodium bicarbonate solution 25 mEq (25 mEq Intravenous Given 6/14/24 1715)       Prescription Management: I performed a review of the patient's current Rx medication list as input by nursing staff.    Patient's Medications   New Prescriptions    SODIUM ZIRCONIUM CYCLOSILICATE (LOKELMA) 10 GRAM PACKET    Take 1 packet (10 g total) by mouth 3 (three) times a week. Mix entire contents of packet(s) into drinking glass containing 3 tablespoons of water; stir well and drink immediately. Add water and repeat until no powder remains to receive entire dose.   Previous Medications    ALPRAZOLAM (XANAX) 0.5 MG TABLET    Take 1 tablet (0.5 mg total) by mouth 2 (two) times daily as needed for Anxiety.    AMLODIPINE (NORVASC) 10 MG TABLET    Take 1 tablet by mouth once daily    ASPIRIN (ECOTRIN) 81 MG EC TABLET    Take 81 mg by mouth once daily.    ATORVASTATIN (LIPITOR) 40 MG TABLET    Take 40 mg by mouth once daily.    BLOOD-GLUCOSE METER,CONTINUOUS (DEXCOM G6  MISC)    by Misc.(Non-Drug; Combo Route) route.    BLOOD-GLUCOSE TRANSMITTER (DEXCOM G6 TRANSMITTER MISC)      Dexcom G6  transmitter, See Instructions, uad with dexcom G6 sensor; change transmitter q 90 days, # 1 EA, 3 Refill(s), Pharmacy: [facility], 187, cm, 23 14:55:00 CDT, Height/Length Measured, 105.1, kg, 23 14:55:00 CDT, Weight Dosing    CALCITRIOL (ROCALTROL) 0.25 MCG CAP    Take 1 capsule (0.25 mcg total) by mouth once daily.    CALCIUM ACETATE,PHOSPHAT BIND, (PHOSLO) 667 MG TABLET    Take 2 tablets (1,334 mg total) by mouth 3 (three) times daily with meals. Take with meals and snacks    COENZYME Q10 200 MG CAPSULE    Take 200 mg by mouth once daily.    DEXCOM G6 SENSOR DEMETRICE    SMARTSI Each Topical Every 10 Days    ICOSAPENT ETHYL (VASCEPA) 1 GRAM CAP    Take 2 g by mouth 4 (four) times daily.    INSULIN LISPRO (HUMALOG U-100 INSULIN) 100 UNIT/ML INJECTION      See Instructions, for use in insulin pump; max daily dose 100 units, # 90 mL, 1 Refill(s), Maintenance, Pharmacy: [facility], 187, cm, 23 11:24:00 CST, Height/Length Measured, 102.5, kg, 23 11:24:00 CST, Weight Dosing    INSULIN LISPRO 100 UNIT/ML INJECTION    Inject into the skin 3 (three) times daily before meals. 100 units    INSULIN PUMP CART,AUTO,BT/CNTR (OMNIPOD 5 G6 INTRO KIT, GEN 5, SUBQ)    Inject into the skin. 200 units per pod    INSULIN PUMP CART,AUTO,BT/CNTR (OMNIPOD 5 G6 INTRO KIT, GEN 5, SUBQ)      Omnipod 5 G6 Intro Kit (Gen 5), See Instructions, use as directed, # 1 EA, 0 Refill(s), Pharmacy: [facility], 187, cm, 22 9:14:00 CST, Height/Length Measured, 102.3, kg, 22 9:14:00 CST, Weight Dosing    LOSARTAN (COZAAR) 100 MG TABLET    TAKE 1 TABLET(100 MG) BY MOUTH EVERY DAY    MULTIVIT-MIN/FOLIC/VIT K/LYCOP (ONE-A-DAY MEN'S MULTIVITAMIN ORAL)    Take by mouth once daily.    NEBIVOLOL (BYSTOLIC) 20 MG TAB    Take 20 mg by mouth once daily.    ONDANSETRON (ZOFRAN-ODT) 4 MG TBDL    Take 1 tablet (4 mg total) by mouth every 12 (twelve) hours as needed.    SODIUM BICARBONATE 650 MG TABLET     Take 1 tablet (650 mg total) by mouth Daily.    TADALAFIL (CIALIS) 5 MG TABLET    TK 1 T PO D PRF ERECTILE DYSFUNCTION   Modified Medications    No medications on file   Discontinued Medications    No medications on file         Follow-up Information       Jose Mckinney MD. Schedule an appointment as soon as possible for a visit on 6/17/2024.    Specialty: Nephrology  Contact information:  [address]  [address] [facility] [address]  [phone]               [facility] Emergency Dept.    Specialty: Emergency Medicine  Why: As needed, If symptoms worsen  Contact information:  [address] [facility] [address] [phone]                          Clinical Impression       ICD-10-CM ICD-9-CM   1. Hyperkalemia  E87.5 276.7          Refugio Cabrera MD  06/16/24 0354

## 2024-06-14 NOTE — TELEPHONE ENCOUNTER
----- Message from Ginger Rousseau sent at 6/14/2024  2:08 PM CDT -----  Regarding: critical lab value  Hello,  This is the lab, we have a critical potassium on this patient and need someone to call us at [phone] so we can release the value to.    Thank you,  Ginger

## 2024-06-14 NOTE — PROGRESS NOTES
Prelim potassium is 6.5 from today.      I did contact him to go to ER now to get medication to lower it acutely.    He understood and agreed to go.    He will need veltassa or lokelma three times a week to help maintain a normal potassium for safety.

## 2024-06-14 NOTE — PROGRESS NOTES
I  agree with the findings and plan as documented.  karie   Patient received shingrix IM to the right deltoid.  Tolerated well and left in NAD

## 2024-06-14 NOTE — DISCHARGE INSTRUCTIONS
Take Veltassa once every other day or 3 times a week.  Maintain your low potassium diet.  Follow up with your nephrologist this coming week if possible.  Return emergency with any worsening dizziness weakness or any other concerns.

## 2024-06-17 LAB
OHS QRS DURATION: 88 MS
OHS QTC CALCULATION: 400 MS

## 2024-06-20 ENCOUNTER — PATIENT MESSAGE (OUTPATIENT)
Dept: TRANSPLANT | Facility: CLINIC | Age: 44
End: 2024-06-20
Payer: COMMERCIAL

## 2024-06-20 ENCOUNTER — TELEPHONE (OUTPATIENT)
Dept: TRANSPLANT | Facility: CLINIC | Age: 44
End: 2024-06-20
Payer: COMMERCIAL

## 2024-06-20 ENCOUNTER — LAB VISIT (OUTPATIENT)
Dept: LAB | Facility: HOSPITAL | Age: 44
End: 2024-06-20
Attending: NURSE PRACTITIONER
Payer: COMMERCIAL

## 2024-06-20 DIAGNOSIS — N18.5 CKD (CHRONIC KIDNEY DISEASE) STAGE 5, GFR LESS THAN 15 ML/MIN: ICD-10-CM

## 2024-06-20 DIAGNOSIS — Z76.82 AWAITING ORGAN TRANSPLANT STATUS: ICD-10-CM

## 2024-06-20 DIAGNOSIS — Z76.82 ORGAN TRANSPLANT CANDIDATE: Primary | ICD-10-CM

## 2024-06-20 LAB
ALBUMIN SERPL BCP-MCNC: 3.7 G/DL (ref 3.5–5.2)
ANION GAP SERPL CALC-SCNC: 12 MMOL/L (ref 8–16)
BUN SERPL-MCNC: 87 MG/DL (ref 6–20)
CALCIUM SERPL-MCNC: 8.8 MG/DL (ref 8.7–10.5)
CHLORIDE SERPL-SCNC: 111 MMOL/L (ref 95–110)
CO2 SERPL-SCNC: 17 MMOL/L (ref 23–29)
CREAT SERPL-MCNC: 9.2 MG/DL (ref 0.5–1.4)
EST. GFR  (NO RACE VARIABLE): 6.7 ML/MIN/1.73 M^2
GLUCOSE SERPL-MCNC: 107 MG/DL (ref 70–110)
PHOSPHATE SERPL-MCNC: 5.5 MG/DL (ref 2.7–4.5)
POTASSIUM SERPL-SCNC: 5.7 MMOL/L (ref 3.5–5.1)
SODIUM SERPL-SCNC: 140 MMOL/L (ref 136–145)

## 2024-06-20 PROCEDURE — 36415 COLL VENOUS BLD VENIPUNCTURE: CPT | Mod: TXP | Performed by: INTERNAL MEDICINE

## 2024-06-20 PROCEDURE — 80069 RENAL FUNCTION PANEL: CPT | Mod: TXP | Performed by: INTERNAL MEDICINE

## 2024-06-20 NOTE — TELEPHONE ENCOUNTER
Confirmed surgery date of 7/16/24 with preop testing 7/2/24. Asked patient to complete [employer] application as soon as possible. Information for FMLA/STD provided. All questions were answered.

## 2024-06-21 ENCOUNTER — TELEPHONE (OUTPATIENT)
Dept: TRANSPLANT | Facility: CLINIC | Age: 44
End: 2024-06-21
Payer: COMMERCIAL

## 2024-06-21 NOTE — TELEPHONE ENCOUNTER
PRE-SCHEDULING LD SURGERY REVIEW    Living donor surgery planned 7/16/24    Preop testing planned 7/2/24      Recipient:      Surgeon  Neph/NANCY  Stress test 8/30/23 Ok EB 6/21/24    2D Echo 8/30/23 Ok EB 6/21/24          CXR 4/17/24 Ok EB 6/21/24    Renal US 8/25/23 Ok EB 6/21/24    Imaging CT 1/8/24 - ok Ok EB 6/21/24          ABO  A Ok EB 6/21/24    Serologies 4/17/24 Ok EB 6/21/24    PTH 4/8/24 - 313 Ok EB 6/21/24    Lab 6/2024 - K+ high on Lokelma EB 6/21/24    **Pre-dialysis with recent high Ks started on lokalma. Does he need to initiate HD? Special check of K pre-op**          Cancer Screenings UTD Ok EB 6/21/24          Pt. Specific issues Pre dialysis See above  EB 6/21/24        Donor:       Surgeon  Neph/NANCY  ABO compatible A Ok EB 6/21/24          Serologies/Lab 5/29/24 Ok EB 6/21/24          Imaging Left Ok EB 6/21/24

## 2024-06-28 ENCOUNTER — TELEPHONE (OUTPATIENT)
Dept: TRANSPLANT | Facility: CLINIC | Age: 44
End: 2024-06-28
Payer: COMMERCIAL

## 2024-06-28 ENCOUNTER — PATIENT MESSAGE (OUTPATIENT)
Dept: TRANSPLANT | Facility: CLINIC | Age: 44
End: 2024-06-28
Payer: COMMERCIAL

## 2024-06-28 NOTE — TELEPHONE ENCOUNTER
SW returned pt's call regarding NLDAC application.     Pt inquired if he will be able to complete application during clinic visit on 7/2/2024. SW verbalized understanding and advised pt to have recent check stubs available. Pt verbalized understanding and agreement.     SW remains available to pt and family.           ----- Message from Karin Cohen sent at 6/28/2024  1:46 PM CDT -----  Regarding: Patient advice  Contact: Pt  [phone]              Caller:    Dejuan     Returning call to:   Janey      Caller can be reached at: [phone]    Nature of the call:  Returning missed call

## 2024-06-28 NOTE — TELEPHONE ENCOUNTER
SW received the below [employer] message. MANUELA attempted to reach out to pt to provide assistance with NLD[employer] application. MANUELA was only able to leave a message and contact information.     MANUELA remains available to pt and family.              Good Morning Paige,   I am needing assistance with filling out paperwork.  I opened the  website link and filled in the request, but the site gave me a notice that my email had already been used and would not let me proceed further with paperwork for my donor.   Just wanted to know if I could fill out Tuesday when I am over there or can you help me with it.     Have a Great Day   [place].

## 2024-07-02 ENCOUNTER — OFFICE VISIT (OUTPATIENT)
Dept: TRANSPLANT | Facility: CLINIC | Age: 44
End: 2024-07-02
Payer: COMMERCIAL

## 2024-07-02 ENCOUNTER — ANESTHESIA EVENT (OUTPATIENT)
Dept: SURGERY | Facility: HOSPITAL | Age: 44
End: 2024-07-02
Payer: COMMERCIAL

## 2024-07-02 ENCOUNTER — HOSPITAL ENCOUNTER (OUTPATIENT)
Dept: PREADMISSION TESTING | Facility: HOSPITAL | Age: 44
Discharge: HOME | End: 2024-07-02
Attending: TRANSPLANT SURGERY | Admitting: TRANSPLANT SURGERY
Payer: COMMERCIAL

## 2024-07-02 ENCOUNTER — CLINICAL SUPPORT (OUTPATIENT)
Dept: TRANSPLANT | Facility: CLINIC | Age: 44
End: 2024-07-02
Payer: COMMERCIAL

## 2024-07-02 ENCOUNTER — TELEPHONE (OUTPATIENT)
Dept: TRANSPLANT | Facility: CLINIC | Age: 44
End: 2024-07-02

## 2024-07-02 ENCOUNTER — HOSPITAL ENCOUNTER (OUTPATIENT)
Dept: CARDIOLOGY | Facility: CLINIC | Age: 44
Discharge: HOME | End: 2024-07-02
Payer: COMMERCIAL

## 2024-07-02 ENCOUNTER — SOCIAL WORK (OUTPATIENT)
Dept: TRANSPLANT | Facility: CLINIC | Age: 44
End: 2024-07-02
Payer: COMMERCIAL

## 2024-07-02 ENCOUNTER — PATIENT MESSAGE (OUTPATIENT)
Dept: TRANSPLANT | Facility: CLINIC | Age: 44
End: 2024-07-02

## 2024-07-02 ENCOUNTER — HOSPITAL ENCOUNTER (OUTPATIENT)
Dept: RADIOLOGY | Facility: HOSPITAL | Age: 44
Discharge: HOME | End: 2024-07-02
Attending: STUDENT IN AN ORGANIZED HEALTH CARE EDUCATION/TRAINING PROGRAM
Payer: COMMERCIAL

## 2024-07-02 VITALS
BODY MASS INDEX: 29.15 KG/M2 | SYSTOLIC BLOOD PRESSURE: 182 MMHG | OXYGEN SATURATION: 100 % | TEMPERATURE: 98 F | HEART RATE: 61 BPM | DIASTOLIC BLOOD PRESSURE: 88 MMHG | WEIGHT: 215.19 LBS | HEIGHT: 72 IN

## 2024-07-02 VITALS
WEIGHT: 215.19 LBS | OXYGEN SATURATION: 100 % | DIASTOLIC BLOOD PRESSURE: 98 MMHG | DIASTOLIC BLOOD PRESSURE: 98 MMHG | HEART RATE: 61 BPM | RESPIRATION RATE: 18 BRPM | SYSTOLIC BLOOD PRESSURE: 209 MMHG | TEMPERATURE: 98 F | HEIGHT: 72 IN | HEIGHT: 72 IN | WEIGHT: 215.19 LBS | OXYGEN SATURATION: 100 % | OXYGEN SATURATION: 100 % | TEMPERATURE: 98 F | SYSTOLIC BLOOD PRESSURE: 209 MMHG | HEART RATE: 61 BPM | DIASTOLIC BLOOD PRESSURE: 98 MMHG | RESPIRATION RATE: 18 BRPM | BODY MASS INDEX: 29.15 KG/M2 | WEIGHT: 215.19 LBS | HEART RATE: 61 BPM | BODY MASS INDEX: 29.15 KG/M2 | HEIGHT: 72 IN | TEMPERATURE: 98 F | SYSTOLIC BLOOD PRESSURE: 209 MMHG | RESPIRATION RATE: 18 BRPM | BODY MASS INDEX: 29.15 KG/M2

## 2024-07-02 VITALS
TEMPERATURE: 98 F | SYSTOLIC BLOOD PRESSURE: 209 MMHG | OXYGEN SATURATION: 100 % | WEIGHT: 215.19 LBS | BODY MASS INDEX: 29.15 KG/M2 | RESPIRATION RATE: 18 BRPM | DIASTOLIC BLOOD PRESSURE: 98 MMHG | HEIGHT: 72 IN | HEART RATE: 61 BPM

## 2024-07-02 DIAGNOSIS — I10 HYPERTENSION, UNSPECIFIED TYPE: ICD-10-CM

## 2024-07-02 DIAGNOSIS — N18.6 ESRD (END STAGE RENAL DISEASE): Primary | ICD-10-CM

## 2024-07-02 DIAGNOSIS — Z01.818 PRE-TRANSPLANT EVALUATION FOR KIDNEY TRANSPLANT: Primary | ICD-10-CM

## 2024-07-02 DIAGNOSIS — Z76.82 ORGAN TRANSPLANT CANDIDATE: ICD-10-CM

## 2024-07-02 DIAGNOSIS — Z76.82 AWAITING ORGAN TRANSPLANT: Primary | ICD-10-CM

## 2024-07-02 DIAGNOSIS — N18.6 ESRD (END STAGE RENAL DISEASE): ICD-10-CM

## 2024-07-02 DIAGNOSIS — N18.5 CKD (CHRONIC KIDNEY DISEASE) STAGE 5, GFR LESS THAN 15 ML/MIN: ICD-10-CM

## 2024-07-02 DIAGNOSIS — E87.5 HYPERKALEMIA: ICD-10-CM

## 2024-07-02 DIAGNOSIS — Z79.4 LONG TERM CURRENT USE OF INSULIN: ICD-10-CM

## 2024-07-02 DIAGNOSIS — N25.81 SECONDARY RENAL HYPERPARATHYROIDISM: ICD-10-CM

## 2024-07-02 LAB
OHS QRS DURATION: 94 MS
OHS QTC CALCULATION: 414 MS

## 2024-07-02 PROCEDURE — 99999 PR PBB SHADOW E&M-EST. PATIENT-LVL III: CPT | Mod: PBBFAC,TXP,,

## 2024-07-02 PROCEDURE — 71046 X-RAY EXAM CHEST 2 VIEWS: CPT | Mod: TC,TXP

## 2024-07-02 PROCEDURE — 99999 PR PBB SHADOW E&M-EST. PATIENT-LVL I: CPT | Mod: PBBFAC,TXP,,

## 2024-07-02 PROCEDURE — 99999 PR PBB SHADOW E&M-EST. PATIENT-LVL III: CPT | Mod: PBBFAC,TXP,, | Performed by: STUDENT IN AN ORGANIZED HEALTH CARE EDUCATION/TRAINING PROGRAM

## 2024-07-02 PROCEDURE — 99999 PR PBB SHADOW E&M-EST. PATIENT-LVL IV: CPT | Mod: PBBFAC,TXP,,

## 2024-07-02 RX ORDER — ACETAMINOPHEN 650 MG/20.3ML
650 LIQUID ORAL ONCE
OUTPATIENT
Start: 2024-07-02 | End: 2024-07-02

## 2024-07-02 RX ORDER — CEFAZOLIN SODIUM 2 G/50ML
2 SOLUTION INTRAVENOUS
OUTPATIENT
Start: 2024-07-02

## 2024-07-02 RX ORDER — SODIUM CHLORIDE 0.9 % (FLUSH) 0.9 %
10 SYRINGE (ML) INJECTION
OUTPATIENT
Start: 2024-07-02

## 2024-07-02 RX ORDER — PREGABALIN 75 MG/1
75 CAPSULE ORAL
OUTPATIENT
Start: 2024-07-02

## 2024-07-02 RX ORDER — HEPARIN SODIUM 5000 [USP'U]/ML
5000 INJECTION, SOLUTION INTRAVENOUS; SUBCUTANEOUS ONCE
OUTPATIENT
Start: 2024-07-02 | End: 2024-07-02

## 2024-07-02 RX ORDER — MUPIROCIN 20 MG/G
OINTMENT TOPICAL
OUTPATIENT
Start: 2024-07-02

## 2024-07-02 RX ORDER — DIPHENHYDRAMINE HYDROCHLORIDE 50 MG/ML
50 INJECTION INTRAMUSCULAR; INTRAVENOUS ONCE
OUTPATIENT
Start: 2024-07-02 | End: 2024-07-02

## 2024-07-02 NOTE — ANESTHESIA PREPROCEDURE EVALUATION
07/02/2024  Dejuan Diaz Jr. is a 43 y.o., male.    Pre-operative evaluation for Procedure(s) (LRB):  TRANSPLANT, KIDNEY (N/A)    Patient Active Problem List   Diagnosis    Essential hypertension    Mixed hyperlipidemia    Long term current use of insulin    Hypertension    Hyperlipidemia    Pre-transplant evaluation for kidney transplant    CKD (chronic kidney disease) stage 5, GFR less than 15 ml/min    Type 1 diabetes mellitus    ESRD (end stage renal disease)    Secondary renal hyperparathyroidism    Hyperkalemia       Review of patient's allergies indicates:   Allergen Reactions    Codeine Hallucinations    Vancomycin analogues Other (See Comments)     Burning and  itchig of skin       Current Outpatient Medications on File Prior to Encounter   Medication Sig Dispense Refill    ALPRAZolam (XANAX) 0.5 MG tablet Take 1 tablet (0.5 mg total) by mouth 2 (two) times daily as needed for Anxiety. (Patient not taking: Reported on 7/2/2024) 60 tablet 0    amLODIPine (NORVASC) 10 MG tablet Take 1 tablet by mouth once daily 90 tablet 1    aspirin (ECOTRIN) 81 MG EC tablet Take 81 mg by mouth once daily. (Patient not taking: Reported on 7/2/2024)      atorvastatin (LIPITOR) 40 MG tablet Take 40 mg by mouth once daily.      blood-glucose meter,continuous (DEXCOM G6  MISC) by Misc.(Non-Drug; Combo Route) route.      blood-glucose transmitter (DEXCOM G6 TRANSMITTER MISC)   Dexcom G6 transmitter, See Instructions, uad with dexcom G6 sensor; change transmitter q 90 days, # 1 EA, 3 Refill(s), Pharmacy: [id number], 187, cm, 08/03/23 14:55:00 CDT, Height/Length Measured, 105.1, kg, 08/03/23 14:55:00 CDT, Weight Dosing      calcitRIOL (ROCALTROL) 0.25 MCG Cap Take 1 capsule (0.25 mcg total) by mouth once daily. 90 capsule 1    calcium acetate,phosphat bind, (PHOSLO) 667 mg tablet Take 2 tablets (1,334 mg  total) by mouth 3 (three) times daily with meals. Take with meals and snacks 300 tablet 2    coenzyme Q10 200 mg capsule Take 200 mg by mouth once daily. (Patient not taking: Reported on 2024)      DEXCOM G6 SENSOR Adelina SMARTSI Each Topical Every 10 Days      icosapent ethyL (VASCEPA) 1 gram Cap Take 2 g by mouth 4 (four) times daily.      insulin lispro (HUMALOG U-100 INSULIN) 100 unit/mL injection   See Instructions, for use in insulin pump; max daily dose 100 units, # 90 mL, 1 Refill(s), Maintenance, Pharmacy: [id number], 187, cm, 23 11:24:00 CST, Height/Length Measured, 102.5, kg, 23 11:24:00 CST, Weight Dosing      insulin pump cart,auto,BT/cntr (OMNIPOD 5 G6 INTRO KIT, GEN 5, SUBQ) Inject into the skin. 200 units per pod      insulin pump cart,auto,BT/cntr (OMNIPOD 5 G6 INTRO KIT, GEN 5, SUBQ)   Omnipod 5 G6 Intro Kit (Gen 5), See Instructions, use as directed, # 1 EA, 0 Refill(s), Pharmacy: [id number], 187, cm, 22 9:14:00 CST, Height/Length Measured, 102.3, kg, 22 9:14:00 CST, Weight Dosing      losartan (COZAAR) 100 MG tablet TAKE 1 TABLET(100 MG) BY MOUTH EVERY DAY 90 tablet 1    multivit-min/folic/vit K/lycop (ONE-A-DAY MEN'S MULTIVITAMIN ORAL) Take by mouth once daily. (Patient not taking: Reported on 2024)      nebivoloL (BYSTOLIC) 20 mg Tab Take 20 mg by mouth once daily. 90 tablet 3    ondansetron (ZOFRAN-ODT) 4 MG TbDL Take 1 tablet (4 mg total) by mouth every 12 (twelve) hours as needed. 30 tablet 1    patiromer calcium sorbitex (VELTASSA) 8.4 gram PwPk Take 1 packet (8.4 g total) by mouth every Mon, Wed, Fri, Sun. (Patient not taking: Reported on 2024) 15 packet 0    sodium bicarbonate 650 MG tablet Take 1 tablet (650 mg total) by mouth Daily. 30 tablet 5    sodium zirconium cyclosilicate (LOKELMA) 10 gram packet Take 1 packet (10 g total) by mouth 3 (three) times a week. Mix entire contents of packet(s) into drinking glass containing 3  tablespoons of water; stir well and drink immediately. Add water and repeat until no powder remains to receive entire dose. (Patient not taking: Reported on 7/2/2024) 12 packet 0    tadalafil (CIALIS) 5 MG tablet TK 1 T PO D PRF ERECTILE DYSFUNCTION (Patient not taking: Reported on 7/2/2024)  1     No current facility-administered medications on file prior to encounter.       Past Surgical History:   Procedure Laterality Date    INNER EAR SURGERY      for tinnitus    KIDNEY TRANSPLANT N/A 4/18/2024    Procedure: TRANSPLANT, KIDNEY;  Surgeon: Raul Jean MD;  Location: [address];  Service: Transplant;  Laterality: N/A;    TRANSPLANTATION OF PANCREAS N/A 4/18/2024    Procedure: TRANSPLANT, PANCREAS;  Surgeon: Raul Jean MD;  Location: [facility] OR [address] [place] [address];  Service: Transplant;  Laterality: N/A;       CBC:   Recent Labs     07/02/24  0732   WBC 5.94   RBC 3.11*   HGB 9.3*   HCT 26.6*      MCV 86   MCH 29.9   MCHC 35.0       CMP:   Recent Labs     07/02/24  0732      K 4.8   *   CO2 18*   BUN 78*   CREATININE 7.9*   GLU 93   PHOS 6.3*   CALCIUM 9.3   ALBUMIN 3.7   PROT 7.4   ALKPHOS 95   ALT 51*   AST 30   BILITOT 0.4       INR  Recent Labs     07/02/24  0732   INR 1.0   APTT 27.1           Diagnostic Studies:  Interpretation Summary from exercise stress test with myocardial perfusion SPECT on 8/30/23         Normal myocardial perfusion scan. There is no evidence of myocardial ischemia or infarction.    There is a mild intensity fixed perfusion abnormality in the anteroseptal wall of the left ventricle, secondary to soft tissue attenuation.    The visually estimated ejection fraction is normal at rest.    There is normal wall motion at rest.    The ECG portion of the study is negative for ischemia.    The patient exercised for 7 minutes 0 seconds on a high ramp protocol, corresponding to a functional capacity of 11.0 METS, achieving a peak heart rate of 151 bpm, which is 85 % of the age  predicted maximum heart rate. Their exercise capacity was average.    During stress, rare PVCs are noted.    The exercise capacity was average.    There are no prior studies for comparison.    EKG:  Normal sinus rhythm   Normal ECG   When compared with ECG of 17-APR-2024 22:01,   No significant change was found   Confirmed by Juan R Dailey MD ([id number]) on 6/17/2024 8:37:28 AM     2D Echo:  Summary from echo on 8/30/23         Left Ventricle: The left ventricle is normal in size. There is concentric remodeling. Normal wall motion. There is normal systolic function with a visually estimated ejection fraction of 60 - 65%. There is normal diastolic function.    Right Ventricle: Normal right ventricular cavity size. Wall thickness is normal. Right ventricle wall motion  is normal. Systolic function is normal.    Aortic Valve: The aortic valve is a trileaflet valve.    Pulmonary Artery: The estimated pulmonary artery systolic pressure is 34 mmHg.    IVC/SVC: Normal venous pressure at 3 mmHg.         Pre-op Assessment    I have reviewed the Patient Summary Reports.       I have reviewed the Medications.     Review of Systems  Anesthesia Hx:    No personal problems with anesthesia but he has a strong family history of PONV History of prior surgery of interest to airway management or planning:          Family Hx of Anesthesia complications:  Family Anesthesia Complications are Significant family history of PONV.  Denies Personal Hx of Anesthesia complications.                    Social:  Non-Smoker, No Alcohol Use       Hematology/Oncology:    Oncology Normal    -- Anemia:                                  EENT/Dental:  EENT/Dental Normal           Cardiovascular:  Exercise tolerance: good   Hypertension       Denies Angina.     hyperlipidemia  Denies VICTORIA.  ECG has been reviewed.                          Pulmonary:  Pulmonary Normal      Denies Shortness of breath.                  Renal/:  Chronic Renal Disease, ESRD, CKD    Patient has ESRD but is not and has never been on dialysis. He has no existing access for HD.             Hepatic/GI:     GERD, well controlled             Musculoskeletal:  Musculoskeletal Normal                Neurological:  Neurology Normal                                      Endocrine:  Diabetes, type 1           Dermatological:  Skin Normal    Psych:  Psychiatric Normal                    Physical Exam  General: Well nourished, Cooperative, Alert and Oriented    Airway:  Mallampati: III / II  Mouth Opening: Normal  TM Distance: Normal  Tongue: Normal  Neck ROM: Normal ROM    Dental:  Intact    Chest/Lungs:  Normal Respiratory Rate    Heart:  Rate: Normal        Anesthesia Plan  Type of Anesthesia, risks & benefits discussed:    Anesthesia Type: Gen ETT  Intra-op Monitoring Plan: Standard ASA Monitors and Art Line  Post Op Pain Control Plan: multimodal analgesia and IV/PO Opioids PRN  Induction:  IV  Airway Plan: Video, Post-Induction  Informed Consent: Informed consent signed with the Patient and all parties understand the risks and agree with anesthesia plan.  All questions answered.   ASA Score: 3  Day of Surgery Review of History & Physical: H&P Update referred to the surgeon/provider.  Anesthesia Plan Notes:   1. Anesthesia e-consent signed with patient in POC  2. Patient was scheduled twice prior for a kidney-pancreas transplant but was cancelled 2/2 poor donor quality  3. Patient has a strong family history of PONV (no personal history but he's never had GETA). We discussed our standard multimodal approach.  4. Patient's family member was concerned about post-operative cognitive decline. We discussed that he is at low risk, however, we will use mitigating strategies when appropriate/available.  5. He has ESRD but he has never been on dialysis.   -FLOWER Nagy MD     Ready For Surgery From Anesthesia Perspective.     .

## 2024-07-02 NOTE — PROGRESS NOTES
"   Kidney Transplant Recipient Preoperative Evaluation    Subjective:     CC:  Preoperative evaluation of kidney transplant candidacy.    HPI:  Mr. Diaz is a 43 y.o. year old White male who has been approved to receive a living unrelated kidney transplant.  He has advanced kidney disease secondary to diabetic nephropathy and HTN.  Patient is predialysis.  He has presented for his final preoperative medical evaluation.  He denies any recent hospitalizations or ED visits.      He reports potassium levels have been increasing recently. He monitors his potassium intake, avoiding high potassium foods like cherries and eating lower potassium foods like blueberries, strawberries, apples, and raspberries. He takes Veltassa as instructed for managing potassium levels.    He is not currently on dialysis and has no dialysis access in place. He denies fluid overload symptoms such as significant leg swelling or fluid buildup in the lungs requiring hospitalization. He continues to make adequate urine output.    He reports anemia with recent hemoglobin levels around 9.5.     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 He is concerned about potential risks of exposure to pathogens like E. coli from cattle, but is reassured that most cattle pathogens do not transmit to humans. He acknowledges that strenuous physical activity like herding cattle may need to be avoided for the first 2-3 months post-transplant to avoid stressing the surgical incision site. However, lighter duties like administering medications should be okay with proper handwashing and masking precautions. 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    ROS:  Constitutional: -weight loss           Objective:     Blood pressure (!) 209/98, pulse 61, temperature 97.7 °F (36.5 °C), temperature source Temporal, resp. rate 18, height 6' 0.01" (1.829 m), weight " 97.6 kg (215 lb 2.7 oz), SpO2 100%.  Physical Exam  Vitals and nursing note reviewed.   Constitutional:       General: He is not in acute distress.  Eyes:      General: No scleral icterus.  Cardiovascular:      Rate and Rhythm: Normal rate.   Pulmonary:      Effort: Pulmonary effort is normal. No respiratory distress.   Musculoskeletal:      Right lower leg: No edema.      Left lower leg: No edema.   Skin:     Coloration: Skin is not jaundiced.   Neurological:      Mental Status: He is alert.         Labs:  7/2/2024: Prothrombin Time 10.7 sec (Ref range: 9.0 - 12.5 sec)  Labs were reviewed with the patient.    ABO type: A NEG    Assessment:     1. Pre-transplant evaluation for kidney transplant    2. CKD (chronic kidney disease) stage 5, GFR less than 15 ml/min    3. ESRD (end stage renal disease)    4. Hyperkalemia    5. Long term current use of insulin    6. Secondary renal hyperparathyroidism    7. 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  Z51.81 Encounter for therapeutic drug level monitoring  Z71.89 Other specified counseling  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  - Continued current management with Veltassa.  - Continued Veltassa to manage potassium levels.  HYPERPHOSPHATEMIA:  - Phosphorus is elevated but parathyroid hormone is acceptable at 300.  - Will address phosphorus management after transplant.  - Educated that transplanted kidneys are not as effective at maintaining phosphorus levels, so a high phosphorus diet will be needed after transplant, including dark sodas, yogurt, nuts, and preserved/frozen foods.  METABOLIC ACIDOSIS:  - Bicarbonate is low at 18 but slowly improving with sodium bicarbonate.  - Goal is >22 for CKD progression.  ANEMIA:  - Mild anemia with Hgb 9.5, likely due to decreased erythropoietin production from CKD.  - May require EPO injections if worsens but currently stable.  DIABETES:  - Explained that diabetes control will worsen considerably after transplant due to loss of insulin-clearing native kidneys and effects of immunosuppression.  - Insulin requirements will increase for the first few weeks.  - Discussed that prednisone 5 mg after transplant has not been shown to significantly worsen diabetes control or increase risks in trials.  RISK OF CANCER:  - Explained that all immunosuppressants carry an increased risk of cancer, especially skin cancer.  - Close monitoring with dermatology is critical.  INFECTION RISK POST-TRANSPLANT:  - Discussed that rabbit antithymocyte globulin induction will be given at time of transplant, significantly increasing infection risk for 9-12 months.  - Educated that transplant significantly increases infection risk, especially in the first 2-3 months.  KIDNEY TRANSPLANT:  - Explained that the transplanted kidney is placed in the pelvis, allowing for easier biopsies compared to native kidneys if needed to evaluate for rejection.  - Discussed that average lifespan on dialysis is 5 years, while average lifespan with a kidney  transplant is 10 years.  - A living donor kidney may last 10-20 years.  PANCREAS TRANSPLANT:  - Explained that pancreas transplant does not provide significant mortality benefit compared to kidney transplant alone in most cases.           Plan:      Transplant Candidacy:   Based on available information, Mr. Diaz remains a suitable kidney transplant candidate.  He may proceed with transplant surgery as planned. Must continue low K diet and veltassa daily. Starting 3 days before transplant, instructed to take veltassa twice a day and sodium bicarbonate twice a day.    Mitch Simmons Jr., MD       Follow-up:  After the transplant, the patient will follow-up with the kidney transplant team and get blood work per standard protocol as described below.    Clinic: return to transplant clinic weekly for the first month after transplant; every 2 weeks during months 2-3; then at 6-, 9-, 12-, 18-, 24-, and 36- months post-transplant to reassess for complications from immunosuppression and monitor for rejection.  Annually thereafter.    Labs: continue twice weekly CBC, renal panel, and drug level for first month; then same labs once weekly through 3rd month post-transplant.  Urine for UA, protein/creatinine ratio monthly.  Add urine BK - PCR at 1-, 3-, 6-, 9-, 12-, 18-, 24-, and 36- months post-transplant.  Hepatic panel at 1-, 2-, 3-, 6-, 9-, 12-, 18-, 24-, and 36- months post-transplant.    Counseling:     Patient advised that it is recommended that all transplant candidates, and their close contacts and household members receive Covid vaccination.    I spent a total of 96 minutes on the day of the visit.

## 2024-07-02 NOTE — PROGRESS NOTES
PRE-OP TEACHING NOTE    Dejuan Diaz is here today for pre-op appointments.  Pre-op instructions reviewed and written information was provided. Post kidney transplant education book provided.  All questions were answered.  Discussed possibility that surgery may be rescheduled if the program is busy with  donor transplants.  Patient agreed and verbalized understanding of all instructions.

## 2024-07-02 NOTE — TELEPHONE ENCOUNTER
----- Message from Mitch Simmons Jr., MD sent at 7/2/2024 11:43 AM CDT -----  These labs are acceptable pre transplant labs. We discussed that he needs to continue to take the veltassa and stay on low k diet prior to transplant.

## 2024-07-02 NOTE — PROGRESS NOTES
Kidney Transplant Recipient Preoperative Evaluation    Subjective:     CC:  Preoperative evaluation of kidney transplant candidacy.    HPI:  Mr. Diaz is a 43 y.o. year old White male who has been approved to receive a living unrelated kidney transplant.  He has ESRD secondary to diabetic nephropathy.  Patient is predialysis.  He has presented for his final preoperative medical evaluation.  He denies any recent hospitalizations or ED visits.    External provider notes reviewed: Yes    Past Medical History:   Diagnosis Date    Anemia     Diabetes mellitus type II     Disorder of kidney and ureter     GERD (gastroesophageal reflux disease)     Hypertension     Proteinuria      Past Surgical History:   Procedure Laterality Date    INNER EAR SURGERY      for tinnitus    KIDNEY TRANSPLANT N/A 4/18/2024    Procedure: TRANSPLANT, KIDNEY;  Surgeon: Raul Jean MD;  Location: [facility] OR [address] [place] [address];  Service: Transplant;  Laterality: N/A;    TRANSPLANTATION OF PANCREAS N/A 4/18/2024    Procedure: TRANSPLANT, PANCREAS;  Surgeon: Raul Jean MD;  Location: [facility] OR [address] [place] [address];  Service: Transplant;  Laterality: N/A;     Review of patient's allergies indicates:   Allergen Reactions    Codeine Hallucinations    Vancomycin analogues Other (See Comments)     Burning and  itchig of skin     Review of Systems   Constitutional:  Negative for activity change, appetite change, chills, diaphoresis, fatigue, fever and unexpected weight change.   HENT:  Negative for congestion, dental problem, ear pain, facial swelling, mouth sores, nosebleeds, sore throat, tinnitus, trouble swallowing and voice change.    Eyes:  Negative for photophobia, pain and visual disturbance.   Respiratory:  Negative for apnea, cough, choking, chest tightness and shortness of breath.    Cardiovascular:  Negative for chest pain, palpitations and leg swelling.   Gastrointestinal:  Negative for abdominal distention, abdominal pain, blood in stool, constipation,  "diarrhea, nausea and vomiting.   Endocrine: Negative for cold intolerance and heat intolerance.   Genitourinary:  Negative for difficulty urinating, dysuria, flank pain, hematuria and urgency.   Musculoskeletal:  Negative for arthralgias and gait problem.   Skin:  Negative for color change, pallor and rash.   Neurological:  Negative for dizziness, tremors, seizures, syncope and light-headedness.   Hematological:  Negative for adenopathy. Does not bruise/bleed easily.   Psychiatric/Behavioral:  Negative for agitation and confusion.      Objective:     Blood pressure (!) 209/98, pulse 61, temperature 97.7 °F (36.5 °C), temperature source Temporal, resp. rate 18, height 6' 0.01" (1.829 m), weight 97.6 kg (215 lb 2.7 oz), SpO2 100%.  Physical Exam  Constitutional:       General: He is not in acute distress.     Appearance: He is well-developed.   HENT:      Head: Normocephalic and atraumatic.      Mouth/Throat:      Pharynx: No oropharyngeal exudate.   Eyes:      General: No scleral icterus.     Conjunctiva/sclera: Conjunctivae normal.      Pupils: Pupils are equal, round, and reactive to light.   Cardiovascular:      Rate and Rhythm: Normal rate and regular rhythm.      Heart sounds: Normal heart sounds.   Pulmonary:      Effort: Pulmonary effort is normal. No respiratory distress.      Breath sounds: Normal breath sounds.   Abdominal:      General: Bowel sounds are normal. There is no distension.      Palpations: Abdomen is soft.      Tenderness: There is no abdominal tenderness. There is no guarding or rebound.   Musculoskeletal:         General: No swelling. Normal range of motion.      Cervical back: Normal range of motion and neck supple.   Lymphadenopathy:      Cervical: No cervical adenopathy.   Skin:     General: Skin is warm and dry.      Findings: No erythema or rash.   Neurological:      Mental Status: He is alert and oriented to person, place, and time.   Psychiatric:         Mood and Affect: Mood normal.   "       Behavior: Behavior normal.         Thought Content: Thought content normal.         Labs:  7/2/2024: Prothrombin Time 10.7 sec (Ref range: 9.0 - 12.5 sec)  Labs were reviewed with the patient.    Diagnostics:  The following radiology images have been independently reviewed and interpreted: CT Abd/Pelvis    ABO type: A NEG    Assessment:     1. ESRD (end stage renal disease)        Plan:      Transplant Candidacy:   Based on available information, Mr. Diaz remains a suitable kidney transplant candidate.  He may proceed with transplant surgery as planned. Place kidney on left for possible future pancreas on right.    Counseling: I discussed kidney transplantation at length with him, including risks, potential complications, and alternatives in the management of his renal failure.  The discussion included complications related to anesthesia, bleeding, infection, primary nonfunction, and ATN. I discussed the typical postoperative course, length of hospitalization, the need for long-term immunosuppression, and the need for long-term routine follow-up.    Patient advised that it is recommended that all transplant candidates, and their close contacts and household members receive Covid vaccination.    Interpretation of tests and discussion of patient management involves all members of the multidisciplinary transplant team.    Erma Gómez MD

## 2024-07-02 NOTE — H&P (VIEW-ONLY)
Kidney Transplant Recipient Preoperative Evaluation    Subjective:     CC:  Preoperative evaluation of kidney transplant candidacy.    HPI:  Mr. Diaz is a 43 y.o. year old White male who has been approved to receive a living unrelated kidney transplant.  He has ESRD secondary to diabetic nephropathy.  Patient is predialysis.  He has presented for his final preoperative medical evaluation.  He denies any recent hospitalizations or ED visits.    External provider notes reviewed: Yes    Past Medical History:   Diagnosis Date    Anemia     Diabetes mellitus type II     Disorder of kidney and ureter     GERD (gastroesophageal reflux disease)     Hypertension     Proteinuria      Past Surgical History:   Procedure Laterality Date    INNER EAR SURGERY      for tinnitus    KIDNEY TRANSPLANT N/A 4/18/2024    Procedure: TRANSPLANT, KIDNEY;  Surgeon: Raul Jean MD;  Location: [facility] OR [address] [place] [address];  Service: Transplant;  Laterality: N/A;    TRANSPLANTATION OF PANCREAS N/A 4/18/2024    Procedure: TRANSPLANT, PANCREAS;  Surgeon: Raul Jean MD;  Location: [facility] OR [address] [place] [address];  Service: Transplant;  Laterality: N/A;     Review of patient's allergies indicates:   Allergen Reactions    Codeine Hallucinations    Vancomycin analogues Other (See Comments)     Burning and  itchig of skin     Review of Systems   Constitutional:  Negative for activity change, appetite change, chills, diaphoresis, fatigue, fever and unexpected weight change.   HENT:  Negative for congestion, dental problem, ear pain, facial swelling, mouth sores, nosebleeds, sore throat, tinnitus, trouble swallowing and voice change.    Eyes:  Negative for photophobia, pain and visual disturbance.   Respiratory:  Negative for apnea, cough, choking, chest tightness and shortness of breath.    Cardiovascular:  Negative for chest pain, palpitations and leg swelling.   Gastrointestinal:  Negative for abdominal distention, abdominal pain, blood in stool, constipation,  "diarrhea, nausea and vomiting.   Endocrine: Negative for cold intolerance and heat intolerance.   Genitourinary:  Negative for difficulty urinating, dysuria, flank pain, hematuria and urgency.   Musculoskeletal:  Negative for arthralgias and gait problem.   Skin:  Negative for color change, pallor and rash.   Neurological:  Negative for dizziness, tremors, seizures, syncope and light-headedness.   Hematological:  Negative for adenopathy. Does not bruise/bleed easily.   Psychiatric/Behavioral:  Negative for agitation and confusion.      Objective:     Blood pressure (!) 209/98, pulse 61, temperature 97.7 °F (36.5 °C), temperature source Temporal, resp. rate 18, height 6' 0.01" (1.829 m), weight 97.6 kg (215 lb 2.7 oz), SpO2 100%.  Physical Exam  Constitutional:       General: He is not in acute distress.     Appearance: He is well-developed.   HENT:      Head: Normocephalic and atraumatic.      Mouth/Throat:      Pharynx: No oropharyngeal exudate.   Eyes:      General: No scleral icterus.     Conjunctiva/sclera: Conjunctivae normal.      Pupils: Pupils are equal, round, and reactive to light.   Cardiovascular:      Rate and Rhythm: Normal rate and regular rhythm.      Heart sounds: Normal heart sounds.   Pulmonary:      Effort: Pulmonary effort is normal. No respiratory distress.      Breath sounds: Normal breath sounds.   Abdominal:      General: Bowel sounds are normal. There is no distension.      Palpations: Abdomen is soft.      Tenderness: There is no abdominal tenderness. There is no guarding or rebound.   Musculoskeletal:         General: No swelling. Normal range of motion.      Cervical back: Normal range of motion and neck supple.   Lymphadenopathy:      Cervical: No cervical adenopathy.   Skin:     General: Skin is warm and dry.      Findings: No erythema or rash.   Neurological:      Mental Status: He is alert and oriented to person, place, and time.   Psychiatric:         Mood and Affect: Mood normal.   "       Behavior: Behavior normal.         Thought Content: Thought content normal.         Labs:  7/2/2024: Prothrombin Time 10.7 sec (Ref range: 9.0 - 12.5 sec)  Labs were reviewed with the patient.    Diagnostics:  The following radiology images have been independently reviewed and interpreted: CT Abd/Pelvis    ABO type: A NEG    Assessment:     1. ESRD (end stage renal disease)        Plan:      Transplant Candidacy:   Based on available information, Mr. Diaz remains a suitable kidney transplant candidate.  He may proceed with transplant surgery as planned. Place kidney on left for possible future pancreas on right.    Counseling: I discussed kidney transplantation at length with him, including risks, potential complications, and alternatives in the management of his renal failure.  The discussion included complications related to anesthesia, bleeding, infection, primary nonfunction, and ATN. I discussed the typical postoperative course, length of hospitalization, the need for long-term immunosuppression, and the need for long-term routine follow-up.    Patient advised that it is recommended that all transplant candidates, and their close contacts and household members receive Covid vaccination.    Interpretation of tests and discussion of patient management involves all members of the multidisciplinary transplant team.    Erma Gómez MD

## 2024-07-02 NOTE — PROGRESS NOTES
Clinic Note: Pre-op Transplant Note    Met with Dejuan Diaz  in the clinic as part of her pre-transplant clearance appointment.    1) Performed a complete medication reconciliation.  Reminded Mr Diaz to bring his insulin, pump, Dexcom and all supplies with him when he travels to [facility] for his transplant  2) Obtained copies of insurance cards, patient understood that the Pharm.D. will order medications, and that medications must be available prior to discharge   3) Discussed medication education that will occur post-transplant   4) Patient will use [facility] for first fill.  5)  was checked: no  6) Co-pays were assessed: his Valcyte is $0, however immunosuppressants must come from [facility]-- will discuss and send scripts ahead of his transplant    Current Outpatient Medications   Medication Sig Dispense Refill    amLODIPine (NORVASC) 10 MG tablet Take 1 tablet by mouth once daily 90 tablet 1    atorvastatin (LIPITOR) 40 MG tablet Take 40 mg by mouth once daily.      blood-glucose meter,continuous (DEXCOM G6  MISC) by Misc.(Non-Drug; Combo Route) route.      blood-glucose transmitter (DEXCOM G6 TRANSMITTER MISC)   Dexcom G6 transmitter, See Instructions, uad with dexcom G6 sensor; change transmitter q 90 days, # 1 EA, 3 Refill(s), Pharmacy: [facility], 187, cm, 23 14:55:00 CDT, Height/Length Measured, 105.1, kg, 23 14:55:00 CDT, Weight Dosing      calcitRIOL (ROCALTROL) 0.25 MCG Cap Take 1 capsule (0.25 mcg total) by mouth once daily. 90 capsule 1    calcium acetate,phosphat bind, (PHOSLO) 667 mg tablet Take 2 tablets (1,334 mg total) by mouth 3 (three) times daily with meals. Take with meals and snacks 300 tablet 2    DEXCOM G6 SENSOR Adelina SMARTSI Each Topical Every 10 Days      icosapent ethyL (VASCEPA) 1 gram Cap Take 2 g by mouth 4 (four) times daily.      insulin lispro (HUMALOG U-100 INSULIN) 100 unit/mL injection   See Instructions, for use in insulin pump;  max daily dose 100 units, # 90 mL, 1 Refill(s), Maintenance, Pharmacy: [facility], 187, cm, 02/23/23 11:24:00 CST, Height/Length Measured, 102.5, kg, 02/23/23 11:24:00 CST, Weight Dosing      insulin pump cart,auto,BT/cntr (OMNIPOD 5 G6 INTRO KIT, GEN 5, SUBQ) Inject into the skin. 200 units per pod      insulin pump cart,auto,BT/cntr (OMNIPOD 5 G6 INTRO KIT, GEN 5, SUBQ)   Omnipod 5 G6 Intro Kit (Gen 5), See Instructions, use as directed, # 1 EA, 0 Refill(s), Pharmacy: [facility], 187, cm, 12/30/22 9:14:00 CST, Height/Length Measured, 102.3, kg, 12/30/22 9:14:00 CST, Weight Dosing      losartan (COZAAR) 100 MG tablet TAKE 1 TABLET(100 MG) BY MOUTH EVERY DAY 90 tablet 1    nebivoloL (BYSTOLIC) 20 mg Tab Take 20 mg by mouth once daily. 90 tablet 3    ondansetron (ZOFRAN-ODT) 4 MG TbDL Take 1 tablet (4 mg total) by mouth every 12 (twelve) hours as needed. 30 tablet 1    sodium bicarbonate 650 MG tablet Take 1 tablet (650 mg total) by mouth Daily. 30 tablet 5    ALPRAZolam (XANAX) 0.5 MG tablet Take 1 tablet (0.5 mg total) by mouth 2 (two) times daily as needed for Anxiety. (Patient not taking: Reported on 7/2/2024) 60 tablet 0    aspirin (ECOTRIN) 81 MG EC tablet Take 81 mg by mouth once daily. (Patient not taking: Reported on 7/2/2024)      coenzyme Q10 200 mg capsule Take 200 mg by mouth once daily. (Patient not taking: Reported on 7/2/2024)      multivit-min/folic/vit K/lycop (ONE-A-DAY MEN'S MULTIVITAMIN ORAL) Take by mouth once daily. (Patient not taking: Reported on 7/2/2024)      patiromer calcium sorbitex (VELTASSA) 8.4 gram PwPk Take 1 packet (8.4 g total) by mouth every Mon, Wed, Fri, Sun. (Patient not taking: Reported on 7/2/2024) 15 packet 0    sodium zirconium cyclosilicate (LOKELMA) 10 gram packet Take 1 packet (10 g total) by mouth 3 (three) times a week. Mix entire contents of packet(s) into drinking glass containing 3 tablespoons of water; stir well and drink immediately. Add water  and repeat until no powder remains to receive entire dose. (Patient not taking: Reported on 7/2/2024) 12 packet 0    tadalafil (CIALIS) 5 MG tablet TK 1 T PO D PRF ERECTILE DYSFUNCTION (Patient not taking: Reported on 7/2/2024)  1     No current facility-administered medications for this visit.       Patient verbalized understanding and had the opportunity to ask questions

## 2024-07-02 NOTE — H&P
Kidney Transplant Recipient Preoperative Evaluation    Subjective:     CC:  Preoperative evaluation of kidney transplant candidacy.    HPI:  Mr. Diaz is a 43 y.o. year old White male who has been approved to receive a living unrelated kidney transplant.  He has ESRD secondary to diabetic nephropathy.  Patient is predialysis.  He has presented for his final preoperative medical evaluation.  He denies any recent hospitalizations or ED visits.    External provider notes reviewed: Yes    Past Medical History:   Diagnosis Date    Anemia     Diabetes mellitus type II     Disorder of kidney and ureter     GERD (gastroesophageal reflux disease)     Hypertension     Proteinuria      Past Surgical History:   Procedure Laterality Date    INNER EAR SURGERY      for tinnitus    KIDNEY TRANSPLANT N/A 4/18/2024    Procedure: TRANSPLANT, KIDNEY;  Surgeon: Raul Jean MD;  Location: [facility] OR [address] [place] [address];  Service: Transplant;  Laterality: N/A;    TRANSPLANTATION OF PANCREAS N/A 4/18/2024    Procedure: TRANSPLANT, PANCREAS;  Surgeon: Raul Jean MD;  Location: [facility] OR [address] [place] [address];  Service: Transplant;  Laterality: N/A;     Review of patient's allergies indicates:   Allergen Reactions    Codeine Hallucinations    Vancomycin analogues Other (See Comments)     Burning and  itchig of skin     Review of Systems   Constitutional:  Negative for activity change, appetite change, chills, diaphoresis, fatigue, fever and unexpected weight change.   HENT:  Negative for congestion, dental problem, ear pain, facial swelling, mouth sores, nosebleeds, sore throat, tinnitus, trouble swallowing and voice change.    Eyes:  Negative for photophobia, pain and visual disturbance.   Respiratory:  Negative for apnea, cough, choking, chest tightness and shortness of breath.    Cardiovascular:  Negative for chest pain, palpitations and leg swelling.   Gastrointestinal:  Negative for abdominal distention, abdominal pain, blood in stool, constipation,  "diarrhea, nausea and vomiting.   Endocrine: Negative for cold intolerance and heat intolerance.   Genitourinary:  Negative for difficulty urinating, dysuria, flank pain, hematuria and urgency.   Musculoskeletal:  Negative for arthralgias and gait problem.   Skin:  Negative for color change, pallor and rash.   Neurological:  Negative for dizziness, tremors, seizures, syncope and light-headedness.   Hematological:  Negative for adenopathy. Does not bruise/bleed easily.   Psychiatric/Behavioral:  Negative for agitation and confusion.      Objective:     Blood pressure (!) 209/98, pulse 61, temperature 97.7 °F (36.5 °C), temperature source Temporal, resp. rate 18, height 6' 0.01" (1.829 m), weight 97.6 kg (215 lb 2.7 oz), SpO2 100%.  Physical Exam  Constitutional:       General: He is not in acute distress.     Appearance: He is well-developed.   HENT:      Head: Normocephalic and atraumatic.      Mouth/Throat:      Pharynx: No oropharyngeal exudate.   Eyes:      General: No scleral icterus.     Conjunctiva/sclera: Conjunctivae normal.      Pupils: Pupils are equal, round, and reactive to light.   Cardiovascular:      Rate and Rhythm: Normal rate and regular rhythm.      Heart sounds: Normal heart sounds.   Pulmonary:      Effort: Pulmonary effort is normal. No respiratory distress.      Breath sounds: Normal breath sounds.   Abdominal:      General: Bowel sounds are normal. There is no distension.      Palpations: Abdomen is soft.      Tenderness: There is no abdominal tenderness. There is no guarding or rebound.   Musculoskeletal:         General: No swelling. Normal range of motion.      Cervical back: Normal range of motion and neck supple.   Lymphadenopathy:      Cervical: No cervical adenopathy.   Skin:     General: Skin is warm and dry.      Findings: No erythema or rash.   Neurological:      Mental Status: He is alert and oriented to person, place, and time.   Psychiatric:         Mood and Affect: Mood normal.   "       Behavior: Behavior normal.         Thought Content: Thought content normal.         Labs:  7/2/2024: Prothrombin Time 10.7 sec (Ref range: 9.0 - 12.5 sec)  Labs were reviewed with the patient.    Diagnostics:  The following radiology images have been independently reviewed and interpreted: CT Abd/Pelvis    ABO type: A NEG    Assessment:     1. ESRD (end stage renal disease)        Plan:      Transplant Candidacy:   Based on available information, Mr. Diaz remains a suitable kidney transplant candidate.  He may proceed with transplant surgery as planned. Place kidney on left for possible future pancreas on right.    Counseling: I discussed kidney transplantation at length with him, including risks, potential complications, and alternatives in the management of his renal failure.  The discussion included complications related to anesthesia, bleeding, infection, primary nonfunction, and ATN. I discussed the typical postoperative course, length of hospitalization, the need for long-term immunosuppression, and the need for long-term routine follow-up.    Patient advised that it is recommended that all transplant candidates, and their close contacts and household members receive Covid vaccination.    Interpretation of tests and discussion of patient management involves all members of the multidisciplinary transplant team.    Erma Gómez MD

## 2024-07-02 NOTE — LETTER
July 2, 2024        Jose Mckinney  [address]  [address]  [facility] [address]  Phone: [phone]  Fax: [phone]             [facility] 1st Fl  [address]  [facility] [address]  Phone: [phone]   Patient: Dejuan Diaz Jr.   MR Number: [id number]   YOB: 1980   Date of Visit: 7/2/2024       Dear Dr. Jose Mckinney    Thank you for referring Dejuan Diaz to me for evaluation. Attached you will find relevant portions of my assessment and plan of care.    If you have questions, please do not hesitate to call me. I look forward to following Dejuan Diaz along with you.    Sincerely,    Mitch Simmons Jr., MD    Enclosure    If you would like to receive this communication electronically, please contact [email or web address] or [phone] to request [employer] access.    [employer] is a tool which provides read-only access to select patient information with whom you have a relationship. Its easy to use and provides real time access to review your patients record including encounter summaries, notes, results, and demographic information.    If you feel you have received this communication in error or would no longer like to receive these types of communications, please e-mail [email or web address]

## 2024-07-03 NOTE — PROGRESS NOTES
RECIPIENT PRE-OP NOTE    Potential Recipient Name: Dejuan Diaz Jr., [id number]  Encounter Date: 7/2/2024    Sex: male  YOB: 1980  Age: 43 y.o.    Housing/Contact Info:  [address] [facility] [place] [address]  Telephone Information:   Mobile [phone]    Home: [phone] (home)  Work: There is no work phone number on file.  E-mail: [email or web address]    Potential Surgery Date: July 16,2024  Potential Donor Name: Cathi Beach, Clinic Number: [id number]  Potential Donor Relationship: friend    Patient presents as alert and oriented x 4, pleasant, good eye contact, well groomed, recall good, concentration/judgement good, average intelligence, calm, communicative, cooperative, and asking and answering questions appropriately. Patient presents as a 43 y.o. year old male to recipient pre-op appointment for scheduled kidney living donor surgery. Patient's sister accompanies patient.  Patient states that he is independent with ADLs at this time.  Patient states motivation to pursue organ transplant at this time.    Does patient drive?  Patient is aware will not be able to drive until medically cleared by the transplant team. Patient verbalizes understanding that patient will need assistance for all transportation needs until medically cleared to drive.    Caregivers/Transportation:  Name: Ginger Carrero   Age: 54  Phone: [phone]  Relationship: sister  Does person drive? yes  Does person have own/reliable transportation? yes    Name: Naz Graham   Age: None provided   Phone: [phone]  Relationship: significant other  Does person drive? yes  Does person have own/reliable transportation? yes    Dependents/Others who rely on Patient/Caregiver for care: pt reports caring for parents on their farm. Pt's parents will be cared for by their siblings and sister Ginger.     Cognitive:  Education: college degree   Reading Level: college  Reports difficulty with: seeing  Denies difficulty with:  reading, writing, hearing, comprehension, learning, and memory    Infusion Service: patient utilizing? no  Home Health: patient utilizing? no  DME:  patient utilizing? no    Living Will: no  Healthcare Power of : no Pt reports trusting sister and parents with medical decisions as needed  Written LW/HCPA and verbal information presented to patient today.    Insurance: Payor: [employer] / Plan: [employer] ([employer]) / Product Type: Commercial /   Possible concerns regarding insurance post-donation reviewed. Patient verbalizes clear understanding.    Financial:  Employment: Patient is currently employed. Patient does plan to return to work once medically cleared. Patient referred to vocational rehabilitation.  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  Patient states has not conducted fundraising to assist with post-transplant costs.    Tobacco/Alcohol/Illicit Drug Abuse: Patient reports does not use tobacco products, alcohol, illicit drugs, and non-prescription medications and that patient does plan to remain abstinent.     Psychiatric History: patient denies    Coping: Patient states that he is coping well with having kidney living donor surgery. Patient states will call as needed and does understand how to access resources including the  as needed, both inpatient and outpatient.    Resources, information and support provided. Psychosocial aspects regarding organ donation and transplantation were discussed. Patient reports having a clear understanding of resources, information, support and psychosocial aspects.    Discharge Plan: Patient to discharge to the [facility] complex under the care of patient's significant other post-organ transplant. Patient states that patient's significant other and sister will be present as caregiver in the hospital.  Patient's significant other and sister will transport patient home. Patient states agreement with not driving and not returning to work until medically cleared to do so.    Patient states having clear understanding and realistic expectations regarding the potential risks and potential benefits of organ transplantation and organ donation. Patient agrees to further discuss with health care team members and support system members, as well as to utilize available resources and express questions and/or concerns. Resources and information provided and reviewed.    Patient reports motivation to proceed with living organ donor transplantation as scheduled.     Suitability for Transplant: Patient presents as a suitable candidate for organ transplant at this time.  Patient states does have a caregiver plan, transportation plan, and lodging plan in place. Patient states that patient does have medical and prescription medicine insurance in place and does have a plan in place to afford post-transplant costs.    Patient provided verbal permission to release any necessary information to outside resources for patient care and discharge planning.  Resources and information provided and reviewed.  Patient is choosing specialty transplant mail order pharmacy.    [place] [facility] [place]: [place] [id number]                                                         Outpt: [id number] [address] [id number]    Patient states that he is aware of what patient's normal copays and deductibles are for prescription medicines.       provided psychosocial support, counseling, resources, education, assistance, and discharge planning.  remains available.    Recommendations/Additional Comments: SW expressed no concerns at this time.    Patient states is aware of [facility]'s affiliation and/or partnership with agencies in home health care, LTAC, SNF, DME, and  other [facility] and clinics.

## 2024-07-05 ENCOUNTER — LAB VISIT (OUTPATIENT)
Dept: LAB | Facility: HOSPITAL | Age: 44
End: 2024-07-05
Attending: INTERNAL MEDICINE
Payer: COMMERCIAL

## 2024-07-05 DIAGNOSIS — N18.5 CKD (CHRONIC KIDNEY DISEASE) STAGE 5, GFR LESS THAN 15 ML/MIN: ICD-10-CM

## 2024-07-05 LAB
ALBUMIN SERPL BCP-MCNC: 3.6 G/DL (ref 3.5–5.2)
ANION GAP SERPL CALC-SCNC: 11 MMOL/L (ref 8–16)
BUN SERPL-MCNC: 84 MG/DL (ref 6–20)
CALCIUM SERPL-MCNC: 8.7 MG/DL (ref 8.7–10.5)
CHLORIDE SERPL-SCNC: 114 MMOL/L (ref 95–110)
CO2 SERPL-SCNC: 16 MMOL/L (ref 23–29)
CREAT SERPL-MCNC: 8.1 MG/DL (ref 0.5–1.4)
CREAT UR-MCNC: 103.9 MG/DL (ref 23–375)
EST. GFR  (NO RACE VARIABLE): 7.8 ML/MIN/1.73 M^2
GLUCOSE SERPL-MCNC: 84 MG/DL (ref 70–110)
PHOSPHATE SERPL-MCNC: 5.7 MG/DL (ref 2.7–4.5)
POTASSIUM SERPL-SCNC: 5.1 MMOL/L (ref 3.5–5.1)
PROT UR-MCNC: 147 MG/DL (ref 0–15)
PROT/CREAT UR: 1.41 MG/G{CREAT} (ref 0–0.2)
PTH-INTACT SERPL-MCNC: 293.6 PG/ML (ref 9–77)
SODIUM SERPL-SCNC: 141 MMOL/L (ref 136–145)

## 2024-07-05 PROCEDURE — 80069 RENAL FUNCTION PANEL: CPT | Mod: TXP | Performed by: INTERNAL MEDICINE

## 2024-07-05 PROCEDURE — 83970 ASSAY OF PARATHORMONE: CPT | Mod: TXP | Performed by: INTERNAL MEDICINE

## 2024-07-05 PROCEDURE — 82570 ASSAY OF URINE CREATININE: CPT | Mod: TXP | Performed by: INTERNAL MEDICINE

## 2024-07-05 PROCEDURE — 36415 COLL VENOUS BLD VENIPUNCTURE: CPT | Mod: TXP | Performed by: INTERNAL MEDICINE

## 2024-07-08 ENCOUNTER — OFFICE VISIT (OUTPATIENT)
Dept: NEPHROLOGY | Facility: CLINIC | Age: 44
End: 2024-07-08
Payer: COMMERCIAL

## 2024-07-08 ENCOUNTER — TELEPHONE (OUTPATIENT)
Dept: CARDIOLOGY | Facility: CLINIC | Age: 44
End: 2024-07-08
Payer: COMMERCIAL

## 2024-07-08 DIAGNOSIS — E83.39 HYPERPHOSPHATEMIA: ICD-10-CM

## 2024-07-08 DIAGNOSIS — N25.81 SECONDARY RENAL HYPERPARATHYROIDISM: ICD-10-CM

## 2024-07-08 DIAGNOSIS — N18.5 CKD (CHRONIC KIDNEY DISEASE) STAGE 5, GFR LESS THAN 15 ML/MIN: Primary | ICD-10-CM

## 2024-07-08 DIAGNOSIS — E10.21 TYPE 1 DIABETES MELLITUS WITH NEPHROPATHY: ICD-10-CM

## 2024-07-08 DIAGNOSIS — I10 PRIMARY HYPERTENSION: ICD-10-CM

## 2024-07-08 PROCEDURE — 1159F MED LIST DOCD IN RCRD: CPT | Mod: CPTII,95,, | Performed by: INTERNAL MEDICINE

## 2024-07-08 PROCEDURE — 3066F NEPHROPATHY DOC TX: CPT | Mod: CPTII,95,, | Performed by: INTERNAL MEDICINE

## 2024-07-08 PROCEDURE — 3044F HG A1C LEVEL LT 7.0%: CPT | Mod: CPTII,95,, | Performed by: INTERNAL MEDICINE

## 2024-07-08 PROCEDURE — 1160F RVW MEDS BY RX/DR IN RCRD: CPT | Mod: CPTII,95,, | Performed by: INTERNAL MEDICINE

## 2024-07-08 PROCEDURE — 3062F POS MACROALBUMINURIA REV: CPT | Mod: CPTII,95,, | Performed by: INTERNAL MEDICINE

## 2024-07-08 PROCEDURE — 99214 OFFICE O/P EST MOD 30 MIN: CPT | Mod: 95,,, | Performed by: INTERNAL MEDICINE

## 2024-07-08 RX ORDER — MYCOPHENOLATE MOFETIL 250 MG/1
1000 CAPSULE ORAL 2 TIMES DAILY
Qty: 240 CAPSULE | Refills: 11 | Status: SHIPPED | OUTPATIENT
Start: 2024-07-08 | End: 2024-07-12

## 2024-07-08 RX ORDER — TACROLIMUS 1 MG/1
6 CAPSULE ORAL EVERY 12 HOURS
Qty: 360 CAPSULE | Refills: 11 | Status: SHIPPED | OUTPATIENT
Start: 2024-07-08 | End: 2024-07-12

## 2024-07-08 NOTE — PROGRESS NOTES
Subjective:       Patient ID: Dejuan Diaz Jr. is a 43 y.o. White male who presents for return patient evaluation for chronic renal failure.    The patient location is:  Patient Home   The chief complaint leading to consultation is: CKD  Visit type: Virtual visit with synchronous audio and video  Total time spent with patient: 11 minutes  Each patient to whom he or she provides medical services by telemedicine is:  (1) informed of the relationship between the physician and patient and the respective role of any other health care provider with respect to management of the patient; and (2) notified that he or she may decline to receive medical services by telemedicine and may withdraw from such care at any time.        He had COVID in April 2021.  He reports that he has been dealing with a divorce and both of his parents having cancer.  His diabetes is much better now that he is on an omnipod.  He has no new uremic symptoms.  He is now travelling more now due to a change in his job as well.   His blood pressure at home has been 140s-150/90s.  His donor should be complete at the end of May.  He is awaiting a transplant on Tuesday of next week.      Review of Systems   Constitutional:  Positive for fatigue. Negative for appetite change, chills and fever.   Eyes:  Negative for visual disturbance.   Respiratory:  Positive for shortness of breath (mild with exertion). Negative for cough.    Cardiovascular:  Positive for chest pain (at times). Negative for leg swelling.   Gastrointestinal:  Positive for nausea. Negative for diarrhea and vomiting.   Genitourinary:  Negative for difficulty urinating, dysuria and hematuria.        ED   Musculoskeletal:  Positive for gait problem (occasional leg heaviness). Negative for myalgias.   Skin:  Negative for rash.   Neurological:  Positive for headaches.   Psychiatric/Behavioral:  Negative for sleep disturbance. The patient is nervous/anxious.        The past medical, family and  social histories were reviewed for this encounter.    There were no vitals taken for this visit.    Objective:      Physical Exam  Vitals reviewed.   Constitutional:       General: He is not in acute distress.     Appearance: He is well-developed.   HENT:      Head: Normocephalic and atraumatic.   Eyes:      General: No scleral icterus.  Pulmonary:      Effort: Pulmonary effort is normal. No respiratory distress.   Neurological:      Mental Status: He is alert and oriented to person, place, and time.   Psychiatric:         Mood and Affect: Mood normal.         Behavior: Behavior normal.        Assessment:       1. CKD (chronic kidney disease) stage 5, GFR less than 15 ml/min    2. Primary hypertension    3. Type 1 diabetes mellitus with nephropathy    4. Secondary renal hyperparathyroidism    5. Hyperphosphatemia          Lab Results   Component Value Date    CREATININE 8.1 (H) 07/05/2024    BUN 84 (H) 07/05/2024     07/05/2024    K 5.1 07/05/2024     (H) 07/05/2024    CO2 16 (L) 07/05/2024     Lab Results   Component Value Date    .6 (H) 07/05/2024    CALCIUM 8.7 07/05/2024    PHOS 5.7 (H) 07/05/2024     Lab Results   Component Value Date    HCT 26.6 (L) 07/02/2024     Prot/Creat Ratio, Urine   Date Value Ref Range Status   07/05/2024 1.41 (H) 0.00 - 0.20 Final   04/08/2024 2.76 (H) 0.00 - 0.20 Final   01/23/2024 4.72 (H) 0.00 - 0.20 Final       Plan:   Return to clinic in 4 months.   Labs for next visit include transplant labs per standing orders.  UPC is 1.4.  PTH is 293 with a calcium of 8.7.  Continue calcitriol.  Renal US shows R 11.4 cm, L 12.2 cm.  Please avoid or minimize all NSAIDS (ibuprofen, motrin, aleve, indocin, naprosyn) to minimize the risk to your kidneys.  Aspirin in a dose of 325 mg or less a day is likely the safest with regards to kidney function.  If you are able to take aspirin and do not have any bleeding problems or ulcers, this may be your best therapy.  Alternatively,  acetaminophen (Tylenol) is the safer than NSAIDs with regards to kidney function.  I would ask you take this as directed on the bottle.  It is best to stay under 2 grams a day (4-500 mg tablets a day maximum).  ESRD Treatment Choices.    We discussed kidney transplant versus kidney/pancreas transplant.  KTM referral has been done.  We will be going straight to transplant.    KFRE 2-Year: 98.6% at 7/5/2024 10:29 AM  Calculated from:  Serum Creatinine: 8.1 mg/dL at 7/5/2024 10:29 AM  Urine Albumin Creatinine Ratio: 2,819.6 ug/mg at 2/23/2024  8:49 AM  Age: 43 years  Sex: Male at 7/5/2024 10:29 AM  Has CKD-3 to CKD-5: Yes    KFRE 5-Year: 100% at 7/5/2024 10:29 AM  Calculated from:  Serum Creatinine: 8.1 mg/dL at 7/5/2024 10:29 AM  Urine Albumin Creatinine Ratio: 2,819.6 ug/mg at 2/23/2024  8:49 AM  Age: 43 years  Sex: Male at 7/5/2024 10:29 AM  Has CKD-3 to CKD-5: Yes

## 2024-07-08 NOTE — TELEPHONE ENCOUNTER
Pt having kidney transplant next week and all testing has been completed and is satisfactory; Pt has follow up scheduled with you tomorrow--pt asking if this visit is necessary?

## 2024-07-09 PROCEDURE — 86832 HLA CLASS I HIGH DEFIN QUAL: CPT | Mod: TXP | Performed by: INTERNAL MEDICINE

## 2024-07-09 PROCEDURE — 86829 HLA CLASS I/II ANTIBODY QUAL: CPT | Mod: 59,TXP | Performed by: INTERNAL MEDICINE

## 2024-07-09 PROCEDURE — 86829 HLA CLASS I/II ANTIBODY QUAL: CPT | Mod: 91,TXP | Performed by: INTERNAL MEDICINE

## 2024-07-09 PROCEDURE — 86833 HLA CLASS II HIGH DEFIN QUAL: CPT | Mod: TXP | Performed by: INTERNAL MEDICINE

## 2024-07-10 ENCOUNTER — DOCUMENTATION ONLY (OUTPATIENT)
Dept: TRANSPLANT | Facility: CLINIC | Age: 44
End: 2024-07-10
Payer: COMMERCIAL

## 2024-07-10 ENCOUNTER — TELEPHONE (OUTPATIENT)
Dept: TRANSPLANT | Facility: CLINIC | Age: 44
End: 2024-07-10
Payer: COMMERCIAL

## 2024-07-10 NOTE — TELEPHONE ENCOUNTER
"Recipient Information  Name: Dejuan Diaz  MRN: [id number]  Age: 43 y.o. BMI: 29       Primary Surgeon:Rico     Dialysis status: pre-dialysis  GFR: 8  K+ at pre-op:   Lab Results   Component Value Date    K 5.1 07/05/2024         Plan for K+: Nephrologist is managing- on Velt[place]    Re-Transplant: No  Currently on Blood thinners? Yes Reason: Stopped Aspirin one week before surgery    Induction: Thymoglobulin    Other Considerations: Place kidney on left for possible pancreas transplant.  Cross match was falsely positive.  Additional HLA testing has confirmed that cross match was actually negative, according to Dr. Yanez and Dr. Li.  "So, from HLA standpoint, our opinion is that this donor/recipient pair is good to go. Post transplant DSA studies will have to be done on the Immucor platform (which shows that the antibody is not real)."    Donor Information  Name: Cathi Beach   MRN: [id number]  Age: 42    Cr Cl: 145  BMI: 32    Surgeon: Aldair JOSEPH ID: [id number]  Kidney to be donated: the left side          Final CXM Results:  HLA Flow Crossmatch (Allo) Interpretation (no units)   Date Value   07/02/2024     FINAL CROSSMATCH    Results reported to Kiara Solorzano on 07/08/2024 at 9:30 AM. Obtained VRB.     The previously reported DSA to DQ7 was determined to be false positive reactivity.  Dr. Gómez was notified about amended results by Dr. Li on 07/10/24 @ 0948.       T MCS Average - XM Allo (no units)   Date Value   07/02/2024 10.70   07/02/2024 1.90     T Cell Results - XM Allo (no units)   Date Value   07/02/2024 Negative   07/02/2024 Negative     B MCS Average - XM Allo (no units)   Date Value   07/02/2024 24.50   07/02/2024 -1.10     B Cell Results - XM Allo (no units)   Date Value   07/02/2024 Negative   07/02/2024 Negative        T MCS Average - XM Auto (no units)   Date Value   07/02/2024 5.70     T Cell Results - XM Auto (no units)   Date Value   07/02/2024 Negative     B MCS Average - XM " Auto (no units)   Date Value   07/02/2024 -4.40     B Cell Results - XM Auto (no units)   Date Value   07/02/2024 Negative       Comments:  See note above

## 2024-07-10 NOTE — TELEPHONE ENCOUNTER
"Recipient Information  Name: Dejuan Diaz  MRN: [id number]  Age: 43 y.o. BMI: 29       Primary Surgeon:Rico     Dialysis status: pre-dialysis  GFR: 8  K+ at pre-op:   Lab Results   Component Value Date    K 5.1 07/05/2024         Plan for K+: Nephrologist is managing- on V[place]    Re-Transplant: No  Currently on Blood thinners? Yes Reason: Stopped Aspirin one week before surgery    Induction: Thymoglobulin    Other Considerations: Place kidney on left for possible pancreas transplant.  Cross match was falsely positive.  Additional HLA testing has confirmed that cross match was actually negative, according to Dr. Yanez and Dr. Li.  "So, from HLA standpoint, our opinion is that this donor/recipient pair is good to go. Post transplant DSA studies will have to be done on the Immucor platform (which shows that the antibody is not real)."    Donor Information  Name: Cathi Beach   MRN: [id number]  Age: 42    Cr Cl: 145  BMI: 32    Surgeon: Aldair JOSEPH ID: [id number]  Kidney to be donated: the left side          Final CXM Results:  HLA Flow Crossmatch (Allo) Interpretation (no units)   Date Value   07/02/2024     FINAL CROSSMATCH    Results reported to Kiara Solorzano on 07/08/2024 at 9:30 AM. Obtained VRB.     The previously reported DSA to DQ7 was determined to be false positive reactivity.  Dr. Gómez was notified about amended results by Dr. Li on 07/10/24 @ 0948.       T MCS Average - XM Allo (no units)   Date Value   07/02/2024 10.70   07/02/2024 1.90     T Cell Results - XM Allo (no units)   Date Value   07/02/2024 Negative   07/02/2024 Negative     B MCS Average - XM Allo (no units)   Date Value   07/02/2024 24.50   07/02/2024 -1.10     B Cell Results - XM Allo (no units)   Date Value   07/02/2024 Negative   07/02/2024 Negative        T MCS Average - XM Auto (no units)   Date Value   07/02/2024 5.70     T Cell Results - XM Auto (no units)   Date Value   07/02/2024 Negative     B MCS Average - XM " Auto (no units)   Date Value   07/02/2024 -4.40     B Cell Results - XM Auto (no units)   Date Value   07/02/2024 Negative       Comments:  See note above

## 2024-07-10 NOTE — PROGRESS NOTES
"Note received from Dr. Fournier and Guillermo regarding HLA crossmatch results from 7/2/2024:  "The additional testing by two different platforms is negative- that is, the DQ7/DAQ03 antibody is a false positive. That is further substantiated by the completely negative crossmatches. With that additional information, the concern about increased risk is obviated. So, from HLA standpoint, our opinion is that this donor/recipient pair is good to go. Post transplant DSA studies will have to be done on the Immucor platform (which shows that the antibody is not real)."      "

## 2024-07-10 NOTE — TELEPHONE ENCOUNTER
"Donor/Recipient Compatibility    Test/Item Donor Recipient Acceptable/Not Acceptable   ABO A POS A NEG Acceptable   HBsAb (Hepatitis B Surface Antibody) 23.47, Reactive Hep B S Ab   Date Value   11/29/2023 <3.00 mIU/mL   11/29/2023 Non-reactive    Acceptable   HBsAg (Hepatitis B Surface Antigen) Non-reactive Hepatitis B Surface Ag (no units)   Date Value   07/02/2024 Non-reactive    Acceptable   HBcAb (Hepatitis Core Antibody) Non-reactive Hep B Core Total Ab (no units)   Date Value   07/02/2024 Non-reactive    Acceptable   HCVab (Hepatitis C antibody) Non-reactive Hepatitis C Ab (no units)   Date Value   04/17/2024 Non-reactive    Acceptable   HIV  Non-reactive HIV 1/2 Ag/Ab (no units)   Date Value   07/02/2024 Non-reactive    Acceptable   CMV Non-reactive CMV IgG Interpretation (no units)   Date Value   07/02/2024 Non-Reactive     If no results found, check Results Review for Sendouts Acceptable   RPR Non-reactive RPR (no units)   Date Value   11/29/2023 Non-reactive    Acceptable     Potassium 4.6 Potassium (mmol/L)   Date Value   07/05/2024 5.1    Acceptable   Creatinine 0.7 Creatinine (mg/dL)   Date Value   07/05/2024 8.1 (H)    Acceptable   eGFR  >60.0 eGFR (mL/min/1.73 m^2)   Date Value   07/05/2024 7.8 (A)    Acceptable   Creatinine Clearance 145 No results found for: "CRCLEARANCE" Acceptable   BUN 8 BUN (mg/dL)   Date Value   07/05/2024 84 (H)    Acceptable   WBC 6.60 WBC (K/uL)   Date Value   07/02/2024 5.94    Acceptable   Hemoglobin 15.9 Hemoglobin (g/dL)   Date Value   07/02/2024 9.3 (L)    Acceptable   Hematocrit 47.6 Hematocrit (%)   Date Value   07/02/2024 26.6 (L)    Acceptable   Platelet count 230 Platelets (K/uL)   Date Value   07/02/2024 218    Acceptable   COVID Vax status Unvaccinated Unvaccinated      Recipient Only    X Match Reviewed      The previously reported DSA to DQ7 was determined to be false positive reactivity.      Donor Only    Imaging reviewed imaging and agree with selection " coordinator for left kidney   KAVITA Testing Acceptable  Look in the Labs tab of Chart Review to find the results or Results Review activity.     Erma Gómez MD

## 2024-07-12 DIAGNOSIS — Z76.82 AWAITING ORGAN TRANSPLANT: ICD-10-CM

## 2024-07-12 RX ORDER — TACROLIMUS 1 MG/1
6 CAPSULE ORAL EVERY 12 HOURS
Qty: 360 CAPSULE | Refills: 11 | Status: SHIPPED | OUTPATIENT
Start: 2024-07-12

## 2024-07-12 RX ORDER — TACROLIMUS 1 MG/1
6 CAPSULE ORAL EVERY 12 HOURS
Qty: 360 CAPSULE | Refills: 11 | Status: SHIPPED | OUTPATIENT
Start: 2024-07-12 | End: 2024-07-12 | Stop reason: CLARIF

## 2024-07-12 RX ORDER — TACROLIMUS 1 MG/1
6 CAPSULE ORAL EVERY 12 HOURS
Qty: 360 CAPSULE | Refills: 11 | Status: SHIPPED | OUTPATIENT
Start: 2024-07-12 | End: 2024-07-12

## 2024-07-12 RX ORDER — MYCOPHENOLATE MOFETIL 250 MG/1
1000 CAPSULE ORAL 2 TIMES DAILY
Qty: 240 CAPSULE | Refills: 11 | Status: SHIPPED | OUTPATIENT
Start: 2024-07-12 | End: 2024-07-12

## 2024-07-12 RX ORDER — MYCOPHENOLATE MOFETIL 250 MG/1
1000 CAPSULE ORAL 2 TIMES DAILY
Qty: 240 CAPSULE | Refills: 11 | Status: SHIPPED | OUTPATIENT
Start: 2024-07-12 | End: 2024-07-12 | Stop reason: CLARIF

## 2024-07-12 RX ORDER — MYCOPHENOLATE MOFETIL 250 MG/1
1000 CAPSULE ORAL 2 TIMES DAILY
Qty: 240 CAPSULE | Refills: 11 | Status: SHIPPED | OUTPATIENT
Start: 2024-07-12

## 2024-07-12 NOTE — NURSING
Spoke with the patient's Sharon Swenson P:[phone] / F:[phone];  was told that the patient's immunos, antivirals, and other medications must come from [facility] P: [phone], Option 1; Per Sharon direct contacts are [phone] and [facility] [phone]; She is sending the referral over today.  She assures that [facility] will OVERNIGHT medications to the desired address after transplant.

## 2024-07-15 ENCOUNTER — TELEPHONE (OUTPATIENT)
Dept: TRANSPLANT | Facility: CLINIC | Age: 44
End: 2024-07-15
Payer: COMMERCIAL

## 2024-07-15 LAB
CLASS I ANTIBODIES - LUMINEX: NORMAL
CLASS I ANTIBODY COMMENTS - LUMINEX: NORMAL
CLASS II ANTIBODIES - LUMINEX: NORMAL
CLASS II ANTIBODY COMMENTS - LUMINEX: NORMAL
CPRA %: 37
SERUM COLLECTION DT - LUMINEX CLASS I: NORMAL
SERUM COLLECTION DT - LUMINEX CLASS II: NORMAL
SPCL1 TESTING DATE: NORMAL
SPCL2 TESTING DATE: NORMAL
SPCLU TESTING DATE: NORMAL

## 2024-07-15 NOTE — TELEPHONE ENCOUNTER
I called patient and reviewed his BP medications. Patient asked what his BP is in the AM prior to taking his B/P medications. Patient stated that his BP is usually around 140/80. We discussed patient taking his BP in the morning prior to leaving his home to come in for transplant. I will discuss with transplant nephrologist regarding which BP med to take if BP is elevated.   Dr. Simmons sent a text requesting he call me. Routed note to SANDOVAL Cowart

## 2024-07-16 ENCOUNTER — HOSPITAL ENCOUNTER (INPATIENT)
Facility: HOSPITAL | Age: 44
LOS: 2 days | Discharge: HOME | DRG: 652 | End: 2024-07-18
Attending: TRANSPLANT SURGERY | Admitting: TRANSPLANT SURGERY
Payer: COMMERCIAL

## 2024-07-16 ENCOUNTER — ANESTHESIA (OUTPATIENT)
Dept: SURGERY | Facility: HOSPITAL | Age: 44
End: 2024-07-16
Payer: COMMERCIAL

## 2024-07-16 DIAGNOSIS — N18.5 TYPE 1 DIABETES MELLITUS WITH STAGE 5 CHRONIC KIDNEY DISEASE NOT ON CHRONIC DIALYSIS: ICD-10-CM

## 2024-07-16 DIAGNOSIS — I10 ESSENTIAL HYPERTENSION: ICD-10-CM

## 2024-07-16 DIAGNOSIS — Z94.0 STATUS POST LIVING-DONOR KIDNEY TRANSPLANTATION: ICD-10-CM

## 2024-07-16 DIAGNOSIS — Z96.41 INSULIN PUMP STATUS: ICD-10-CM

## 2024-07-16 DIAGNOSIS — Z94.0 STATUS POST KIDNEY TRANSPLANT: Primary | ICD-10-CM

## 2024-07-16 DIAGNOSIS — N18.6 ESRD (END STAGE RENAL DISEASE): ICD-10-CM

## 2024-07-16 DIAGNOSIS — Z79.4 LONG TERM CURRENT USE OF INSULIN: ICD-10-CM

## 2024-07-16 DIAGNOSIS — E83.51 HYPOCALCEMIA: ICD-10-CM

## 2024-07-16 DIAGNOSIS — D63.8 ANEMIA OF CHRONIC DISEASE: ICD-10-CM

## 2024-07-16 DIAGNOSIS — Z29.89 PROPHYLACTIC IMMUNOTHERAPY: ICD-10-CM

## 2024-07-16 DIAGNOSIS — Z91.89 AT RISK FOR OPPORTUNISTIC INFECTIONS: ICD-10-CM

## 2024-07-16 DIAGNOSIS — R53.1 WEAKNESS: ICD-10-CM

## 2024-07-16 DIAGNOSIS — D62 ACUTE ON CHRONIC BLOOD LOSS ANEMIA: ICD-10-CM

## 2024-07-16 DIAGNOSIS — E87.20 METABOLIC ACIDOSIS: ICD-10-CM

## 2024-07-16 DIAGNOSIS — E10.22 TYPE 1 DIABETES MELLITUS WITH STAGE 5 CHRONIC KIDNEY DISEASE NOT ON CHRONIC DIALYSIS: ICD-10-CM

## 2024-07-16 DIAGNOSIS — Z79.60 LONG-TERM USE OF IMMUNOSUPPRESSANT MEDICATION: ICD-10-CM

## 2024-07-16 LAB
25(OH)D3+25(OH)D2 SERPL-MCNC: 30 NG/ML (ref 30–96)
ABO + RH BLD: NORMAL
ALBUMIN SERPL BCP-MCNC: 3.1 G/DL (ref 3.5–5.2)
ALBUMIN SERPL BCP-MCNC: 3.3 G/DL (ref 3.5–5.2)
ALBUMIN SERPL BCP-MCNC: 3.6 G/DL (ref 3.5–5.2)
ANION GAP SERPL CALC-SCNC: 11 MMOL/L (ref 8–16)
ANION GAP SERPL CALC-SCNC: 11 MMOL/L (ref 8–16)
ANION GAP SERPL CALC-SCNC: 12 MMOL/L (ref 8–16)
APTT PPP: 27 SEC (ref 21–32)
BASOPHILS # BLD AUTO: 0 K/UL (ref 0–0.2)
BASOPHILS NFR BLD: 0 % (ref 0–1.9)
BLD GP AB SCN CELLS X3 SERPL QL: NORMAL
BUN SERPL-MCNC: 63 MG/DL (ref 6–20)
BUN SERPL-MCNC: 78 MG/DL (ref 6–20)
BUN SERPL-MCNC: 87 MG/DL (ref 6–20)
CALCIUM SERPL-MCNC: 7.2 MG/DL (ref 8.7–10.5)
CALCIUM SERPL-MCNC: 7.3 MG/DL (ref 8.7–10.5)
CALCIUM SERPL-MCNC: 8.5 MG/DL (ref 8.7–10.5)
CHLORIDE SERPL-SCNC: 112 MMOL/L (ref 95–110)
CHLORIDE SERPL-SCNC: 114 MMOL/L (ref 95–110)
CHLORIDE SERPL-SCNC: 114 MMOL/L (ref 95–110)
CO2 SERPL-SCNC: 14 MMOL/L (ref 23–29)
CO2 SERPL-SCNC: 14 MMOL/L (ref 23–29)
CO2 SERPL-SCNC: 15 MMOL/L (ref 23–29)
CREAT SERPL-MCNC: 4.9 MG/DL (ref 0.5–1.4)
CREAT SERPL-MCNC: 6.9 MG/DL (ref 0.5–1.4)
CREAT SERPL-MCNC: 8.2 MG/DL (ref 0.5–1.4)
CREAT UR-MCNC: 54 MG/DL (ref 23–375)
DIFFERENTIAL METHOD BLD: ABNORMAL
EOSINOPHIL # BLD AUTO: 0 K/UL (ref 0–0.5)
EOSINOPHIL NFR BLD: 0 % (ref 0–8)
ERYTHROCYTE [DISTWIDTH] IN BLOOD BY AUTOMATED COUNT: 13 % (ref 11.5–14.5)
EST. GFR  (NO RACE VARIABLE): 14.2 ML/MIN/1.73 M^2
EST. GFR  (NO RACE VARIABLE): 7.7 ML/MIN/1.73 M^2
EST. GFR  (NO RACE VARIABLE): 9.4 ML/MIN/1.73 M^2
ESTIMATED AVG GLUCOSE: 105 MG/DL (ref 68–131)
GLUCOSE SERPL-MCNC: 141 MG/DL (ref 70–110)
GLUCOSE SERPL-MCNC: 160 MG/DL (ref 70–110)
GLUCOSE SERPL-MCNC: 408 MG/DL (ref 70–110)
HBA1C MFR BLD: 5.3 % (ref 4–5.6)
HBV CORE AB SERPL QL IA: NORMAL
HBV SURFACE AB SER-ACNC: 162.94 MIU/ML
HBV SURFACE AB SER-ACNC: REACTIVE M[IU]/ML
HBV SURFACE AG SERPL QL IA: NORMAL
HCT VFR BLD AUTO: 24.1 % (ref 40–54)
HCT VFR BLD AUTO: 26.1 % (ref 40–54)
HCT VFR BLD AUTO: 26.5 % (ref 40–54)
HCV AB SERPL QL IA: NORMAL
HCV RNA SERPL QL NAA+PROBE: NOT DETECTED
HCV RNA SPEC NAA+PROBE-ACNC: NOT DETECTED IU/ML
HGB BLD-MCNC: 9.1 G/DL (ref 14–18)
HIV 1+2 AB+HIV1 P24 AG SERPL QL IA: NORMAL
IMM GRANULOCYTES # BLD AUTO: 0.02 K/UL (ref 0–0.04)
IMM GRANULOCYTES NFR BLD AUTO: 0.3 % (ref 0–0.5)
INR PPP: 0.9 (ref 0.8–1.2)
LYMPHOCYTES # BLD AUTO: 0.1 K/UL (ref 1–4.8)
LYMPHOCYTES NFR BLD: 0.8 % (ref 18–48)
MCH RBC QN AUTO: 29.2 PG (ref 27–31)
MCHC RBC AUTO-ENTMCNC: 34.3 G/DL (ref 32–36)
MCV RBC AUTO: 85 FL (ref 82–98)
MONOCYTES # BLD AUTO: 0.3 K/UL (ref 0.3–1)
MONOCYTES NFR BLD: 4.2 % (ref 4–15)
NEUTROPHILS # BLD AUTO: 6.2 K/UL (ref 1.8–7.7)
NEUTROPHILS NFR BLD: 94.7 % (ref 38–73)
NRBC BLD-RTO: 0 /100 WBC
PHOSPHATE SERPL-MCNC: 3.7 MG/DL (ref 2.7–4.5)
PHOSPHATE SERPL-MCNC: 5.1 MG/DL (ref 2.7–4.5)
PHOSPHATE SERPL-MCNC: 6.7 MG/DL (ref 2.7–4.5)
PLATELET # BLD AUTO: 169 K/UL (ref 150–450)
PMV BLD AUTO: 10.7 FL (ref 9.2–12.9)
POCT GLUCOSE: 162 MG/DL (ref 70–110)
POCT GLUCOSE: 168 MG/DL (ref 70–110)
POCT GLUCOSE: 226 MG/DL (ref 70–110)
POCT GLUCOSE: 230 MG/DL (ref 70–110)
POCT GLUCOSE: 366 MG/DL (ref 70–110)
POCT GLUCOSE: 372 MG/DL (ref 70–110)
POCT GLUCOSE: 390 MG/DL (ref 70–110)
POCT GLUCOSE: 406 MG/DL (ref 70–110)
POCT GLUCOSE: 413 MG/DL (ref 70–110)
POTASSIUM SERPL-SCNC: 4.1 MMOL/L (ref 3.5–5.1)
POTASSIUM SERPL-SCNC: 4.1 MMOL/L (ref 3.5–5.1)
POTASSIUM SERPL-SCNC: 4.9 MMOL/L (ref 3.5–5.1)
PROT UR-MCNC: 87 MG/DL (ref 0–15)
PROT/CREAT UR: 1.61 MG/G{CREAT} (ref 0–0.2)
PROTHROMBIN TIME: 10.4 SEC (ref 9–12.5)
PTH-INTACT SERPL-MCNC: 342.1 PG/ML (ref 9–77)
RBC # BLD AUTO: 3.12 M/UL (ref 4.6–6.2)
SODIUM SERPL-SCNC: 138 MMOL/L (ref 136–145)
SODIUM SERPL-SCNC: 139 MMOL/L (ref 136–145)
SODIUM SERPL-SCNC: 140 MMOL/L (ref 136–145)
SPECIMEN OUTDATE: NORMAL
WBC # BLD AUTO: 6.5 K/UL (ref 3.9–12.7)

## 2024-07-16 PROCEDURE — 86704 HEP B CORE ANTIBODY TOTAL: CPT | Mod: NTX | Performed by: TRANSPLANT SURGERY

## 2024-07-16 PROCEDURE — S5010 5% DEXTROSE AND 0.45% SALINE: HCPCS | Mod: NTX

## 2024-07-16 PROCEDURE — 27201037 HC PRESSURE MONITORING SET UP: Mod: NTX

## 2024-07-16 PROCEDURE — 25000003 PHARM REV CODE 250: Mod: NTX | Performed by: TRANSPLANT SURGERY

## 2024-07-16 PROCEDURE — 82962 GLUCOSE BLOOD TEST: CPT | Mod: NTX | Performed by: TRANSPLANT SURGERY

## 2024-07-16 PROCEDURE — 71000033 HC RECOVERY, INTIAL HOUR: Mod: NTX | Performed by: TRANSPLANT SURGERY

## 2024-07-16 PROCEDURE — 71000039 HC RECOVERY, EACH ADD'L HOUR: Mod: NTX | Performed by: TRANSPLANT SURGERY

## 2024-07-16 PROCEDURE — 71000016 HC POSTOP RECOV ADDL HR: Mod: NTX | Performed by: TRANSPLANT SURGERY

## 2024-07-16 PROCEDURE — 94799 UNLISTED PULMONARY SVC/PX: CPT | Mod: NTX

## 2024-07-16 PROCEDURE — 63600175 PHARM REV CODE 636 W HCPCS: Mod: NTX | Performed by: NURSE ANESTHETIST, CERTIFIED REGISTERED

## 2024-07-16 PROCEDURE — 50605 INSERT URETERAL SUPPORT: CPT | Mod: 51,LT,NTX, | Performed by: TRANSPLANT SURGERY

## 2024-07-16 PROCEDURE — 85014 HEMATOCRIT: CPT | Mod: NTX | Performed by: TRANSPLANT SURGERY

## 2024-07-16 PROCEDURE — 83036 HEMOGLOBIN GLYCOSYLATED A1C: CPT | Mod: NTX | Performed by: TRANSPLANT SURGERY

## 2024-07-16 PROCEDURE — 36415 COLL VENOUS BLD VENIPUNCTURE: CPT | Mod: NTX | Performed by: PHYSICIAN ASSISTANT

## 2024-07-16 PROCEDURE — 86900 BLOOD TYPING SEROLOGIC ABO: CPT | Mod: NTX | Performed by: TRANSPLANT SURGERY

## 2024-07-16 PROCEDURE — 36000930 HC OR TIME LEV VII 1ST 15 MIN: Mod: NTX | Performed by: TRANSPLANT SURGERY

## 2024-07-16 PROCEDURE — 0TY10Z0 TRANSPLANTATION OF LEFT KIDNEY, ALLOGENEIC, OPEN APPROACH: ICD-10-PCS | Performed by: TRANSPLANT SURGERY

## 2024-07-16 PROCEDURE — 20600001 HC STEP DOWN PRIVATE ROOM: Mod: NTX

## 2024-07-16 PROCEDURE — 63600175 PHARM REV CODE 636 W HCPCS: Mod: NTX | Performed by: TRANSPLANT SURGERY

## 2024-07-16 PROCEDURE — 71000015 HC POSTOP RECOV 1ST HR: Mod: NTX | Performed by: TRANSPLANT SURGERY

## 2024-07-16 PROCEDURE — 87522 HEPATITIS C REVRS TRNSCRPJ: CPT | Mod: NTX | Performed by: TRANSPLANT SURGERY

## 2024-07-16 PROCEDURE — 80069 RENAL FUNCTION PANEL: CPT | Mod: NTX | Performed by: TRANSPLANT SURGERY

## 2024-07-16 PROCEDURE — 50360 RNL ALTRNSPLJ W/O RCP NFRCT: CPT | Mod: LT,NTX,, | Performed by: TRANSPLANT SURGERY

## 2024-07-16 PROCEDURE — 37000008 HC ANESTHESIA 1ST 15 MINUTES: Mod: NTX | Performed by: TRANSPLANT SURGERY

## 2024-07-16 PROCEDURE — 80069 RENAL FUNCTION PANEL: CPT | Mod: 91,NTX | Performed by: PHYSICIAN ASSISTANT

## 2024-07-16 PROCEDURE — 36000931 HC OR TIME LEV VII EA ADD 15 MIN: Mod: NTX | Performed by: TRANSPLANT SURGERY

## 2024-07-16 PROCEDURE — 82570 ASSAY OF URINE CREATININE: CPT | Mod: NTX | Performed by: TRANSPLANT SURGERY

## 2024-07-16 PROCEDURE — 25000003 PHARM REV CODE 250: Mod: NTX | Performed by: INTERNAL MEDICINE

## 2024-07-16 PROCEDURE — 27201423 OPTIME MED/SURG SUP & DEVICES STERILE SUPPLY: Mod: NTX | Performed by: TRANSPLANT SURGERY

## 2024-07-16 PROCEDURE — 94761 N-INVAS EAR/PLS OXIMETRY MLT: CPT | Mod: NTX

## 2024-07-16 PROCEDURE — 99900035 HC TECH TIME PER 15 MIN (STAT): Mod: NTX

## 2024-07-16 PROCEDURE — 86901 BLOOD TYPING SEROLOGIC RH(D): CPT | Mod: NTX | Performed by: TRANSPLANT SURGERY

## 2024-07-16 PROCEDURE — 25000003 PHARM REV CODE 250: Mod: NTX | Performed by: NURSE ANESTHETIST, CERTIFIED REGISTERED

## 2024-07-16 PROCEDURE — 81100000 HC KIDNEY ACQUISITION-LIVE DONOR

## 2024-07-16 PROCEDURE — 85730 THROMBOPLASTIN TIME PARTIAL: CPT | Mod: NTX | Performed by: TRANSPLANT SURGERY

## 2024-07-16 PROCEDURE — 25000003 PHARM REV CODE 250: Mod: NTX

## 2024-07-16 PROCEDURE — 86803 HEPATITIS C AB TEST: CPT | Mod: NTX | Performed by: TRANSPLANT SURGERY

## 2024-07-16 PROCEDURE — 83970 ASSAY OF PARATHORMONE: CPT | Mod: NTX | Performed by: TRANSPLANT SURGERY

## 2024-07-16 PROCEDURE — 86706 HEP B SURFACE ANTIBODY: CPT | Mod: NTX | Performed by: TRANSPLANT SURGERY

## 2024-07-16 PROCEDURE — 36415 COLL VENOUS BLD VENIPUNCTURE: CPT | Mod: NTX | Performed by: TRANSPLANT SURGERY

## 2024-07-16 PROCEDURE — 85610 PROTHROMBIN TIME: CPT | Mod: NTX | Performed by: TRANSPLANT SURGERY

## 2024-07-16 PROCEDURE — 27000207 HC ISOLATION: Mod: NTX

## 2024-07-16 PROCEDURE — 50325 PREP DONOR RENAL GRAFT: CPT | Mod: 51,LT,NTX, | Performed by: TRANSPLANT SURGERY

## 2024-07-16 PROCEDURE — C2617 STENT, NON-COR, TEM W/O DEL: HCPCS | Mod: NTX | Performed by: TRANSPLANT SURGERY

## 2024-07-16 PROCEDURE — 85014 HEMATOCRIT: CPT | Mod: 91,NTX

## 2024-07-16 PROCEDURE — 87389 HIV-1 AG W/HIV-1&-2 AB AG IA: CPT | Mod: NTX | Performed by: TRANSPLANT SURGERY

## 2024-07-16 PROCEDURE — 27000221 HC OXYGEN, UP TO 24 HOURS: Mod: NTX

## 2024-07-16 PROCEDURE — 63600175 PHARM REV CODE 636 W HCPCS: Mod: NTX

## 2024-07-16 PROCEDURE — S5010 5% DEXTROSE AND 0.45% SALINE: HCPCS | Mod: NTX | Performed by: INTERNAL MEDICINE

## 2024-07-16 PROCEDURE — 37000009 HC ANESTHESIA EA ADD 15 MINS: Mod: NTX | Performed by: TRANSPLANT SURGERY

## 2024-07-16 PROCEDURE — 82306 VITAMIN D 25 HYDROXY: CPT | Mod: NTX | Performed by: TRANSPLANT SURGERY

## 2024-07-16 PROCEDURE — 87340 HEPATITIS B SURFACE AG IA: CPT | Mod: NTX | Performed by: TRANSPLANT SURGERY

## 2024-07-16 PROCEDURE — 85025 COMPLETE CBC W/AUTO DIFF WBC: CPT | Mod: NTX | Performed by: PHYSICIAN ASSISTANT

## 2024-07-16 PROCEDURE — 86850 RBC ANTIBODY SCREEN: CPT | Mod: NTX | Performed by: TRANSPLANT SURGERY

## 2024-07-16 PROCEDURE — 25000003 PHARM REV CODE 250: Mod: NTX | Performed by: STUDENT IN AN ORGANIZED HEALTH CARE EDUCATION/TRAINING PROGRAM

## 2024-07-16 PROCEDURE — 80069 RENAL FUNCTION PANEL: CPT | Mod: 91,NTX

## 2024-07-16 DEVICE — SOF-FLEX DOUBLE PIGTAIL URETERAL STENT SET
Type: IMPLANTABLE DEVICE | Site: KIDNEY | Status: FUNCTIONAL
Brand: SOF-FLEX

## 2024-07-16 RX ORDER — VALGANCICLOVIR 450 MG/1
450 TABLET, FILM COATED ORAL EVERY MORNING
Status: DISCONTINUED | OUTPATIENT
Start: 2024-07-26 | End: 2024-07-18 | Stop reason: HOSPADM

## 2024-07-16 RX ORDER — ACYCLOVIR 400 MG/1
400 TABLET ORAL 2 TIMES DAILY
Qty: 60 TABLET | Refills: 2 | Status: SHIPPED | OUTPATIENT
Start: 2024-07-16 | End: 2024-07-19

## 2024-07-16 RX ORDER — ONDANSETRON HYDROCHLORIDE 2 MG/ML
4 INJECTION, SOLUTION INTRAVENOUS DAILY PRN
Status: DISCONTINUED | OUTPATIENT
Start: 2024-07-16 | End: 2024-07-16 | Stop reason: HOSPADM

## 2024-07-16 RX ORDER — ONDANSETRON HYDROCHLORIDE 2 MG/ML
4 INJECTION, SOLUTION INTRAVENOUS EVERY 6 HOURS PRN
Status: DISCONTINUED | OUTPATIENT
Start: 2024-07-16 | End: 2024-07-18 | Stop reason: HOSPADM

## 2024-07-16 RX ORDER — DIPHENHYDRAMINE HCL 25 MG
50 CAPSULE ORAL ONCE AS NEEDED
Status: DISCONTINUED | OUTPATIENT
Start: 2024-07-16 | End: 2024-07-18 | Stop reason: HOSPADM

## 2024-07-16 RX ORDER — BISACODYL 5 MG
10 TABLET, DELAYED RELEASE (ENTERIC COATED) ORAL NIGHTLY
Status: DISCONTINUED | OUTPATIENT
Start: 2024-07-16 | End: 2024-07-18 | Stop reason: HOSPADM

## 2024-07-16 RX ORDER — ACETAMINOPHEN 650 MG/20.3ML
650 LIQUID ORAL ONCE
Status: COMPLETED | OUTPATIENT
Start: 2024-07-16 | End: 2024-07-16

## 2024-07-16 RX ORDER — FAMOTIDINE 20 MG/1
20 TABLET, FILM COATED ORAL NIGHTLY
Status: DISCONTINUED | OUTPATIENT
Start: 2024-07-16 | End: 2024-07-18 | Stop reason: HOSPADM

## 2024-07-16 RX ORDER — GLUCAGON 1 MG
1 KIT INJECTION CONTINUOUS PRN
Status: DISCONTINUED | OUTPATIENT
Start: 2024-07-16 | End: 2024-07-18 | Stop reason: HOSPADM

## 2024-07-16 RX ORDER — MANNITOL 20 G/100ML
INJECTION, SOLUTION INTRAVENOUS
Status: DISCONTINUED | OUTPATIENT
Start: 2024-07-16 | End: 2024-07-16

## 2024-07-16 RX ORDER — MYCOPHENOLATE MOFETIL 250 MG/1
1000 CAPSULE ORAL 2 TIMES DAILY
Status: DISCONTINUED | OUTPATIENT
Start: 2024-07-16 | End: 2024-07-18 | Stop reason: HOSPADM

## 2024-07-16 RX ORDER — EPINEPHRINE 1 MG/ML
1 INJECTION, SOLUTION, CONCENTRATE INTRAVENOUS ONCE AS NEEDED
Status: DISCONTINUED | OUTPATIENT
Start: 2024-07-16 | End: 2024-07-18 | Stop reason: HOSPADM

## 2024-07-16 RX ORDER — DIPHENHYDRAMINE HCL 25 MG
25 CAPSULE ORAL
Status: COMPLETED | OUTPATIENT
Start: 2024-07-17 | End: 2024-07-18

## 2024-07-16 RX ORDER — PHENYLEPHRINE HYDROCHLORIDE 10 MG/ML
INJECTION INTRAVENOUS
Status: DISCONTINUED | OUTPATIENT
Start: 2024-07-16 | End: 2024-07-16

## 2024-07-16 RX ORDER — SULFAMETHOXAZOLE AND TRIMETHOPRIM 400; 80 MG/1; MG/1
1 TABLET ORAL EVERY MORNING
Status: DISCONTINUED | OUTPATIENT
Start: 2024-07-26 | End: 2024-07-18 | Stop reason: HOSPADM

## 2024-07-16 RX ORDER — IBUPROFEN 200 MG
24 TABLET ORAL
Status: DISCONTINUED | OUTPATIENT
Start: 2024-07-16 | End: 2024-07-18 | Stop reason: HOSPADM

## 2024-07-16 RX ORDER — MUPIROCIN 20 MG/G
1 OINTMENT TOPICAL 2 TIMES DAILY
Status: DISCONTINUED | OUTPATIENT
Start: 2024-07-16 | End: 2024-07-18 | Stop reason: HOSPADM

## 2024-07-16 RX ORDER — SODIUM CHLORIDE 9 MG/ML
INJECTION, SOLUTION INTRAVENOUS CONTINUOUS
Status: DISCONTINUED | OUTPATIENT
Start: 2024-07-16 | End: 2024-07-17

## 2024-07-16 RX ORDER — PREGABALIN 75 MG/1
75 CAPSULE ORAL
Status: COMPLETED | OUTPATIENT
Start: 2024-07-16 | End: 2024-07-16

## 2024-07-16 RX ORDER — SULFAMETHOXAZOLE AND TRIMETHOPRIM 400; 80 MG/1; MG/1
1 TABLET ORAL EVERY MORNING
Qty: 30 TABLET | Refills: 5 | Status: SHIPPED | OUTPATIENT
Start: 2024-07-16 | End: 2024-07-19

## 2024-07-16 RX ORDER — GLUCAGON 1 MG
1 KIT INJECTION
Status: DISCONTINUED | OUTPATIENT
Start: 2024-07-16 | End: 2024-07-16 | Stop reason: HOSPADM

## 2024-07-16 RX ORDER — BUPIVACAINE HYDROCHLORIDE 2.5 MG/ML
INJECTION, SOLUTION EPIDURAL; INFILTRATION; INTRACAUDAL
Status: DISCONTINUED | OUTPATIENT
Start: 2024-07-16 | End: 2024-07-16 | Stop reason: HOSPADM

## 2024-07-16 RX ORDER — PREDNISONE 20 MG/1
20 TABLET ORAL DAILY
Status: DISCONTINUED | OUTPATIENT
Start: 2024-07-19 | End: 2024-07-18 | Stop reason: HOSPADM

## 2024-07-16 RX ORDER — TACROLIMUS 1 MG/1
3 CAPSULE ORAL 2 TIMES DAILY
Status: DISCONTINUED | OUTPATIENT
Start: 2024-07-16 | End: 2024-07-17

## 2024-07-16 RX ORDER — KETAMINE HCL IN 0.9 % NACL 50 MG/5 ML
SYRINGE (ML) INTRAVENOUS
Status: DISCONTINUED | OUTPATIENT
Start: 2024-07-16 | End: 2024-07-16

## 2024-07-16 RX ORDER — HEPARIN SODIUM 10000 [USP'U]/ML
INJECTION, SOLUTION INTRAVENOUS; SUBCUTANEOUS
Status: DISCONTINUED | OUTPATIENT
Start: 2024-07-16 | End: 2024-07-16 | Stop reason: HOSPADM

## 2024-07-16 RX ORDER — DIPHENHYDRAMINE HYDROCHLORIDE 50 MG/ML
50 INJECTION INTRAMUSCULAR; INTRAVENOUS ONCE
Status: COMPLETED | OUTPATIENT
Start: 2024-07-16 | End: 2024-07-16

## 2024-07-16 RX ORDER — MIDAZOLAM HYDROCHLORIDE 1 MG/ML
INJECTION INTRAMUSCULAR; INTRAVENOUS
Status: DISCONTINUED | OUTPATIENT
Start: 2024-07-16 | End: 2024-07-16

## 2024-07-16 RX ORDER — HEPARIN SODIUM 5000 [USP'U]/ML
5000 INJECTION, SOLUTION INTRAVENOUS; SUBCUTANEOUS ONCE
Status: COMPLETED | OUTPATIENT
Start: 2024-07-16 | End: 2024-07-16

## 2024-07-16 RX ORDER — METHYLPREDNISOLONE SOD SUCC 125 MG
250 VIAL (EA) INJECTION ONCE
Status: COMPLETED | OUTPATIENT
Start: 2024-07-17 | End: 2024-07-17

## 2024-07-16 RX ORDER — DOCUSATE SODIUM 100 MG/1
100 CAPSULE, LIQUID FILLED ORAL 3 TIMES DAILY
Status: DISCONTINUED | OUTPATIENT
Start: 2024-07-16 | End: 2024-07-18 | Stop reason: HOSPADM

## 2024-07-16 RX ORDER — SODIUM CHLORIDE 9 MG/ML
INJECTION, SOLUTION INTRAVENOUS CONTINUOUS
Status: DISCONTINUED | OUTPATIENT
Start: 2024-07-16 | End: 2024-07-16

## 2024-07-16 RX ORDER — FENTANYL CITRATE 50 UG/ML
25 INJECTION, SOLUTION INTRAMUSCULAR; INTRAVENOUS EVERY 5 MIN PRN
Status: DISCONTINUED | OUTPATIENT
Start: 2024-07-16 | End: 2024-07-16 | Stop reason: HOSPADM

## 2024-07-16 RX ORDER — ONDANSETRON HYDROCHLORIDE 2 MG/ML
INJECTION, SOLUTION INTRAVENOUS
Status: DISCONTINUED | OUTPATIENT
Start: 2024-07-16 | End: 2024-07-16

## 2024-07-16 RX ORDER — DEXTROSE MONOHYDRATE AND SODIUM CHLORIDE 5; .45 G/100ML; G/100ML
INJECTION, SOLUTION INTRAVENOUS CONTINUOUS
Status: DISCONTINUED | OUTPATIENT
Start: 2024-07-16 | End: 2024-07-16

## 2024-07-16 RX ORDER — PREDNISONE 5 MG/1
TABLET ORAL
Qty: 75 TABLET | Refills: 5 | Status: SHIPPED | OUTPATIENT
Start: 2024-07-19 | End: 2024-07-19

## 2024-07-16 RX ORDER — EPHEDRINE SULFATE 50 MG/ML
INJECTION, SOLUTION INTRAVENOUS
Status: DISCONTINUED | OUTPATIENT
Start: 2024-07-16 | End: 2024-07-16

## 2024-07-16 RX ORDER — SODIUM CHLORIDE 0.9 % (FLUSH) 0.9 %
10 SYRINGE (ML) INJECTION
Status: DISCONTINUED | OUTPATIENT
Start: 2024-07-16 | End: 2024-07-16

## 2024-07-16 RX ORDER — SODIUM CHLORIDE 0.9 % (FLUSH) 0.9 %
10 SYRINGE (ML) INJECTION
Status: DISCONTINUED | OUTPATIENT
Start: 2024-07-16 | End: 2024-07-18 | Stop reason: HOSPADM

## 2024-07-16 RX ORDER — FUROSEMIDE 10 MG/ML
INJECTION INTRAMUSCULAR; INTRAVENOUS
Status: DISCONTINUED | OUTPATIENT
Start: 2024-07-16 | End: 2024-07-16

## 2024-07-16 RX ORDER — OXYCODONE HYDROCHLORIDE 5 MG/1
5 TABLET ORAL EVERY 6 HOURS PRN
Status: DISCONTINUED | OUTPATIENT
Start: 2024-07-16 | End: 2024-07-18 | Stop reason: HOSPADM

## 2024-07-16 RX ORDER — IBUPROFEN 200 MG
16 TABLET ORAL
Status: DISCONTINUED | OUTPATIENT
Start: 2024-07-16 | End: 2024-07-18 | Stop reason: HOSPADM

## 2024-07-16 RX ORDER — PROPOFOL 10 MG/ML
VIAL (ML) INTRAVENOUS
Status: DISCONTINUED | OUTPATIENT
Start: 2024-07-16 | End: 2024-07-16

## 2024-07-16 RX ORDER — HEPARIN SODIUM 5000 [USP'U]/ML
5000 INJECTION, SOLUTION INTRAVENOUS; SUBCUTANEOUS EVERY 8 HOURS
Status: DISCONTINUED | OUTPATIENT
Start: 2024-07-16 | End: 2024-07-18 | Stop reason: HOSPADM

## 2024-07-16 RX ORDER — FENTANYL CITRATE 50 UG/ML
INJECTION, SOLUTION INTRAMUSCULAR; INTRAVENOUS
Status: DISCONTINUED | OUTPATIENT
Start: 2024-07-16 | End: 2024-07-16

## 2024-07-16 RX ORDER — ACETAMINOPHEN 325 MG/1
650 TABLET ORAL EVERY 8 HOURS
Status: COMPLETED | OUTPATIENT
Start: 2024-07-16 | End: 2024-07-18

## 2024-07-16 RX ORDER — MUPIROCIN 20 MG/G
OINTMENT TOPICAL
Status: DISCONTINUED | OUTPATIENT
Start: 2024-07-16 | End: 2024-07-16 | Stop reason: HOSPADM

## 2024-07-16 RX ORDER — METHYLPREDNISOLONE SOD SUCC 125 MG
125 VIAL (EA) INJECTION ONCE
Status: COMPLETED | OUTPATIENT
Start: 2024-07-18 | End: 2024-07-18

## 2024-07-16 RX ORDER — INSULIN ASPART 100 [IU]/ML
0-5 INJECTION, SOLUTION INTRAVENOUS; SUBCUTANEOUS
Status: DISCONTINUED | OUTPATIENT
Start: 2024-07-16 | End: 2024-07-18

## 2024-07-16 RX ORDER — MEPERIDINE HYDROCHLORIDE 50 MG/ML
12.5 INJECTION INTRAMUSCULAR; INTRAVENOUS; SUBCUTANEOUS ONCE AS NEEDED
Status: DISCONTINUED | OUTPATIENT
Start: 2024-07-16 | End: 2024-07-16 | Stop reason: HOSPADM

## 2024-07-16 RX ORDER — CEFAZOLIN SODIUM 1 G/3ML
INJECTION, POWDER, FOR SOLUTION INTRAMUSCULAR; INTRAVENOUS
Status: DISCONTINUED | OUTPATIENT
Start: 2024-07-16 | End: 2024-07-16 | Stop reason: HOSPADM

## 2024-07-16 RX ORDER — ROCURONIUM BROMIDE 10 MG/ML
INJECTION, SOLUTION INTRAVENOUS
Status: DISCONTINUED | OUTPATIENT
Start: 2024-07-16 | End: 2024-07-16

## 2024-07-16 RX ORDER — ACETAMINOPHEN 325 MG/1
650 TABLET ORAL
Status: DISCONTINUED | OUTPATIENT
Start: 2024-07-17 | End: 2024-07-18 | Stop reason: HOSPADM

## 2024-07-16 RX ADMIN — MIDAZOLAM HYDROCHLORIDE 2 MG: 2 INJECTION, SOLUTION INTRAMUSCULAR; INTRAVENOUS at 09:07

## 2024-07-16 RX ADMIN — DOCUSATE SODIUM 100 MG: 100 CAPSULE, LIQUID FILLED ORAL at 02:07

## 2024-07-16 RX ADMIN — INSULIN HUMAN 1 UNITS/HR: 1 INJECTION, SOLUTION INTRAVENOUS at 09:07

## 2024-07-16 RX ADMIN — SODIUM BICARBONATE: 84 INJECTION, SOLUTION INTRAVENOUS at 05:07

## 2024-07-16 RX ADMIN — METHYLPREDNISOLONE SODIUM SUCCINATE 500 MG: 500 INJECTION INTRAMUSCULAR; INTRAVENOUS at 09:07

## 2024-07-16 RX ADMIN — INSULIN ASPART 5 UNITS: 100 INJECTION, SOLUTION INTRAVENOUS; SUBCUTANEOUS at 06:07

## 2024-07-16 RX ADMIN — HEPARIN SODIUM 5000 UNITS: 5000 INJECTION INTRAVENOUS; SUBCUTANEOUS at 08:07

## 2024-07-16 RX ADMIN — FAMOTIDINE 20 MG: 20 TABLET ORAL at 08:07

## 2024-07-16 RX ADMIN — SODIUM BICARBONATE: 84 INJECTION, SOLUTION INTRAVENOUS at 08:07

## 2024-07-16 RX ADMIN — SODIUM CHLORIDE, SODIUM GLUCONATE, SODIUM ACETATE, POTASSIUM CHLORIDE, MAGNESIUM CHLORIDE, SODIUM PHOSPHATE, DIBASIC, AND POTASSIUM PHOSPHATE: .53; .5; .37; .037; .03; .012; .00082 INJECTION, SOLUTION INTRAVENOUS at 10:07

## 2024-07-16 RX ADMIN — OXYCODONE HYDROCHLORIDE 5 MG: 5 TABLET ORAL at 08:07

## 2024-07-16 RX ADMIN — ACETAMINOPHEN 650 MG: 160 SOLUTION ORAL at 08:07

## 2024-07-16 RX ADMIN — SODIUM CHLORIDE 250 ML: 9 INJECTION, SOLUTION INTRAVENOUS at 12:07

## 2024-07-16 RX ADMIN — ROCURONIUM BROMIDE 20 MG: 10 INJECTION, SOLUTION INTRAVENOUS at 11:07

## 2024-07-16 RX ADMIN — HEPARIN SODIUM 5000 UNITS: 5000 INJECTION INTRAVENOUS; SUBCUTANEOUS at 06:07

## 2024-07-16 RX ADMIN — PHENYLEPHRINE HYDROCHLORIDE 0.5 MCG: 10 INJECTION INTRAVENOUS at 11:07

## 2024-07-16 RX ADMIN — BISACODYL 10 MG: 5 TABLET, COATED ORAL at 08:07

## 2024-07-16 RX ADMIN — ROCURONIUM BROMIDE 50 MG: 10 INJECTION, SOLUTION INTRAVENOUS at 09:07

## 2024-07-16 RX ADMIN — DIPHENHYDRAMINE HYDROCHLORIDE 50 MG: 50 INJECTION, SOLUTION INTRAMUSCULAR; INTRAVENOUS at 09:07

## 2024-07-16 RX ADMIN — SODIUM CHLORIDE: 9 INJECTION, SOLUTION INTRAVENOUS at 10:07

## 2024-07-16 RX ADMIN — MYCOPHENOLATE MOFETIL 1000 MG: 250 CAPSULE ORAL at 08:07

## 2024-07-16 RX ADMIN — PHENYLEPHRINE HYDROCHLORIDE 200 MCG: 10 INJECTION INTRAVENOUS at 09:07

## 2024-07-16 RX ADMIN — PHENYLEPHRINE HYDROCHLORIDE 100 MCG: 10 INJECTION INTRAVENOUS at 10:07

## 2024-07-16 RX ADMIN — ONDANSETRON 4 MG: 2 INJECTION INTRAMUSCULAR; INTRAVENOUS at 10:07

## 2024-07-16 RX ADMIN — SODIUM CHLORIDE: 0.9 INJECTION, SOLUTION INTRAVENOUS at 09:07

## 2024-07-16 RX ADMIN — PREGABALIN 75 MG: 75 CAPSULE ORAL at 06:07

## 2024-07-16 RX ADMIN — SODIUM CHLORIDE, SODIUM GLUCONATE, SODIUM ACETATE, POTASSIUM CHLORIDE, MAGNESIUM CHLORIDE, SODIUM PHOSPHATE, DIBASIC, AND POTASSIUM PHOSPHATE: .53; .5; .37; .037; .03; .012; .00082 INJECTION, SOLUTION INTRAVENOUS at 12:07

## 2024-07-16 RX ADMIN — MUPIROCIN 1 TUBE: 20 OINTMENT TOPICAL at 06:07

## 2024-07-16 RX ADMIN — PHENYLEPHRINE HYDROCHLORIDE 0.25 MCG: 10 INJECTION INTRAVENOUS at 11:07

## 2024-07-16 RX ADMIN — PHENYLEPHRINE HYDROCHLORIDE 50 MCG: 10 INJECTION INTRAVENOUS at 11:07

## 2024-07-16 RX ADMIN — MANNITOL 25 G: 20 INJECTION, SOLUTION INTRAVENOUS at 11:07

## 2024-07-16 RX ADMIN — SUGAMMADEX 200 MG: 100 INJECTION, SOLUTION INTRAVENOUS at 12:07

## 2024-07-16 RX ADMIN — HEPARIN SODIUM 5000 UNITS: 5000 INJECTION INTRAVENOUS; SUBCUTANEOUS at 02:07

## 2024-07-16 RX ADMIN — THERA TABS 1 TABLET: TAB at 02:07

## 2024-07-16 RX ADMIN — ROCURONIUM BROMIDE 30 MG: 10 INJECTION, SOLUTION INTRAVENOUS at 10:07

## 2024-07-16 RX ADMIN — EPHEDRINE SULFATE 10 MG: 50 INJECTION INTRAVENOUS at 11:07

## 2024-07-16 RX ADMIN — DOCUSATE SODIUM 100 MG: 100 CAPSULE, LIQUID FILLED ORAL at 08:07

## 2024-07-16 RX ADMIN — CEFAZOLIN 2 G: 2 INJECTION, POWDER, FOR SOLUTION INTRAMUSCULAR; INTRAVENOUS at 09:07

## 2024-07-16 RX ADMIN — OXYCODONE HYDROCHLORIDE 5 MG: 5 TABLET ORAL at 02:07

## 2024-07-16 RX ADMIN — FUROSEMIDE 100 MG: 10 INJECTION, SOLUTION INTRAMUSCULAR; INTRAVENOUS at 11:07

## 2024-07-16 RX ADMIN — DEXTROSE AND SODIUM CHLORIDE 50 ML: 5; 450 INJECTION, SOLUTION INTRAVENOUS at 12:07

## 2024-07-16 RX ADMIN — ONDANSETRON 4 MG: 2 INJECTION INTRAMUSCULAR; INTRAVENOUS at 12:07

## 2024-07-16 RX ADMIN — INSULIN ASPART 2 UNITS: 100 INJECTION, SOLUTION INTRAVENOUS; SUBCUTANEOUS at 02:07

## 2024-07-16 RX ADMIN — Medication 25 MG: at 09:07

## 2024-07-16 RX ADMIN — EPHEDRINE SULFATE 10 MG: 50 INJECTION INTRAVENOUS at 10:07

## 2024-07-16 RX ADMIN — SODIUM CHLORIDE: 9 INJECTION, SOLUTION INTRAVENOUS at 08:07

## 2024-07-16 RX ADMIN — TACROLIMUS 3 MG: 1 CAPSULE ORAL at 06:07

## 2024-07-16 RX ADMIN — CEFAZOLIN 2 G: 2 INJECTION, POWDER, FOR SOLUTION INTRAMUSCULAR; INTRAVENOUS at 04:07

## 2024-07-16 RX ADMIN — MUPIROCIN 1 G: 20 OINTMENT TOPICAL at 08:07

## 2024-07-16 RX ADMIN — SODIUM CHLORIDE: 9 INJECTION, SOLUTION INTRAVENOUS at 06:07

## 2024-07-16 RX ADMIN — ACETAMINOPHEN 650 MG: 325 TABLET ORAL at 04:07

## 2024-07-16 RX ADMIN — PROPOFOL 150 MG: 10 INJECTION, EMULSION INTRAVENOUS at 09:07

## 2024-07-16 RX ADMIN — EPHEDRINE SULFATE 15 MG: 50 INJECTION INTRAVENOUS at 10:07

## 2024-07-16 RX ADMIN — FENTANYL CITRATE 100 MCG: 50 INJECTION, SOLUTION INTRAMUSCULAR; INTRAVENOUS at 09:07

## 2024-07-16 RX ADMIN — PHENYLEPHRINE HYDROCHLORIDE 0.25 MCG/KG/MIN: 10 INJECTION INTRAVENOUS at 11:07

## 2024-07-16 NOTE — PROGRESS NOTES
"Pt arrived to TSU [id number] from PACU at 1532. S/p living unrelated kidney transplant today. Pt is AAOx4, VSS on bedside monitor, afebrile, on RA. LLQ incision MILAD with dermabond, C/D/I. Pt expressing eagerness to ambulate upon arrival to TSU. Pt assisted with standing - he did endorse dizziness and orthostatic hypotension noted. Pt assisted to chair and fall risk reviewed with him. VINNIE Roberto notified and orders received for bolus. Pt reporting feeling "shaky", requested BG to be checked - 230. Ortiz in place, pt putting out excessive uop. Is=Os continued per orders. Scheduled tylenol and PRN oxy for pain. Pain reassessed throughout the shift and treated accordingly. Pt able to eat some jello this afternoon. Instructed to notify RN of nausea. Pt currently up in chair, wheels locked. Family at bedside. SCDs and TEDs on. POC ongoing.     Nurses Note -- 4 Eyes      7/16/2024   5:24 PM      Skin assessed during: Transfer      [] No Altered Skin Integrity Present    [x]Prevention Measures Documented      [x] Yes- Altered Skin Integrity Present or Discovered (LLQ incision)   [] LDA Added if Not in Epic (Describe Wound)   [] New Altered Skin Integrity was Present on Admit and Documented in LDA   [] Wound Image Taken    Wound Care Consulted? No    Attending Nurse:  Aurora Velazquez RN/Staff Member:   Abigail Rocha RN          "

## 2024-07-16 NOTE — OP NOTE
Operative Report    Date of Procedure: 7/16/2024  Date of Transplant (UNOS): 7/16/24    Surgeons:  Surgeons and Role:     * Erma Gómez MD - Primary     * Marya Rick MD - Resident - Assisting    First Assistant Attestation:  The indicated resident served as first assistant for this procedure.    Pre-operative Diagnosis: CKD5, dialysis not yet started  secondary to Diabetes Mellitus - Type II  Post-operative Diagnosis: Same    Procedure(s) Performed:   Back Table Preparation of Left Kidney     Living Kidney transplant    Anesthesia: General endotracheal    Preamble  Indications and Patient Counseling: The patient is a 43 y.o. year-old male with end-stage kidney disease secondary to Diabetes Mellitus - Type II who has been evaluated for a kidney transplant.  The procedure was thoroughly discussed with the patient, including potential risks, complications, and alternatives.  Specific complications mentioned included death, graft non-function, bleeding, infection, and rejection, as well as the possibility of other complications not specifically mentioned.    Donor Risk Factors: Prior to the operation, the patient was advised of any donor-specific risk factors requiring specific disclosure.  Factors in this case included nothing that required specific disclosure.      Specific [facility] donor risk criteria for the organ donor include:  None    All questions were answered, the patient voiced appropriate understanding, and he agreed to proceed with the planned procedure.    ABO Confirmation: Immediately following arrival of the donor organ and prior to implantation, a formal ABO confirmation was done according to hospital and UNOS policies.  I confirmed the UNOS ID number ([id number]) of the donor organ and the donor and recipient ABO types, directly verifying these data by comparison with the UNOS Match Run report ( ).  This confirmation was personally done by an attending surgeon and circulating nurse, and is  officially documented elsewhere.    Time-Out: A complete time out was carried out prior to incision, with confirmation of patient identity, correct procedure, correct operative site, appropriate antibiotic prophylaxis, review of any known allergies, and presence of all needed equipment.    Procedure in Detail  Prior to starting the operation, the left kidney  was prepared on the back table. Arterial anatomy was single. Venous anatomy was single. Ureteral anatomy was single. Back table vascular reconstruction was not required  .  Unneeded fat was removed from the kidney, the vessels were cleaned of adherent tissue and tested for leaks, and the kidney was maintained at ice temperature in organ preservation solution until it was brought to the operative field.     The patient was brought into the operating room and placed in a supine position on the OR table.  After the induction of general endotracheal anesthesia, lines were placed by the anesthesiologist.  The urinary bladder was catheterized and irrigated with antibiotic solution.  There was no tension on the axillae and all pressure points were padded.  Sequential compression boots were used as were Mehnaz Huggers.  The abdomen was prepped and draped in the usual sterile fashion.  Skin was incised over the left with a knife and deepened with electrocautery.  The peritoneum and its contents were swept medially, exposing the left external iliac artery and the left external iliac vein.  The Bookwalter retractor was used to provide exposure.  Overlying lymphatics were ligated or cauterized and the vessels were dissected free for a length compatible with anastomosis.  The kidney was brought to the OR table at 7/16/2024 10:57 AM.  Venous control was obtained with a vascular clamp.  A venotomy was made, the vein irrigated, and an end renal to left external iliac vein anastomosis was created with 5-0 polypropylene.  Arterial control was obtained with a vascular clamp.   Arteriotomy was made, the artery irrigated, and an end renal to left external iliac artery anastomosis was created with 6-0 polypropylene.  The kidney was unclamped and reperfused at 7/16/2024 11:25 AM.  Reperfusion quality was good. Intraoperative urine production was observed.  After hemostasis was obtained, a Lich uretero-neocystostomy was created.  The bladder was filled and identified, opened, and the anastomosis created using 6-0 PDS.  The bladder muscle was closed over the distal ureter to create an antireflux tunnel.  A ureteral stent was used.  With the kidney well perfused and sitting appropriately without tension on the anastomoses, viscera were replaced in their usual position.  The wound was closed after a final check for hemostasis.  Overall, the graft quality was assessed to be good. At the end of the case the needle, sponge and instrument counts were all correct.  Sterile dressings were applied and the patient was brought to the recovery room/ICU in good condition.    Estimated Blood Loss: 50 mL  Fluids Administered:   Crystalloid (mL) 3,000      Drains: none  Specimens: none    Findings:    Organ Transplanted: Left Kidney    Arterial Anatomy: single  Number of Arteries: 1  Configuration of Multiple Arteries: not applicable  Venous Anatomy: single  Number of Veins: 1  Ureteral Anatomy: single  Number of Ureters: 1  Reperfusion Quality: good  Overall Graft Quality: good  Intraoperative Urine Production: yes  Ortiz: not to be removed before 2 days.  Ureteral Stent: Yes    Ischemic Times:   Anastomosis (warm ischemia) time: 28 minutes   Cold ischemia time: 32 minutes  Total ischemia time: 60 minutes    Donor Data:  UNOS ID: [id number]    UNOS Match Run:      Donor Type: Living    Donor CMV Status:      Donor HBcAB:      Donor HCV Status:

## 2024-07-16 NOTE — ANESTHESIA PROCEDURE NOTES
Arterial    Diagnosis: ESRD    Patient location during procedure: done in OR  Procedure end time: 7/16/2024 9:57 AM    Staffing  Authorizing Provider: Dragan Cortez Jr., MD  Performing Provider: Dragan Cortez Jr., MD    Staffing  Performed by: Dragan Cortez Jr., MD  Authorized by: Dragan Cortez Jr., MD    Anesthesiologist was present at the time of the procedure.    Preanesthetic Checklist  Completed: patient identified, IV checked, site marked, risks and benefits discussed, surgical consent, monitors and equipment checked, pre-op evaluation, timeout performed and anesthesia consent givenArterial  Skin Prep: chlorhexidine gluconate  Orientation: right  Location: radial    Catheter Size: 4 Fr Cook  Catheter placement by Ultrasound guidance. Heme positive aspiration all ports.   Vessel Caliber: small, patent, compressibility normal  Vascular Doppler:  not done  Needle advanced into vessel with real time Ultrasound guidance.Insertion Attempts: 3  Assessment  Dressing: tegaderm and secured with tape  Patient: Tolerated poorly

## 2024-07-16 NOTE — NURSING TRANSFER
Nursing Transfer Note      7/16/2024   1:32 PM    Nurse giving handoff:Rajiv BURDICK Rn  Nurse receiving handoff: Arabella Gonzalez    Reason patient is being transferred: postop/Kidney transpplant    Transfer To: [id number]    Transfer via bed    Transfer with n/a    Transported by pacu pct    Transfer Vital Signs:  Blood Pressure:see flowsheet  Heart Rate:  O2:  Temperature:  Respirations:    Telemetry: n/a  Order for Tele Monitor? No    Additional Lines: Ortiz Catheter        Medicines sent: IVF    Any special needs or follow-up needed: n/a    Patient belongings transferred with patient:  n/a    Chart send with patient: Yes    Notified: family    Patient reassessed at: 7/16/24  1  Upon arrival to floor: bed in lowest position

## 2024-07-16 NOTE — TRANSFER OF CARE
Anesthesia Transfer of Care Note    Patient: Dejuan Diaz Jr.    Procedure(s) Performed: Procedure(s) (LRB):  TRANSPLANT, KIDNEY (Left)    Patient location: PACU    Anesthesia Type: general    Transport from OR: Transported from OR on 6-10 L/min O2 by face mask with adequate spontaneous ventilation    Post pain: adequate analgesia    Post assessment: no apparent anesthetic complications    Post vital signs: stable    Level of consciousness: sedated    Complications: none    Transfer of care protocol was followed      Last vitals: Visit Vitals  BP (!) 191/95   Pulse 69   Temp 36.6 °C (97.8 °F) (Oral)   Resp 18   Ht 6' (1.829 m)   Wt 97.6 kg (215 lb 2.7 oz)   SpO2 100%   BMI 29.18 kg/m²

## 2024-07-16 NOTE — PROGRESS NOTES
TRANSPLANT NOTE:      ORGAN: LEFT KIDNEY      Disease Etiology: Diabetes Mellitus - Type II  Donor Type: Living      [facility] High Risk: No      Donor CMV Status:   Negative  Donor HBcAB:      Negative  Donor HCV Status:      Negative  Peak cPRA % (within 1 year of transplant): 37      Dejuan Diaz Jr. is a 43 y.o. male s/p  Living     kidney transplant on 7/16/2024 (Kidney / Pancreas) for Diabetes Mellitus - Type II.   This patient will receive 3 doses of Thymoglobulin for induction.  This patients maintenance immunosuppression will include a steroid taper per protocol to 5mg daily, Prograf, and Cellcept maintenance.  Opportunistic infection prophylaxis will include Acyclovir for 3 months (CMV D - , R - ) and Bactrim for 6 months.  Patient is to begin self medications upon transfer to the TSU, and I plan to meet with this patient and his/her support person prior to discharge to review the medication section of the Kidney Transplant Education Manual.  I have reviewed the pre-op medications and have restarted those, as appropriate.

## 2024-07-16 NOTE — PROGRESS NOTES
Pt remained stable during pacu stay. Pt AAOX4. Pt following commands appropriately. VSS. Patient states pain is tolerable. No N&V. Pt on renal diet. Incision to left abdomen intact with dermabond, Ortiz in place with clear yellow urine with good hourly outputs. Teds/SCD's in place throughout duration in PACU. Transferred to the next Phase of Care.

## 2024-07-16 NOTE — PROGRESS NOTES
MANUELA On Call Note  MANUELA received secure chat from nurse, Aurora, stating that patient's sister, Ginger, would like to speak with a SW ASAP as she was told to speak with SW about patient's insurance and finances as soon as patient was transplanted. Aurora informed her that SW would be by patient's room tomorrow to speak with her but reached out to SW with Ginger's number ([phone] ). MANUELA called patient's sister and left VM informing her that SW would be at bedside tomorrow to check in with her. Also stated that insurance issues are to be directed to , who can be reached through the transplant .

## 2024-07-16 NOTE — ANESTHESIA PROCEDURE NOTES
Intubation    Date/Time: 7/16/2024 9:13 AM    Performed by: Reynaldo Cardona CRNA  Authorized by: Dragan Cortez Jr., MD    Intubation:     Induction:  Intravenous    Intubated:  Postinduction    Mask Ventilation:  Easy mask    Attempts:  1    Attempted By:  Student    Method of Intubation:  Direct    Blade:  Leslie 4    Laryngeal View Grade: Grade I - full view of cords      Difficult Airway Encountered?: No      Complications:  None    Airway Device:  Oral endotracheal tube    Airway Device Size:  7.5    Style/Cuff Inflation:  Cuffed (inflated to minimal occlusive pressure)    Tube secured:  22    Secured at:  The teeth    Placement Verified By:  Capnometry    Complicating Factors:  None    Findings Post-Intubation:  BS equal bilateral and atraumatic/condition of teeth unchanged

## 2024-07-17 DIAGNOSIS — Z94.0 KIDNEY REPLACED BY TRANSPLANT: Primary | ICD-10-CM

## 2024-07-17 PROBLEM — Z79.60 LONG-TERM USE OF IMMUNOSUPPRESSANT MEDICATION: Status: ACTIVE | Noted: 2024-07-17

## 2024-07-17 PROBLEM — E87.20 METABOLIC ACIDOSIS: Status: ACTIVE | Noted: 2024-07-17

## 2024-07-17 PROBLEM — D63.8 ANEMIA OF CHRONIC DISEASE: Status: ACTIVE | Noted: 2024-07-17

## 2024-07-17 PROBLEM — E83.51 HYPOCALCEMIA: Status: ACTIVE | Noted: 2024-07-17

## 2024-07-17 PROBLEM — Z91.89 AT RISK FOR OPPORTUNISTIC INFECTIONS: Status: ACTIVE | Noted: 2024-07-17

## 2024-07-17 PROBLEM — Z29.89 PROPHYLACTIC IMMUNOTHERAPY: Status: ACTIVE | Noted: 2024-07-17

## 2024-07-17 PROBLEM — D62 ACUTE ON CHRONIC BLOOD LOSS ANEMIA: Status: ACTIVE | Noted: 2024-07-17

## 2024-07-17 PROBLEM — R53.1 WEAKNESS: Status: ACTIVE | Noted: 2024-07-17

## 2024-07-17 LAB
ALBUMIN SERPL BCP-MCNC: 3 G/DL (ref 3.5–5.2)
ALBUMIN SERPL BCP-MCNC: 3 G/DL (ref 3.5–5.2)
ANION GAP SERPL CALC-SCNC: 10 MMOL/L (ref 8–16)
ANION GAP SERPL CALC-SCNC: 6 MMOL/L (ref 8–16)
BASOPHILS # BLD AUTO: 0 K/UL (ref 0–0.2)
BASOPHILS NFR BLD: 0 % (ref 0–1.9)
BUN SERPL-MCNC: 21 MG/DL (ref 6–20)
BUN SERPL-MCNC: 34 MG/DL (ref 6–20)
CALCIUM SERPL-MCNC: 6.9 MG/DL (ref 8.7–10.5)
CALCIUM SERPL-MCNC: 8 MG/DL (ref 8.7–10.5)
CHLORIDE SERPL-SCNC: 113 MMOL/L (ref 95–110)
CHLORIDE SERPL-SCNC: 114 MMOL/L (ref 95–110)
CO2 SERPL-SCNC: 17 MMOL/L (ref 23–29)
CO2 SERPL-SCNC: 22 MMOL/L (ref 23–29)
CREAT SERPL-MCNC: 1.5 MG/DL (ref 0.5–1.4)
CREAT SERPL-MCNC: 2.4 MG/DL (ref 0.5–1.4)
DIFFERENTIAL METHOD BLD: ABNORMAL
EOSINOPHIL # BLD AUTO: 0 K/UL (ref 0–0.5)
EOSINOPHIL NFR BLD: 0 % (ref 0–8)
ERYTHROCYTE [DISTWIDTH] IN BLOOD BY AUTOMATED COUNT: 13.2 % (ref 11.5–14.5)
EST. GFR  (NO RACE VARIABLE): 33.5 ML/MIN/1.73 M^2
EST. GFR  (NO RACE VARIABLE): 58.9 ML/MIN/1.73 M^2
GLUCOSE SERPL-MCNC: 106 MG/DL (ref 70–110)
GLUCOSE SERPL-MCNC: 106 MG/DL (ref 70–110)
GLUCOSE SERPL-MCNC: 127 MG/DL (ref 70–110)
GLUCOSE SERPL-MCNC: 130 MG/DL (ref 70–110)
GLUCOSE SERPL-MCNC: 188 MG/DL (ref 70–110)
GLUCOSE SERPL-MCNC: 256 MG/DL (ref 70–110)
HCO3 UR-SCNC: 14.5 MMOL/L (ref 24–28)
HCO3 UR-SCNC: 14.6 MMOL/L (ref 24–28)
HCO3 UR-SCNC: 15 MMOL/L (ref 24–28)
HCO3 UR-SCNC: 16.8 MMOL/L (ref 24–28)
HCT VFR BLD AUTO: 24.3 % (ref 40–54)
HCT VFR BLD CALC: 21 %PCV (ref 36–54)
HCT VFR BLD CALC: 21 %PCV (ref 36–54)
HCT VFR BLD CALC: 22 %PCV (ref 36–54)
HCT VFR BLD CALC: 26 %PCV (ref 36–54)
HGB BLD-MCNC: 8.1 G/DL (ref 14–18)
IMM GRANULOCYTES # BLD AUTO: 0.02 K/UL (ref 0–0.04)
IMM GRANULOCYTES NFR BLD AUTO: 0.3 % (ref 0–0.5)
LYMPHOCYTES # BLD AUTO: 0 K/UL (ref 1–4.8)
LYMPHOCYTES NFR BLD: 0.4 % (ref 18–48)
MAGNESIUM SERPL-MCNC: 1.7 MG/DL (ref 1.6–2.6)
MAGNESIUM SERPL-MCNC: 2 MG/DL (ref 1.6–2.6)
MCH RBC QN AUTO: 29 PG (ref 27–31)
MCHC RBC AUTO-ENTMCNC: 33.3 G/DL (ref 32–36)
MCV RBC AUTO: 87 FL (ref 82–98)
MONOCYTES # BLD AUTO: 0.4 K/UL (ref 0.3–1)
MONOCYTES NFR BLD: 5.8 % (ref 4–15)
NEUTROPHILS # BLD AUTO: 6.5 K/UL (ref 1.8–7.7)
NEUTROPHILS NFR BLD: 93.5 % (ref 38–73)
NRBC BLD-RTO: 0 /100 WBC
PCO2 BLDA: 31.8 MMHG (ref 35–45)
PCO2 BLDA: 35.4 MMHG (ref 35–45)
PCO2 BLDA: 37.1 MMHG (ref 35–45)
PCO2 BLDA: 39.7 MMHG (ref 35–45)
PH SMN: 7.17 [PH] (ref 7.35–7.45)
PH SMN: 7.21 [PH] (ref 7.35–7.45)
PH SMN: 7.27 [PH] (ref 7.35–7.45)
PH SMN: 7.28 [PH] (ref 7.35–7.45)
PHOSPHATE SERPL-MCNC: 2.4 MG/DL (ref 2.7–4.5)
PHOSPHATE SERPL-MCNC: 3.1 MG/DL (ref 2.7–4.5)
PLATELET # BLD AUTO: 167 K/UL (ref 150–450)
PMV BLD AUTO: 11.5 FL (ref 9.2–12.9)
PO2 BLDA: 102 MMHG (ref 80–100)
PO2 BLDA: 103 MMHG (ref 80–100)
PO2 BLDA: 156 MMHG (ref 80–100)
PO2 BLDA: 35 MMHG (ref 40–60)
POC BE: -10 MMOL/L
POC BE: -12 MMOL/L
POC BE: -13 MMOL/L
POC BE: -14 MMOL/L
POC IONIZED CALCIUM: 1.02 MMOL/L (ref 1.06–1.42)
POC IONIZED CALCIUM: 1.12 MMOL/L (ref 1.06–1.42)
POC IONIZED CALCIUM: 1.14 MMOL/L (ref 1.06–1.42)
POC IONIZED CALCIUM: 1.19 MMOL/L (ref 1.06–1.42)
POC SATURATED O2: 61 % (ref 95–100)
POC SATURATED O2: 96 % (ref 95–100)
POC SATURATED O2: 96 % (ref 95–100)
POC SATURATED O2: 99 % (ref 95–100)
POC TCO2: 16 MMOL/L (ref 23–27)
POC TCO2: 18 MMOL/L (ref 24–29)
POCT GLUCOSE: 101 MG/DL (ref 70–110)
POCT GLUCOSE: 122 MG/DL (ref 70–110)
POCT GLUCOSE: 158 MG/DL (ref 70–110)
POCT GLUCOSE: 161 MG/DL (ref 70–110)
POCT GLUCOSE: 163 MG/DL (ref 70–110)
POCT GLUCOSE: 168 MG/DL (ref 70–110)
POCT GLUCOSE: 171 MG/DL (ref 70–110)
POCT GLUCOSE: 172 MG/DL (ref 70–110)
POCT GLUCOSE: 176 MG/DL (ref 70–110)
POCT GLUCOSE: 186 MG/DL (ref 70–110)
POCT GLUCOSE: 190 MG/DL (ref 70–110)
POCT GLUCOSE: 198 MG/DL (ref 70–110)
POCT GLUCOSE: 204 MG/DL (ref 70–110)
POCT GLUCOSE: 209 MG/DL (ref 70–110)
POCT GLUCOSE: 219 MG/DL (ref 70–110)
POCT GLUCOSE: 228 MG/DL (ref 70–110)
POCT GLUCOSE: 251 MG/DL (ref 70–110)
POCT GLUCOSE: 265 MG/DL (ref 70–110)
POCT GLUCOSE: 279 MG/DL (ref 70–110)
POCT GLUCOSE: 287 MG/DL (ref 70–110)
POCT GLUCOSE: 310 MG/DL (ref 70–110)
POCT GLUCOSE: 314 MG/DL (ref 70–110)
POCT GLUCOSE: 352 MG/DL (ref 70–110)
POTASSIUM BLD-SCNC: 4.6 MMOL/L (ref 3.5–5.1)
POTASSIUM BLD-SCNC: 4.6 MMOL/L (ref 3.5–5.1)
POTASSIUM BLD-SCNC: 4.8 MMOL/L (ref 3.5–5.1)
POTASSIUM BLD-SCNC: 4.9 MMOL/L (ref 3.5–5.1)
POTASSIUM SERPL-SCNC: 3.7 MMOL/L (ref 3.5–5.1)
POTASSIUM SERPL-SCNC: 4 MMOL/L (ref 3.5–5.1)
RBC # BLD AUTO: 2.79 M/UL (ref 4.6–6.2)
SAMPLE: ABNORMAL
SODIUM BLD-SCNC: 142 MMOL/L (ref 136–145)
SODIUM BLD-SCNC: 142 MMOL/L (ref 136–145)
SODIUM BLD-SCNC: 143 MMOL/L (ref 136–145)
SODIUM BLD-SCNC: 143 MMOL/L (ref 136–145)
SODIUM SERPL-SCNC: 141 MMOL/L (ref 136–145)
SODIUM SERPL-SCNC: 141 MMOL/L (ref 136–145)
TACROLIMUS BLD-MCNC: <2 NG/ML (ref 5–15)
WBC # BLD AUTO: 6.92 K/UL (ref 3.9–12.7)

## 2024-07-17 PROCEDURE — 97116 GAIT TRAINING THERAPY: CPT | Mod: NTX

## 2024-07-17 PROCEDURE — 99233 SBSQ HOSP IP/OBS HIGH 50: CPT | Mod: 24,NTX,, | Performed by: PHYSICIAN ASSISTANT

## 2024-07-17 PROCEDURE — 83735 ASSAY OF MAGNESIUM: CPT | Mod: 91,NTX | Performed by: TRANSPLANT SURGERY

## 2024-07-17 PROCEDURE — 97530 THERAPEUTIC ACTIVITIES: CPT | Mod: NTX

## 2024-07-17 PROCEDURE — 25000003 PHARM REV CODE 250: Mod: NTX | Performed by: CLINICAL NURSE SPECIALIST

## 2024-07-17 PROCEDURE — 25000003 PHARM REV CODE 250: Mod: NTX | Performed by: STUDENT IN AN ORGANIZED HEALTH CARE EDUCATION/TRAINING PROGRAM

## 2024-07-17 PROCEDURE — 80197 ASSAY OF TACROLIMUS: CPT | Mod: NTX

## 2024-07-17 PROCEDURE — 97161 PT EVAL LOW COMPLEX 20 MIN: CPT | Mod: NTX

## 2024-07-17 PROCEDURE — 63600175 PHARM REV CODE 636 W HCPCS: Mod: NTX | Performed by: PHYSICIAN ASSISTANT

## 2024-07-17 PROCEDURE — 36415 COLL VENOUS BLD VENIPUNCTURE: CPT | Mod: NTX

## 2024-07-17 PROCEDURE — 25000003 PHARM REV CODE 250: Mod: NTX

## 2024-07-17 PROCEDURE — 80069 RENAL FUNCTION PANEL: CPT | Mod: 91,NTX | Performed by: PHYSICIAN ASSISTANT

## 2024-07-17 PROCEDURE — 83735 ASSAY OF MAGNESIUM: CPT | Mod: NTX

## 2024-07-17 PROCEDURE — 36415 COLL VENOUS BLD VENIPUNCTURE: CPT | Mod: NTX | Performed by: PHYSICIAN ASSISTANT

## 2024-07-17 PROCEDURE — 63600175 PHARM REV CODE 636 W HCPCS: Mod: NTX

## 2024-07-17 PROCEDURE — 20600001 HC STEP DOWN PRIVATE ROOM: Mod: NTX

## 2024-07-17 PROCEDURE — 25000003 PHARM REV CODE 250: Mod: NTX | Performed by: PHYSICIAN ASSISTANT

## 2024-07-17 PROCEDURE — 97165 OT EVAL LOW COMPLEX 30 MIN: CPT | Mod: NTX

## 2024-07-17 PROCEDURE — 80069 RENAL FUNCTION PANEL: CPT | Mod: NTX

## 2024-07-17 PROCEDURE — 85025 COMPLETE CBC W/AUTO DIFF WBC: CPT | Mod: NTX

## 2024-07-17 PROCEDURE — 27000207 HC ISOLATION: Mod: NTX

## 2024-07-17 PROCEDURE — 99223 1ST HOSP IP/OBS HIGH 75: CPT | Mod: NTX,,, | Performed by: PHYSICIAN ASSISTANT

## 2024-07-17 RX ORDER — FAMOTIDINE 20 MG/1
20 TABLET, FILM COATED ORAL NIGHTLY
Qty: 30 TABLET | Refills: 0 | Status: SHIPPED | OUTPATIENT
Start: 2024-07-16 | End: 2024-08-17

## 2024-07-17 RX ORDER — SODIUM CHLORIDE 9 MG/ML
INJECTION, SOLUTION INTRAVENOUS CONTINUOUS
Status: DISCONTINUED | OUTPATIENT
Start: 2024-07-17 | End: 2024-07-18

## 2024-07-17 RX ORDER — SODIUM BICARBONATE 650 MG/1
1300 TABLET ORAL 2 TIMES DAILY
Status: DISCONTINUED | OUTPATIENT
Start: 2024-07-17 | End: 2024-07-18 | Stop reason: HOSPADM

## 2024-07-17 RX ORDER — SODIUM BICARBONATE 650 MG/1
1300 TABLET ORAL 2 TIMES DAILY
Qty: 30 TABLET | Refills: 5 | Status: SHIPPED | OUTPATIENT
Start: 2024-07-17 | End: 2025-07-17

## 2024-07-17 RX ORDER — TACROLIMUS 1 MG/1
2 CAPSULE ORAL ONCE
Status: COMPLETED | OUTPATIENT
Start: 2024-07-17 | End: 2024-07-17

## 2024-07-17 RX ORDER — CALCITRIOL 0.5 UG/1
0.5 CAPSULE ORAL 2 TIMES DAILY
Status: DISCONTINUED | OUTPATIENT
Start: 2024-07-17 | End: 2024-07-18 | Stop reason: HOSPADM

## 2024-07-17 RX ORDER — CALCITRIOL 0.5 UG/1
0.5 CAPSULE ORAL 2 TIMES DAILY
Qty: 60 CAPSULE | Refills: 5 | Status: SHIPPED | OUTPATIENT
Start: 2024-07-17 | End: 2024-07-19

## 2024-07-17 RX ORDER — TACROLIMUS 1 MG/1
5 CAPSULE ORAL 2 TIMES DAILY
Status: DISCONTINUED | OUTPATIENT
Start: 2024-07-17 | End: 2024-07-18

## 2024-07-17 RX ADMIN — INSULIN HUMAN 3 UNITS/HR: 1 INJECTION, SOLUTION INTRAVENOUS at 03:07

## 2024-07-17 RX ADMIN — SODIUM BICARBONATE 1300 MG: 650 TABLET ORAL at 08:07

## 2024-07-17 RX ADMIN — TACROLIMUS 3 MG: 1 CAPSULE ORAL at 08:07

## 2024-07-17 RX ADMIN — ACETAMINOPHEN 650 MG: 325 TABLET ORAL at 04:07

## 2024-07-17 RX ADMIN — SODIUM BICARBONATE: 84 INJECTION, SOLUTION INTRAVENOUS at 06:07

## 2024-07-17 RX ADMIN — CEFAZOLIN 2 G: 2 INJECTION, POWDER, FOR SOLUTION INTRAMUSCULAR; INTRAVENOUS at 01:07

## 2024-07-17 RX ADMIN — MUPIROCIN 1 G: 20 OINTMENT TOPICAL at 08:07

## 2024-07-17 RX ADMIN — OXYCODONE HYDROCHLORIDE 5 MG: 5 TABLET ORAL at 08:07

## 2024-07-17 RX ADMIN — BISACODYL 10 MG: 5 TABLET, COATED ORAL at 08:07

## 2024-07-17 RX ADMIN — MYCOPHENOLATE MOFETIL 1000 MG: 250 CAPSULE ORAL at 08:07

## 2024-07-17 RX ADMIN — SODIUM BICARBONATE: 84 INJECTION, SOLUTION INTRAVENOUS at 10:07

## 2024-07-17 RX ADMIN — SODIUM CHLORIDE: 9 INJECTION, SOLUTION INTRAVENOUS at 09:07

## 2024-07-17 RX ADMIN — DIPHENHYDRAMINE HYDROCHLORIDE 25 MG: 25 CAPSULE ORAL at 09:07

## 2024-07-17 RX ADMIN — OXYCODONE HYDROCHLORIDE 5 MG: 5 TABLET ORAL at 02:07

## 2024-07-17 RX ADMIN — HEPARIN SODIUM 5000 UNITS: 5000 INJECTION INTRAVENOUS; SUBCUTANEOUS at 08:07

## 2024-07-17 RX ADMIN — DOCUSATE SODIUM 100 MG: 100 CAPSULE, LIQUID FILLED ORAL at 02:07

## 2024-07-17 RX ADMIN — ONDANSETRON 4 MG: 2 INJECTION INTRAMUSCULAR; INTRAVENOUS at 07:07

## 2024-07-17 RX ADMIN — ACETAMINOPHEN 650 MG: 325 TABLET ORAL at 12:07

## 2024-07-17 RX ADMIN — HEPARIN SODIUM 5000 UNITS: 5000 INJECTION INTRAVENOUS; SUBCUTANEOUS at 02:07

## 2024-07-17 RX ADMIN — SODIUM CHLORIDE: 9 INJECTION, SOLUTION INTRAVENOUS at 02:07

## 2024-07-17 RX ADMIN — SODIUM CHLORIDE: 9 INJECTION, SOLUTION INTRAVENOUS at 12:07

## 2024-07-17 RX ADMIN — METHYLPREDNISOLONE SODIUM SUCCINATE 250 MG: 125 INJECTION, POWDER, FOR SOLUTION INTRAMUSCULAR; INTRAVENOUS at 09:07

## 2024-07-17 RX ADMIN — SODIUM BICARBONATE 1300 MG: 650 TABLET ORAL at 10:07

## 2024-07-17 RX ADMIN — HEPARIN SODIUM 5000 UNITS: 5000 INJECTION INTRAVENOUS; SUBCUTANEOUS at 06:07

## 2024-07-17 RX ADMIN — FAMOTIDINE 20 MG: 20 TABLET ORAL at 08:07

## 2024-07-17 RX ADMIN — SODIUM CHLORIDE: 9 INJECTION, SOLUTION INTRAVENOUS at 04:07

## 2024-07-17 RX ADMIN — CALCITRIOL 0.5 MCG: 0.5 CAPSULE, LIQUID FILLED ORAL at 10:07

## 2024-07-17 RX ADMIN — TACROLIMUS 2 MG: 1 CAPSULE ORAL at 11:07

## 2024-07-17 RX ADMIN — TACROLIMUS 5 MG: 1 CAPSULE ORAL at 06:07

## 2024-07-17 RX ADMIN — INSULIN HUMAN 4 UNITS/HR: 1 INJECTION, SOLUTION INTRAVENOUS at 02:07

## 2024-07-17 RX ADMIN — THERA TABS 1 TABLET: TAB at 08:07

## 2024-07-17 RX ADMIN — DOCUSATE SODIUM 100 MG: 100 CAPSULE, LIQUID FILLED ORAL at 08:07

## 2024-07-17 RX ADMIN — CALCITRIOL 0.5 MCG: 0.5 CAPSULE, LIQUID FILLED ORAL at 08:07

## 2024-07-17 RX ADMIN — ACETAMINOPHEN 650 MG: 325 TABLET ORAL at 09:07

## 2024-07-17 NOTE — PT/OT/SLP EVAL
"Physical Therapy Co-Evaluation    Patient Name:  Dejuan Diaz Jr.   MRN:  [id number]    Recommendations:     Discharge Recommendations: Low Intensity Therapy   Discharge Equipment Recommendations: shower chair   Barriers to discharge: Inaccessible home    Assessment:     Dejuan Diaz Jr. is a 43 y.o. male admitted with a medical diagnosis of <principal problem not specified>.  He presents with the following impairments/functional limitations: weakness, impaired endurance, impaired functional mobility, decreased upper extremity function, decreased lower extremity function, gait instability, impaired balance.    Pleasant and in good spirits, motivated. Pt amb >300' no AD 1 person for safety and presented with slow but steady gait. Pt will reside with significant other in a [facility] with 12 SILVA unilateral HR. Pt will benefit from skilled PT services while in house in order to address the aforementioned deficits.      Rehab Prognosis: Good; patient would benefit from acute skilled PT services to address these deficits and reach maximum level of function.    Recent Surgery: Procedure(s) (LRB):  TRANSPLANT, KIDNEY (Left) 1 Day Post-Op    Plan:     During this hospitalization, patient to be seen 3 x/week to address the identified rehab impairments via gait training, therapeutic activities, therapeutic exercises, neuromuscular re-education and progress toward the following goals:    Plan of Care Expires:  08/17/24    Subjective     "My left side hurts a little, I wonder why?"    Pain/Comfort:  Pain Rating 1:  (not rated)  Location - Side 1: Left  Location - Orientation 1: generalized  Location 1: flank  Pain Addressed 1: Reposition, Cessation of Activity, Distraction    Patients cultural, spiritual, Rastafari conflicts given the current situation: no    Living Environment:  Per significant other, for one week pt will return home alone in [facility] with 1 SILVA and walk-in shower no seat. Afterwards, pt and significant other will " reside in [facility] with 2 + 1 SILVA. Pt's bedroom on second floor with 12 steps unilateral HR (unsure which side). Pt has WIS with built in bench on first and second floor.  Prior to admission, patients level of function was I.  Equipment used at home: none.  DME owned (not currently used): none.  Upon discharge, patient will have assistance from significant other.    Objective:     Communicated with RN prior to session.  Patient found up in chair with mcclain catheter, peripheral IV  upon PT entry to room.    General Precautions: Standard, fall  Orthopedic Precautions:N/A   Braces: N/A  Respiratory Status: Room air    Exams:  RLE ROM: WFL  RLE Strength: WFL  LLE ROM: WFL  LLE Strength: WFL    Functional Mobility:  Transfers:     Sit to Stand:  contact guard assistance with no AD  Toilet Transfer: contact guard assistance with  no AD  using  Step Transfer  Gait: pt amb >300' SBA and presented with fair chang, arm swing present, reciprocal gait. Pt reported increased L flank pain thus cessation of activity warranted.  Balance:   Good sitting balance  Good standing balance      AM-PAC 6 CLICK MOBILITY  Total Score:19       Treatment & Education:  Discussed sitting upright in chair >1hr, pt agreeable  Discussed sitting up EOB and/or UIC with RN/staff outside of therapy services  Discussed amb with RN/staff outside of therapy services    Educated pt on PT role/POC  Educated pt on importance of OOB activity and daily ambulation   Pt educated on proper body mechanics, safety techniques, and energy conservation with PT facilitation and cueing throughout session   Pt verbalized understanding          Patient left up in chair with all lines intact, call button in reach, RN notified, and OT, RN, family, MD team present.    GOALS:   Multidisciplinary Problems       Physical Therapy Goals          Problem: Physical Therapy    Goal Priority Disciplines Outcome Goal Variances Interventions   Physical Therapy Goal     PT, PT/OT  Progressing     Description: Goals to be met by: 2024     Patient will increase functional independence with mobility by performin. Supine to sit with Modified [place]  2. Sit to supine with Modified [place]  3. Sit to stand transfer with Modified [place]  4. Bed to chair transfer with Modified [place] using LRAD  5. Gait  x 600 feet with Modified [place] using LRAD.   6. Ascend/descend 12 stair with either Handrails Supervision using LRAD.                        History:     Past Medical History:   Diagnosis Date    Anemia     Diabetes mellitus type II     Disorder of kidney and ureter     GERD (gastroesophageal reflux disease)     Hypertension     Proteinuria        Past Surgical History:   Procedure Laterality Date    INNER EAR SURGERY      for tinnitus    KIDNEY TRANSPLANT N/A 2024    Procedure: TRANSPLANT, KIDNEY;  Surgeon: Raul Jean MD;  Location: [facility] OR [address] [place] [address];  Service: Transplant;  Laterality: N/A;    KIDNEY TRANSPLANT Left 2024    Procedure: TRANSPLANT, KIDNEY;  Surgeon: Erma Gómez MD;  Location: [facility] OR [address] [place] [address];  Service: Transplant;  Laterality: Left;    TRANSPLANTATION OF PANCREAS N/A 2024    Procedure: TRANSPLANT, PANCREAS;  Surgeon: Raul Jean MD;  Location: [facility] OR [address] [place] [address];  Service: Transplant;  Laterality: N/A;       Time Tracking:     PT Received On: 24  PT Start Time: 953     PT Stop Time:   PT Total Time (min): 25 min     Billable Minutes: Evaluation 10 and Gait Training 15    Co-treatment performed due to patient's multiple deficits requiring two skilled therapists to appropriately and safely assess patient's strength and endurance while facilitating functional tasks in addition to accommodating for patient's activity tolerance.       2024

## 2024-07-17 NOTE — CONSULTS
[facility] - Transplant Stepdown  Adult Nutrition  Consult Note    SUMMARY     Recommendations    1.) Recommend continuing with CLD (no sugar), as tolerated, advancing to Diabetic Diet     - adding renal modifiers as needed.     2.) If diet advances, and PO intake <50%, recommend Boost Glucose Control BID to help meet needs.     3.) RD to monitor wt, PO intake, skin, labs.       Goals: to meet % of EEN/EPN by next RD f/u  Nutrition Goal Status: new  Communication of RD Recs:  (POC)    Assessment and Plan    Nutrition Problem  Increased PRO/energy needs    Related to (etiology):   Increased physiological needs    Signs and Symptoms (as evidenced by):   S/p living donor kidney tx.      Interventions/Recommendations (treatment strategy):  Collaboration of nutritional care with other providers.   Diet edu  ONS PRN    Nutrition Diagnosis Status:   New     Reason for Assessment    Reason For Assessment: consult (s/p living donor kidney tx)  Diagnosis: transplant/postoperative complications (s/p living donor kidney tx)  Relevant Medical History: ESRD 2/2 diabetic nephropathy, pre-HD, HTN, DM1  Interdisciplinary Rounds: did not attend    General Information Comments: RD consulted for s/p living donor kidney tx. Pt endorse some nausea (received Zofran- effective). Pt denies v/d/c. Pt tolerating CLD. Pt states that appetite PTA was low to fair. Per chart review, insignificant wt loss noted: -2% x 1mo and -4% x 6mo. No other indicator of malnutrition. RD was travis to provide food safety diet guidelines to pt and caregivers. All questions and concerns were addressed. RD's contact information was provided. RD team  to continue to monitor and f/u.     Nutrition Discharge Planning: Post transplant nutrition education provided on 7/17. Food safety/drug interactions emphasized. General healthy/low salt diet recommended. Appropriate education material with RD contact information provided. No other needs identified.    Nutrition  Related Social Determinants of Health: SDOH: None Identified     Nutrition Risk Screen    Nutrition Risk Screen: no indicators present    Nutrition/Diet History    Patient Reported Diet/Restrictions/Preferences: diabetic diet  Typical Food/Fluid Intake: 4 small meals  Spiritual, Cultural Beliefs, Presybeterian Practices, Values that Affect Care: no  Food Allergies: NKFA    Anthropometrics    Temp: 98 °F (36.7 °C)  Height Method: Stated  Height: 6' (182.9 cm)  Height (inches): 72 in  Weight Method: Standard Scale  Weight: 97.5 kg (214 lb 15.2 oz)  Weight (lb): 214.95 lb  Ideal Body Weight (IBW), Male: 178 lb  % Ideal Body Weight, Male (lb): 120.76 %  BMI (Calculated): 29.1    Lab/Procedures/Meds    Pertinent Labs Reviewed: reviewed  Pertinent Labs Comments: BUN: 34, Cr: 2.4, GFR: 33.5, gluc: 256, Ca: 6.9, alb: 3.0    Pertinent Medications Reviewed: reviewed  Pertinent Medications Comments: antithymocyte globulin, bisacodyl, calcitriol, docusate, famotidine, heparin, methylprednisolone, MVI, mycophenolate, prednisone, Na Bicarb, abx, tacrolimus, NaCl, insulin, Na Bicarb    Estimated/Assessed Needs    Weight Used For Calorie Calculations: 97.5 kg (214 lb 15.2 oz)  Energy Calorie Requirements (kcal): 2099 kcal  Energy Need Method: [place] (MSJ x 1.1)    Protein Requirements: 98g (1.0g/kg)  Weight Used For Protein Calculations: 97.5 kg (214 lb 15.2 oz)    Fluid Requirements (mL): 1ml/1kcal or per MD  Estimated Fluid Requirement Method: RDA Method  RDA Method (mL): 2099    CHO Requirement: 262 g    Nutrition Prescription Ordered    Current Diet Order: CLD (no sugar)    Evaluation of Received Nutrient/Fluid Intake    I/O: +3.4L since admit  Energy Calories Required: not meeting needs  Protein Required: not meeting needs  Fluid Required:  (as per MD)  Comments: LBM 7/16  Tolerance: tolerating  % Intake of Estimated Energy Needs: 0 - 25 %  % Meal Intake: Other: CLD    Nutrition Risk    Level of Risk/Frequency of  Follow-up:  (RD to f/u x 1-2/week)     Monitor and Evaluation    Food and Nutrient Intake: food and beverage intake  Food and Nutrient Adminstration: diet order  Knowledge/Beliefs/Attitudes: food and nutrition knowledge/skill, beliefs and attitudes  Anthropometric Measurements: weight, weight change, body mass index  Biochemical Data, Medical Tests and Procedures: electrolyte and renal panel, gastrointestinal profile, glucose/endocrine profile, inflammatory profile  Nutrition-Focused Physical Findings: overall appearance, skin     Nutrition Follow-Up    RD Follow-up?: Yes

## 2024-07-17 NOTE — CONSULTS
[facility] - Transplant Stepdown  Endocrinology  Diabetes Consult Note    Consult Requested by: Erma Gómez MD   Reason for admit: Status post living-donor kidney transplantation    HISTORY OF PRESENT ILLNESS:  Reason for Consult: Management of T1DM, Hyperglycemia     Surgical Procedure and Date:  Status post kidney transplant 716th 2024    Diabetes diagnosis year:  Over 10 years ago    Home Diabetes Medications:    Omnipod 5  0000-1.3  0300-1.25  0600-0.95  0800-1.00  1200-1.05  1800-1.15    ICR-1:2  ISF- 1:30    How often checking glucose at home?  Dexcom   BG readings on regimen: 100s-200s  Hypoglycemia on the regimen?  No  Missed doses on regimen?  No    Diabetes Complications include:     Hyperglycemia and Diabetic nephropathy      Complicating diabetes co morbidities:   ESRD      HPI:   Patient is a 43 y.o. male with T1DM, CKD s/p living donor kidney txp for DM/HTN .  Endocrine consulted for BG management        Interval HPI:   Status post kidney transplant Patient in room 16649/65828 A. Blood glucose stable. BG at goal on current insulin regimen (IIP). Steroid use- Methylprednisolone  and Hydrocortisone  .   1 Day Post-Op  Renal function- Abnormal -  Vasopressors-  None     Diet Clear liquid (no sugar)/Bariatric     Eating:   NPO  Nausea: No  Hypoglycemia and intervention: No  Fever: No  TPN and/or TF: No      PMH, PSH, FH, SH updated and reviewed     ROS:    Review of Systems   Constitutional:  Negative for fatigue and fever.   Gastrointestinal:  Negative for nausea and vomiting.       Current Medications and/or Treatments Impacting Glycemic Control  Immunotherapy:    Immunosuppressants           Stop Route Frequency     tacrolimus capsule 5 mg         -- Oral 2 times daily     antithymocyte globulin (rabbit) 125 mg, hydrocortisone sodium succinate (SOLU-CORTEF) 20 mg in 0.9% NaCl 500 mL (FOR PERIPHERAL LINE ADMINISTRATION ONLY)         07/19/24 0859 IV Daily     mycophenolate capsule 1,000 mg          -- Oral 2 times daily          Steroids:   Hormones (From admission, onward)      Start     Stop Route Frequency Ordered    07/19/24 0900  predniSONE tablet 20 mg  (IP TXP KIDNEY POST-OP THYMO WITH PERIPHERAL PRECHECKED)         -- Oral Daily 07/16/24 1315    07/18/24 0800  methylPREDNISolone sodium succinate injection 125 mg  (IP TXP KIDNEY POST-OP THYMO WITH PERIPHERAL PRECHECKED)         -- IV Once 07/16/24 1315    07/17/24 0900  antithymocyte globulin (rabbit) 125 mg, hydrocortisone sodium succinate (SOLU-CORTEF) 20 mg in 0.9% NaCl 500 mL (FOR PERIPHERAL LINE ADMINISTRATION ONLY)  (IP TXP KIDNEY POST-OP THYMO WITH PERIPHERAL PRECHECKED)         07/19/24 0859 IV Daily 07/16/24 1315    07/16/24 1414  hydrocortisone sodium succinate injection 100 mg  (IP TXP KIDNEY POST-OP THYMO WITH PERIPHERAL PRECHECKED)         12/13/35 0514 IV Once as needed 07/16/24 1315          Pressors:    Autonomic Drugs (From admission, onward)      Start     Stop Route Frequency Ordered    07/16/24 1414  EPINEPHrine (PF) injection 1 mg  (IP TXP KIDNEY POST-OP THYMO WITH PERIPHERAL PRECHECKED)         12/13/35 0514 SubQ Once as needed 07/16/24 1315          Hyperglycemia/Diabetes Medications:   Antihyperglycemics (From admission, onward)      Start     Stop Route Frequency Ordered    07/16/24 2200  insulin regular in 0.9 % NaCl 100 unit/100 mL (1 unit/mL) infusion        Question:  Enter initial dose from Infusion Protocol Chart (Units/hr):  Answer:  1    -- IV Continuous 07/16/24 2052 07/16/24 2132  insulin regular in 0.9 % NaCl 100 unit/100 mL (1 unit/mL) infusion        Note to Pharmacy: Created by cabinet override    07/17/24 0944 07/16/24 2132 07/16/24 1414  insulin aspart U-100 pen 0-5 Units  (INSULIN - LOW CORRECTION DOSE (Recommended for BMI <25, GFR<15 or on dialysis, Age > 70, type 1 diabetes, ESLD, severe CHF or patients on <70 units of insulin daily))         -- SubQ Before meals & nightly PRN 07/16/24 1315          "    PHYSICAL EXAMINATION:  Vitals:    07/17/24 1427   BP:    Pulse:    Resp: 14   Temp:      Body mass index is 29.15 kg/m².     Physical Exam  Constitutional:       Appearance: He is not ill-appearing.   HENT:      Head: Normocephalic and atraumatic.   Neurological:      Mental Status: He is alert.   Psychiatric:         Mood and Affect: Mood normal.         Behavior: Behavior normal.            Labs Reviewed and Include   Recent Labs   Lab 07/17/24  0620   *   CALCIUM 6.9*   ALBUMIN 3.0*      K 3.7   CO2 17*   *   BUN 34*   CREATININE 2.4*     Lab Results   Component Value Date    WBC 6.92 07/17/2024    HGB 8.1 (L) 07/17/2024    HCT 24.3 (L) 07/17/2024    MCV 87 07/17/2024     07/17/2024     No results for input(s): "TSH", "FREET4" in the last 168 hours.  Lab Results   Component Value Date    HGBA1C 5.3 07/16/2024       Nutritional status:   Body mass index is 29.15 kg/m².  Lab Results   Component Value Date    ALBUMIN 3.0 (L) 07/17/2024    ALBUMIN 3.3 (L) 07/16/2024    ALBUMIN 3.1 (L) 07/16/2024     No results found for: "PREALBUMIN"    Estimated Creatinine Clearance: 48.1 mL/min (A) (based on SCr of 2.4 mg/dL (H)).    Accu-Checks  Recent Labs     07/17/24  0438 07/17/24  0534 07/17/24  0635 07/17/24  0738 07/17/24  0846 07/17/24  1003 07/17/24  1109 07/17/24  1214 07/17/24  1322 07/17/24  1423   POCTGLUCOSE 265* 279* 251* 228* 198* 176* 161* 158* 172* 163*        ASSESSMENT and PLAN    Renal/  * Status post living-donor kidney transplantation  Managed by primary team  Optimize BG control      Endocrine  Type 1 diabetes mellitus  BG goal: 140-180  T1DM S/p kidney transplant.  On steroids.  On Omnipod at home.  High insulin drip requirements post surgery 6-9 times higher than home needs.  Not safe to advance diet or put back on Omnipod at this point.  Will continue to monitor on correction and consider switching to transition drip as a bridge to patient's Omnipod once safely able to.  " Evaluated patient's pdm, suspect patient not entering all of carbs with meals.  Weakened carb ratio 1:6 and strengthen correction 1:25      - -Continue Intensive insulin drip    - POCT Glucose every hour  - Hypoglycemia protocol in place      ** Please notify Endocrine for any change and/or advance in diet**  ** Please call Endocrine for any BG related issues **     Discharge Planning:   TBD.    Suspect patient not entering all carbs with meals.  Concerned over aggressive carb ratio may lead to lows. Weakened carb ratio 1:6 and strengthen correction 1:25.        Palliative Care  Long-term use of immunosuppressant medication  On immunosuppressive therapy per transplant team; may elevate BG readings            Plan discussed with patient, family, and RN at bedside.     Mac Roberto PA-C  Endocrinology  [facility] - Transplant Stepdown

## 2024-07-17 NOTE — PROGRESS NOTES
Admit Note     Met with patient, significant other, and sister to assess needs. Patient is a 43 y.o. single male, admitted for for kidney transplant.      Patient admitted from home on 7/16/2024 .  At this time, patient presents as alert and oriented x 4, pleasant, good eye contact, well groomed, recall good, concentration/judgement good, average intelligence, calm, communicative, cooperative, and asking and answering questions appropriately.  At this time, patients caregiver presents as alert and oriented x 4, pleasant, good eye contact, well groomed, recall good, concentration/judgement good, average intelligence, calm, communicative, cooperative, and asking and answering questions appropriately.    Household/Family Systems     Patient resides with patient's parents, at [address] [place] [address].  Support system includes his significant other Naz Graham ([phone]), and his sister Ginger Carrero ([phone])  Patient does not have dependents that are need of being cared for.     Patients primary caregiver is Naz Graham, patients significant other, phone number [phone].  Confirmed patients contact information is [phone] (home);   Telephone Information:   Mobile [phone]   .    During admission, patient's caregiver plans to stay in patient's room.  Confirmed patient and patients caregivers do have access to reliable transportation.    Cognitive Status/Learning     Patient reports reading ability as college and states patient does not have difficulty with N/A.  Patient reports patient learns best by multisensory learning.   Needed: No.   Highest education level: Post-College Graduate Degree    Vocation/Disability   .  Working for Income: yes  If yes, working activity level: Working Full Time  Patient in .    Adherence     Patient reports a high level of adherence to patients health care regimen.  Adherence counseling and education provided.  Patient verbalizes understanding.    Substance Use    Patient reports the following substance usage.    Tobacco: none, patient denies any use.  Alcohol: none, patient denies any use.  Illicit Drugs/Non-prescribed Medications: none, patient denies any use.  Patient states clear understanding of the potential impact of substance use.  Substance abstinence/cessation counseling, education and resources provided and reviewed.     Services Utilizing/ADLS    Infusion Service: Prior to admission, patient utilizing? no  Home Health: Prior to admission, patient utilizing? no  DME: Prior to admission, yes Insulin pump, BPC  Pulmonary/Cardiac Rehab: Prior to admission, no  Dialysis:  Prior to admission, no  Transplant Specialty Pharmacy:  Prior to admission, no; patient provides permission to release necessary information to specialty pharmacy for medications post-tranplant. Resources provided and patient is choosing [facility] pharmacy at this time.    Prior to admission, patient reports patient was independent with ADLS and was driving.  Patient reports patient is not able to care for self at this time due to compromised medical condition (as documented in medical record) and physical weakness..  Patient indicates a willingness to care for self once medically cleared to do so.    Insurance/Medications    Insured by   Payer/Plan Subscr  Sex Relation Sub. Ins. ID Effective Group Num   1. GENERIC COMME* FRED TORRES* 1980 Male Self [id number] 10/5/23                                    [address], [address], [place] [address]   2. UNITED MEDICA* FRED TORRES* 1980 Male Self [id number]                                    [address] [id number]      Primary Insurance (for UNOS reporting): Private Insurance  Secondary Insurance (for UNOS reporting): Private Insurance    Patient reports patient is able to obtain and afford medications at this time and at time of discharge.    Living Will/Healthcare  Power of     Patient states patient has a LW and/or HCPA.   provided education regarding LW and HCPA and the completion of forms.    Coping/Mental Health    Patient is coping adequately with the aid of  family members and friends.  Patient denies mental health difficulties. Patient and caregiver reported questions regarding patients insurance coverage,  will send message to  team and ask them to call patient. Patient and caregivers denied needing or wanting additional resources or referrals at this time. Patient and caregivers agree to contact transplant team with needs, questions, or concerns as they arise.     Discharge Planning    At time of discharge, patient plans to return to patient's home under the care of Naz Graham.  Patients significant other will transport patient.  Per rounds today, expected discharge date has not been medically determined at this time. Patient and patients caregiver  verbalize understanding and are involved in treatment planning and discharge process.    Additional Concerns    Patient's caretaker denies additional needs and/or concerns at this time.  providing ongoing psychosocial support, education, resources and d/c planning as needed.  SW remains available.  remains available. Patient's caregiver verbalizes understanding and agreement with information reviewed,  availability and how to access available resources as needed. Patient denies additional needs and/or concerns at this time. Patient verbalizes understanding and agreement with information reviewed, social work availability, and how to access available resources as needed.

## 2024-07-17 NOTE — PROGRESS NOTES
EDUCATION NOTE:    Met with Dejuan Diaz Jr. and his caregivers to provide teaching re: immunosuppressant medications.  Reviewed medication section of the Kidney Transplant Education book that was provided.  Emphasized the importance of compliance, role of the blue medication card, concerns for drug interactions, and process of obtaining refills.  Counseled regarding Prograf, Cellcept , prednisone, including directions for use, monitoring, how to handle missed doses, and side effects.  Mr. Diaz and his family verbalized understanding and had the opportunity to ask questions.

## 2024-07-17 NOTE — ASSESSMENT & PLAN NOTE
BG goal: 140-180  T1DM S/p kidney transplant.  On steroids.  On Omnipod at home.  High insulin drip requirements post surgery 6-9 times higher than home needs.  Not safe to advance diet or put back on Omnipod at this point.  Will continue to monitor on correction and consider switching to transition drip as a bridge to patient's Omnipod once safely able to.  Evaluated patient's pdm, suspect patient not entering all of carbs with meals.  Weakened carb ratio 1:6 and strengthen correction 1:25      - -Continue Intensive insulin drip    - POCT Glucose every hour  - Hypoglycemia protocol in place      ** Please notify Endocrine for any change and/or advance in diet**  ** Please call Endocrine for any BG related issues **     Discharge Planning:   TBD.    Suspect patient not entering all carbs with meals.  Concerned over aggressive carb ratio may lead to lows. Weakened carb ratio 1:6 and strengthen correction 1:25.

## 2024-07-17 NOTE — PROGRESS NOTES
[facility] Transplant Stepdown  Kidney Transplant  Progress Note      Reason for Follow-up: Reassessment of Kidney, Pancreas Transplant Waitlist - Qualified: 2/19/2024 recipient and management of immunosuppression.    ORGAN: LEFT KIDNEY      Donor Type: Living      Donor CMV Status:    Donor HBcAB:   Donor HCV Status:   Donor HBV KAVITA:   Donor HCV KAVITA:       Subjective:   History of Present Illness:  No notes on file    Mr. Diaz is a 43 y.o. year old male who is status post Kidney, Pancreas Transplant Waitlist - Qualified: 2/19/2024.  His maintenance immunosuppression consists of:   Immunosuppressants (From admission, onward)      Start     Stop Route Frequency Ordered    07/17/24 1800  tacrolimus capsule 5 mg  (IP TXP KIDNEY POST-OP THYMO WITH PERIPHERAL PRECHECKED)         -- Oral 2 times daily 07/17/24 1031    07/17/24 0900  antithymocyte globulin (rabbit) 125 mg, hydrocortisone sodium succinate (SOLU-CORTEF) 20 mg in 0.9% NaCl 500 mL (FOR PERIPHERAL LINE ADMINISTRATION ONLY)  (IP TXP KIDNEY POST-OP THYMO WITH PERIPHERAL PRECHECKED)         07/19/24 0859 IV Daily 07/16/24 1315    07/16/24 2200  mycophenolate capsule 1,000 mg  (IP TXP KIDNEY POST-OP THYMO WITH PERIPHERAL PRECHECKED)         -- Oral 2 times daily 07/16/24 1315            Hospital Course:  Mr. iDaz is a 43 yr male with hx CKD 5, preHD now s/p living kidney transplant on 7/16/24. No intra-op issues noted. 2 day mcclain. No drains. Patient making ton of urine (>18L). Will cont bicarb gtt for now. Plan to remove mcclain tomorrow am. Patient reports feeling well. States some lower leg weakness. PT consulted. Endocrine following for BG management. Remains on insulin gtt this am. On CLD per Endo for now until insulin requirements decrease.  Pt orthostatic- treat standing BP only. Will hold off on restarting home BP meds at this time. Con to monitor.      Past Medical, Surgical, Family, and Social History:   Unchanged from H&P.    Scheduled Meds:    acetaminophen  650 mg Oral Q8H    acetaminophen  650 mg Oral Q24H    antithymocyte globulin (rabbit) 125 mg, hydrocortisone sodium succinate (SOLU-CORTEF) 20 mg in 0.9% NaCl 500 mL (FOR PERIPHERAL LINE ADMINISTRATION ONLY)  1.5 mg/kg (Adjusted) Intravenous Daily    bisacodyL  10 mg Oral QHS    calcitRIOL  0.5 mcg Oral BID    diphenhydrAMINE  25 mg Oral Q24H    docusate sodium  100 mg Oral TID    famotidine  20 mg Oral QHS    heparin (porcine)  5,000 Units Subcutaneous Q8H    [START ON 7/18/2024] methylPREDNISolone injection (PEDS and ADULTS)  125 mg Intravenous Once    multivitamin  1 tablet Oral Daily    mupirocin  1 g Nasal BID    mycophenolate  1,000 mg Oral BID    [START ON 7/19/2024] predniSONE  20 mg Oral Daily    sodium bicarbonate  1,300 mg Oral BID    [START ON 7/26/2024] sulfamethoxazole-trimethoprim 400-80mg  1 tablet Oral Daily AM    tacrolimus  5 mg Oral BID    [START ON 7/26/2024] valGANciclovir  450 mg Oral Daily AM     Continuous Infusions:   0.9% NaCl   Intravenous Continuous 250 mL/hr at 07/17/24 1200 Rate Verify at 07/17/24 1200    glucagon (human recombinant)  1 mg Intramuscular Continuous PRN        insulin regular 1 units/mL infusion orderable (DKA)  1 Units/hr Intravenous Continuous 6.2 mL/hr at 07/17/24 1018 6.2 Units/hr at 07/17/24 1018    sodium bicarbonate 150 mEq in D5W 1,000 mL infusion   Intravenous Continuous 150 mL/hr at 07/17/24 1009 New Bag at 07/17/24 1009     PRN Meds:  Current Facility-Administered Medications:     dextrose 10%, 12.5 g, Intravenous, PRN    dextrose 10%, 12.5 g, Intravenous, PRN    dextrose 10%, 25 g, Intravenous, PRN    diphenhydrAMINE, 50 mg, Oral, Once PRN    EPINEPHrine (PF), 1 mg, Subcutaneous, Once PRN    glucagon (human recombinant), 1 mg, Intramuscular, Continuous PRN    glucose, 16 g, Oral, PRN    glucose, 16 g, Oral, PRN    glucose, 24 g, Oral, PRN    hydrocortisone sodium succinate, 100 mg, Intravenous, Once PRN    insulin aspart U-100, 0-5 Units,  Subcutaneous, QID (AC + HS) PRN    ondansetron, 4 mg, Intravenous, Q6H PRN    oxyCODONE, 5 mg, Oral, Q6H PRN    sodium chloride 0.9%, 10 mL, Intravenous, PRN    Intake/Output - Last 3 Shifts         07/15 0700  07/16 0659 07/16 0700 07/17 0659 07/17 0700 07/18 0659    P.O.  1780 540    I.V. (mL/kg)  9597.8 (98.4) 2074.1 (21.3)    IV Piggyback  4249.7 68    Total Intake(mL/kg)  99899.5 (160.3) 2682.1 (27.5)    Urine (mL/kg/hr)  13608 (7.8) 1650 (2.7)    Stool  0     Blood  50     Total Output  15302 1650    Net  -2752.5 +1032.1           Stool Occurrence  0 x              Review of Systems   Constitutional:  Negative for chills and fever.   Respiratory:  Negative for cough and shortness of breath.    Gastrointestinal:  Positive for abdominal pain (incisional). Negative for abdominal distention, constipation, diarrhea, nausea and vomiting.   Genitourinary:  Negative for decreased urine volume, difficulty urinating, dysuria and frequency.   Allergic/Immunologic: Positive for immunocompromised state.   Neurological:  Positive for weakness (LE).   Psychiatric/Behavioral:  Negative for agitation, confusion and decreased concentration. The patient is not nervous/anxious.       Objective:     Vital Signs (Most Recent):  Temp: 98 °F (36.7 °C) (07/17/24 1130)  Pulse: 66 (07/17/24 1145)  Resp: 18 (07/17/24 1145)  BP: (!) 157/77 (07/17/24 1145)  SpO2: 99 % (07/17/24 0656) Vital Signs (24h Range):  Temp:  [97.8 °F (36.6 °C)-99 °F (37.2 °C)] 98 °F (36.7 °C)  Pulse:  [59-85] 66  Resp:  [16-23] 18  SpO2:  [98 %-100 %] 99 %  BP: ()/(49-90) 157/77     Weight: 97.5 kg (214 lb 15.2 oz)  Height: 6' (182.9 cm)  Body mass index is 29.15 kg/m².     Physical Exam  Vitals and nursing note reviewed.   Constitutional:       General: He is not in acute distress.     Appearance: He is well-developed. He is not diaphoretic.   Cardiovascular:      Rate and Rhythm: Normal rate and regular rhythm.      Heart sounds: No murmur heard.     No  friction rub.   Pulmonary:      Breath sounds: Normal breath sounds. No wheezing or rales.   Abdominal:      General: There is no distension.      Palpations: Abdomen is soft.      Tenderness: There is abdominal tenderness (post-op). There is no guarding or rebound.      Comments: Kidney incision with dermabond- cdi     Musculoskeletal:      Right lower leg: No edema.      Left lower leg: No edema.   Skin:     General: Skin is warm and dry.   Neurological:      Mental Status: He is alert and oriented to person, place, and time.   Psychiatric:         Mood and Affect: Mood normal.         Behavior: Behavior normal.         Thought Content: Thought content normal.         Judgment: Judgment normal.          Laboratory:  CBC:   Recent Labs   Lab 07/16/24 1320 07/16/24 1910 07/17/24  0620   WBC  --  6.50 6.92   RBC  --  3.12* 2.79*   HGB  --  9.1* 8.1*   HCT 24.1* 26.5* 24.3*   PLT  --  169 167   MCV  --  85 87   MCH  --  29.2 29.0   MCHC  --  34.3 33.3     CMP:   Recent Labs   Lab 07/16/24 1320 07/16/24 1910 07/17/24  0620   * 408* 256*   CALCIUM 7.3* 7.2* 6.9*   ALBUMIN 3.1* 3.3* 3.0*    138 141   K 4.9 4.1 3.7   CO2 14* 15* 17*   * 112* 114*   BUN 78* 63* 34*   CREATININE 6.9* 4.9* 2.4*     Labs within the past 24 hours have been reviewed.    Diagnostic Results:  None  Assessment/Plan:     * Status post living-donor kidney transplantation  - hx CKD 5, preHD now s/p living donor kidney txp for DM/HTN  - 2 day mcclain, to be removed 7/18 am  - no drains  - making ton of urine (>18L).  - cont bicarb gtt for now.  - encouraged PO intake  - Cr trending down well      Acute on chronic blood loss anemia  - expected to improve post op. H/H stable      Anemia of chronic disease  - H/H stable. No overt signs of bleeding. Cont to monitor      Weakness  - post-op LE weakness. PT consulted to assess.      Hypocalcemia  - PO calcitriol      Metabolic acidosis  - PO bicarb  - remains on IV bicarb gtt for  "today      Prophylactic immunotherapy  - Continue Cellcept and Prograf. Will monitor for signs of toxic side effects, check daily tacrolimus troughs, and change meds accordingly.       Long-term use of immunosuppressant medication  - see "prophylactic immuno"      At risk for opportunistic infections  - Valcyte for CMV prophylaxis at d/c  - Bactrim for PCP prophylaxis at d/c    Type 1 diabetes mellitus  - endocrine following for BG management  - remains on insulin gtt and CLD for now  - plan to transition to pump once insulin requirements decrease      Long term current use of insulin  - on pump at home          Discharge Planning: not current candidate   Medical decision making for this encounter includes review of pertinent labs and diagnostic studies, assessment and planning, discussions with consulting providers, discussion with patient/family, and participation in multidisciplinary rounds. Time spent caring for patient: 60 minutes    Cherie Diaz PA-C  Kidney Transplant  [facility] - Transplant Stepdown  "

## 2024-07-17 NOTE — PLAN OF CARE
-kidney transplant 07/16/2024.  - Pt is AAOx4   -Patient is Dizzy with standing orthostatic BP's and a 1 person assist   -Patient received PRN Oxy Twice and Zofran once  -Patient receiving IV insulin  -Patient received overnight dose of cefazolin

## 2024-07-17 NOTE — HOSPITAL COURSE
Mr. Diaz is a 43 yr male with hx CKD 5, preHD now s/p living kidney transplant on 7/16/24. No intra-op issues noted. 2 day mcclain. No drains. Patient making ton of urine (>18L). Will cont bicarb gtt for now. Plan to remove mcclain tomorrow am. Patient reports feeling well. States some lower leg weakness. PT consulted. Endocrine following for BG management. Remains on insulin gtt this am. On CLD per Endo for now until insulin requirements decrease.  Pt orthostatic- treat standing BP only. Will hold off on restarting home BP meds at this time. Con to monitor.

## 2024-07-17 NOTE — ASSESSMENT & PLAN NOTE
- endocrine following for BG management  - remains on insulin gtt and CLD for now  - plan to transition to pump once insulin requirements decrease

## 2024-07-17 NOTE — ASSESSMENT & PLAN NOTE
- hx CKD 5, preHD now s/p living donor kidney txp for DM/HTN  - 2 day mcclain, to be removed 7/18 am  - no drains  - making ton of urine (>18L).  - cont bicarb gtt for now.  - encouraged PO intake  - Cr trending down well

## 2024-07-17 NOTE — ANESTHESIA POSTPROCEDURE EVALUATION
Anesthesia Post Evaluation    Patient: Dejuan Diaz Jr.    Procedure(s) Performed: Procedure(s) (LRB):  TRANSPLANT, KIDNEY (Left)    Final Anesthesia Type: general      Patient location during evaluation: PACU  Patient participation: Yes- Able to Participate  Level of consciousness: awake and alert  Post-procedure vital signs: reviewed and stable  Pain management: adequate  Airway patency: patent    PONV status at discharge: No PONV  Anesthetic complications: no      Cardiovascular status: blood pressure returned to baseline  Respiratory status: spontaneous ventilation and face mask  Hydration status: euvolemic  Follow-up not needed.              Vitals Value Taken Time   /71 07/17/24 0656   Temp 37.1 °C (98.8 °F) 07/17/24 0656   Pulse 72 07/17/24 0656   Resp 16 07/17/24 0832   SpO2 99 % 07/17/24 0656         Event Time   Out of Recovery 13:45:00         Pain/Emely Score: Pain Rating Prior to Med Admin: 8 (7/17/2024  8:32 AM)  Pain Rating Post Med Admin: 3 (7/17/2024  3:43 AM)  Emely Score: 10 (7/16/2024  2:45 PM)

## 2024-07-17 NOTE — SUBJECTIVE & OBJECTIVE
Subjective:   History of Present Illness:  No notes on file    Mr. Diaz is a 43 y.o. year old male who is status post Kidney, Pancreas Transplant Waitlist - Qualified: 2/19/2024.  His maintenance immunosuppression consists of:   Immunosuppressants (From admission, onward)      Start     Stop Route Frequency Ordered    07/17/24 1800  tacrolimus capsule 5 mg  (IP TXP KIDNEY POST-OP THYMO WITH PERIPHERAL PRECHECKED)         -- Oral 2 times daily 07/17/24 1031    07/17/24 0900  antithymocyte globulin (rabbit) 125 mg, hydrocortisone sodium succinate (SOLU-CORTEF) 20 mg in 0.9% NaCl 500 mL (FOR PERIPHERAL LINE ADMINISTRATION ONLY)  (IP TXP KIDNEY POST-OP THYMO WITH PERIPHERAL PRECHECKED)         07/19/24 0859 IV Daily 07/16/24 1315    07/16/24 2200  mycophenolate capsule 1,000 mg  (IP TXP KIDNEY POST-OP THYMO WITH PERIPHERAL PRECHECKED)         -- Oral 2 times daily 07/16/24 1315            Hospital Course:  Mr. Diaz is a 43 yr male with hx CKD 5, preHD now s/p living kidney transplant on 7/16/24. No intra-op issues noted. 2 day mcclain. No drains. Patient making ton of urine (>18L). Will cont bicarb gtt for now. Plan to remove mcclain tomorrow am. Patient reports feeling well. States some lower leg weakness. PT consulted. Endocrine following for BG management. Remains on insulin gtt this am. On CLD per Endo for now until insulin requirements decrease.  Pt orthostatic- treat standing BP only. Will hold off on restarting home BP meds at this time. Con to monitor.      Past Medical, Surgical, Family, and Social History:   Unchanged from H&P.    Scheduled Meds:   acetaminophen  650 mg Oral Q8H    acetaminophen  650 mg Oral Q24H    antithymocyte globulin (rabbit) 125 mg, hydrocortisone sodium succinate (SOLU-CORTEF) 20 mg in 0.9% NaCl 500 mL (FOR PERIPHERAL LINE ADMINISTRATION ONLY)  1.5 mg/kg (Adjusted) Intravenous Daily    bisacodyL  10 mg Oral QHS    calcitRIOL  0.5 mcg Oral BID    diphenhydrAMINE  25 mg Oral Q24H     docusate sodium  100 mg Oral TID    famotidine  20 mg Oral QHS    heparin (porcine)  5,000 Units Subcutaneous Q8H    [START ON 7/18/2024] methylPREDNISolone injection (PEDS and ADULTS)  125 mg Intravenous Once    multivitamin  1 tablet Oral Daily    mupirocin  1 g Nasal BID    mycophenolate  1,000 mg Oral BID    [START ON 7/19/2024] predniSONE  20 mg Oral Daily    sodium bicarbonate  1,300 mg Oral BID    [START ON 7/26/2024] sulfamethoxazole-trimethoprim 400-80mg  1 tablet Oral Daily AM    tacrolimus  5 mg Oral BID    [START ON 7/26/2024] valGANciclovir  450 mg Oral Daily AM     Continuous Infusions:   0.9% NaCl   Intravenous Continuous 250 mL/hr at 07/17/24 1200 Rate Verify at 07/17/24 1200    glucagon (human recombinant)  1 mg Intramuscular Continuous PRN        insulin regular 1 units/mL infusion orderable (DKA)  1 Units/hr Intravenous Continuous 6.2 mL/hr at 07/17/24 1018 6.2 Units/hr at 07/17/24 1018    sodium bicarbonate 150 mEq in D5W 1,000 mL infusion   Intravenous Continuous 150 mL/hr at 07/17/24 1009 New Bag at 07/17/24 1009     PRN Meds:  Current Facility-Administered Medications:     dextrose 10%, 12.5 g, Intravenous, PRN    dextrose 10%, 12.5 g, Intravenous, PRN    dextrose 10%, 25 g, Intravenous, PRN    diphenhydrAMINE, 50 mg, Oral, Once PRN    EPINEPHrine (PF), 1 mg, Subcutaneous, Once PRN    glucagon (human recombinant), 1 mg, Intramuscular, Continuous PRN    glucose, 16 g, Oral, PRN    glucose, 16 g, Oral, PRN    glucose, 24 g, Oral, PRN    hydrocortisone sodium succinate, 100 mg, Intravenous, Once PRN    insulin aspart U-100, 0-5 Units, Subcutaneous, QID (AC + HS) PRN    ondansetron, 4 mg, Intravenous, Q6H PRN    oxyCODONE, 5 mg, Oral, Q6H PRN    sodium chloride 0.9%, 10 mL, Intravenous, PRN    Intake/Output - Last 3 Shifts         07/15 0700  07/16 0659 07/16 0700  07/17 0659 07/17 0700 07/18 0659    P.O.  1780 540    I.V. (mL/kg)  9597.8 (98.4) 2074.1 (21.3)    IV Piggyback  4249.7 68    Total  Intake(mL/kg)  46592.5 (160.3) 2682.1 (27.5)    Urine (mL/kg/hr)  58642 (7.8) 1650 (2.7)    Stool  0     Blood  50     Total Output  66090 1650    Net  -2752.5 +1032.1           Stool Occurrence  0 x              Review of Systems   Constitutional:  Negative for chills and fever.   Respiratory:  Negative for cough and shortness of breath.    Gastrointestinal:  Positive for abdominal pain (incisional). Negative for abdominal distention, constipation, diarrhea, nausea and vomiting.   Genitourinary:  Negative for decreased urine volume, difficulty urinating, dysuria and frequency.   Allergic/Immunologic: Positive for immunocompromised state.   Neurological:  Positive for weakness (LE).   Psychiatric/Behavioral:  Negative for agitation, confusion and decreased concentration. The patient is not nervous/anxious.       Objective:     Vital Signs (Most Recent):  Temp: 98 °F (36.7 °C) (07/17/24 1130)  Pulse: 66 (07/17/24 1145)  Resp: 18 (07/17/24 1145)  BP: (!) 157/77 (07/17/24 1145)  SpO2: 99 % (07/17/24 0656) Vital Signs (24h Range):  Temp:  [97.8 °F (36.6 °C)-99 °F (37.2 °C)] 98 °F (36.7 °C)  Pulse:  [59-85] 66  Resp:  [16-23] 18  SpO2:  [98 %-100 %] 99 %  BP: ()/(49-90) 157/77     Weight: 97.5 kg (214 lb 15.2 oz)  Height: 6' (182.9 cm)  Body mass index is 29.15 kg/m².     Physical Exam  Vitals and nursing note reviewed.   Constitutional:       General: He is not in acute distress.     Appearance: He is well-developed. He is not diaphoretic.   Cardiovascular:      Rate and Rhythm: Normal rate and regular rhythm.      Heart sounds: No murmur heard.     No friction rub.   Pulmonary:      Breath sounds: Normal breath sounds. No wheezing or rales.   Abdominal:      General: There is no distension.      Palpations: Abdomen is soft.      Tenderness: There is abdominal tenderness (post-op). There is no guarding or rebound.      Comments: Kidney incision with dermabond- cdi     Musculoskeletal:      Right lower leg: No  edema.      Left lower leg: No edema.   Skin:     General: Skin is warm and dry.   Neurological:      Mental Status: He is alert and oriented to person, place, and time.   Psychiatric:         Mood and Affect: Mood normal.         Behavior: Behavior normal.         Thought Content: Thought content normal.         Judgment: Judgment normal.          Laboratory:  CBC:   Recent Labs   Lab 07/16/24 1320 07/16/24 1910 07/17/24  0620   WBC  --  6.50 6.92   RBC  --  3.12* 2.79*   HGB  --  9.1* 8.1*   HCT 24.1* 26.5* 24.3*   PLT  --  169 167   MCV  --  85 87   MCH  --  29.2 29.0   MCHC  --  34.3 33.3     CMP:   Recent Labs   Lab 07/16/24 1320 07/16/24 1910 07/17/24  0620   * 408* 256*   CALCIUM 7.3* 7.2* 6.9*   ALBUMIN 3.1* 3.3* 3.0*    138 141   K 4.9 4.1 3.7   CO2 14* 15* 17*   * 112* 114*   BUN 78* 63* 34*   CREATININE 6.9* 4.9* 2.4*     Labs within the past 24 hours have been reviewed.    Diagnostic Results:  None

## 2024-07-17 NOTE — PLAN OF CARE
Recommendations     1.) Recommend continuing with CLD (no sugar), as tolerated, advancing to Diabetic Diet                 - adding renal modifiers as needed.      2.) If diet advances, and PO intake <50%, recommend Boost Glucose Control BID to help meet needs.      3.) RD to monitor wt, PO intake, skin, labs.         Goals: to meet % of EEN/EPN by next RD f/u  Nutrition Goal Status: new  Communication of RD Recs:  (POC)

## 2024-07-17 NOTE — PLAN OF CARE
Patient aao, independent with ambulation. Doing great. Cr coming down nicely, copious amout of clear yellow urine, per johny that will be dcd 7/18 0600. Dermabond to incision CDI. Intensive insulin infusion, BG controlled, bariatric clear diet. Patient ambulated in the halls today. I=O, bicarb drip, and thymo infusing. Self med and transplant education performed, patients family attentive to patient and eager to learn about caring for transplant. No bm thus far.

## 2024-07-17 NOTE — SUBJECTIVE & OBJECTIVE
Interval HPI:   Status post kidney transplant Patient in room 17326/86761 A. Blood glucose stable. BG at goal on current insulin regimen (IIP). Steroid use- Methylprednisolone  and Hydrocortisone  .   1 Day Post-Op  Renal function- Abnormal -  Vasopressors-  None     Diet Clear liquid (no sugar)/Bariatric     Eating:   NPO  Nausea: No  Hypoglycemia and intervention: No  Fever: No  TPN and/or TF: No      PMH, PSH, FH, SH updated and reviewed     ROS:    Review of Systems   Constitutional:  Negative for fatigue and fever.   Gastrointestinal:  Negative for nausea and vomiting.       Current Medications and/or Treatments Impacting Glycemic Control  Immunotherapy:    Immunosuppressants           Stop Route Frequency     tacrolimus capsule 5 mg         -- Oral 2 times daily     antithymocyte globulin (rabbit) 125 mg, hydrocortisone sodium succinate (SOLU-CORTEF) 20 mg in 0.9% NaCl 500 mL (FOR PERIPHERAL LINE ADMINISTRATION ONLY)         07/19/24 0859 IV Daily     mycophenolate capsule 1,000 mg         -- Oral 2 times daily          Steroids:   Hormones (From admission, onward)      Start     Stop Route Frequency Ordered    07/19/24 0900  predniSONE tablet 20 mg  (IP TXP KIDNEY POST-OP THYMO WITH PERIPHERAL PRECHECKED)         -- Oral Daily 07/16/24 1315    07/18/24 0800  methylPREDNISolone sodium succinate injection 125 mg  (IP TXP KIDNEY POST-OP THYMO WITH PERIPHERAL PRECHECKED)         -- IV Once 07/16/24 1315    07/17/24 0900  antithymocyte globulin (rabbit) 125 mg, hydrocortisone sodium succinate (SOLU-CORTEF) 20 mg in 0.9% NaCl 500 mL (FOR PERIPHERAL LINE ADMINISTRATION ONLY)  (IP TXP KIDNEY POST-OP THYMO WITH PERIPHERAL PRECHECKED)         07/19/24 0859 IV Daily 07/16/24 1315    07/16/24 1414  hydrocortisone sodium succinate injection 100 mg  (IP TXP KIDNEY POST-OP THYMO WITH PERIPHERAL PRECHECKED)         12/13/35 0514 IV Once as needed 07/16/24 1315          Pressors:    Autonomic Drugs (From admission, onward)       Start     Stop Route Frequency Ordered    07/16/24 1414  EPINEPHrine (PF) injection 1 mg  (IP TXP KIDNEY POST-OP THYMO WITH PERIPHERAL PRECHECKED)         12/13/35 0514 SubQ Once as needed 07/16/24 1315          Hyperglycemia/Diabetes Medications:   Antihyperglycemics (From admission, onward)      Start     Stop Route Frequency Ordered    07/16/24 2200  insulin regular in 0.9 % NaCl 100 unit/100 mL (1 unit/mL) infusion        Question:  Enter initial dose from Infusion Protocol Chart (Units/hr):  Answer:  1    -- IV Continuous 07/16/24 2052 07/16/24 2132  insulin regular in 0.9 % NaCl 100 unit/100 mL (1 unit/mL) infusion        Note to Pharmacy: Created by cabinet override    07/17/24 0944 07/16/24 2132 07/16/24 1414  insulin aspart U-100 pen 0-5 Units  (INSULIN - LOW CORRECTION DOSE (Recommended for BMI <25, GFR<15 or on dialysis, Age > 70, type 1 diabetes, ESLD, severe CHF or patients on <70 units of insulin daily))         -- SubQ Before meals & nightly PRN 07/16/24 1315             PHYSICAL EXAMINATION:  Vitals:    07/17/24 1427   BP:    Pulse:    Resp: 14   Temp:      Body mass index is 29.15 kg/m².     Physical Exam  Constitutional:       Appearance: He is not ill-appearing.   HENT:      Head: Normocephalic and atraumatic.   Neurological:      Mental Status: He is alert.   Psychiatric:         Mood and Affect: Mood normal.         Behavior: Behavior normal.

## 2024-07-17 NOTE — PLAN OF CARE
Problem: Occupational Therapy  Goal: Occupational Therapy Goal  Description: Goals to be met by: 7/24/2024     Patient will increase functional independence with ADLs by performing:    UE Dressing with [place].  LE Dressing with [place].  Grooming while standing at sink with [place].  Toileting from toilet with [place] for hygiene and clothing management.   Toilet transfer to toilet with [place].    Outcome: Progressing

## 2024-07-17 NOTE — PROGRESS NOTES
Transplant Teaching Book previously given to patient, Dejuan Diaz ,.  I met with patient, sister and mahnaz on 07/17/2024.  During the course of the hospital stay the patient received information regarding kidney transplant. Teaching and instruction were completed.  Areas that were discussed included: how to contact the Transplant Team, the importance of measuring intake of fluids and urine output, and monitoring vital signs such as blood pressure, temperature, and daily weights.  Parameters for which to report abnormal findings were given.  Appointment were provided along with the rational for the importance of lab work and clinic visits.  A written medication list was provided.  The importance of immunosuppressive medications, their common side effects, and treatment to prevent or minimize side effects has been reviewed.  Signs and symptoms of rejection and infection along with various treatments were reviewed.  The need to avoid infection was discussed.  Wound care and special consideration regarding activities of daily living were explained.  Written and verbal teaching of the above information was given.     Discussed with the patient and caregiver the importance of maintaining COVID-19 precautions; wearing a mask, good handwashing, and social distancing.  Also, to report any signs or symptoms (fever, difficulty breathing, loss of taste/smell, etc.), suspected exposure, or COVID testing, immediately to the transplant program.

## 2024-07-17 NOTE — PT/OT/SLP EVAL
"Occupational Therapy   Co-Evaluation    Name: Dejuan Diaz Jr.  MRN: [id number]  Admitting Diagnosis: Status post living-donor kidney transplantation  Recent Surgery: Procedure(s) (LRB):  TRANSPLANT, KIDNEY (Left) 1 Day Post-Op    Recommendations:     Discharge Recommendations: Low Intensity Therapy  Discharge Equipment Recommendations:  shower chair  Barriers to discharge:  None    Assessment:     Dejuan Diaz Jr. is a 43 y.o. male with a medical diagnosis of Status post living-donor kidney transplantation.  He presents with the following performance deficits affecting function: weakness, impaired endurance, impaired self care skills, impaired functional mobility, gait instability, pain, impaired balance, impaired cardiopulmonary response to activity. Pt was willing to participate and tolerated session well overall. Pt was able to perform functional mobility and ADLs assessed with little to no assistance. Pt demonstrated excellent functional endurance and activity tolerance, required some verbal cueing for safety awareness with medical lines.     Rehab Prognosis: Good; patient would benefit from acute skilled OT services to address these deficits and reach maximum level of function.       Plan:     Patient to be seen 3 x/week to address the above listed problems via self-care/home management, therapeutic activities, therapeutic exercises  Plan of Care Expires: 08/17/24  Plan of Care Reviewed with: patient, family    Subjective     Chief Complaint: none stated  Patient/Family Comments/goals: "I feel much better than earlier this morning."    Occupational Profile:  Living Environment: SS, 1 SILVA, WIS, lives alone; pt reports will be staying at SO house: 2 SH, 2 [id number] SILVA c/ no rail; bedroom on 2nd flr, full bath c/ WIS on 1st  Previous level of function: indep, drives  Roles and Routines: SO  Equipment Used at Home: none  Assistance upon Discharge: SO    Pain/Comfort:  Pain Rating 1:  (not rated)  Location " - Side 1: Left  Location - Orientation 1: generalized  Location 1: flank  Pain Addressed 1: Reposition, Distraction, Cessation of Activity  Pain Rating Post-Intervention 1:  (not rated)    Patients cultural, spiritual, [employer] conflicts given the current situation: no    Objective:     Communicated with: RN prior to session.  Patient found up in chair with mcclain catheter, peripheral IV upon OT entry to room.    General Precautions: Standard, fall  Orthopedic Precautions: N/A  Braces: N/A  Respiratory Status: Room air    Occupational Performance:    Bed Mobility:    Pt started and finished session in chair    Functional Mobility/Transfers:  Patient completed Sit <> Stand Transfer with contact guard assistance  with  no assistive device   Patient completed Toilet Transfer Step Transfer technique with contact guard assistance with  no AD  Chair t/f: CGA c/ no AD  Functional Mobility: from chair into hallway, back to bathroom then chair; SBA no AD; no LOB    Activities of Daily Living:  Grooming: pt declined to perform   Upper Body Dressing: moderate assistance donned gown as robe; assistance 2/2 to medical lines  Toileting: pt c/ johny, not needing to have BM at this time     Cognitive/Visual Perceptual:  Cognitive/Psychosocial Skills:     -       Oriented to: Person, Place, Time, and Situation   -       Follows Commands/attention:Follows multistep  commands  -       Safety awareness/insight to disability: intact     Physical Exam:  Balance:    -       static standing balance: SBA; dynamic standing balance: SBA ~5 min  Upper Extremity Range of Motion:     -       Right Upper Extremity: WFL  -       Left Upper Extremity: WFL  Upper Extremity Strength:    -       Right Upper Extremity: WFL  -       Left Upper Extremity: WFL   Strength:    -       Right Upper Extremity: WFL  -       Left Upper Extremity: WFL  Fine Motor Coordination:    -       Intact  Left hand thumb/finger opposition skills, Right hand  thumb/finger opposition skills, Left hand, manipulation of objects, and Right hand, manipulation of objects    AMPAC 6 Click ADL:  AMPAC Total Score: 20    Treatment & Education:  Pt edu on role of OT, POC, safety when performing self care tasks , benefit of performing OOB activity, and safety when performing functional transfers and mobility.  - White board updated  - Self care tasks completed-- as noted above      Patient left up in chair with all lines intact, call button in reach, RN notified, and family present    Co-eval with PT to have 2 skilled therapists present to safely assess pt's functional mobility.     GOALS:   Multidisciplinary Problems       Occupational Therapy Goals          Problem: Occupational Therapy    Goal Priority Disciplines Outcome Interventions   Occupational Therapy Goal     OT, PT/OT Progressing    Description: Goals to be met by: 7/24/2024     Patient will increase functional independence with ADLs by performing:    UE Dressing with [place].  LE Dressing with [place].  Grooming while standing at sink with [place].  Toileting from toilet with [place] for hygiene and clothing management.   Toilet transfer to toilet with [place].                         History:     Past Medical History:   Diagnosis Date    Anemia     Diabetes mellitus type II     Disorder of kidney and ureter     GERD (gastroesophageal reflux disease)     Hypertension     Proteinuria          Past Surgical History:   Procedure Laterality Date    INNER EAR SURGERY      for tinnitus    KIDNEY TRANSPLANT N/A 4/18/2024    Procedure: TRANSPLANT, KIDNEY;  Surgeon: Raul Jean MD;  Location: [facility] OR [address] [place] [address];  Service: Transplant;  Laterality: N/A;    KIDNEY TRANSPLANT Left 7/16/2024    Procedure: TRANSPLANT, KIDNEY;  Surgeon: Erma Gómez MD;  Location: [facility] OR [address] [place] [address];  Service: Transplant;  Laterality: Left;    TRANSPLANTATION OF PANCREAS N/A 4/18/2024    Procedure: TRANSPLANT, PANCREAS;   Surgeon: Raul Jean MD;  Location: [facility] OR [address] [place] [address];  Service: Transplant;  Laterality: N/A;       Time Tracking:     OT Date of Treatment: 07/17/24  OT Start Time: 0953  OT Stop Time: 1018  OT Total Time (min): 25 min    Billable Minutes:Evaluation 10  Therapeutic Activity 15    7/17/2024

## 2024-07-17 NOTE — PROGRESS NOTES
Patient admitted for Kidney transplant.  Transplant coordinator met with the patient on rounds to introduce self and explain the coordinator role.     The post-transplant teaching book was given previously in transplant clinic..   Transplant Coordinator explained that she will follow the patient while in the hospital and assist with discharge.

## 2024-07-17 NOTE — PLAN OF CARE
PT Evaluation completed and PT POC established.    Problem: Physical Therapy  Goal: Physical Therapy Goal  Description: Goals to be met by: 2024     Patient will increase functional independence with mobility by performin. Supine to sit with Modified [place]  2. Sit to supine with Modified [place]  3. Sit to stand transfer with Modified [place]  4. Bed to chair transfer with Modified [place] using LRAD  5. Gait  x 600 feet with Modified [place] using LRAD.   6. Ascend/descend 12 stair with either Handrails Supervision using LRAD.   Outcome: Progressing

## 2024-07-17 NOTE — HPI
Reason for Consult: Management of T1DM, Hyperglycemia     Surgical Procedure and Date:  Status post kidney transplant 716th 2024    Diabetes diagnosis year:  Over 10 years ago    Home Diabetes Medications:    Omnipod 5  0000-1.3  0300-1.25  0600-0.95  0800-1.00  1200-1.05  1800-1.15    ICR-1:2  ISF- 1:30    How often checking glucose at home?  Dexcom   BG readings on regimen: 100s-200s  Hypoglycemia on the regimen?  No  Missed doses on regimen?  No    Diabetes Complications include:     Hyperglycemia and Diabetic nephropathy      Complicating diabetes co morbidities:   ESRD      HPI:   Patient is a 43 y.o. male with T1DM, CKD s/p living donor kidney txp for DM/HTN .  Endocrine consulted for BG management

## 2024-07-18 ENCOUNTER — PATIENT MESSAGE (OUTPATIENT)
Dept: ENDOCRINOLOGY | Facility: HOSPITAL | Age: 44
End: 2024-07-18
Payer: COMMERCIAL

## 2024-07-18 VITALS
SYSTOLIC BLOOD PRESSURE: 118 MMHG | WEIGHT: 212.75 LBS | TEMPERATURE: 98 F | DIASTOLIC BLOOD PRESSURE: 76 MMHG | HEART RATE: 76 BPM | OXYGEN SATURATION: 99 % | RESPIRATION RATE: 20 BRPM | HEIGHT: 72 IN | BODY MASS INDEX: 28.82 KG/M2

## 2024-07-18 PROBLEM — Z96.41 INSULIN PUMP STATUS: Status: ACTIVE | Noted: 2024-07-18

## 2024-07-18 LAB
ALBUMIN SERPL BCP-MCNC: 2.8 G/DL (ref 3.5–5.2)
ANION GAP SERPL CALC-SCNC: 7 MMOL/L (ref 8–16)
BASOPHILS # BLD AUTO: 0.02 K/UL (ref 0–0.2)
BASOPHILS NFR BLD: 0.4 % (ref 0–1.9)
BUN SERPL-MCNC: 17 MG/DL (ref 6–20)
CALCIUM SERPL-MCNC: 8.3 MG/DL (ref 8.7–10.5)
CHLORIDE SERPL-SCNC: 116 MMOL/L (ref 95–110)
CO2 SERPL-SCNC: 20 MMOL/L (ref 23–29)
CREAT SERPL-MCNC: 1.2 MG/DL (ref 0.5–1.4)
DIFFERENTIAL METHOD BLD: ABNORMAL
EOSINOPHIL # BLD AUTO: 0 K/UL (ref 0–0.5)
EOSINOPHIL NFR BLD: 0.4 % (ref 0–8)
ERYTHROCYTE [DISTWIDTH] IN BLOOD BY AUTOMATED COUNT: 13.6 % (ref 11.5–14.5)
EST. GFR  (NO RACE VARIABLE): >60 ML/MIN/1.73 M^2
GLUCOSE SERPL-MCNC: 149 MG/DL (ref 70–110)
HCT VFR BLD AUTO: 23.6 % (ref 40–54)
HGB BLD-MCNC: 7.8 G/DL (ref 14–18)
IMM GRANULOCYTES # BLD AUTO: 0.01 K/UL (ref 0–0.04)
IMM GRANULOCYTES NFR BLD AUTO: 0.2 % (ref 0–0.5)
LYMPHOCYTES # BLD AUTO: 0.1 K/UL (ref 1–4.8)
LYMPHOCYTES NFR BLD: 2.3 % (ref 18–48)
MAGNESIUM SERPL-MCNC: 1.9 MG/DL (ref 1.6–2.6)
MCH RBC QN AUTO: 29.3 PG (ref 27–31)
MCHC RBC AUTO-ENTMCNC: 33.1 G/DL (ref 32–36)
MCV RBC AUTO: 89 FL (ref 82–98)
MONOCYTES # BLD AUTO: 0.4 K/UL (ref 0.3–1)
MONOCYTES NFR BLD: 7.7 % (ref 4–15)
NEUTROPHILS # BLD AUTO: 4.3 K/UL (ref 1.8–7.7)
NEUTROPHILS NFR BLD: 89 % (ref 38–73)
NRBC BLD-RTO: 0 /100 WBC
PHOSPHATE SERPL-MCNC: 1.9 MG/DL (ref 2.7–4.5)
PLATELET # BLD AUTO: 126 K/UL (ref 150–450)
PMV BLD AUTO: 11.1 FL (ref 9.2–12.9)
POCT GLUCOSE: 110 MG/DL (ref 70–110)
POCT GLUCOSE: 119 MG/DL (ref 70–110)
POCT GLUCOSE: 123 MG/DL (ref 70–110)
POCT GLUCOSE: 139 MG/DL (ref 70–110)
POCT GLUCOSE: 144 MG/DL (ref 70–110)
POCT GLUCOSE: 146 MG/DL (ref 70–110)
POCT GLUCOSE: 156 MG/DL (ref 70–110)
POCT GLUCOSE: 174 MG/DL (ref 70–110)
POCT GLUCOSE: 181 MG/DL (ref 70–110)
POCT GLUCOSE: 281 MG/DL (ref 70–110)
POCT GLUCOSE: 328 MG/DL (ref 70–110)
POCT GLUCOSE: 353 MG/DL (ref 70–110)
POTASSIUM SERPL-SCNC: 4.5 MMOL/L (ref 3.5–5.1)
RBC # BLD AUTO: 2.66 M/UL (ref 4.6–6.2)
SODIUM SERPL-SCNC: 143 MMOL/L (ref 136–145)
TACROLIMUS BLD-MCNC: 5.4 NG/ML (ref 5–15)
WBC # BLD AUTO: 4.8 K/UL (ref 3.9–12.7)

## 2024-07-18 PROCEDURE — 80069 RENAL FUNCTION PANEL: CPT | Mod: NTX

## 2024-07-18 PROCEDURE — 83735 ASSAY OF MAGNESIUM: CPT | Mod: NTX

## 2024-07-18 PROCEDURE — 63600175 PHARM REV CODE 636 W HCPCS: Mod: NTX | Performed by: NURSE PRACTITIONER

## 2024-07-18 PROCEDURE — 63600175 PHARM REV CODE 636 W HCPCS: Mod: NTX | Performed by: PHYSICIAN ASSISTANT

## 2024-07-18 PROCEDURE — 80197 ASSAY OF TACROLIMUS: CPT | Mod: NTX

## 2024-07-18 PROCEDURE — 25000003 PHARM REV CODE 250: Mod: NTX

## 2024-07-18 PROCEDURE — 97530 THERAPEUTIC ACTIVITIES: CPT | Mod: NTX

## 2024-07-18 PROCEDURE — 63600175 PHARM REV CODE 636 W HCPCS: Mod: NTX

## 2024-07-18 PROCEDURE — 99239 HOSP IP/OBS DSCHRG MGMT >30: CPT | Mod: 24,NTX,, | Performed by: PHYSICIAN ASSISTANT

## 2024-07-18 PROCEDURE — 25000003 PHARM REV CODE 250: Mod: NTX | Performed by: PHYSICIAN ASSISTANT

## 2024-07-18 PROCEDURE — 85025 COMPLETE CBC W/AUTO DIFF WBC: CPT | Mod: NTX

## 2024-07-18 PROCEDURE — 36415 COLL VENOUS BLD VENIPUNCTURE: CPT | Mod: NTX

## 2024-07-18 PROCEDURE — 25000003 PHARM REV CODE 250: Mod: NTX | Performed by: STUDENT IN AN ORGANIZED HEALTH CARE EDUCATION/TRAINING PROGRAM

## 2024-07-18 PROCEDURE — 25000003 PHARM REV CODE 250: Mod: NTX | Performed by: CLINICAL NURSE SPECIALIST

## 2024-07-18 PROCEDURE — 99233 SBSQ HOSP IP/OBS HIGH 50: CPT | Mod: NTX,,, | Performed by: NURSE PRACTITIONER

## 2024-07-18 PROCEDURE — 97116 GAIT TRAINING THERAPY: CPT | Mod: NTX,CQ

## 2024-07-18 RX ORDER — INSULIN LISPRO 100 [IU]/ML
1-10 INJECTION, SOLUTION INTRAVENOUS; SUBCUTANEOUS
Status: DISCONTINUED | OUTPATIENT
Start: 2024-07-18 | End: 2024-07-18 | Stop reason: HOSPADM

## 2024-07-18 RX ORDER — INSULIN LISPRO 100 [IU]/ML
1 INJECTION, SOLUTION INTRAVENOUS; SUBCUTANEOUS CONTINUOUS
Status: DISCONTINUED | OUTPATIENT
Start: 2024-07-18 | End: 2024-07-18 | Stop reason: HOSPADM

## 2024-07-18 RX ORDER — IBUPROFEN 200 MG
24 TABLET ORAL
Status: DISCONTINUED | OUTPATIENT
Start: 2024-07-18 | End: 2024-07-18

## 2024-07-18 RX ORDER — TACROLIMUS 1 MG/1
1 CAPSULE ORAL ONCE
Status: COMPLETED | OUTPATIENT
Start: 2024-07-18 | End: 2024-07-18

## 2024-07-18 RX ORDER — AMLODIPINE BESYLATE 5 MG/1
5 TABLET ORAL DAILY
Qty: 90 TABLET | Refills: 3 | Status: SHIPPED | OUTPATIENT
Start: 2024-07-19 | End: 2025-07-19

## 2024-07-18 RX ORDER — INSULIN ASPART 100 [IU]/ML
1-10 INJECTION, SOLUTION INTRAVENOUS; SUBCUTANEOUS
Status: DISCONTINUED | OUTPATIENT
Start: 2024-07-18 | End: 2024-07-18

## 2024-07-18 RX ORDER — IBUPROFEN 200 MG
16 TABLET ORAL
Status: DISCONTINUED | OUTPATIENT
Start: 2024-07-18 | End: 2024-07-18

## 2024-07-18 RX ORDER — OXYCODONE HYDROCHLORIDE 5 MG/1
5 TABLET ORAL EVERY 4 HOURS PRN
Qty: 30 TABLET | Refills: 0 | Status: SHIPPED | OUTPATIENT
Start: 2024-07-18 | End: 2024-07-26 | Stop reason: SDUPTHER

## 2024-07-18 RX ORDER — AMLODIPINE BESYLATE 5 MG/1
5 TABLET ORAL DAILY
Status: DISCONTINUED | OUTPATIENT
Start: 2024-07-18 | End: 2024-07-18 | Stop reason: HOSPADM

## 2024-07-18 RX ORDER — TACROLIMUS 1 MG/1
6 CAPSULE ORAL 2 TIMES DAILY
Status: DISCONTINUED | OUTPATIENT
Start: 2024-07-18 | End: 2024-07-18 | Stop reason: HOSPADM

## 2024-07-18 RX ORDER — GLUCAGON 1 MG
1 KIT INJECTION
Status: DISCONTINUED | OUTPATIENT
Start: 2024-07-18 | End: 2024-07-18 | Stop reason: HOSPADM

## 2024-07-18 RX ORDER — IBUPROFEN 200 MG
24 TABLET ORAL
Status: DISCONTINUED | OUTPATIENT
Start: 2024-07-18 | End: 2024-07-18 | Stop reason: HOSPADM

## 2024-07-18 RX ORDER — INSULIN ASPART 100 [IU]/ML
0-4 INJECTION, SOLUTION INTRAVENOUS; SUBCUTANEOUS
Status: DISCONTINUED | OUTPATIENT
Start: 2024-07-18 | End: 2024-07-18

## 2024-07-18 RX ORDER — GLUCAGON 1 MG
1 KIT INJECTION
Status: DISCONTINUED | OUTPATIENT
Start: 2024-07-18 | End: 2024-07-18

## 2024-07-18 RX ORDER — IBUPROFEN 200 MG
16 TABLET ORAL
Status: DISCONTINUED | OUTPATIENT
Start: 2024-07-18 | End: 2024-07-18 | Stop reason: HOSPADM

## 2024-07-18 RX ADMIN — INSULIN ASPART 7 UNITS: 100 INJECTION, SOLUTION INTRAVENOUS; SUBCUTANEOUS at 09:07

## 2024-07-18 RX ADMIN — DIPHENHYDRAMINE HYDROCHLORIDE 25 MG: 25 CAPSULE ORAL at 09:07

## 2024-07-18 RX ADMIN — TACROLIMUS 1 MG: 1 CAPSULE ORAL at 11:07

## 2024-07-18 RX ADMIN — MUPIROCIN 1 G: 20 OINTMENT TOPICAL at 09:07

## 2024-07-18 RX ADMIN — ACETAMINOPHEN 650 MG: 325 TABLET ORAL at 08:07

## 2024-07-18 RX ADMIN — SODIUM PHOSPHATE, MONOBASIC, MONOHYDRATE AND SODIUM PHOSPHATE, DIBASIC, ANHYDROUS 30 MMOL: 142; 276 INJECTION, SOLUTION INTRAVENOUS at 11:07

## 2024-07-18 RX ADMIN — DIBASIC SODIUM PHOSPHATE, MONOBASIC POTASSIUM PHOSPHATE AND MONOBASIC SODIUM PHOSPHATE 2 TABLET: 852; 155; 130 TABLET ORAL at 11:07

## 2024-07-18 RX ADMIN — ACETAMINOPHEN 650 MG: 325 TABLET ORAL at 12:07

## 2024-07-18 RX ADMIN — INSULIN HUMAN 0.1 UNITS/HR: 1 INJECTION, SOLUTION INTRAVENOUS at 05:07

## 2024-07-18 RX ADMIN — OXYCODONE HYDROCHLORIDE 5 MG: 5 TABLET ORAL at 06:07

## 2024-07-18 RX ADMIN — METHYLPREDNISOLONE SODIUM SUCCINATE 125 MG: 125 INJECTION, POWDER, FOR SOLUTION INTRAMUSCULAR; INTRAVENOUS at 09:07

## 2024-07-18 RX ADMIN — TACROLIMUS 5 MG: 1 CAPSULE ORAL at 08:07

## 2024-07-18 RX ADMIN — SODIUM CHLORIDE: 9 INJECTION, SOLUTION INTRAVENOUS at 05:07

## 2024-07-18 RX ADMIN — SODIUM BICARBONATE 1300 MG: 650 TABLET ORAL at 08:07

## 2024-07-18 RX ADMIN — OXYCODONE HYDROCHLORIDE 5 MG: 5 TABLET ORAL at 02:07

## 2024-07-18 RX ADMIN — HEPARIN SODIUM 5000 UNITS: 5000 INJECTION INTRAVENOUS; SUBCUTANEOUS at 05:07

## 2024-07-18 RX ADMIN — MYCOPHENOLATE MOFETIL 1000 MG: 250 CAPSULE ORAL at 08:07

## 2024-07-18 RX ADMIN — HEPARIN SODIUM 5000 UNITS: 5000 INJECTION INTRAVENOUS; SUBCUTANEOUS at 02:07

## 2024-07-18 RX ADMIN — DOCUSATE SODIUM 100 MG: 100 CAPSULE, LIQUID FILLED ORAL at 08:07

## 2024-07-18 RX ADMIN — AMLODIPINE BESYLATE 5 MG: 5 TABLET ORAL at 11:07

## 2024-07-18 RX ADMIN — INSULIN LISPRO 1 UNITS/HR: 100 INJECTION, SOLUTION INTRAVENOUS; SUBCUTANEOUS at 12:07

## 2024-07-18 RX ADMIN — CALCITRIOL 0.5 MCG: 0.5 CAPSULE, LIQUID FILLED ORAL at 08:07

## 2024-07-18 RX ADMIN — THERA TABS 1 TABLET: TAB at 08:07

## 2024-07-18 RX ADMIN — ONDANSETRON 4 MG: 2 INJECTION INTRAMUSCULAR; INTRAVENOUS at 08:07

## 2024-07-18 NOTE — PROGRESS NOTES
DISCHARGE NOTE:    Dejuan Diaz Jr. is a 43 y.o. male s/p LEFT KIDNEY     Living     transplant on 7/16/2024 (Kidney) for ESRD secondary to Diabetes Mellitus - Type II.      Past Medical History:   Diagnosis Date    Anemia     Diabetes mellitus type II     Disorder of kidney and ureter     GERD (gastroesophageal reflux disease)     Hypertension     Proteinuria        Hospital Course: Patient's hospital course uncomplicated and patient ready for discharge.    Allergies:   Review of patient's allergies indicates:   Allergen Reactions    Codeine Hallucinations    Vancomycin analogues Other (See Comments)     Burning and  itchig of skin       [facility]acy: [facility]    Discharge Medications:     Medication List        START taking these medications      acyclovir 400 MG tablet  Commonly known as: ZOVIRAX  Take 1 tablet (400 mg total) by mouth 2 (two) times daily. Stop 10/14/24     famotidine 20 MG tablet  Commonly known as: PEPCID  Take 1 tablet (20 mg total) by mouth every evening. Stop 8/15/24     multivitamin Tab  Take 1 tablet by mouth once daily.     oxyCODONE 5 MG immediate release tablet  Commonly known as: ROXICODONE  Take 1 tablet (5 mg total) by mouth every 4 (four) hours as needed for Pain.     PHOSPHA 250 NEUTRAL 250 mg Tab  Generic drug: k phos di & mono-sod phos mono  Take 2 tablets by mouth 2 (two) times a day.     predniSONE 5 MG tablet  Commonly known as: DELTASONE  Take by mouth daily: 20mg 7/19-7/25, 15mg 7/26-8/1, 10mg 8/2-8/8, 5mg 8/9-  Start taking on: July 19, 2024     sulfamethoxazole-trimethoprim 400-80mg 400-80 mg per tablet  Commonly known as: BACTRIM,SEPTRA  Take 1 tablet by mouth every morning. Stop 1/12/25            CHANGE how you take these medications      amLODIPine 5 MG tablet  Commonly known as: NORVASC  Take 1 tablet (5 mg total) by mouth once daily. Hold for SBP< 130.  Start taking on: July 19, 2024  What changed:   medication strength  how much to take  when to take  this  additional instructions     calcitRIOL 0.5 MCG Cap  Commonly known as: ROCALTROL  Take 1 capsule (0.5 mcg total) by mouth 2 (two) times daily.  What changed:   medication strength  how much to take  when to take this     OMNIPOD 5 G6 INTRO KIT (GEN 5) SUBQ  What changed: Another medication with the same name was removed. Continue taking this medication, and follow the directions you see here.     sodium bicarbonate 650 MG tablet  Take 2 tablets (1,300 mg total) by mouth 2 (two) times daily.  What changed:   how much to take  when to take this            CONTINUE taking these medications      atorvastatin 40 MG tablet  Commonly known as: LIPITOR     DEXCOM G6  MISC     DEXCOM G6 SENSOR Adelina  Generic drug: blood-glucose sensor     DEXCOM G6 TRANSMITTER MISC     HumaLOG U-100 Insulin 100 unit/mL injection  Generic drug: insulin lispro     mycophenolate 250 mg Cap  Commonly known as: CELLCEPT  Take 4 capsules (1,000 mg total) by mouth 2 (two) times daily.     tacrolimus 1 MG Cap  Commonly known as: PROGRAF  Take 6 capsules (6 mg total) by mouth every 12 (twelve) hours.            STOP taking these medications      ALPRAZolam 0.5 MG tablet  Commonly known as: XANAX     aspirin 81 MG EC tablet  Commonly known as: ECOTRIN     calcium acetate(phosphat bind) 667 mg tablet  Commonly known as: PHOSLO     coenzyme Q10 200 mg capsule     losartan 100 MG tablet  Commonly known as: COZAAR     nebivoloL 20 mg Tab  Commonly known as: BYSTOLIC     ondansetron 4 MG Tbdl  Commonly known as: ZOFRAN-ODT     ONE-A-DAY MEN'S MULTIVITAMIN ORAL     patiromer calcium sorbitex 8.4 gram Pwpk  Commonly known as: VELTASSA     tadalafiL 5 MG tablet  Commonly known as: CIALIS     VASCEPA 1 gram Cap  Generic drug: icosapent ethyL               Where to Get Your Medications        These medications were sent to [facility] [place] - [address] [address]  [address] [address] [address]      Phone:  [phone]   acyclovir 400 MG tablet  predniSONE 5 MG tablet  sulfamethoxazole-trimethoprim 400-80mg 400-80 mg per tablet       These medications were sent [facility]  [address] [facility] [address]      Hours: Always Open Phone: [phone]   amLODIPine 5 MG tablet  calcitRIOL 0.5 MCG Cap  famotidine 20 MG tablet  multivitamin Tab  oxyCODONE 5 MG immediate release tablet  PHOSPHA 250 NEUTRAL 250 mg Tab  sodium bicarbonate 650 MG tablet          Pharmacy Interventions/Recommendations:  1) Transplant Immunosuppression: Induction Thymo, and maintenance Prograf 6/6, MMF, and Pred taper.    2) Opportunistic Infection prophylaxis: Bactrim and Acyclovir    3) Osteoporosis Prevention measures (liver txp): NA    4) Patient Counseling/Education: Demonstrated the use of the BP cuff, thermometer.    5) Follow-Up/Discharge Needs:  Amlodipine 5mg added late, patient will need to  from [facility] after clinic.    6) Patient Assistance Information: Patient required to use [facility] for Tacrolimus and MMF.    7) The following medications have been placed on HOLD and should be restarted in the outpatient setting (when appropriate): None    Dejuan and his caregiver verbalized their understanding and had the opportunity to ask questions.

## 2024-07-18 NOTE — PT/OT/SLP PROGRESS
"Occupational Therapy   Treatment    Name: Dejuan Diaz Jr.  MRN: [id number]  Admitting Diagnosis:  Status post living-donor kidney transplantation  2 Days Post-Op    Recommendations:     Discharge Recommendations: Low Intensity Therapy  Discharge Equipment Recommendations:  shower chair  Barriers to discharge:  None    Assessment:     Dejuan Diaz Jr. is a 43 y.o. male with a medical diagnosis of Status post living-donor kidney transplantation.  He presents with the following performance deficits affecting function: weakness, impaired endurance, impaired functional mobility, impaired self care skills, gait instability, impaired balance, pain. Pt willing to participate, tolerated session well overall. Pt was able to perform functional mobility, ADLs assessed without assistance. Pt reported feeling improvement with balance, leg strength. Pt demonstrated good functional endurance this session.     Rehab Prognosis:  Good; patient would benefit from acute skilled OT services to address these deficits and reach maximum level of function.       Plan:     Patient to be seen 3 x/week to address the above listed problems via self-care/home management, therapeutic activities, therapeutic exercises  Plan of Care Expires: 08/17/24  Plan of Care Reviewed with: patient, family    Subjective     Chief Complaint: mild pain at incision site  Patient/Family Comments/goals: "I ate a little, had a BM so I'm feeling a little better."  Pain/Comfort:  Pain Rating 1:  (not rated)  Location - Side 1: Left  Location - Orientation 1: generalized  Location 1: flank  Pain Addressed 1: Distraction, Reposition  Pain Rating Post-Intervention 1:  (not rated)    Objective:     Communicated with: RN prior to session.  Patient found up in chair with peripheral IV upon OT entry to room.    General Precautions: Standard, fall    Orthopedic Precautions:N/A  Braces: N/A  Respiratory Status: Room air     Occupational Performance:     Bed Mobility:    Pt " started and finished session in chair     Functional Mobility/Transfers:  Patient completed Sit <> Stand Transfer with supervision  with  no assistive device   Patient completed Toilet Transfer Step Transfer technique with supervision with  no AD  Patient completed  Shower Transfer Step Transfer technique with supervision with no AD  Chair t/f: close SPV no AD  Functional Mobility: from chair to bathroom into hallway, back to chair; close SPV no AD; no LOB    Activities of Daily Living:  Grooming: supervision pt washed face, hands standing at sink  Upper Body Dressing: pt declined to perform    Lower Body Dressing: pt declined to perform    Toileting: pt not needing to void at this time      Balance:   Static standing balance: SPV; dynamic standing balance: close SPV    [facility] 6 Click ADL: 20    Treatment & Education:  Pt edu on role of OT, POC, safety when performing self care tasks , benefit of performing OOB activity, and safety when performing functional transfers and mobility.  - White board updated  - Self care tasks completed-- as noted above    - pt educated on proper sequencing for UB, LB dressing  - pt educated on proper sequencing for log rolling    Patient left up in chair with all lines intact, call button in reach, RN notified, and SO present    GOALS:   Multidisciplinary Problems       Occupational Therapy Goals          Problem: Occupational Therapy    Goal Priority Disciplines Outcome Interventions   Occupational Therapy Goal     OT, PT/OT Progressing    Description: Goals to be met by: 7/24/2024     Patient will increase functional independence with ADLs by performing:    UE Dressing with [place].  LE Dressing with [place].  Grooming while standing at sink with [place].  Toileting from toilet with [place] for hygiene and clothing management.   Toilet transfer to toilet with [place].                         Time Tracking:     OT Date of Treatment: 07/18/24  OT Start Time:  1417  OT Stop Time: 1434  OT Total Time (min): 17 min    Billable Minutes:Therapeutic Activity 17    OT/MILAD: OT          7/18/2024

## 2024-07-18 NOTE — PROGRESS NOTES
Transplant Social Work Discharge Note:    Pt will discharge to [facility] under the care of Naz Graham, patient's significant other, phone number [phone].     Patient reports readiness to discharge at this time. Patient is referred to [facility] for PT/SN. Patient reports he owns a shower chair (recommended by PT/OT). Per rounds this morning patient does not require additional resources or referrals at time of discharge. Patient and caregiver denied needing or wanting resources or referrals at this time. Patient agrees to contact transplant team with needs, questions, or concerns as they arise.     Pt aware of, involved in, and coping well with this discharge plan. Pt did not have any concerns with the discharge plan at this time. SW remains available at [phone].    Carolin Jones LMSW

## 2024-07-18 NOTE — ASSESSMENT & PLAN NOTE
Insulin Pump:  Patient has the ability and wherewithal to manage the pump.  Order has been placed to notify nurse of patient using own pump. (Order Set: Adult and Pediatric Home Insulin Pump)     Settings:   Basal rate:  Omnipod 5 in automated mode (see below for manual settings)   0000-1.3  0300-1.25  0600-0.95  0800-1.00  1200-1.05  1800-1.15     ICR-1:6  ISF- 1:25      Pump type:    Infusion set type: plastic cannula  Change infusion set: Every 2-3 days (7/18/24)   Change insertion site: with change of infusion set. (7/18/24)   Location of insertion site: Abdomen   State of insertion site: appears clean

## 2024-07-18 NOTE — PROGRESS NOTES
"[facility] - Transplant Stepdown  Endocrinology  Progress Note    Admit Date: 7/16/2024     Reason for Consult: Management of T1DM, Hyperglycemia     Surgical Procedure and Date:  Status post kidney transplant 716th 2024    Diabetes diagnosis year:  Over 10 years ago    Home Diabetes Medications:    Omnipod 5  0000-1.3  0300-1.25  0600-0.95  0800-1.00  1200-1.05  1800-1.15    ICR-1:2  ISF- 1:30    How often checking glucose at home?  Dexcom   BG readings on regimen: 100s-200s  Hypoglycemia on the regimen?  No  Missed doses on regimen?  No    Diabetes Complications include:     Hyperglycemia and Diabetic nephropathy      Complicating diabetes co morbidities:   ESRD      HPI:   Patient is a 43 y.o. male with T1DM, CKD s/p living donor kidney txp for DM/HTN .  Endocrine consulted for BG management        Interval HPI:   Overnight events: Remains in TSU. NAEON. POD 2. BG within goal ranges on IV intensive insulin protocol with infusion rates ranging from 0.1 -5.5 u/hr overnight. Receiving Solu Medrol 125 mg,standard steroid taper. Diet diabetic Consistent Carbohydrate; 2000 Calorie    Eating:   <25%  Nausea: Yes  Hypoglycemia and intervention: No  Fever: No  TPN and/or TF: No  If yes, type of TF/TPN and rate: n/a    /64 (BP Location: Left arm, Patient Position: Lying)   Pulse 71   Temp 99 °F (37.2 °C) (Oral)   Resp 16   Ht 6' (1.829 m)   Wt 96.5 kg (212 lb 11.9 oz)   SpO2 97%   BMI 28.85 kg/m²     Labs Reviewed and Include    Recent Labs   Lab 07/18/24  0612   *   CALCIUM 8.3*   ALBUMIN 2.8*      K 4.5   CO2 20*   *   BUN 17   CREATININE 1.2     Lab Results   Component Value Date    WBC 4.80 07/18/2024    HGB 7.8 (L) 07/18/2024    HCT 23.6 (L) 07/18/2024    MCV 89 07/18/2024     (L) 07/18/2024     No results for input(s): "TSH", "FREET4" in the last 168 hours.  Lab Results   Component Value Date    HGBA1C 5.3 07/16/2024       Nutritional status:   Body mass index is 28.85 kg/m².  Lab " "Results   Component Value Date    ALBUMIN 2.8 (L) 07/18/2024    ALBUMIN 3.0 (L) 07/17/2024    ALBUMIN 3.0 (L) 07/17/2024     No results found for: "PREALBUMIN"    Estimated Creatinine Clearance: 95.7 mL/min (based on SCr of 1.2 mg/dL).    Accu-Checks  Recent Labs     07/17/24  2339 07/18/24  0041 07/18/24  0135 07/18/24  0233 07/18/24  0332 07/18/24  0434 07/18/24  0537 07/18/24  0633 07/18/24  0735 07/18/24  0836   POCTGLUCOSE 101 119* 110 123* 139* 144* 146* 156* 174* 181*       Current Medications and/or Treatments Impacting Glycemic Control  Immunotherapy:    Immunosuppressants           Stop Route Frequency     tacrolimus capsule 5 mg         -- Oral 2 times daily     antithymocyte globulin (rabbit) 125 mg, hydrocortisone sodium succinate (SOLU-CORTEF) 20 mg in 0.9% NaCl 500 mL (FOR PERIPHERAL LINE ADMINISTRATION ONLY)         07/19/24 0859 IV Daily     mycophenolate capsule 1,000 mg         -- Oral 2 times daily          Steroids:   Hormones (From admission, onward)      Start     Stop Route Frequency Ordered    07/19/24 0900  predniSONE tablet 20 mg  (IP TXP KIDNEY POST-OP THYMO WITH PERIPHERAL PRECHECKED)         -- Oral Daily 07/16/24 1315    07/18/24 0800  methylPREDNISolone sodium succinate injection 125 mg  (IP TXP KIDNEY POST-OP THYMO WITH PERIPHERAL PRECHECKED)         -- IV Once 07/16/24 1315    07/17/24 0900  antithymocyte globulin (rabbit) 125 mg, hydrocortisone sodium succinate (SOLU-CORTEF) 20 mg in 0.9% NaCl 500 mL (FOR PERIPHERAL LINE ADMINISTRATION ONLY)  (IP TXP KIDNEY POST-OP THYMO WITH PERIPHERAL PRECHECKED)         07/19/24 0859 IV Daily 07/16/24 1315    07/16/24 1414  hydrocortisone sodium succinate injection 100 mg  (IP TXP KIDNEY POST-OP THYMO WITH PERIPHERAL PRECHECKED)         12/13/35 0514 IV Once as needed 07/16/24 1315          Pressors:    Autonomic Drugs (From admission, onward)      Start     Stop Route Frequency Ordered    07/16/24 1414  EPINEPHrine (PF) injection 1 mg  (IP TXP " KIDNEY POST-OP THYMO WITH PERIPHERAL PRECHECKED)         12/13/35 0514 SubQ Once as needed 07/16/24 1315          Hyperglycemia/Diabetes Medications:   Antihyperglycemics (From admission, onward)      Start     Stop Route Frequency Ordered    07/16/24 2200  insulin regular in 0.9 % NaCl 100 unit/100 mL (1 unit/mL) infusion        Question:  Enter initial dose from Infusion Protocol Chart (Units/hr):  Answer:  1    -- IV Continuous 07/16/24 2052 07/16/24 2132  insulin regular in 0.9 % NaCl 100 unit/100 mL (1 unit/mL) infusion        Note to Pharmacy: Created by cabinet override    07/17/24 0944   07/16/24 2132 07/16/24 1414  insulin aspart U-100 pen 0-5 Units  (INSULIN - LOW CORRECTION DOSE (Recommended for BMI <25, GFR<15 or on dialysis, Age > 70, type 1 diabetes, ESLD, severe CHF or patients on <70 units of insulin daily))         -- SubQ Before meals & nightly PRN 07/16/24 1315            ASSESSMENT and PLAN    Renal/  * Status post living-donor kidney transplantation  Managed by primary team  Optimize BG control      Endocrine  Insulin pump status  Insulin Pump:  Patient has the ability and wherewithal to manage the pump.  Order has been placed to notify nurse of patient using own pump. (Order Set: Adult and Pediatric Home Insulin Pump)     Settings:   Basal rate:  Omnipod 5 in automated mode (see below for manual settings)   0000-1.3  0300-1.25  0600-0.95  0800-1.00  1200-1.05  1800-1.15     ICR-1:2  ISF- 1:25      Pump type:    Infusion set type: plastic cannula  Change infusion set: Every 2-3 days (7/18/24)   Change insertion site: with change of infusion set. (7/18/24)   Location of insertion site: Abdomen   State of insertion site: appears clean      Type 1 diabetes mellitus  BG goal: 140-180  T1DM S/p kidney transplant.  On steroids.  On Omnipod at home.  High insulin drip requirements post surgery 6-9 times higher than home needs.  Not safe to advance diet or put back on Omnipod at this point.   Will continue to monitor on correction and consider switching to transition drip as a bridge to patient's Omnipod once safely able to.  Evaluated patient's pdm, suspect patient not entering all of carbs with meals.  Weakened carb ratio 1:6 and strengthen correction 1:25      -Discontinue T Transition IV insulin infusion  -Have patient reconnect home insulin pump (see below for settings)   -BG monitoring /hs/0200     - Hypoglycemia protocol in place      ** Please notify Endocrine for any change and/or advance in diet**  ** Please call Endocrine for any BG related issues **     Discharge Planning:   Resume home insulin pump.  Portal message sent to patient for continued communication [facility] endocrine service post discharge .        Palliative Care  Long-term use of immunosuppressant medication  On immunosuppressive therapy per transplant team; may elevate BG readings            Lizz Orona NP  Endocrinology  [facility] - Transplant Stepdown

## 2024-07-18 NOTE — PLAN OF CARE
-Patient AOX4 family at bedside  -Patient walked the akhtar on day shift and over night shift   -Dc'd Bicarb, replaced with NS running @ 125ml/hr  -Patient most recent was Cr-1.5 and GFR 58.9   -Ortiz removed 0610  -Pull self meds 100%  -Thymo number 3 today   -BG monitored  every hour

## 2024-07-18 NOTE — PROGRESS NOTES
Transplant Coordinator  7/16-7/18/24    Pt admitted for a LURD kidney transplant. ESRD 2/2 DM. Thymo induction, CMV -,-.  Pt has a home Insulin pump    Good graft function and UOP    RLQ incision MILAD with dermabond  Guidry removed prior to dc/    Pt will d/c home. Labs 7/19/24 and RTC to meet with coordinator/pharmD

## 2024-07-18 NOTE — PROGRESS NOTES
[facility] - Transplant Stepdown  Adult Nutrition  Progress Note    SUMMARY       Recommendations    1.) Recommend continuing Diabetic Diet, as tolerated.      2.) If diet advances, and PO intake <50%, recommend Boost Glucose Control BID to help meet needs.      3.) RD to monitor wt, PO intake, skin, labs.     Goals: to meet % of EEN/EPN by next RD f/u  Nutrition Goal Status: progressing towards goal  Communication of RD Recs:  (POC)    Assessment and Plan    Nutrition Problem  Increased PRO/energy needs     Related to (etiology):   Increased physiological needs     Signs and Symptoms (as evidenced by):   S/p living donor kidney tx.       Interventions/Recommendations (treatment strategy):  Collaboration of nutritional care with other providers.   Diet edu  ONS PRN     Nutrition Diagnosis Status:   Continues    Reason for Assessment    Reason For Assessment: RD follow-up  Diagnosis: transplant/postoperative complications (s/p living donor kidney tx)  Relevant Medical History: ESRD 2/2 diabetic nephropathy, pre-HD, HTN, DM1  Interdisciplinary Rounds: did not attend    General Information Comments: RD f/u: Pt endorsed some nausea, Zofran was effective. Pt denies v/d/c. Pt endorsed good appetite, and was looking forward to having lunch. Diet was upgraded today to Diabetic. RD was able to help with additional questions and concerns in regards to the Food Safety diet. RD's contact information was provided. RD team to monitor as needed.     Nutrition Discharge Planning: Post transplant nutrition education provided on 7/17. Food safety/drug interactions emphasized. General healthy/low salt diet recommended. Appropriate education material with RD contact information provided. No other needs identified.    Nutrition Related Social Determinants of Health: SDOH: None Identified    Nutrition Risk Screen    Nutrition Risk Screen: no indicators present    Nutrition/Diet History    Patient Reported  Diet/Restrictions/Preferences: diabetic diet  Typical Food/Fluid Intake: 4 small meals  Spiritual, Cultural Beliefs, Cheondoism Practices, Values that Affect Care: no  Food Allergies: NKFA    Anthropometrics    Temp: 99 °F (37.2 °C)  Height Method: Stated  Height: 6' (182.9 cm)  Height (inches): 72 in  Weight Method: Standard Scale  Weight: 96.5 kg (212 lb 11.9 oz)  Weight (lb): 212.75 lb  Ideal Body Weight (IBW), Male: 178 lb  % Ideal Body Weight, Male (lb): 119.52 %  BMI (Calculated): 28.8    Lab/Procedures/Meds    Pertinent Labs Reviewed: reviewed  Pertinent Labs Comments: gluc: 149, Ca: 8.3, phos: 1.9, alb: 2.8    Pertinent Medications Reviewed: reviewed  Pertinent Medications Comments: bisacodyl, calcitriol, docusate, famotidine, heparin,  MVI, mycophenolate, prednisone, Na Bicarb, Na Phos, abx, tacrolimus, NaCl, insulin    Estimated/Assessed Needs    Weight Used For Calorie Calculations: 97.5 kg (214 lb 15.2 oz)  Energy Calorie Requirements (kcal): 2099 kcal  Energy Need Method: Fleming-St Jeor (MSJ x 1.1)    Protein Requirements: 98g (1.0g/kg)  Weight Used For Protein Calculations: 97.5 kg (214 lb 15.2 oz)    Fluid Requirements (mL): 1ml/1kcal or per MD  Estimated Fluid Requirement Method: RDA Method  RDA Method (mL): 2099    CHO Requirement: 262 g    Nutrition Prescription Ordered    Current Diet Order: Diabetic Diet    Evaluation of Received Nutrient/Fluid Intake    I/O: -2.1L since admit  Energy Calories Required: meeting needs  Protein Required: meeting needs  Fluid Required:  (as per MD)  Comments: LBM 7/16  Tolerance: tolerating  % Intake of Estimated Energy Needs: 0 - 25 %  % Meal Intake: Other: diet was just advanced    Nutrition Risk    Level of Risk/Frequency of Follow-up:  (RD to f/u x 1-2/week)     Monitor and Evaluation    Food and Nutrient Intake: food and beverage intake  Food and Nutrient Adminstration: diet order  Knowledge/Beliefs/Attitudes: food and nutrition knowledge/skill, beliefs and  attitudes  Anthropometric Measurements: weight, weight change, body mass index  Biochemical Data, Medical Tests and Procedures: electrolyte and renal panel, gastrointestinal profile, glucose/endocrine profile, inflammatory profile  Nutrition-Focused Physical Findings: overall appearance, skin     Nutrition Follow-Up    RD Follow-up?: Yes

## 2024-07-18 NOTE — SUBJECTIVE & OBJECTIVE
"Interval HPI:   Overnight events: Remains in TSU. MEENA. POD 2. BG within goal ranges on IV intensive insulin protocol with infusion rates ranging from 0.1 -5.5 u/hr overnight. Receiving Solu Medrol 125 mg,standard steroid taper. Diet diabetic Consistent Carbohydrate; 2000 Calorie    Eating:   <25%  Nausea: Yes  Hypoglycemia and intervention: No  Fever: No  TPN and/or TF: No  If yes, type of TF/TPN and rate: n/a    /64 (BP Location: Left arm, Patient Position: Lying)   Pulse 71   Temp 99 °F (37.2 °C) (Oral)   Resp 16   Ht 6' (1.829 m)   Wt 96.5 kg (212 lb 11.9 oz)   SpO2 97%   BMI 28.85 kg/m²     Labs Reviewed and Include    Recent Labs   Lab 07/18/24  0612   *   CALCIUM 8.3*   ALBUMIN 2.8*      K 4.5   CO2 20*   *   BUN 17   CREATININE 1.2     Lab Results   Component Value Date    WBC 4.80 07/18/2024    HGB 7.8 (L) 07/18/2024    HCT 23.6 (L) 07/18/2024    MCV 89 07/18/2024     (L) 07/18/2024     No results for input(s): "TSH", "FREET4" in the last 168 hours.  Lab Results   Component Value Date    HGBA1C 5.3 07/16/2024       Nutritional status:   Body mass index is 28.85 kg/m².  Lab Results   Component Value Date    ALBUMIN 2.8 (L) 07/18/2024    ALBUMIN 3.0 (L) 07/17/2024    ALBUMIN 3.0 (L) 07/17/2024     No results found for: "PREALBUMIN"    Estimated Creatinine Clearance: 95.7 mL/min (based on SCr of 1.2 mg/dL).    Accu-Checks  Recent Labs     07/17/24  2339 07/18/24  0041 07/18/24  0135 07/18/24  0233 07/18/24  0332 07/18/24  0434 07/18/24  0537 07/18/24  0633 07/18/24  0735 07/18/24  0836   POCTGLUCOSE 101 119* 110 123* 139* 144* 146* 156* 174* 181*       Current Medications and/or Treatments Impacting Glycemic Control  Immunotherapy:    Immunosuppressants           Stop Route Frequency     tacrolimus capsule 5 mg         -- Oral 2 times daily     antithymocyte globulin (rabbit) 125 mg, hydrocortisone sodium succinate (SOLU-CORTEF) 20 mg in 0.9% NaCl 500 mL (FOR PERIPHERAL " LINE ADMINISTRATION ONLY)         07/19/24 0859 IV Daily     mycophenolate capsule 1,000 mg         -- Oral 2 times daily          Steroids:   Hormones (From admission, onward)      Start     Stop Route Frequency Ordered    07/19/24 0900  predniSONE tablet 20 mg  (IP TXP KIDNEY POST-OP THYMO WITH PERIPHERAL PRECHECKED)         -- Oral Daily 07/16/24 1315    07/18/24 0800  methylPREDNISolone sodium succinate injection 125 mg  (IP TXP KIDNEY POST-OP THYMO WITH PERIPHERAL PRECHECKED)         -- IV Once 07/16/24 1315    07/17/24 0900  antithymocyte globulin (rabbit) 125 mg, hydrocortisone sodium succinate (SOLU-CORTEF) 20 mg in 0.9% NaCl 500 mL (FOR PERIPHERAL LINE ADMINISTRATION ONLY)  (IP TXP KIDNEY POST-OP THYMO WITH PERIPHERAL PRECHECKED)         07/19/24 0859 IV Daily 07/16/24 1315    07/16/24 1414  hydrocortisone sodium succinate injection 100 mg  (IP TXP KIDNEY POST-OP THYMO WITH PERIPHERAL PRECHECKED)         12/13/35 0514 IV Once as needed 07/16/24 1315          Pressors:    Autonomic Drugs (From admission, onward)      Start     Stop Route Frequency Ordered    07/16/24 1414  EPINEPHrine (PF) injection 1 mg  (IP TXP KIDNEY POST-OP THYMO WITH PERIPHERAL PRECHECKED)         12/13/35 0514 SubQ Once as needed 07/16/24 1315          Hyperglycemia/Diabetes Medications:   Antihyperglycemics (From admission, onward)      Start     Stop Route Frequency Ordered    07/16/24 2200  insulin regular in 0.9 % NaCl 100 unit/100 mL (1 unit/mL) infusion        Question:  Enter initial dose from Infusion Protocol Chart (Units/hr):  Answer:  1    -- IV Continuous 07/16/24 2052 07/16/24 2132  insulin regular in 0.9 % NaCl 100 unit/100 mL (1 unit/mL) infusion        Note to Pharmacy: Created by cabinet override    07/17/24 0944 07/16/24 2132 07/16/24 1414  insulin aspart U-100 pen 0-5 Units  (INSULIN - LOW CORRECTION DOSE (Recommended for BMI <25, GFR<15 or on dialysis, Age > 70, type 1 diabetes, ESLD, severe CHF or patients  on <70 units of insulin daily))         -- SubQ Before meals & nightly PRN 07/16/24 0327

## 2024-07-18 NOTE — PT/OT/SLP PROGRESS
Physical Therapy Treatment    Patient Name:  Dejuan Diaz Jr.   MRN:  [id number]    Recommendations:     Discharge Recommendations: Low Intensity Therapy  Discharge Equipment Recommendations: shower chair  Barriers to discharge: None    Assessment:     Dejuan Diaz Jr. is a 43 y.o. male admitted with a medical diagnosis of Status post living-donor kidney transplantation.  He presents with the following impairments/functional limitations: weakness, impaired endurance Pt tolerated treatment session well today. Pt reporting once d/c'ed he will be staying at his family members home, and only has 4 steps to enter. Patient remains appropriate for continued skilled services within the acute environment and goals remain appropriate.   .    Rehab Prognosis: Good; patient would benefit from acute skilled PT services to address these deficits and reach maximum level of function.    Recent Surgery: Procedure(s) (LRB):  TRANSPLANT, KIDNEY (Left) 2 Days Post-Op    Plan:     During this hospitalization, patient to be seen 3 x/week to address the identified rehab impairments via gait training, therapeutic activities, therapeutic exercises, neuromuscular re-education and progress toward the following goals:    Plan of Care Expires:  08/17/24    Subjective     Chief Complaint: None stated   Patient/Family Comments/goals: Pt agreeable to PT   Pain/Comfort:  Pain Rating 1: 0/10      Objective:     Communicated with Rn prior to session.  Patient found up in chair with peripheral IV upon PT entry to room.     General Precautions: Standard, fall  Orthopedic Precautions: N/A  Braces: N/A  Respiratory Status: Room air     Functional Mobility:  Transfers:     Sit to Stand:  stand by assistance with no AD  Gait: 204 ft CGA/SBA with no AD     Stairs:  Pt ascended/descended 4 stair(s) with No Assistive Device with facing R handrail with Contact Guard Assistance.       AM-PAC 6 CLICK MOBILITY  Turning over in bed (including adjusting  bedclothes, sheets and blankets)?: 4  Sitting down on and standing up from a chair with arms (e.g., wheelchair, bedside commode, etc.): 4  Moving from lying on back to sitting on the side of the bed?: 4  Moving to and from a bed to a chair (including a wheelchair)?: 4  Need to walk in hospital room?: 4  Climbing 3-5 steps with a railing?: 3  Basic Mobility Total Score: 23       Treatment & Education:  Therapist provided instruction and educated for safety during transfers, stair training and gait training. As well as proper body mechanics, energy conservation, and fall prevention strategies during tasks listed above, and the effects of prolonged immobility and the importance of performing EOB/OOB activity and exercises to promote healing and reduce recovery time.       Patient left up in chair with all lines intact, call button in reach, Rn notified, and family present..    GOALS:   Multidisciplinary Problems       Physical Therapy Goals          Problem: Physical Therapy    Goal Priority Disciplines Outcome Goal Variances Interventions   Physical Therapy Goal     PT, PT/OT Progressing     Description: Goals to be met by: 2024     Patient will increase functional independence with mobility by performin. Supine to sit with Modified [place]  2. Sit to supine with Modified [place]  3. Sit to stand transfer with Modified [place]  4. Bed to chair transfer with Modified [place] using LRAD  5. Gait  x 600 feet with Modified [place] using LRAD.   6. Ascend/descend 12 stair with either Handrails Supervision using LRAD.                        Time Tracking:     PT Received On: 24  PT Start Time: 1345     PT Stop Time: 1353  PT Total Time (min): 8 min     Billable Minutes: Gait Training 8    Treatment Type: Treatment  PT/PTA: PTA     Number of PTA visits since last PT visit: 2024

## 2024-07-18 NOTE — ASSESSMENT & PLAN NOTE
BG goal: 140-180  T1DM S/p kidney transplant.  On steroids.  On Omnipod at home.  High insulin drip requirements post surgery 6-9 times higher than home needs.  Not safe to advance diet or put back on Omnipod at this point.  Will continue to monitor on correction and consider switching to transition drip as a bridge to patient's Omnipod once safely able to.  Evaluated patient's pdm, suspect patient not entering all of carbs with meals.  Weakened carb ratio 1:6 and strengthen correction 1:25      -Discontinue T Transition IV insulin infusion  -Have patient reconnect home insulin pump (see below for settings)   -BG monitoring ac/hs/0200     - Hypoglycemia protocol in place      ** Please notify Endocrine for any change and/or advance in diet**  ** Please call Endocrine for any BG related issues **     Discharge Planning:   Resume home insulin pump.  Portal message sent to patient for continued [place] [facility] [place] .

## 2024-07-18 NOTE — PLAN OF CARE
Recommendations     1.) Recommend continuing Diabetic Diet, as tolerated.      2.) If diet advances, and PO intake <50%, recommend Boost Glucose Control BID to help meet needs.      3.) RD to monitor wt, PO intake, skin, labs.      Goals: to meet % of EEN/EPN by next RD f/u  Nutrition Goal Status: progressing towards goal  Communication of RD Recs:  (POC)

## 2024-07-18 NOTE — DISCHARGE SUMMARY
[facility] - Transplant Stepdown  Kidney Transplant  Discharge Summary    Patient Name: Dejuan Diaz Jr.  MRN: [id number]  Admission Date: 7/16/2024  Hospital Length of Stay: 2 days  Discharge Date and Time:  07/18/2024 11:02 AM  Attending Physician: Erma Gómez MD   Discharging Provider: Cherie Diaz PA-C  Primary Care Provider: Val, Primary Doctor    HPI:   No notes on file    Procedure(s) (LRB):  TRANSPLANT, KIDNEY (Left)     Hospital Course:    Mr. Diaz is a 43 yr male with hx CKD 5, preHD now s/p living kidney transplant on 7/16/24. No intra-op issues noted. 2 day mcclain, removed 7/18 without issue. Patient is urinating well. Cr trended down nicely. Patient reports feeling well. States some lower leg weakness. PT consulted with recs for home health.  Endocrine following for BG management, required insulin gtt, now transitioned back to home pump and BG stable. Pt orthostatic- treat standing BP only; Restarted on norvasc 5mg; hold if BP <120mg. Electrolytes replaced.    Pt is stable and ready for discharge. Encouraged to continue fluid intake. He has no complaints. He has met with PharmD and received education. Pt expressed understanding of discharge instructions and importance of follow-up.        Goals of Care Treatment Preferences:  Code Status: Full Code      Final Active Diagnoses:    Diagnosis Date Noted POA    PRINCIPAL PROBLEM:  Status post living-donor kidney transplantation [Z94.0] 07/17/2024 Not Applicable    At risk for opportunistic infections [Z91.89] 07/17/2024 No    Long-term use of immunosuppressant medication [Z79.60] 07/17/2024 Not Applicable    Prophylactic immunotherapy [Z29.89] 07/17/2024 Not Applicable    Metabolic acidosis [E87.20] 07/17/2024 No    Hypocalcemia [E83.51] 07/17/2024 Yes    Weakness [R53.1] 07/17/2024 No    Anemia of chronic disease [D63.8] 07/17/2024 Yes    Acute on chronic blood loss anemia [D62] 07/17/2024 No    Type 1 diabetes mellitus [E10.9] 04/17/2024 Yes     Long term current use of insulin [Z79.4] 11/29/2023 Not Applicable      Problems Resolved During this Admission:       Treatments: surgery: kidney transplant     Consults (From admission, onward)          Status Ordering Provider     Inpatient consult to Endocrinology  Once        Provider:  (Not yet assigned)    Completed MARCIA VIEYRA     Inpatient consult to Registered Dietitian/Nutritionist  Once        Provider:  (Not yet assigned)    Completed DA GOFF     Inpatient consult to Transplant Nephrology (KTM)  Once        Provider:  (Not yet assigned)    Acknowledged DA GOFF            Pending Diagnostic Studies:       None          Significant Diagnostic Studies: Labs: CMP   Recent Labs   Lab 07/17/24  0620 07/17/24 2018 07/18/24  0612    141 143   K 3.7 4.0 4.5   * 113* 116*   CO2 17* 22* 20*   * 188* 149*   BUN 34* 21* 17   CREATININE 2.4* 1.5* 1.2   CALCIUM 6.9* 8.0* 8.3*   ALBUMIN 3.0* 3.0* 2.8*   ANIONGAP 10 6* 7*   , CBC   Recent Labs   Lab 07/16/24  1910 07/17/24  0620 07/18/24  0613   WBC 6.50 6.92 4.80   HGB 9.1* 8.1* 7.8*   HCT 26.5* 24.3* 23.6*    167 126*   , and All labs within the past 24 hours have been reviewed    Discharged Condition: good    Disposition: Home or Self Care    Follow Up:    Patient Instructions:      BATH/SHOWER CHAIR FOR HOME USE     Order Specific Question Answer Comments   Height: 6' (1.829 m)    Weight: 96.5 kg (212 lb 11.9 oz)    Does patient have medical equipment at home? none    Length of need (1-99 months): 99    Type: With back      Ambulatory referral/consult to Home Health   Standing Status: Future   Referral Priority: Routine Referral Type: Home Health Care   Referral Reason: Specialty Services Required   Requested Specialty: Home Health Services   Number of Visits Requested: 1     Diet diabetic     Notify your health care provider if you experience any of the following:  temperature >100.4     Notify your health care provider if  you experience any of the following:  persistent nausea and vomiting or diarrhea     Notify your health care provider if you experience any of the following:  severe uncontrolled pain     Notify your health care provider if you experience any of the following:  redness, tenderness, or signs of infection (pain, swelling, redness, odor or green/yellow discharge around incision site)     Notify your health care provider if you experience any of the following:  difficulty breathing or increased cough     Notify your health care provider if you experience any of the following:  severe persistent headache     Notify your health care provider if you experience any of the following:  worsening rash     Notify your health care provider if you experience any of the following:  persistent dizziness, light-headedness, or visual disturbances     Notify your health care provider if you experience any of the following:  increased confusion or weakness     Activity as tolerated     Medications:  Reconciled Home Medications:      Medication List        START taking these medications      acyclovir 400 MG tablet  Commonly known as: ZOVIRAX  Take 1 tablet (400 mg total) by mouth 2 (two) times daily. Stop 10/14/24     famotidine 20 MG tablet  Commonly known as: PEPCID  Take 1 tablet (20 mg total) by mouth every evening. Stop 8/15/24     multivitamin Tab  Take 1 tablet by mouth once daily.     oxyCODONE 5 MG immediate release tablet  Commonly known as: ROXICODONE  Take 1 tablet (5 mg total) by mouth every 4 (four) hours as needed for Pain.     PHOSPHA 250 NEUTRAL 250 mg Tab  Generic drug: k phos di & mono-sod phos mono  Take 2 tablets by mouth 2 (two) times a day.     predniSONE 5 MG tablet  Commonly known as: DELTASONE  Take by mouth daily: 20mg 7/19-7/25, 15mg 7/26-8/1, 10mg 8/2-8/8, 5mg 8/9-  Start taking on: July 19, 2024     sulfamethoxazole-trimethoprim 400-80mg 400-80 mg per tablet  Commonly known as: BACTRIM,SEPTRA  Take 1  tablet by mouth every morning. Stop 25            CHANGE how you take these medications      calcitRIOL 0.5 MCG Cap  Commonly known as: ROCALTROL  Take 1 capsule (0.5 mcg total) by mouth 2 (two) times daily.  What changed:   medication strength  how much to take  when to take this     sodium bicarbonate 650 MG tablet  Take 2 tablets (1,300 mg total) by mouth 2 (two) times daily.  What changed:   how much to take  when to take this            CONTINUE taking these medications      atorvastatin 40 MG tablet  Commonly known as: LIPITOR  Take 40 mg by mouth once daily.     DEXCOM G6  MISC  by Misc.(Non-Drug; Combo Route) route.     DEXCOM G6 SENSOR Adelina  Generic drug: blood-glucose sensor  SMARTSI Each Topical Every 10 Days     DEXCOM G6 TRANSMITTER MISC  Dexcom G6 transmitter, See Instructions, uad with dexcom G6 sensor; change transmitter q 90 days, # 1 EA, 3 Refill(s), Pharmacy: [id number], 187, cm, 23 14:55:00 CDT, Height/Length Measured, 105.1, kg, 23 14:55:00 CDT, Weight Dosing     mycophenolate 250 mg Cap  Commonly known as: CELLCEPT  Take 4 capsules (1,000 mg total) by mouth 2 (two) times daily.            STOP taking these medications      ALPRAZolam 0.5 MG tablet  Commonly known as: XANAX     amLODIPine 10 MG tablet  Commonly known as: NORVASC     aspirin 81 MG EC tablet  Commonly known as: ECOTRIN     calcium acetate(phosphat bind) 667 mg tablet  Commonly known as: PHOSLO     coenzyme Q10 200 mg capsule     losartan 100 MG tablet  Commonly known as: COZAAR     nebivoloL 20 mg Tab  Commonly known as: BYSTOLIC     ondansetron 4 MG Tbdl  Commonly known as: ZOFRAN-ODT     ONE-A-DAY MEN'S MULTIVITAMIN ORAL     patiromer calcium sorbitex 8.4 gram Pwpk  Commonly known as: VELTASSA     tadalafiL 5 MG tablet  Commonly known as: CIALIS     VASCEPA 1 gram Cap  Generic drug: icosapent ethyL            ASK your doctor about these medications      HumaLOG U-100 Insulin 100 unit/mL  injection  Generic drug: insulin lispro  See Instructions, for use in insulin pump; max daily dose 100 units, # 90 mL, 1 Refill(s), Maintenance, Pharmacy: [id number], 187, cm, 02/23/23 11:24:00 CST, Height/Length Measured, 102.5, kg, 02/23/23 11:24:00 CST, Weight Dosing     * OMNIPOD 5 G6 INTRO KIT (GEN 5) SUBQ  Inject into the skin. 200 units per pod     * OMNIPOD 5 G6 INTRO KIT (GEN 5) SUBQ  Omnipod 5 G6 Intro Kit (Gen 5), See Instructions, use as directed, # 1 EA, 0 Refill(s), Pharmacy: [id number], 187, cm, 12/30/22 9:14:00 CST, Height/Length Measured, 102.3, kg, 12/30/22 9:14:00 CST, Weight Dosing     tacrolimus 1 MG Cap  Commonly known as: PROGRAF  Take 6 capsules (6 mg total) by mouth every 12 (twelve) hours.           * This list has 2 medication(s) that are the same as other medications prescribed for you. Read the directions carefully, and ask your doctor or other care provider to review them with you.                Time spent caring for patient (Greater than 1/2 spent in direct face-to-face contact): > 30 minutes    Cherie Diaz PA-C  Kidney Transplant  [facility] - Transplant Stepdown

## 2024-07-18 NOTE — PROGRESS NOTES
Discharge education given to pt and s.o. regarding medications, future appointments, and when to call a healthcare provider. Both verbalized understanding. PIVs removed prior to d/c. Insulin pump on per endocrine's instruction, and then carb counting readjusted d/t BG being in the 300s. BG now down to 281. Pt received last dose of IV steroids today and will wean down to oral prednisone tomorrow. Thymo infusion received with no adverse effects. Sodium phos given IV and PO. He had a shower before d/c. Will be brought down via wheelchair and will stay in the [facility]. Sent with needed supplies, meds, and personal belongings.

## 2024-07-19 ENCOUNTER — LAB VISIT (OUTPATIENT)
Dept: LAB | Facility: HOSPITAL | Age: 44
End: 2024-07-19
Attending: INTERNAL MEDICINE
Payer: COMMERCIAL

## 2024-07-19 ENCOUNTER — CLINICAL SUPPORT (OUTPATIENT)
Dept: TRANSPLANT | Facility: CLINIC | Age: 44
End: 2024-07-19
Payer: COMMERCIAL

## 2024-07-19 ENCOUNTER — TELEPHONE (OUTPATIENT)
Dept: TRANSPLANT | Facility: CLINIC | Age: 44
End: 2024-07-19

## 2024-07-19 ENCOUNTER — INFUSION (OUTPATIENT)
Dept: INFUSION THERAPY | Facility: HOSPITAL | Age: 44
End: 2024-07-19
Payer: COMMERCIAL

## 2024-07-19 VITALS
SYSTOLIC BLOOD PRESSURE: 143 MMHG | WEIGHT: 212.94 LBS | DIASTOLIC BLOOD PRESSURE: 78 MMHG | HEIGHT: 72 IN | DIASTOLIC BLOOD PRESSURE: 78 MMHG | HEART RATE: 74 BPM | HEART RATE: 74 BPM | HEIGHT: 72 IN | OXYGEN SATURATION: 100 % | TEMPERATURE: 97 F | SYSTOLIC BLOOD PRESSURE: 143 MMHG | OXYGEN SATURATION: 100 % | RESPIRATION RATE: 18 BRPM | BODY MASS INDEX: 28.84 KG/M2 | BODY MASS INDEX: 28.84 KG/M2 | TEMPERATURE: 97 F | RESPIRATION RATE: 18 BRPM | WEIGHT: 212.94 LBS

## 2024-07-19 VITALS — HEIGHT: 72 IN | BODY MASS INDEX: 28.84 KG/M2 | WEIGHT: 212.94 LBS

## 2024-07-19 DIAGNOSIS — Z94.0 STATUS POST LIVING-DONOR KIDNEY TRANSPLANTATION: Primary | ICD-10-CM

## 2024-07-19 DIAGNOSIS — Z76.82 AWAITING ORGAN TRANSPLANT: ICD-10-CM

## 2024-07-19 DIAGNOSIS — Z94.0 KIDNEY REPLACED BY TRANSPLANT: Primary | ICD-10-CM

## 2024-07-19 DIAGNOSIS — Z94.0 KIDNEY REPLACED BY TRANSPLANT: ICD-10-CM

## 2024-07-19 DIAGNOSIS — D50.9 IRON DEFICIENCY ANEMIA, UNSPECIFIED IRON DEFICIENCY ANEMIA TYPE: ICD-10-CM

## 2024-07-19 DIAGNOSIS — Z57.8 OCCUPATIONAL EXPOSURE TO OTHER RISK FACTORS: ICD-10-CM

## 2024-07-19 DIAGNOSIS — Z94.0 STATUS POST KIDNEY TRANSPLANT: ICD-10-CM

## 2024-07-19 DIAGNOSIS — E83.42 HYPOMAGNESEMIA: ICD-10-CM

## 2024-07-19 DIAGNOSIS — Z94.0 STATUS POST KIDNEY TRANSPLANT: Primary | ICD-10-CM

## 2024-07-19 LAB
ALBUMIN SERPL BCP-MCNC: 3.3 G/DL (ref 3.5–5.2)
ANION GAP SERPL CALC-SCNC: 6 MMOL/L (ref 8–16)
BASOPHILS # BLD AUTO: 0.01 K/UL (ref 0–0.2)
BASOPHILS NFR BLD: 0.3 % (ref 0–1.9)
BUN SERPL-MCNC: 18 MG/DL (ref 6–20)
CALCIUM SERPL-MCNC: 9 MG/DL (ref 8.7–10.5)
CHLORIDE SERPL-SCNC: 116 MMOL/L (ref 95–110)
CO2 SERPL-SCNC: 22 MMOL/L (ref 23–29)
CREAT SERPL-MCNC: 1.1 MG/DL (ref 0.5–1.4)
DIFFERENTIAL METHOD BLD: ABNORMAL
EOSINOPHIL # BLD AUTO: 0 K/UL (ref 0–0.5)
EOSINOPHIL NFR BLD: 0.5 % (ref 0–8)
ERYTHROCYTE [DISTWIDTH] IN BLOOD BY AUTOMATED COUNT: 13.2 % (ref 11.5–14.5)
EST. GFR  (NO RACE VARIABLE): >60 ML/MIN/1.73 M^2
GLUCOSE SERPL-MCNC: 75 MG/DL (ref 70–110)
HCT VFR BLD AUTO: 26.7 % (ref 40–54)
HGB BLD-MCNC: 8.8 G/DL (ref 14–18)
IMM GRANULOCYTES # BLD AUTO: 0.01 K/UL (ref 0–0.04)
IMM GRANULOCYTES NFR BLD AUTO: 0.3 % (ref 0–0.5)
LYMPHOCYTES # BLD AUTO: 0.2 K/UL (ref 1–4.8)
LYMPHOCYTES NFR BLD: 4.1 % (ref 18–48)
MAGNESIUM SERPL-MCNC: 2 MG/DL (ref 1.6–2.6)
MCH RBC QN AUTO: 29.3 PG (ref 27–31)
MCHC RBC AUTO-ENTMCNC: 33 G/DL (ref 32–36)
MCV RBC AUTO: 89 FL (ref 82–98)
MONOCYTES # BLD AUTO: 0.4 K/UL (ref 0.3–1)
MONOCYTES NFR BLD: 10 % (ref 4–15)
NEUTROPHILS # BLD AUTO: 3.3 K/UL (ref 1.8–7.7)
NEUTROPHILS NFR BLD: 84.8 % (ref 38–73)
NRBC BLD-RTO: 0 /100 WBC
PHOSPHATE SERPL-MCNC: 1.7 MG/DL (ref 2.7–4.5)
PLATELET # BLD AUTO: 157 K/UL (ref 150–450)
PMV BLD AUTO: 11.1 FL (ref 9.2–12.9)
POTASSIUM SERPL-SCNC: 3.9 MMOL/L (ref 3.5–5.1)
RBC # BLD AUTO: 3 M/UL (ref 4.6–6.2)
SODIUM SERPL-SCNC: 144 MMOL/L (ref 136–145)
TACROLIMUS BLD-MCNC: 6 NG/ML (ref 5–15)
WBC # BLD AUTO: 3.91 K/UL (ref 3.9–12.7)

## 2024-07-19 PROCEDURE — 85025 COMPLETE CBC W/AUTO DIFF WBC: CPT | Mod: TXP | Performed by: INTERNAL MEDICINE

## 2024-07-19 PROCEDURE — 80069 RENAL FUNCTION PANEL: CPT | Mod: NTX | Performed by: INTERNAL MEDICINE

## 2024-07-19 PROCEDURE — 83735 ASSAY OF MAGNESIUM: CPT | Mod: NTX | Performed by: INTERNAL MEDICINE

## 2024-07-19 PROCEDURE — 99999 PR PBB SHADOW E&M-EST. PATIENT-LVL III: CPT | Mod: PBBFAC,TXP,,

## 2024-07-19 PROCEDURE — 25000003 PHARM REV CODE 250: Mod: NTX | Performed by: INTERNAL MEDICINE

## 2024-07-19 PROCEDURE — 36415 COLL VENOUS BLD VENIPUNCTURE: CPT | Mod: TXP | Performed by: INTERNAL MEDICINE

## 2024-07-19 PROCEDURE — 96365 THER/PROPH/DIAG IV INF INIT: CPT | Mod: NTX

## 2024-07-19 PROCEDURE — 96366 THER/PROPH/DIAG IV INF ADDON: CPT | Mod: NTX

## 2024-07-19 PROCEDURE — 80197 ASSAY OF TACROLIMUS: CPT | Mod: TXP | Performed by: INTERNAL MEDICINE

## 2024-07-19 RX ORDER — ONDANSETRON 4 MG/1
4 TABLET, ORALLY DISINTEGRATING ORAL EVERY 8 HOURS PRN
Qty: 10 TABLET | Refills: 2 | Status: SHIPPED | OUTPATIENT
Start: 2024-07-19

## 2024-07-19 RX ORDER — CALCITRIOL 0.5 UG/1
0.5 CAPSULE ORAL 2 TIMES DAILY
Qty: 60 CAPSULE | Refills: 5 | Status: SHIPPED | OUTPATIENT
Start: 2024-07-19

## 2024-07-19 RX ORDER — SULFAMETHOXAZOLE AND TRIMETHOPRIM 400; 80 MG/1; MG/1
1 TABLET ORAL EVERY MORNING
Qty: 30 TABLET | Refills: 5 | Status: SHIPPED | OUTPATIENT
Start: 2024-07-19 | End: 2025-01-15

## 2024-07-19 RX ORDER — HEPARIN 100 UNIT/ML
500 SYRINGE INTRAVENOUS
OUTPATIENT
Start: 2024-07-19

## 2024-07-19 RX ORDER — SODIUM CHLORIDE 0.9 % (FLUSH) 0.9 %
10 SYRINGE (ML) INJECTION
OUTPATIENT
Start: 2024-07-19

## 2024-07-19 RX ORDER — MYCOPHENOLATE MOFETIL 250 MG/1
1000 CAPSULE ORAL 2 TIMES DAILY
Qty: 240 CAPSULE | Refills: 11 | Status: SHIPPED | OUTPATIENT
Start: 2024-07-19

## 2024-07-19 RX ORDER — HEPARIN 100 UNIT/ML
500 SYRINGE INTRAVENOUS
Status: DISCONTINUED | OUTPATIENT
Start: 2024-07-19 | End: 2024-07-19 | Stop reason: HOSPADM

## 2024-07-19 RX ORDER — SODIUM CHLORIDE 0.9 % (FLUSH) 0.9 %
10 SYRINGE (ML) INJECTION
Status: DISCONTINUED | OUTPATIENT
Start: 2024-07-19 | End: 2024-07-19 | Stop reason: HOSPADM

## 2024-07-19 RX ORDER — ONDANSETRON 4 MG/1
4 TABLET, ORALLY DISINTEGRATING ORAL
Status: DISCONTINUED | OUTPATIENT
Start: 2024-07-19 | End: 2024-07-19 | Stop reason: HOSPADM

## 2024-07-19 RX ORDER — HEPARIN 100 UNIT/ML
500 SYRINGE INTRAVENOUS
Status: CANCELLED | OUTPATIENT
Start: 2024-07-19

## 2024-07-19 RX ORDER — ONDANSETRON 4 MG/1
4 TABLET, ORALLY DISINTEGRATING ORAL
Status: CANCELLED
Start: 2024-07-19

## 2024-07-19 RX ORDER — PREDNISONE 5 MG/1
TABLET ORAL
Qty: 75 TABLET | Refills: 5 | Status: SHIPPED | OUTPATIENT
Start: 2024-07-19

## 2024-07-19 RX ORDER — SODIUM CHLORIDE 0.9 % (FLUSH) 0.9 %
10 SYRINGE (ML) INJECTION
Status: CANCELLED | OUTPATIENT
Start: 2024-07-19

## 2024-07-19 RX ORDER — TACROLIMUS 1 MG/1
6 CAPSULE ORAL EVERY 12 HOURS
Qty: 360 CAPSULE | Refills: 11 | Status: SHIPPED | OUTPATIENT
Start: 2024-07-19

## 2024-07-19 RX ORDER — ACYCLOVIR 400 MG/1
400 TABLET ORAL 2 TIMES DAILY
Qty: 60 TABLET | Refills: 2 | Status: SHIPPED | OUTPATIENT
Start: 2024-07-19 | End: 2024-10-17

## 2024-07-19 RX ADMIN — SODIUM PHOSPHATE, MONOBASIC, MONOHYDRATE AND SODIUM PHOSPHATE, DIBASIC, ANHYDROUS 15 MMOL: 142; 276 INJECTION, SOLUTION INTRAVENOUS at 11:07

## 2024-07-19 SDOH — SOCIAL DETERMINANTS OF HEALTH (SDOH): OCCUPATIONAL EXPOSURE TO OTHER RISK FACTORS: Z57.8

## 2024-07-19 NOTE — PROGRESS NOTES
Clinic Note: First Return to Clinic Post-  Kidney Transplant    Dejuan Diaz  is a 43 y.o. male  S/p LEFT KIDNEY     transplant on 2024 (Kidney) for Diabetes Mellitus - Type II.      Discharge Course (Issues/Concerns): No concerns    Objective:   Vitals:    24 0941   BP: (!) 143/78   Pulse: 74   Resp: 18   Temp: 97.3 °F (36.3 °C)       Met with patient and his caregiver in the clinic to review current medication list.     Current Outpatient Medications   Medication Sig Dispense Refill    amLODIPine (NORVASC) 5 MG tablet Take 1 tablet (5 mg total) by mouth once daily. Hold for SBP< 130. 90 tablet 3    atorvastatin (LIPITOR) 40 MG tablet Take 40 mg by mouth once daily.      blood-glucose meter,continuous (DEXCOM G6  MISC) by Misc.(Non-Drug; Combo Route) route.      blood-glucose transmitter (DEXCOM G6 TRANSMITTER MISC)   Dexcom G6 transmitter, See Instructions, uad with dexcom G6 sensor; change transmitter q 90 days, # 1 EA, 3 Refill(s), Pharmacy: [facility], 187, cm, 23 14:55:00 CDT, Height/Length Measured, 105.1, kg, 23 14:55:00 CDT, Weight Dosing      DEXCOM G6 SENSOR Adelina SMARTSI Each Topical Every 10 Days      famotidine (PEPCID) 20 MG tablet Take 1 tablet (20 mg total) by mouth every evening. Stop 8/15/24 30 tablet 0    insulin lispro (HUMALOG U-100 INSULIN) 100 unit/mL injection   See Instructions, for use in insulin pump; max daily dose 100 units, # 90 mL, 1 Refill(s), Maintenance, Pharmacy: [facility], 187, cm, 23 11:24:00 CST, Height/Length Measured, 102.5, kg, 23 11:24:00 CST, Weight Dosing      insulin pump cart,auto,BT/cntr (OMNIPOD 5 G6 INTRO KIT, GEN 5, SUBQ)   Omnipod 5 G6 Intro Kit (Gen 5), See Instructions, use as directed, # 1 EA, 0 Refill(s), Pharmacy: [facility], 187, cm, 22 9:14:00 CST, Height/Length Measured, 102.3, kg, 22 9:14:00 CST, Weight Dosing      multivitamin Tab Take 1 tablet by mouth once daily.  30 tablet 5    oxyCODONE (ROXICODONE) 5 MG immediate release tablet Take 1 tablet (5 mg total) by mouth every 4 (four) hours as needed for Pain. 30 tablet 0    sodium bicarbonate 650 MG tablet Take 2 tablets (1,300 mg total) by mouth 2 (two) times daily. 30 tablet 5    acyclovir (ZOVIRAX) 400 MG tablet Take 1 tablet (400 mg total) by mouth 2 (two) times daily. Stop 10/14/24 60 tablet 2    calcitRIOL (ROCALTROL) 0.5 MCG Cap Take 1 capsule (0.5 mcg total) by mouth 2 (two) times daily. 60 capsule 5    k phos di & mono-sod phos mono (K-PHOS-NEUTRAL) 250 mg Tab Take 2 tablets by mouth 3 (three) times daily. 180 tablet 11    mycophenolate (CELLCEPT) 250 mg Cap Take 4 capsules (1,000 mg total) by mouth 2 (two) times daily. 240 capsule 11    ondansetron (ZOFRAN-ODT) 4 MG TbDL Take 1 tablet (4 mg total) by mouth every 8 (eight) hours as needed (nausea). 10 tablet 2    predniSONE (DELTASONE) 5 MG tablet Take by mouth daily: 20mg 7/19-7/25, 15mg 7/26-8/1, 10mg 8/2-8/8, 5mg 8/9- 75 tablet 5    sulfamethoxazole-trimethoprim 400-80mg (BACTRIM,SEPTRA) 400-80 mg per tablet Take 1 tablet by mouth every morning. Stop 1/12/25 30 tablet 5    tacrolimus (PROGRAF) 1 MG Cap Take 6 capsules (6 mg total) by mouth every 12 (twelve) hours. 360 capsule 11     No current facility-administered medications for this visit.     Facility-Administered Medications Ordered in Other Visits   Medication Dose Route Frequency Provider Last Rate Last Admin    0.9% NaCl 250 mL flush bag   Intravenous PRN Gurwinder, Bella N., DO        alteplase injection 2 mg  2 mg Intra-Catheter PRN Purdy, Bella N., DO        heparin, porcine (PF) 100 unit/mL injection flush 500 Units  500 Units Intravenous PRN Purdy, Bella N., DO        sodium chloride 0.9% flush 10 mL  10 mL Intravenous PRN Gurwinder, Bella N., DO        sodium phosphate 15 mmol in D5W 250 mL IVPB  15 mmol Intravenous Once Bella Purdy, DO 62.5 mL/hr at 07/19/24 1140 15 mmol at 07/19/24 1140       Pharmacy  Interventions/Recommendations:     1) Graft Function & Immunosuppression Issues: tac/mmf/pred taper; tac level 6 / SCr 1.1    2) Opportunistic Infection prophylaxis:   PCP ppx: Bactrim until 1/12/25  CMV ppx: acyclovir until 10/14/24  Fungal ppx: n/a    3) Donor Serologies & Monitoring:     Donor CMV Status:  Negative  Donor HCV Status:    Donor HBcAb:    Donor HBV KAVITA: Organ record is missing.  Donor HCV KAVITA: Organ record is missing.      4) Pain Management & Bowel Regimen: oxycodone    5) Blood Pressure Management: 143/78; amlodipine 5 mg picked up and taken this morning    6) Blood Sugar Management & Follow-up: n/a    7) Electrolyte Management: Ph 1.7, will increase KPN to TID and give NaPh 15 mmol infusion    8) Osteoporosis Prevention Strategy (Liver Transplant): n/a    9) OTHER medication follow-up (patient assistance, held medications, etc):   none    10) Reinforced medication education conducted in the hospital, including medication indications, dosing, administration, side effects, monitoring-- including timing of immunosuppressant levels.     Patient received their FIRST fill of medications from [facility].  Discussed the process for obtaining refills of medications, including verifying the dose and mailing address to have medications delivered.     Dejuan and his caregivers verbalized understanding and had the opportunity to ask questions.

## 2024-07-19 NOTE — PROGRESS NOTES
"1ST NURSING VISIT POST DISCHARGE NOTE    1st RN appointment with Dejuan Diaz post discharge 7/18/24 s/p kidney transplant 7/16/24.  Patient's significant other and caregiver accompanied him.  Patient reports incisional pain.  Patient says that he that he is sleeping well.  Incision  intact with dermabond .  Patient that he is able to explain daily incision care and showering instructions.  Reviewed I&O monitoring, measuring, and recording, and the need for hydration (i.e., at least 2 liters of water daily with minimal caffeine and no grapefruit products).  Medication list and rationale were reviewed.  Patient did bring blue medication card and medication bottles for review.  Patient reports that he has stopped Dulcolax and has not continued Colace.  Patient has had a bowel movement.  Patient expressed understanding of daily care including BID VS, medications, and I&O documentation.  Patient made aware of today's creatinine level: 1.1.  Patient aware that coordinator will review today's labs with a transplant physician and call the patient with any dose changes indicated.  Next lab appointment scheduled for Monday 7/22/24.  First post-operative transplant team appointment with labs scheduled for 8/5/24.    Using the Kidney Transplant Patient Reference Manual, the patient submitted his open book "Self-assessment of Kidney Transplant Patient Knowledge" test, which was completed in the transplant clinic this morning before 1st nursing visit.  This test includes questions regarding critical dose medications commonly used after kidney transplant, medication dosing and side effects, importance of timed lab draws, important signs and symptoms to report 24/7 immediately post-transplant as well as how to contact the transplant team 24/7.    Test Score: 24/25    After completing the test, the patient was given a copy of the Self-assessment Answer Key to reinforce accurate learning of test content.  Patient expressed his " understanding of the value of the information included in the self-assessment test.

## 2024-07-19 NOTE — PROGRESS NOTES
Limited head to toe assessment due to privacy issues and visit reason (infusion) though opportunity was given for the patient to express concerns.    IV Na Phos completed. PIV removed. Pt ambulated out of the infusion suite with his girlfriend, Naz

## 2024-07-21 ENCOUNTER — TELEPHONE (OUTPATIENT)
Dept: TRANSPLANT | Facility: CLINIC | Age: 44
End: 2024-07-21
Payer: COMMERCIAL

## 2024-07-22 ENCOUNTER — PATIENT MESSAGE (OUTPATIENT)
Dept: NEPHROLOGY | Facility: CLINIC | Age: 44
End: 2024-07-22
Payer: COMMERCIAL

## 2024-07-22 ENCOUNTER — TELEPHONE (OUTPATIENT)
Dept: TRANSPLANT | Facility: CLINIC | Age: 44
End: 2024-07-22
Payer: COMMERCIAL

## 2024-07-22 ENCOUNTER — LAB VISIT (OUTPATIENT)
Dept: LAB | Facility: HOSPITAL | Age: 44
End: 2024-07-22
Attending: INTERNAL MEDICINE
Payer: COMMERCIAL

## 2024-07-22 DIAGNOSIS — Z94.0 KIDNEY REPLACED BY TRANSPLANT: ICD-10-CM

## 2024-07-22 DIAGNOSIS — E83.42 HYPOMAGNESEMIA: ICD-10-CM

## 2024-07-22 LAB
ALBUMIN SERPL BCP-MCNC: 3.7 G/DL (ref 3.5–5.2)
ANION GAP SERPL CALC-SCNC: 6 MMOL/L (ref 8–16)
BASOPHILS # BLD AUTO: 0.03 K/UL (ref 0–0.2)
BASOPHILS NFR BLD: 0.5 % (ref 0–1.9)
BUN SERPL-MCNC: 21 MG/DL (ref 6–20)
CALCIUM SERPL-MCNC: 9.7 MG/DL (ref 8.7–10.5)
CHLORIDE SERPL-SCNC: 111 MMOL/L (ref 95–110)
CLASS I ANTIBODY COMMENTS - LUMINEX: NORMAL
CLASS II ANTIBODIES - LUMINEX: NEGATIVE
CO2 SERPL-SCNC: 23 MMOL/L (ref 23–29)
CPRA %: 0
CREAT SERPL-MCNC: 1.2 MG/DL (ref 0.5–1.4)
DIFFERENTIAL METHOD BLD: ABNORMAL
EOSINOPHIL # BLD AUTO: 0.1 K/UL (ref 0–0.5)
EOSINOPHIL NFR BLD: 1.5 % (ref 0–8)
ERYTHROCYTE [DISTWIDTH] IN BLOOD BY AUTOMATED COUNT: 13.2 % (ref 11.5–14.5)
EST. GFR  (NO RACE VARIABLE): >60 ML/MIN/1.73 M^2
GLUCOSE SERPL-MCNC: 153 MG/DL (ref 70–110)
HCT VFR BLD AUTO: 31.3 % (ref 40–54)
HGB BLD-MCNC: 10 G/DL (ref 14–18)
IMM GRANULOCYTES # BLD AUTO: 0.08 K/UL (ref 0–0.04)
IMM GRANULOCYTES NFR BLD AUTO: 1.4 % (ref 0–0.5)
LYMPHOCYTES # BLD AUTO: 0.3 K/UL (ref 1–4.8)
LYMPHOCYTES NFR BLD: 5.3 % (ref 18–48)
MAGNESIUM SERPL-MCNC: 1.5 MG/DL (ref 1.6–2.6)
MCH RBC QN AUTO: 28.9 PG (ref 27–31)
MCHC RBC AUTO-ENTMCNC: 31.9 G/DL (ref 32–36)
MCV RBC AUTO: 91 FL (ref 82–98)
MONOCYTES # BLD AUTO: 0.6 K/UL (ref 0.3–1)
MONOCYTES NFR BLD: 10.5 % (ref 4–15)
NEUTROPHILS # BLD AUTO: 4.7 K/UL (ref 1.8–7.7)
NEUTROPHILS NFR BLD: 80.8 % (ref 38–73)
NRBC BLD-RTO: 0 /100 WBC
PHOSPHATE SERPL-MCNC: 2.2 MG/DL (ref 2.7–4.5)
PLATELET # BLD AUTO: 215 K/UL (ref 150–450)
PMV BLD AUTO: 11.3 FL (ref 9.2–12.9)
POTASSIUM SERPL-SCNC: 4.7 MMOL/L (ref 3.5–5.1)
RBC # BLD AUTO: 3.46 M/UL (ref 4.6–6.2)
SERUM COLLECTION DT - LUMINEX CLASS I: NORMAL
SERUM COLLECTION DT - LUMINEX CLASS II: NORMAL
SODIUM SERPL-SCNC: 140 MMOL/L (ref 136–145)
SPIM1 TESTING DATE: NORMAL
SPIM2 TESTING DATE: NORMAL
SPLIM TESTING DATE: NORMAL
TACROLIMUS BLD-MCNC: 8.9 NG/ML (ref 5–15)
WBC # BLD AUTO: 5.82 K/UL (ref 3.9–12.7)

## 2024-07-22 PROCEDURE — 36415 COLL VENOUS BLD VENIPUNCTURE: CPT | Mod: TXP | Performed by: INTERNAL MEDICINE

## 2024-07-22 PROCEDURE — 80069 RENAL FUNCTION PANEL: CPT | Mod: TXP | Performed by: INTERNAL MEDICINE

## 2024-07-22 PROCEDURE — 80197 ASSAY OF TACROLIMUS: CPT | Mod: TXP | Performed by: INTERNAL MEDICINE

## 2024-07-22 PROCEDURE — 83735 ASSAY OF MAGNESIUM: CPT | Mod: TXP | Performed by: INTERNAL MEDICINE

## 2024-07-22 PROCEDURE — 85025 COMPLETE CBC W/AUTO DIFF WBC: CPT | Mod: TXP | Performed by: INTERNAL MEDICINE

## 2024-07-22 RX ORDER — CALCIUM ACETATE 667 MG/1
CAPSULE ORAL
COMMUNITY
Start: 2024-05-29 | End: 2024-08-05 | Stop reason: ALTCHOICE

## 2024-07-22 NOTE — TELEPHONE ENCOUNTER
Returned call and spoke with pt and sister. Reports had purchased a new scale - weight discrepancy may occur. Reports 3lb weight loss. Intake over the weekend of atleast 3L daily, UOP of 3300ml per day.  ----- Message from Kamille Amanda sent at 7/22/2024  9:18 AM CDT -----  Regarding: weighr loss concern  Contact: PT  [phone]  The patient & wife called requesting to speak to Nurse Hairston. Concerning a 5 pound weight loss that is of concern. Please call at your earliest convenience       No further information provided    Patient can be contacted @# [phone]

## 2024-07-23 ENCOUNTER — TELEPHONE (OUTPATIENT)
Dept: TRANSPLANT | Facility: CLINIC | Age: 44
End: 2024-07-23
Payer: COMMERCIAL

## 2024-07-23 NOTE — TELEPHONE ENCOUNTER
Returned call to pt. Pt reports feeling clammy for about 2 hours after taking Kphos. Pt also felt clammy overnight. BG and BP WNL, afebrile. No other sick s/s. Instructed to continue monitoring.  ----- Message from Kamille Amanda sent at 7/23/2024 10:59 AM CDT -----  Regarding: Speak to Nurse Hairston/questions  Contact: PT  sister   .Sister calling to speak with Nurse Hairston. She has some questions she needs to discuss. Please reach out at earliest convenience         [phone] (Ginger - sister)

## 2024-07-25 ENCOUNTER — LAB VISIT (OUTPATIENT)
Dept: LAB | Facility: HOSPITAL | Age: 44
End: 2024-07-25
Attending: INTERNAL MEDICINE
Payer: COMMERCIAL

## 2024-07-25 ENCOUNTER — PATIENT MESSAGE (OUTPATIENT)
Dept: ADMINISTRATIVE | Facility: OTHER | Age: 44
End: 2024-07-25
Payer: COMMERCIAL

## 2024-07-25 DIAGNOSIS — E83.42 HYPOMAGNESEMIA: ICD-10-CM

## 2024-07-25 DIAGNOSIS — Z76.82 AWAITING ORGAN TRANSPLANT: ICD-10-CM

## 2024-07-25 DIAGNOSIS — Z94.0 KIDNEY REPLACED BY TRANSPLANT: ICD-10-CM

## 2024-07-25 LAB
ALBUMIN SERPL BCP-MCNC: 3.8 G/DL (ref 3.5–5.2)
ANION GAP SERPL CALC-SCNC: 6 MMOL/L (ref 8–16)
BASOPHILS # BLD AUTO: 0.07 K/UL (ref 0–0.2)
BASOPHILS NFR BLD: 0.8 % (ref 0–1.9)
BUN SERPL-MCNC: 22 MG/DL (ref 6–20)
CALCIUM SERPL-MCNC: 9.6 MG/DL (ref 8.7–10.5)
CHLORIDE SERPL-SCNC: 109 MMOL/L (ref 95–110)
CO2 SERPL-SCNC: 25 MMOL/L (ref 23–29)
CREAT SERPL-MCNC: 1.3 MG/DL (ref 0.5–1.4)
DIFFERENTIAL METHOD BLD: ABNORMAL
EOSINOPHIL # BLD AUTO: 0.1 K/UL (ref 0–0.5)
EOSINOPHIL NFR BLD: 0.9 % (ref 0–8)
ERYTHROCYTE [DISTWIDTH] IN BLOOD BY AUTOMATED COUNT: 14.2 % (ref 11.5–14.5)
EST. GFR  (NO RACE VARIABLE): >60 ML/MIN/1.73 M^2
GLUCOSE SERPL-MCNC: 111 MG/DL (ref 70–110)
HCT VFR BLD AUTO: 31.9 % (ref 40–54)
HGB BLD-MCNC: 10.5 G/DL (ref 14–18)
IMM GRANULOCYTES # BLD AUTO: 0.44 K/UL (ref 0–0.04)
IMM GRANULOCYTES NFR BLD AUTO: 4.8 % (ref 0–0.5)
LYMPHOCYTES # BLD AUTO: 0.6 K/UL (ref 1–4.8)
LYMPHOCYTES NFR BLD: 6.8 % (ref 18–48)
MAGNESIUM SERPL-MCNC: 1.5 MG/DL (ref 1.6–2.6)
MCH RBC QN AUTO: 29.9 PG (ref 27–31)
MCHC RBC AUTO-ENTMCNC: 32.9 G/DL (ref 32–36)
MCV RBC AUTO: 91 FL (ref 82–98)
MONOCYTES # BLD AUTO: 0.9 K/UL (ref 0.3–1)
MONOCYTES NFR BLD: 10.3 % (ref 4–15)
NEUTROPHILS # BLD AUTO: 7 K/UL (ref 1.8–7.7)
NEUTROPHILS NFR BLD: 76.4 % (ref 38–73)
NRBC BLD-RTO: 0 /100 WBC
PHOSPHATE SERPL-MCNC: 2.8 MG/DL (ref 2.7–4.5)
PLATELET # BLD AUTO: 282 K/UL (ref 150–450)
PMV BLD AUTO: 11.3 FL (ref 9.2–12.9)
POTASSIUM SERPL-SCNC: 4.4 MMOL/L (ref 3.5–5.1)
RBC # BLD AUTO: 3.51 M/UL (ref 4.6–6.2)
SODIUM SERPL-SCNC: 140 MMOL/L (ref 136–145)
TACROLIMUS BLD-MCNC: 11.4 NG/ML (ref 5–15)
WBC # BLD AUTO: 9.15 K/UL (ref 3.9–12.7)

## 2024-07-25 PROCEDURE — 80069 RENAL FUNCTION PANEL: CPT | Mod: TXP | Performed by: INTERNAL MEDICINE

## 2024-07-25 PROCEDURE — 85025 COMPLETE CBC W/AUTO DIFF WBC: CPT | Mod: TXP | Performed by: INTERNAL MEDICINE

## 2024-07-25 PROCEDURE — 36415 COLL VENOUS BLD VENIPUNCTURE: CPT | Mod: NTX | Performed by: INTERNAL MEDICINE

## 2024-07-25 PROCEDURE — 83735 ASSAY OF MAGNESIUM: CPT | Mod: NTX | Performed by: INTERNAL MEDICINE

## 2024-07-25 PROCEDURE — 80197 ASSAY OF TACROLIMUS: CPT | Mod: TXP | Performed by: INTERNAL MEDICINE

## 2024-07-25 RX ORDER — TACROLIMUS 1 MG/1
5 CAPSULE ORAL EVERY 12 HOURS
Qty: 300 CAPSULE | Refills: 11 | Status: SHIPPED | OUTPATIENT
Start: 2024-07-26 | End: 2025-07-26

## 2024-07-25 NOTE — PHYSICIAN QUERY
Provider, due to documentation conflict Please clarify the stage of chronic kidney disease (CKD).      End Stage Renal Disease (ESRD) - Kidney failure, requiring renal replacement therapy or transplant, eGFR <15 (for 3 or more months)

## 2024-07-25 NOTE — TELEPHONE ENCOUNTER
Message sent to pt instructing him to hold prograf tonight and restart taking tomorrow AM @ 5mg BID. Repeat labs scheduled Monday.  ----- Message from Bella Purdy DO sent at 7/25/2024 11:57 AM CDT -----  Near baseline graft function. Acceptable electrolytes  High FK. Have him hold FK dose tonight and decrease FK to 5 mg BID from 6 mg bID

## 2024-07-25 NOTE — PHYSICIAN QUERY
"Provider, the H&P Notes     "Diabetes mellitus type II"    and the Discharge Summary notes     "Type 1 diabetes mellitus "         Due to Documentation Conflict please clarify the Type of Diabetes  Diabetes mellitus type II      To Respond To Query  Respond from In Basket  Navigate to the Hospital Chart Completion folder.  Highlight the Physician Query message and click New Note to complete the query.   Response appears in a new window.  After reviewing the chart, respond to the query by selecting the clinically appropriate option from the list and then hit Enter  Click Sign  Unselected options will disappear after signing the note.          **Message Replies To This Message or to the Query Will Not Be Taken as Responses Please Follow the Workflow**  "

## 2024-07-26 RX ORDER — OXYCODONE HYDROCHLORIDE 5 MG/1
5 TABLET ORAL EVERY 4 HOURS PRN
Qty: 30 TABLET | Refills: 0 | Status: SHIPPED | OUTPATIENT
Start: 2024-07-26

## 2024-07-29 ENCOUNTER — LAB VISIT (OUTPATIENT)
Dept: LAB | Facility: HOSPITAL | Age: 44
End: 2024-07-29
Attending: INTERNAL MEDICINE
Payer: COMMERCIAL

## 2024-07-29 ENCOUNTER — PATIENT MESSAGE (OUTPATIENT)
Dept: TRANSPLANT | Facility: CLINIC | Age: 44
End: 2024-07-29
Payer: COMMERCIAL

## 2024-07-29 DIAGNOSIS — E83.42 HYPOMAGNESEMIA: ICD-10-CM

## 2024-07-29 DIAGNOSIS — Z94.0 KIDNEY REPLACED BY TRANSPLANT: ICD-10-CM

## 2024-07-29 DIAGNOSIS — Z94.0 KIDNEY REPLACED BY TRANSPLANT: Primary | ICD-10-CM

## 2024-07-29 LAB
ALBUMIN SERPL BCP-MCNC: 3.6 G/DL (ref 3.5–5.2)
ANION GAP SERPL CALC-SCNC: 8 MMOL/L (ref 8–16)
BASOPHILS # BLD AUTO: 0.07 K/UL (ref 0–0.2)
BASOPHILS NFR BLD: 1 % (ref 0–1.9)
BUN SERPL-MCNC: 20 MG/DL (ref 6–20)
CALCIUM SERPL-MCNC: 9.4 MG/DL (ref 8.7–10.5)
CHLORIDE SERPL-SCNC: 105 MMOL/L (ref 95–110)
CO2 SERPL-SCNC: 26 MMOL/L (ref 23–29)
CREAT SERPL-MCNC: 1.4 MG/DL (ref 0.5–1.4)
DIFFERENTIAL METHOD BLD: ABNORMAL
EOSINOPHIL # BLD AUTO: 0.1 K/UL (ref 0–0.5)
EOSINOPHIL NFR BLD: 1.3 % (ref 0–8)
ERYTHROCYTE [DISTWIDTH] IN BLOOD BY AUTOMATED COUNT: 15.2 % (ref 11.5–14.5)
EST. GFR  (NO RACE VARIABLE): >60 ML/MIN/1.73 M^2
GLUCOSE SERPL-MCNC: 191 MG/DL (ref 70–110)
HCT VFR BLD AUTO: 30 % (ref 40–54)
HGB BLD-MCNC: 9.8 G/DL (ref 14–18)
IMM GRANULOCYTES # BLD AUTO: 0.11 K/UL (ref 0–0.04)
IMM GRANULOCYTES NFR BLD AUTO: 1.6 % (ref 0–0.5)
LYMPHOCYTES # BLD AUTO: 0.7 K/UL (ref 1–4.8)
LYMPHOCYTES NFR BLD: 9.4 % (ref 18–48)
MAGNESIUM SERPL-MCNC: 1.5 MG/DL (ref 1.6–2.6)
MCH RBC QN AUTO: 29.3 PG (ref 27–31)
MCHC RBC AUTO-ENTMCNC: 32.7 G/DL (ref 32–36)
MCV RBC AUTO: 90 FL (ref 82–98)
MONOCYTES # BLD AUTO: 0.6 K/UL (ref 0.3–1)
MONOCYTES NFR BLD: 7.9 % (ref 4–15)
NEUTROPHILS # BLD AUTO: 5.6 K/UL (ref 1.8–7.7)
NEUTROPHILS NFR BLD: 78.8 % (ref 38–73)
NRBC BLD-RTO: 0 /100 WBC
PHOSPHATE SERPL-MCNC: 2.7 MG/DL (ref 2.7–4.5)
PLATELET # BLD AUTO: 254 K/UL (ref 150–450)
PMV BLD AUTO: 10.2 FL (ref 9.2–12.9)
POTASSIUM SERPL-SCNC: 4.2 MMOL/L (ref 3.5–5.1)
RBC # BLD AUTO: 3.34 M/UL (ref 4.6–6.2)
SODIUM SERPL-SCNC: 139 MMOL/L (ref 136–145)
WBC # BLD AUTO: 7.09 K/UL (ref 3.9–12.7)

## 2024-07-29 PROCEDURE — 80197 ASSAY OF TACROLIMUS: CPT | Mod: NTX | Performed by: INTERNAL MEDICINE

## 2024-07-29 PROCEDURE — 85025 COMPLETE CBC W/AUTO DIFF WBC: CPT | Mod: TXP | Performed by: INTERNAL MEDICINE

## 2024-07-29 PROCEDURE — 36415 COLL VENOUS BLD VENIPUNCTURE: CPT | Mod: TXP | Performed by: INTERNAL MEDICINE

## 2024-07-29 PROCEDURE — 83735 ASSAY OF MAGNESIUM: CPT | Mod: TXP | Performed by: INTERNAL MEDICINE

## 2024-07-29 PROCEDURE — 80069 RENAL FUNCTION PANEL: CPT | Mod: TXP | Performed by: INTERNAL MEDICINE

## 2024-07-29 RX ORDER — LANOLIN ALCOHOL/MO/W.PET/CERES
400 CREAM (GRAM) TOPICAL 2 TIMES DAILY
Qty: 60 TABLET | Refills: 11 | Status: SHIPPED | OUTPATIENT
Start: 2024-07-29 | End: 2025-07-29

## 2024-07-29 NOTE — TELEPHONE ENCOUNTER
Pt made aware of below orders          ----- Message from Bella Purdy DO sent at 7/29/2024 11:29 AM CDT -----  Graft function at higher end of normal. Monitor  Acceptable electrolytes  Start on MgOx 400 mg BID

## 2024-07-30 LAB — TACROLIMUS BLD-MCNC: 8 NG/ML (ref 5–15)

## 2024-08-01 ENCOUNTER — LAB VISIT (OUTPATIENT)
Dept: LAB | Facility: HOSPITAL | Age: 44
End: 2024-08-01
Attending: INTERNAL MEDICINE
Payer: COMMERCIAL

## 2024-08-01 DIAGNOSIS — E83.42 HYPOMAGNESEMIA: ICD-10-CM

## 2024-08-01 DIAGNOSIS — Z94.0 KIDNEY REPLACED BY TRANSPLANT: ICD-10-CM

## 2024-08-01 LAB
ALBUMIN SERPL BCP-MCNC: 3.7 G/DL (ref 3.5–5.2)
ANION GAP SERPL CALC-SCNC: 9 MMOL/L (ref 8–16)
BASOPHILS # BLD AUTO: 0.08 K/UL (ref 0–0.2)
BASOPHILS NFR BLD: 1.5 % (ref 0–1.9)
BUN SERPL-MCNC: 24 MG/DL (ref 6–20)
CALCIUM SERPL-MCNC: 9.7 MG/DL (ref 8.7–10.5)
CHLORIDE SERPL-SCNC: 107 MMOL/L (ref 95–110)
CO2 SERPL-SCNC: 26 MMOL/L (ref 23–29)
CREAT SERPL-MCNC: 1.4 MG/DL (ref 0.5–1.4)
DIFFERENTIAL METHOD BLD: ABNORMAL
EOSINOPHIL # BLD AUTO: 0.1 K/UL (ref 0–0.5)
EOSINOPHIL NFR BLD: 2.2 % (ref 0–8)
ERYTHROCYTE [DISTWIDTH] IN BLOOD BY AUTOMATED COUNT: 15.8 % (ref 11.5–14.5)
EST. GFR  (NO RACE VARIABLE): >60 ML/MIN/1.73 M^2
GLUCOSE SERPL-MCNC: 122 MG/DL (ref 70–110)
HCT VFR BLD AUTO: 31.9 % (ref 40–54)
HGB BLD-MCNC: 10.5 G/DL (ref 14–18)
IMM GRANULOCYTES # BLD AUTO: 0.03 K/UL (ref 0–0.04)
IMM GRANULOCYTES NFR BLD AUTO: 0.6 % (ref 0–0.5)
LYMPHOCYTES # BLD AUTO: 0.6 K/UL (ref 1–4.8)
LYMPHOCYTES NFR BLD: 11.8 % (ref 18–48)
MAGNESIUM SERPL-MCNC: 1.5 MG/DL (ref 1.6–2.6)
MCH RBC QN AUTO: 30.1 PG (ref 27–31)
MCHC RBC AUTO-ENTMCNC: 32.9 G/DL (ref 32–36)
MCV RBC AUTO: 91 FL (ref 82–98)
MONOCYTES # BLD AUTO: 0.6 K/UL (ref 0.3–1)
MONOCYTES NFR BLD: 10.5 % (ref 4–15)
NEUTROPHILS # BLD AUTO: 3.9 K/UL (ref 1.8–7.7)
NEUTROPHILS NFR BLD: 73.4 % (ref 38–73)
NRBC BLD-RTO: 0 /100 WBC
PHOSPHATE SERPL-MCNC: 2.8 MG/DL (ref 2.7–4.5)
PLATELET # BLD AUTO: 241 K/UL (ref 150–450)
PMV BLD AUTO: 10.1 FL (ref 9.2–12.9)
POTASSIUM SERPL-SCNC: 4.2 MMOL/L (ref 3.5–5.1)
RBC # BLD AUTO: 3.49 M/UL (ref 4.6–6.2)
SODIUM SERPL-SCNC: 142 MMOL/L (ref 136–145)
WBC # BLD AUTO: 5.34 K/UL (ref 3.9–12.7)

## 2024-08-01 PROCEDURE — 80069 RENAL FUNCTION PANEL: CPT | Mod: TXP | Performed by: INTERNAL MEDICINE

## 2024-08-01 PROCEDURE — 85025 COMPLETE CBC W/AUTO DIFF WBC: CPT | Mod: TXP | Performed by: INTERNAL MEDICINE

## 2024-08-01 PROCEDURE — 36415 COLL VENOUS BLD VENIPUNCTURE: CPT | Mod: TXP | Performed by: INTERNAL MEDICINE

## 2024-08-01 PROCEDURE — 83735 ASSAY OF MAGNESIUM: CPT | Mod: TXP | Performed by: INTERNAL MEDICINE

## 2024-08-01 PROCEDURE — 80197 ASSAY OF TACROLIMUS: CPT | Mod: TXP | Performed by: INTERNAL MEDICINE

## 2024-08-02 LAB — TACROLIMUS BLD-MCNC: 8.7 NG/ML (ref 5–15)

## 2024-08-05 ENCOUNTER — PATIENT MESSAGE (OUTPATIENT)
Dept: TRANSPLANT | Facility: CLINIC | Age: 44
End: 2024-08-05

## 2024-08-05 ENCOUNTER — HOSPITAL ENCOUNTER (OUTPATIENT)
Dept: RADIOLOGY | Facility: HOSPITAL | Age: 44
Discharge: HOME | End: 2024-08-05
Attending: INTERNAL MEDICINE
Payer: COMMERCIAL

## 2024-08-05 ENCOUNTER — OFFICE VISIT (OUTPATIENT)
Dept: TRANSPLANT | Facility: CLINIC | Age: 44
End: 2024-08-05
Payer: COMMERCIAL

## 2024-08-05 ENCOUNTER — PROCEDURE VISIT (OUTPATIENT)
Dept: UROLOGY | Facility: CLINIC | Age: 44
End: 2024-08-05
Payer: COMMERCIAL

## 2024-08-05 VITALS
BODY MASS INDEX: 28.13 KG/M2 | OXYGEN SATURATION: 100 % | DIASTOLIC BLOOD PRESSURE: 79 MMHG | HEIGHT: 72 IN | HEART RATE: 84 BPM | TEMPERATURE: 97 F | WEIGHT: 207.69 LBS | RESPIRATION RATE: 18 BRPM | SYSTOLIC BLOOD PRESSURE: 140 MMHG

## 2024-08-05 VITALS
RESPIRATION RATE: 20 BRPM | WEIGHT: 209.69 LBS | BODY MASS INDEX: 28.43 KG/M2 | TEMPERATURE: 97 F | SYSTOLIC BLOOD PRESSURE: 156 MMHG | HEART RATE: 71 BPM | DIASTOLIC BLOOD PRESSURE: 81 MMHG

## 2024-08-05 DIAGNOSIS — Z94.0 KIDNEY REPLACED BY TRANSPLANT: ICD-10-CM

## 2024-08-05 DIAGNOSIS — N17.9 AKI (ACUTE KIDNEY INJURY): ICD-10-CM

## 2024-08-05 DIAGNOSIS — Z94.0 STATUS POST KIDNEY TRANSPLANT: ICD-10-CM

## 2024-08-05 DIAGNOSIS — Z94.0 STATUS POST LIVING-DONOR KIDNEY TRANSPLANTATION: ICD-10-CM

## 2024-08-05 DIAGNOSIS — Z76.82 AWAITING ORGAN TRANSPLANT: ICD-10-CM

## 2024-08-05 DIAGNOSIS — R30.0 DYSURIA: Primary | ICD-10-CM

## 2024-08-05 PROCEDURE — 3062F POS MACROALBUMINURIA REV: CPT | Mod: CPTII,NTX,S$GLB, | Performed by: INTERNAL MEDICINE

## 2024-08-05 PROCEDURE — 99999 PR PBB SHADOW E&M-EST. PATIENT-LVL IV: CPT | Mod: PBBFAC,TXP,, | Performed by: INTERNAL MEDICINE

## 2024-08-05 PROCEDURE — 3078F DIAST BP <80 MM HG: CPT | Mod: CPTII,NTX,S$GLB, | Performed by: INTERNAL MEDICINE

## 2024-08-05 PROCEDURE — 1111F DSCHRG MED/CURRENT MED MERGE: CPT | Mod: CPTII,NTX,S$GLB, | Performed by: INTERNAL MEDICINE

## 2024-08-05 PROCEDURE — 76776 US EXAM K TRANSPL W/DOPPLER: CPT | Mod: TC,TXP

## 2024-08-05 PROCEDURE — 3044F HG A1C LEVEL LT 7.0%: CPT | Mod: CPTII,NTX,S$GLB, | Performed by: INTERNAL MEDICINE

## 2024-08-05 PROCEDURE — 99214 OFFICE O/P EST MOD 30 MIN: CPT | Mod: NTX,S$GLB,, | Performed by: INTERNAL MEDICINE

## 2024-08-05 PROCEDURE — 3077F SYST BP >= 140 MM HG: CPT | Mod: CPTII,NTX,S$GLB, | Performed by: INTERNAL MEDICINE

## 2024-08-05 PROCEDURE — 76776 US EXAM K TRANSPL W/DOPPLER: CPT | Mod: 26,NTX,, | Performed by: STUDENT IN AN ORGANIZED HEALTH CARE EDUCATION/TRAINING PROGRAM

## 2024-08-05 PROCEDURE — 3066F NEPHROPATHY DOC TX: CPT | Mod: CPTII,NTX,S$GLB, | Performed by: INTERNAL MEDICINE

## 2024-08-05 PROCEDURE — 3008F BODY MASS INDEX DOCD: CPT | Mod: CPTII,NTX,S$GLB, | Performed by: INTERNAL MEDICINE

## 2024-08-05 PROCEDURE — 52310 CYSTOSCOPY AND TREATMENT: CPT | Mod: NTX,S$GLB,, | Performed by: UROLOGY

## 2024-08-05 RX ORDER — LIDOCAINE HYDROCHLORIDE 20 MG/ML
JELLY TOPICAL
Status: COMPLETED | OUTPATIENT
Start: 2024-08-05 | End: 2024-08-05

## 2024-08-05 RX ORDER — TACROLIMUS 1 MG/1
4 CAPSULE ORAL EVERY 12 HOURS
Qty: 240 CAPSULE | Refills: 11 | Status: SHIPPED | OUTPATIENT
Start: 2024-08-06 | End: 2025-08-06

## 2024-08-05 RX ORDER — ONDANSETRON 4 MG/1
4 TABLET, ORALLY DISINTEGRATING ORAL EVERY 8 HOURS PRN
Qty: 20 TABLET | Refills: 1 | Status: SHIPPED | OUTPATIENT
Start: 2024-08-05 | End: 2024-08-09 | Stop reason: SDUPTHER

## 2024-08-05 RX ORDER — FAMOTIDINE 20 MG/1
20 TABLET, FILM COATED ORAL NIGHTLY
Qty: 30 TABLET | Refills: 0 | Status: SHIPPED | OUTPATIENT
Start: 2024-08-05 | End: 2024-08-09 | Stop reason: SDUPTHER

## 2024-08-05 RX ORDER — LANOLIN ALCOHOL/MO/W.PET/CERES
800 CREAM (GRAM) TOPICAL 2 TIMES DAILY
Qty: 120 TABLET | Refills: 11 | Status: SHIPPED | OUTPATIENT
Start: 2024-08-05 | End: 2025-08-05

## 2024-08-05 RX ADMIN — LIDOCAINE HYDROCHLORIDE: 20 JELLY TOPICAL at 01:08

## 2024-08-05 NOTE — LETTER
August 12, 2024        Jose Mckinney  [address] [address] [address] [facility] [address]  [address]  [facility] [address]  Phone: [phone]  Fax: [phone]             [facility] 1st Fl  [address]  [facility] [address]  Phone: [phone]   Patient: Dejuan Diaz Jr.   MR Number: [id number]   YOB: 1980   Date of Visit: 8/5/2024       Dear Dr. Jose Mckinney    Thank you for referring Dejuan Diaz to me for evaluation. Attached you will find relevant portions of my assessment and plan of care.    If you have questions, please do not hesitate to call me. I look forward to following Dejuan Diaz along with you.    Sincerely,    Bella Purdy, DO    Enclosure    If you would like to receive this communication electronically, please contact [email or web address] or [phone] to request [employer] access.    [facility] is a tool which provides read-only access to select patient information with whom you have a relationship. Its easy to use and provides real time access to review your patients record including encounter summaries, notes, results, and demographic information.    If you feel you have received this communication in error or would no longer like to receive these types of communications, please e-mail [email or web address]    16-Apr-2021

## 2024-08-06 ENCOUNTER — TELEPHONE (OUTPATIENT)
Dept: TRANSPLANT | Facility: CLINIC | Age: 44
End: 2024-08-06
Payer: COMMERCIAL

## 2024-08-06 ENCOUNTER — PATIENT MESSAGE (OUTPATIENT)
Dept: TRANSPLANT | Facility: CLINIC | Age: 44
End: 2024-08-06
Payer: COMMERCIAL

## 2024-08-06 DIAGNOSIS — Z94.0 KIDNEY REPLACED BY TRANSPLANT: Primary | ICD-10-CM

## 2024-08-07 ENCOUNTER — OFFICE VISIT (OUTPATIENT)
Dept: TRANSPLANT | Facility: CLINIC | Age: 44
End: 2024-08-07
Payer: COMMERCIAL

## 2024-08-07 ENCOUNTER — CLINICAL SUPPORT (OUTPATIENT)
Dept: TRANSPLANT | Facility: CLINIC | Age: 44
End: 2024-08-07
Payer: COMMERCIAL

## 2024-08-07 VITALS
WEIGHT: 205.69 LBS | HEART RATE: 86 BPM | OXYGEN SATURATION: 99 % | DIASTOLIC BLOOD PRESSURE: 87 MMHG | HEART RATE: 86 BPM | TEMPERATURE: 97 F | HEIGHT: 72 IN | HEIGHT: 72 IN | BODY MASS INDEX: 27.86 KG/M2 | OXYGEN SATURATION: 99 % | TEMPERATURE: 97 F | DIASTOLIC BLOOD PRESSURE: 87 MMHG | SYSTOLIC BLOOD PRESSURE: 146 MMHG | WEIGHT: 205.69 LBS | BODY MASS INDEX: 27.86 KG/M2 | SYSTOLIC BLOOD PRESSURE: 146 MMHG

## 2024-08-07 DIAGNOSIS — Z51.81 ENCOUNTER FOR THERAPEUTIC DRUG MONITORING: Primary | ICD-10-CM

## 2024-08-07 DIAGNOSIS — Z94.0 KIDNEY REPLACED BY TRANSPLANT: ICD-10-CM

## 2024-08-07 DIAGNOSIS — Z94.0 STATUS POST LIVING-DONOR KIDNEY TRANSPLANTATION: ICD-10-CM

## 2024-08-07 LAB — POC RESIDUAL URINE VOLUME: 129 ML (ref 0–100)

## 2024-08-07 PROCEDURE — 99213 OFFICE O/P EST LOW 20 MIN: CPT | Mod: 24,NTX,S$GLB, | Performed by: SURGERY

## 2024-08-07 PROCEDURE — 99999 PR PBB SHADOW E&M-EST. PATIENT-LVL III: CPT | Mod: PBBFAC,,,

## 2024-08-07 PROCEDURE — 3062F POS MACROALBUMINURIA REV: CPT | Mod: CPTII,NTX,S$GLB, | Performed by: SURGERY

## 2024-08-07 PROCEDURE — 3066F NEPHROPATHY DOC TX: CPT | Mod: CPTII,NTX,S$GLB, | Performed by: SURGERY

## 2024-08-07 PROCEDURE — 3079F DIAST BP 80-89 MM HG: CPT | Mod: CPTII,NTX,S$GLB, | Performed by: SURGERY

## 2024-08-07 PROCEDURE — 1160F RVW MEDS BY RX/DR IN RCRD: CPT | Mod: CPTII,NTX,S$GLB, | Performed by: SURGERY

## 2024-08-07 PROCEDURE — 99999 PR PBB SHADOW E&M-EST. PATIENT-LVL IV: CPT | Mod: PBBFAC,TXP,,

## 2024-08-07 PROCEDURE — 3008F BODY MASS INDEX DOCD: CPT | Mod: CPTII,NTX,S$GLB, | Performed by: SURGERY

## 2024-08-07 PROCEDURE — 3044F HG A1C LEVEL LT 7.0%: CPT | Mod: CPTII,NTX,S$GLB, | Performed by: SURGERY

## 2024-08-07 PROCEDURE — 1111F DSCHRG MED/CURRENT MED MERGE: CPT | Mod: CPTII,NTX,S$GLB, | Performed by: SURGERY

## 2024-08-07 PROCEDURE — 3077F SYST BP >= 140 MM HG: CPT | Mod: CPTII,NTX,S$GLB, | Performed by: SURGERY

## 2024-08-07 PROCEDURE — 51798 US URINE CAPACITY MEASURE: CPT | Mod: S$GLB,,, | Performed by: INTERNAL MEDICINE

## 2024-08-07 PROCEDURE — 1159F MED LIST DOCD IN RCRD: CPT | Mod: CPTII,NTX,S$GLB, | Performed by: SURGERY

## 2024-08-08 ENCOUNTER — LAB VISIT (OUTPATIENT)
Dept: LAB | Facility: HOSPITAL | Age: 44
End: 2024-08-08
Attending: INTERNAL MEDICINE
Payer: COMMERCIAL

## 2024-08-08 DIAGNOSIS — Z94.0 KIDNEY REPLACED BY TRANSPLANT: ICD-10-CM

## 2024-08-08 DIAGNOSIS — E83.42 HYPOMAGNESEMIA: ICD-10-CM

## 2024-08-08 LAB
ALBUMIN SERPL BCP-MCNC: 3.9 G/DL (ref 3.5–5.2)
ANION GAP SERPL CALC-SCNC: 11 MMOL/L (ref 8–16)
BASOPHILS # BLD AUTO: 0.11 K/UL (ref 0–0.2)
BASOPHILS NFR BLD: 2.1 % (ref 0–1.9)
BUN SERPL-MCNC: 18 MG/DL (ref 6–20)
CALCIUM SERPL-MCNC: 10.1 MG/DL (ref 8.7–10.5)
CHLORIDE SERPL-SCNC: 104 MMOL/L (ref 95–110)
CO2 SERPL-SCNC: 23 MMOL/L (ref 23–29)
CREAT SERPL-MCNC: 1.5 MG/DL (ref 0.5–1.4)
DIFFERENTIAL METHOD BLD: ABNORMAL
EOSINOPHIL # BLD AUTO: 0.1 K/UL (ref 0–0.5)
EOSINOPHIL NFR BLD: 1.5 % (ref 0–8)
ERYTHROCYTE [DISTWIDTH] IN BLOOD BY AUTOMATED COUNT: 15.8 % (ref 11.5–14.5)
EST. GFR  (NO RACE VARIABLE): 58.9 ML/MIN/1.73 M^2
GLUCOSE SERPL-MCNC: 231 MG/DL (ref 70–110)
HCT VFR BLD AUTO: 37.3 % (ref 40–54)
HGB BLD-MCNC: 12.4 G/DL (ref 14–18)
IMM GRANULOCYTES # BLD AUTO: 0.02 K/UL (ref 0–0.04)
IMM GRANULOCYTES NFR BLD AUTO: 0.4 % (ref 0–0.5)
LYMPHOCYTES # BLD AUTO: 0.7 K/UL (ref 1–4.8)
LYMPHOCYTES NFR BLD: 12.6 % (ref 18–48)
MAGNESIUM SERPL-MCNC: 1.7 MG/DL (ref 1.6–2.6)
MCH RBC QN AUTO: 30.4 PG (ref 27–31)
MCHC RBC AUTO-ENTMCNC: 33.2 G/DL (ref 32–36)
MCV RBC AUTO: 91 FL (ref 82–98)
MONOCYTES # BLD AUTO: 0.5 K/UL (ref 0.3–1)
MONOCYTES NFR BLD: 8.9 % (ref 4–15)
NEUTROPHILS # BLD AUTO: 4 K/UL (ref 1.8–7.7)
NEUTROPHILS NFR BLD: 74.5 % (ref 38–73)
NRBC BLD-RTO: 0 /100 WBC
PHOSPHATE SERPL-MCNC: 2.3 MG/DL (ref 2.7–4.5)
PLATELET # BLD AUTO: 270 K/UL (ref 150–450)
PMV BLD AUTO: 10.3 FL (ref 9.2–12.9)
POTASSIUM SERPL-SCNC: 4.3 MMOL/L (ref 3.5–5.1)
RBC # BLD AUTO: 4.08 M/UL (ref 4.6–6.2)
SODIUM SERPL-SCNC: 138 MMOL/L (ref 136–145)
WBC # BLD AUTO: 5.31 K/UL (ref 3.9–12.7)

## 2024-08-08 PROCEDURE — 36415 COLL VENOUS BLD VENIPUNCTURE: CPT | Mod: TXP | Performed by: INTERNAL MEDICINE

## 2024-08-08 PROCEDURE — 83735 ASSAY OF MAGNESIUM: CPT | Mod: TXP | Performed by: INTERNAL MEDICINE

## 2024-08-08 PROCEDURE — 80197 ASSAY OF TACROLIMUS: CPT | Mod: TXP | Performed by: INTERNAL MEDICINE

## 2024-08-08 PROCEDURE — 80069 RENAL FUNCTION PANEL: CPT | Mod: TXP | Performed by: INTERNAL MEDICINE

## 2024-08-08 PROCEDURE — 85025 COMPLETE CBC W/AUTO DIFF WBC: CPT | Mod: TXP | Performed by: INTERNAL MEDICINE

## 2024-08-09 ENCOUNTER — TELEPHONE (OUTPATIENT)
Dept: TRANSPLANT | Facility: CLINIC | Age: 44
End: 2024-08-09
Payer: COMMERCIAL

## 2024-08-09 DIAGNOSIS — Z94.0 STATUS POST LIVING-DONOR KIDNEY TRANSPLANTATION: ICD-10-CM

## 2024-08-09 DIAGNOSIS — Z94.0 STATUS POST KIDNEY TRANSPLANT: ICD-10-CM

## 2024-08-09 DIAGNOSIS — Z94.0 KIDNEY REPLACED BY TRANSPLANT: Primary | ICD-10-CM

## 2024-08-09 LAB — TACROLIMUS BLD-MCNC: 9.1 NG/ML (ref 5–15)

## 2024-08-12 ENCOUNTER — TELEPHONE (OUTPATIENT)
Dept: TRANSPLANT | Facility: CLINIC | Age: 44
End: 2024-08-12
Payer: COMMERCIAL

## 2024-08-12 ENCOUNTER — LAB VISIT (OUTPATIENT)
Dept: LAB | Facility: HOSPITAL | Age: 44
End: 2024-08-12
Attending: INTERNAL MEDICINE
Payer: COMMERCIAL

## 2024-08-12 ENCOUNTER — TELEPHONE (OUTPATIENT)
Dept: INTERVENTIONAL RADIOLOGY/VASCULAR | Facility: CLINIC | Age: 44
End: 2024-08-12
Payer: COMMERCIAL

## 2024-08-12 DIAGNOSIS — Z94.0 KIDNEY REPLACED BY TRANSPLANT: ICD-10-CM

## 2024-08-12 DIAGNOSIS — Z94.0 STATUS POST LIVING-DONOR KIDNEY TRANSPLANTATION: ICD-10-CM

## 2024-08-12 DIAGNOSIS — E83.42 HYPOMAGNESEMIA: ICD-10-CM

## 2024-08-12 LAB
ALBUMIN SERPL BCP-MCNC: 4 G/DL (ref 3.5–5.2)
ANION GAP SERPL CALC-SCNC: 11 MMOL/L (ref 8–16)
BASOPHILS # BLD AUTO: 0.05 K/UL (ref 0–0.2)
BASOPHILS NFR BLD: 1.2 % (ref 0–1.9)
BUN SERPL-MCNC: 25 MG/DL (ref 6–20)
CALCIUM SERPL-MCNC: 9.8 MG/DL (ref 8.7–10.5)
CHLORIDE SERPL-SCNC: 108 MMOL/L (ref 95–110)
CO2 SERPL-SCNC: 25 MMOL/L (ref 23–29)
CREAT SERPL-MCNC: 1.6 MG/DL (ref 0.5–1.4)
DIFFERENTIAL METHOD BLD: ABNORMAL
EOSINOPHIL # BLD AUTO: 0.1 K/UL (ref 0–0.5)
EOSINOPHIL NFR BLD: 1.7 % (ref 0–8)
ERYTHROCYTE [DISTWIDTH] IN BLOOD BY AUTOMATED COUNT: 15.5 % (ref 11.5–14.5)
EST. GFR  (NO RACE VARIABLE): 54.5 ML/MIN/1.73 M^2
GLUCOSE SERPL-MCNC: 84 MG/DL (ref 70–110)
HCT VFR BLD AUTO: 36.7 % (ref 40–54)
HGB BLD-MCNC: 12 G/DL (ref 14–18)
IMM GRANULOCYTES # BLD AUTO: 0.03 K/UL (ref 0–0.04)
IMM GRANULOCYTES NFR BLD AUTO: 0.7 % (ref 0–0.5)
LYMPHOCYTES # BLD AUTO: 0.7 K/UL (ref 1–4.8)
LYMPHOCYTES NFR BLD: 15.8 % (ref 18–48)
MAGNESIUM SERPL-MCNC: 1.6 MG/DL (ref 1.6–2.6)
MCH RBC QN AUTO: 29.7 PG (ref 27–31)
MCHC RBC AUTO-ENTMCNC: 32.7 G/DL (ref 32–36)
MCV RBC AUTO: 91 FL (ref 82–98)
MONOCYTES # BLD AUTO: 0.4 K/UL (ref 0.3–1)
MONOCYTES NFR BLD: 10.2 % (ref 4–15)
NEUTROPHILS # BLD AUTO: 2.9 K/UL (ref 1.8–7.7)
NEUTROPHILS NFR BLD: 70.4 % (ref 38–73)
NRBC BLD-RTO: 0 /100 WBC
PHOSPHATE SERPL-MCNC: 2.8 MG/DL (ref 2.7–4.5)
PLATELET # BLD AUTO: 236 K/UL (ref 150–450)
PMV BLD AUTO: 9.9 FL (ref 9.2–12.9)
POTASSIUM SERPL-SCNC: 4.2 MMOL/L (ref 3.5–5.1)
RBC # BLD AUTO: 4.04 M/UL (ref 4.6–6.2)
SODIUM SERPL-SCNC: 144 MMOL/L (ref 136–145)
WBC # BLD AUTO: 4.12 K/UL (ref 3.9–12.7)

## 2024-08-12 PROCEDURE — 86977 RBC SERUM PRETX INCUBJ/INHIB: CPT | Mod: 59,NTX | Performed by: INTERNAL MEDICINE

## 2024-08-12 PROCEDURE — 80197 ASSAY OF TACROLIMUS: CPT | Mod: TXP | Performed by: INTERNAL MEDICINE

## 2024-08-12 PROCEDURE — 83735 ASSAY OF MAGNESIUM: CPT | Mod: TXP | Performed by: INTERNAL MEDICINE

## 2024-08-12 PROCEDURE — 86832 HLA CLASS I HIGH DEFIN QUAL: CPT | Mod: TXP | Performed by: INTERNAL MEDICINE

## 2024-08-12 PROCEDURE — 99001 SPECIMEN HANDLING PT-LAB: CPT | Mod: TXP | Performed by: NURSE PRACTITIONER

## 2024-08-12 PROCEDURE — 86833 HLA CLASS II HIGH DEFIN QUAL: CPT | Mod: NTX | Performed by: INTERNAL MEDICINE

## 2024-08-12 PROCEDURE — 36415 COLL VENOUS BLD VENIPUNCTURE: CPT | Mod: TXP | Performed by: INTERNAL MEDICINE

## 2024-08-12 PROCEDURE — 85025 COMPLETE CBC W/AUTO DIFF WBC: CPT | Mod: TXP | Performed by: INTERNAL MEDICINE

## 2024-08-12 PROCEDURE — 80069 RENAL FUNCTION PANEL: CPT | Mod: TXP | Performed by: INTERNAL MEDICINE

## 2024-08-12 NOTE — PROGRESS NOTES
Kidney Post-Transplant Assessment    Referring Physician: Jose Mckinney  Current Nephrologist: Jose Mckinney    ORGAN: LEFT KIDNEY  Donor Type: living  PHS Increased Risk: no  Cold Ischemia: 32 mins  Induction Medications: Thymo     Subjective:     CC:  Reassessment of renal allograft function and management of chronic immunosuppression.    HPI:  Mr. Diaz is a 43 y.o. year old White male who received a living kidney transplant on 7/16/24. Transplant kidney placed on the L side given that the patient is waitlisted for a pancreas transplant. Uneventful post transplant course    Patient seen with his wife. States that he has been doing well. Has been drinking close to 3 L of water. Patient otherwise states that the pain is improving. Has been mainly resting. Patient otherwise denies any fever, HA, runny nose, watery eyes. Denies any nausea, vomiting, heart burn, SOB or CP. Denies any diarrhea, dysuria or frequency    SBP at home of less than 140's (had to take Norvasc 5 mg as needed only once)     Review of Systems   Constitutional:  Negative for activity change, appetite change, chills and fever.   HENT:  Negative for congestion, sneezing and sore throat.    Eyes:  Negative for pain and itching.   Respiratory:  Negative for apnea, cough, shortness of breath and wheezing.    Cardiovascular:  Negative for chest pain.   Gastrointestinal:  Negative for abdominal pain, constipation, diarrhea, nausea and vomiting.   Genitourinary:  Negative for difficulty urinating, dysuria and frequency.   Musculoskeletal:  Negative for back pain, joint swelling and neck pain.   Skin:  Negative for color change.   Neurological:  Negative for dizziness, light-headedness and headaches.   Psychiatric/Behavioral:  Negative for behavioral problems and confusion. The patient is not nervous/anxious.        Objective:   Blood pressure (!) 140/79, pulse 84, temperature 97.3 °F (36.3 °C), temperature source Temporal, resp. rate 18, height  "6' 0.01" (1.829 m), weight 94.2 kg (207 lb 10.8 oz), SpO2 100%.body mass index is 28.16 kg/m².    Physical Exam  Vitals reviewed.   Constitutional:       General: He is not in acute distress.     Appearance: Normal appearance. He is not ill-appearing or toxic-appearing.      Comments: Appears stated age   HENT:      Head: Normocephalic and atraumatic.      Right Ear: External ear normal.      Left Ear: External ear normal.      Nose: Nose normal.   Eyes:      General: No scleral icterus.     Conjunctiva/sclera: Conjunctivae normal.   Cardiovascular:      Rate and Rhythm: Normal rate and regular rhythm.      Pulses: Normal pulses.      Heart sounds: Normal heart sounds. No murmur heard.     No friction rub.   Pulmonary:      Effort: Pulmonary effort is normal. No respiratory distress.      Breath sounds: Normal breath sounds. No wheezing or rhonchi.   Abdominal:      General: Bowel sounds are normal. There is no distension.      Palpations: Abdomen is soft.      Tenderness: There is no abdominal tenderness. There is no guarding.      Comments: Incision noted to be healing.    Musculoskeletal:         General: No swelling or deformity. Normal range of motion.      Cervical back: Normal range of motion and neck supple. No tenderness.      Right lower leg: No edema.      Left lower leg: No edema.   Skin:     General: Skin is warm and dry.      Coloration: Skin is not jaundiced.      Findings: No erythema or rash.   Neurological:      General: No focal deficit present.      Mental Status: He is alert and oriented to person, place, and time.      Motor: No weakness.   Psychiatric:         Mood and Affect: Mood normal.         Behavior: Behavior normal.         Labs:  Lab Results   Component Value Date    WBC 4.12 08/12/2024    HGB 12.0 (L) 08/12/2024    HCT 36.7 (L) 08/12/2024     08/12/2024    K 4.2 08/12/2024     08/12/2024    CO2 25 08/12/2024    BUN 25 (H) 08/12/2024    CREATININE 1.6 (H) 08/12/2024    " "EGFRNORACEVR 54.5 (A) 08/12/2024    CALCIUM 9.8 08/12/2024    PHOS 2.8 08/12/2024    MG 1.6 08/12/2024    ALBUMIN 4.0 08/12/2024    AST 30 07/02/2024    ALT 51 (H) 07/02/2024    UTPCR 0.18 08/12/2024    .1 (H) 07/16/2024    TACROLIMUS 9.1 08/08/2024       No results found for: "EXTANC", "EXTWBC", "EXTSEGS", "EXTPLATELETS", "EXTHEMOGLOBI", "EXTHEMATOCRI", "EXTCREATININ", "EXTSODIUM", "EXTPOTASSIUM", "EXTBUN", "EXTCO2", "EXTCALCIUM", "EXTPHOSPHORU", "EXTGLUCOSE", "EXTALBUMIN", "EXTAST", "EXTALT", "EXTBILITOTAL", "EXTLIPASE", "EXTAMYLASE"    No results found for: "EXTCYCLOSLVL", "EXTSIROLIMUS", "EXTTACROLVL", "EXTPROTCRE", "EXTPTHINTACT", "EXTPROTEINUA", "EXTWBCUA", "EXTRBCUA"    Labs were reviewed with the patient    Assessment and Plan   43 yr old with CKD stage V secondary to type II DM s/p a living unrelated kidney transplant on 7/16/24. 37% PRA. Crossmatch negative but noted initially to have a DQ7 that was felt to be a false positive reactivity    1) JONATHAN of living unrelated kidney transplant:   - creatinine found to be mildly elevated from his baseline of 1.3-1.4.    - obtain UA and UPC given concern for dysuria as well as JONATHAN  - obtain US kidney transplant given JONATHAN    - FK level pending. Currently on FK 5 mg BID   - cont on Cellcept 1 gram BID   - cont on prednisone taper   - cont Acyclovir until 10/14/24   -  cont on Bactrim until 1/12/25  2) HTN:   - controlled off medications   - can take Norvasc 5 mg as needed for SBP> 150  3) type II DM:    - followed by his endocrinologist with setting on insulin pump adjusted   - currently listed inactive for a pancreas transplant   4) Metabolic acidosis   - resolved. Stop sodium bicarb   5) Hypophosphatemia:   - cont on Kphos 500 mg BID   - increase foods rich in phos  6) HLD:   - on Lipitor    Labs and RTC per protocol    Bella Purdy, DO   Transplant Nephrology        "

## 2024-08-12 NOTE — TELEPHONE ENCOUNTER
Explained need for kidney biopsy this week. Scheduled for Wednesday 8/14. Pt verbalized understanding.   ----- Message from Bella Purdy DO sent at 8/12/2024 11:38 AM CDT -----  DSA pending. Try to move kidney biopsy to this week. May need to admit patient for a biopsy if unable to obtain it this week

## 2024-08-13 ENCOUNTER — TELEPHONE (OUTPATIENT)
Dept: INTERVENTIONAL RADIOLOGY/VASCULAR | Facility: HOSPITAL | Age: 44
End: 2024-08-13
Payer: COMMERCIAL

## 2024-08-13 LAB — TACROLIMUS BLD-MCNC: 8.6 NG/ML (ref 5–15)

## 2024-08-13 NOTE — NURSING
Pre-procedure call complete.  2 patient identifier used (name and ).  Pt instructed not to eat or drink anything after midnight the night before procedure.  Pt aware will need someone to provide transport home and monitor pt 8 hours post procedure.  No driving for at least 24 hours after procedure.   Patient advised to anti-rejections medications with a sip of water morning of procedure.  Patient verbalized aware of which medications to take.  Do not take sleep medication (including OTC) the night before procedure.  Arrival time and location given.  Expected length of stay reviewed.  Covid screening completed.  Pt verbalized understanding of all pre-procedure instructions.

## 2024-08-14 ENCOUNTER — HOSPITAL ENCOUNTER (OUTPATIENT)
Dept: INTERVENTIONAL RADIOLOGY/VASCULAR | Facility: HOSPITAL | Age: 44
Discharge: HOME | End: 2024-08-14
Attending: INTERNAL MEDICINE
Payer: COMMERCIAL

## 2024-08-14 VITALS
SYSTOLIC BLOOD PRESSURE: 133 MMHG | RESPIRATION RATE: 18 BRPM | HEIGHT: 73 IN | HEART RATE: 72 BPM | DIASTOLIC BLOOD PRESSURE: 76 MMHG | BODY MASS INDEX: 27.04 KG/M2 | TEMPERATURE: 98 F | OXYGEN SATURATION: 97 % | WEIGHT: 204 LBS

## 2024-08-14 DIAGNOSIS — Z94.0 KIDNEY REPLACED BY TRANSPLANT: ICD-10-CM

## 2024-08-14 LAB
ALBUMIN SERPL BCP-MCNC: 4 G/DL (ref 3.5–5.2)
ALP SERPL-CCNC: 100 U/L (ref 55–135)
ALT SERPL W/O P-5'-P-CCNC: 33 U/L (ref 10–44)
ANION GAP SERPL CALC-SCNC: 7 MMOL/L (ref 8–16)
AST SERPL-CCNC: 20 U/L (ref 10–40)
BILIRUB SERPL-MCNC: 0.4 MG/DL (ref 0.1–1)
BUN SERPL-MCNC: 23 MG/DL (ref 6–20)
CALCIUM SERPL-MCNC: 9.7 MG/DL (ref 8.7–10.5)
CHLORIDE SERPL-SCNC: 108 MMOL/L (ref 95–110)
CLASS I ANTIBODY COMMENTS - LUMINEX: NORMAL
CLASS II ANTIBODY COMMENTS - LUMINEX: NORMAL
CO2 SERPL-SCNC: 24 MMOL/L (ref 23–29)
CREAT SERPL-MCNC: 1.4 MG/DL (ref 0.5–1.4)
DSA1 TESTING DATE: NORMAL
DSA12 TESTING DATE: NORMAL
DSA2 TESTING DATE: NORMAL
EST. GFR  (NO RACE VARIABLE): >60 ML/MIN/1.73 M^2
GLUCOSE SERPL-MCNC: 178 MG/DL (ref 70–110)
INR PPP: 0.9 (ref 0.8–1.2)
POCT GLUCOSE: 207 MG/DL (ref 70–110)
POTASSIUM SERPL-SCNC: 4.7 MMOL/L (ref 3.5–5.1)
PROT SERPL-MCNC: 7.2 G/DL (ref 6–8.4)
PROTHROMBIN TIME: 10.3 SEC (ref 9–12.5)
SERUM COLLECTION DT - LUMINEX CLASS I: NORMAL
SERUM COLLECTION DT - LUMINEX CLASS II: NORMAL
SODIUM SERPL-SCNC: 139 MMOL/L (ref 136–145)

## 2024-08-14 PROCEDURE — 63600175 PHARM REV CODE 636 W HCPCS: Mod: TXP | Performed by: STUDENT IN AN ORGANIZED HEALTH CARE EDUCATION/TRAINING PROGRAM

## 2024-08-14 PROCEDURE — 80053 COMPREHEN METABOLIC PANEL: CPT | Mod: TXP | Performed by: INTERNAL MEDICINE

## 2024-08-14 PROCEDURE — 82962 GLUCOSE BLOOD TEST: CPT | Mod: TXP

## 2024-08-14 PROCEDURE — 25000003 PHARM REV CODE 250: Mod: TXP | Performed by: STUDENT IN AN ORGANIZED HEALTH CARE EDUCATION/TRAINING PROGRAM

## 2024-08-14 PROCEDURE — 27201423 OPTIME MED/SURG SUP & DEVICES STERILE SUPPLY: Mod: TXP

## 2024-08-14 PROCEDURE — 85610 PROTHROMBIN TIME: CPT | Mod: TXP | Performed by: FAMILY MEDICINE

## 2024-08-14 RX ORDER — LIDOCAINE HYDROCHLORIDE 10 MG/ML
INJECTION, SOLUTION INFILTRATION; PERINEURAL
Status: COMPLETED | OUTPATIENT
Start: 2024-08-14 | End: 2024-08-14

## 2024-08-14 RX ORDER — LIDOCAINE HYDROCHLORIDE 10 MG/ML
1 INJECTION, SOLUTION EPIDURAL; INFILTRATION; INTRACAUDAL; PERINEURAL ONCE
Status: DISCONTINUED | OUTPATIENT
Start: 2024-08-14 | End: 2024-08-15 | Stop reason: HOSPADM

## 2024-08-14 RX ORDER — SODIUM CHLORIDE 9 MG/ML
INJECTION, SOLUTION INTRAVENOUS CONTINUOUS
Status: DISCONTINUED | OUTPATIENT
Start: 2024-08-14 | End: 2024-08-15 | Stop reason: HOSPADM

## 2024-08-14 RX ORDER — MIDAZOLAM HYDROCHLORIDE 1 MG/ML
INJECTION, SOLUTION INTRAMUSCULAR; INTRAVENOUS
Status: COMPLETED | OUTPATIENT
Start: 2024-08-14 | End: 2024-08-14

## 2024-08-14 RX ORDER — ONDANSETRON HYDROCHLORIDE 2 MG/ML
INJECTION, SOLUTION INTRAVENOUS
Status: COMPLETED | OUTPATIENT
Start: 2024-08-14 | End: 2024-08-14

## 2024-08-14 RX ORDER — FENTANYL CITRATE 50 UG/ML
INJECTION, SOLUTION INTRAMUSCULAR; INTRAVENOUS
Status: COMPLETED | OUTPATIENT
Start: 2024-08-14 | End: 2024-08-14

## 2024-08-14 RX ADMIN — MIDAZOLAM HYDROCHLORIDE 2 MG: 2 INJECTION, SOLUTION INTRAMUSCULAR; INTRAVENOUS at 02:08

## 2024-08-14 RX ADMIN — Medication 1 EACH: at 02:08

## 2024-08-14 RX ADMIN — FENTANYL CITRATE 50 MCG: 50 INJECTION, SOLUTION INTRAMUSCULAR; INTRAVENOUS at 02:08

## 2024-08-14 RX ADMIN — LIDOCAINE HYDROCHLORIDE 5 ML: 10 INJECTION, SOLUTION INFILTRATION; PERINEURAL at 02:08

## 2024-08-14 RX ADMIN — ONDANSETRON 4 MG: 2 INJECTION INTRAMUSCULAR; INTRAVENOUS at 02:08

## 2024-08-14 NOTE — PLAN OF CARE
Patient AAOx3, no distress noted, respirations even and unlabored, will continue to monitor. VSS. Acceptance of education, consents signed, H/P done. Labs reviewed. Patient in IR Room 190 for transplant kidney biopsy procedure. Warm blankets applied to patient. Patient prepped and draped in sterile fashion.

## 2024-08-14 NOTE — BRIEF OP NOTE
Radiology Post-Procedure Note    Pre Op Diagnosis: elevated creatinine    Post Op Diagnosis: same    Procedure: kidney biopsy     Procedure performed by: Jackelyn Miller MD    Written Informed Consent Obtained: Yes    Specimen Removed: YES     Estimated Blood Loss: less than 50     Findings:   Using US  guidance a 17 g sheath needle was placed into the kidney. 18g biopsy device used to take 4 core biopsy samples. Specimen sent to pathology.     Patient tolerated procedure well.    Jackelyn Miller MD  Interventional Radiology

## 2024-08-14 NOTE — PROGRESS NOTES
Review  with pt  D/C  inst as well as  S&S of infection  and when to  go  to ED and or  contact Transplant  Coordinator  / pt AAO w/ NAD

## 2024-08-14 NOTE — DISCHARGE INSTRUCTIONS
For scheduling: Call Ju at [phone]    For questions or concerns call: OLIMPIA MON-FRI 8 AM- 5PM [phone]. Radiology resident on call [phone].    For immediate concerns that are not emergent, you may call our radiology clinic at: [phone]

## 2024-08-14 NOTE — Clinical Note
A pre-sedation assessment was completed by the physician immediately prior to sedation start. 
Anesthesia Type: RN IV Sedation
Bud Yeung (MD), Otolaryngology  [address] [place] [address]  Phone: [phone]  Fax: [phone]

## 2024-08-14 NOTE — PLAN OF CARE
Transplant kidney biopsy procedure completed. Patient tolerated well. Patient AAOx3, no distress noted, respirations even and unlabored, will continue to monitor. VSS. Left lower quadrant procedure site clean, dry, and intact; no bleeding or hematoma noted. Patient to be transferred to MPU for 2 hours for post-procedural recovery per MD. Report to be given at bedside to RN.

## 2024-08-14 NOTE — H&P
Radiology History & Physical      SUBJECTIVE:     Chief Complaint: Elevated creatinine    History of Present Illness:  Dejuan Diaz Jr. is a 43 y.o. male who presents for transplant kidney biopsy under image-guidance.     Past Medical History:   Diagnosis Date    Anemia     Diabetes mellitus type II     Disorder of kidney and ureter     GERD (gastroesophageal reflux disease)     Hypertension     Proteinuria      Past Surgical History:   Procedure Laterality Date    INNER EAR SURGERY      for tinnitus    KIDNEY TRANSPLANT N/A 4/18/2024    Procedure: TRANSPLANT, KIDNEY;  Surgeon: Raul Jean MD;  Location: [facility] OR [address] [place] [address];  Service: Transplant;  Laterality: N/A;    KIDNEY TRANSPLANT Left 7/16/2024    Procedure: TRANSPLANT, KIDNEY;  Surgeon: Erma Gómez MD;  Location: [facility] OR [address] [place] [address];  Service: Transplant;  Laterality: Left;    TRANSPLANTATION OF PANCREAS N/A 4/18/2024    Procedure: TRANSPLANT, PANCREAS;  Surgeon: Raul Jean MD;  Location: [facility] OR [address] [place] [address];  Service: Transplant;  Laterality: N/A;       Home Meds:   Prior to Admission medications    Medication Sig Start Date End Date Taking? Authorizing Provider   acyclovir (ZOVIRAX) 400 MG tablet Take 1 tablet (400 mg total) by mouth 2 (two) times daily. Stop 10/14/24 7/19/24 10/17/24 Yes Bella Purdy, DO   atorvastatin (LIPITOR) 40 MG tablet Take 40 mg by mouth once daily.   Yes Provider, Historical   calcitRIOL (ROCALTROL) 0.5 MCG Cap Take 1 capsule (0.5 mcg total) by mouth 2 (two) times daily. 7/19/24  Yes Bella Purdy, DO   famotidine (PEPCID) 20 MG tablet Take 1 tablet (20 mg total) by mouth every evening. Stop 8/15/24 8/5/24 9/4/24 Yes Bella Purdy, DO   insulin lispro (HUMALOG U-100 INSULIN) 100 unit/mL injection   See Instructions, for use in insulin pump; max daily dose 100 units, # 90 mL, 1 Refill(s), Maintenance, Pharmacy: [facility], 187, cm, 02/23/23 11:24:00 CST, Height/Length Measured, 102.5, kg, 02/23/23 11:24:00 CST,  Weight Dosing 23  Yes Provider, Historical   k phos di & mono-sod phos mono (K-PHOS-NEUTRAL) 250 mg Tab Take 2 tablets by mouth 3 (three) times daily. 24 Yes Bella Purdy,    magnesium oxide (MAG-OX) 400 mg (241.3 mg magnesium) tablet Take 2 tablets (800 mg total) by mouth 2 (two) times daily. 24 Yes Bella Purdy,    multivitamin Tab Take 1 tablet by mouth once daily. 24  Yes Erma Gómez MD   mycophenolate (CELLCEPT) 250 mg Cap Take 4 capsules (1,000 mg total) by mouth 2 (two) times daily. 24  Yes Bella Purdy,    ondansetron (ZOFRAN-ODT) 4 MG TbDL Take 1 tablet (4 mg total) by mouth every 8 (eight) hours as needed (nausea). 24  Yes Bella Purdy DO   oxyCODONE (ROXICODONE) 5 MG immediate release tablet Take 1 tablet (5 mg total) by mouth every 4 (four) hours as needed for Pain. 24  Yes Reji Noel MD   predniSONE (DELTASONE) 5 MG tablet Take by mouth daily: 20mg -, 15mg -, 10mg -, 5mg - 24  Yes Bella Purdy,    sulfamethoxazole-trimethoprim 400-80mg (BACTRIM,SEPTRA) 400-80 mg per tablet Take 1 tablet by mouth every morning. Stop 1/12/25 7/19/24 1/15/25 Yes Bella Purdy,    tacrolimus (PROGRAF) 1 MG Cap Take 4 capsules (4 mg total) by mouth every 12 (twelve) hours. 24 Yes Bella Purdy DO   blood-glucose meter,continuous (DEXCOM G6  MISC) by Misc.(Non-Drug; Combo Route) route.    Provider, Historical   blood-glucose transmitter (DEXCOM G6 TRANSMITTER MISC)   Dexcom G6 transmitter, See Instructions, uad with dexcom G6 sensor; change transmitter q 90 days, # 1 EA, 3 Refill(s), Pharmacy: [facility], 187, cm, 23 14:55:00 CDT, Height/Length Measured, 105.1, kg, 23 14:55:00 CDT, Weight Dosing 23   Provider, Historical   DEXCOM G6 SENSOR Adelina SMARTSI Each Topical Every 10 Days 23   Provider, Historical   insulin pump cart,auto,BT/cntr (OMNIPOD 5 G6 INTRO KIT,  GEN 5, SUBQ)   Omnipod 5 G6 Intro Kit (Gen 5), See Instructions, use as directed, # 1 EA, 0 Refill(s), Pharmacy: [facility], 187, cm, 12/30/22 9:14:00 CST, Height/Length Measured, 102.3, kg, 12/30/22 9:14:00 CST, Weight Dosing 12/30/22   Provider, Historical     Anticoagulants/Antiplatelets: no anticoagulation    Allergies:   Review of patient's allergies indicates:   Allergen Reactions    Codeine Hallucinations    Vancomycin analogues Other (See Comments)     Burning and  itchig of skin     Sedation History:  have not been any systemic reactions    Review of Systems:   Hematological: no known coagulopathies  Respiratory: no shortness of breath  Cardiovascular: no chest pain  Gastrointestinal: no abdominal pain  Genito-Urinary: no dysuria  Musculoskeletal: negative  Neurological: no TIA or stroke symptoms         OBJECTIVE:     Vital Signs (Most Recent)  Temp: 98.6 °F (37 °C) (08/14/24 1115)  Pulse: 79 (08/14/24 1115)  Resp: 18 (08/14/24 1115)  BP: 112/75 (08/14/24 1115)  SpO2: 98 % (08/14/24 1115)    Physical Exam:  ASA: 2  Mallampati: 3    General: no acute distress  Mental Status: alert and oriented to person, place and time  HEENT: normocephalic, atraumatic  Chest: unlabored breathing  Heart: regular heart rate  Abdomen: nondistended, nontender  Extremity: moves all extremities    Laboratory  Lab Results   Component Value Date    INR 0.9 08/14/2024       Lab Results   Component Value Date    WBC 4.12 08/12/2024    HGB 12.0 (L) 08/12/2024    HCT 36.7 (L) 08/12/2024    MCV 91 08/12/2024     08/12/2024      Lab Results   Component Value Date    GLU 84 08/12/2024     08/12/2024    K 4.2 08/12/2024     08/12/2024    CO2 25 08/12/2024    BUN 25 (H) 08/12/2024    CREATININE 1.6 (H) 08/12/2024    CALCIUM 9.8 08/12/2024    MG 1.6 08/12/2024    ALT 51 (H) 07/02/2024    AST 30 07/02/2024    ALBUMIN 4.0 08/12/2024    BILITOT 0.4 07/02/2024    BILIDIR <0.1 (A) 11/29/2023       ASSESSMENT/PLAN:      Sedation Plan: Up to Moderate  Patient will undergo transplant kidney biopsy under image-guidance.    Catie Brown MD  Radiology, PGY IV  [facility]

## 2024-08-15 ENCOUNTER — LAB VISIT (OUTPATIENT)
Dept: LAB | Facility: HOSPITAL | Age: 44
End: 2024-08-15
Attending: INTERNAL MEDICINE
Payer: COMMERCIAL

## 2024-08-15 ENCOUNTER — PATIENT MESSAGE (OUTPATIENT)
Dept: ADMINISTRATIVE | Facility: OTHER | Age: 44
End: 2024-08-15
Payer: COMMERCIAL

## 2024-08-15 ENCOUNTER — TELEPHONE (OUTPATIENT)
Dept: TRANSPLANT | Facility: CLINIC | Age: 44
End: 2024-08-15
Payer: COMMERCIAL

## 2024-08-15 ENCOUNTER — PATIENT MESSAGE (OUTPATIENT)
Dept: TRANSPLANT | Facility: CLINIC | Age: 44
End: 2024-08-15
Payer: COMMERCIAL

## 2024-08-15 DIAGNOSIS — E83.42 HYPOMAGNESEMIA: ICD-10-CM

## 2024-08-15 DIAGNOSIS — Z94.0 KIDNEY REPLACED BY TRANSPLANT: ICD-10-CM

## 2024-08-15 LAB
ALBUMIN SERPL BCP-MCNC: 4 G/DL (ref 3.5–5.2)
ANION GAP SERPL CALC-SCNC: 9 MMOL/L (ref 8–16)
BASOPHILS # BLD AUTO: 0.07 K/UL (ref 0–0.2)
BASOPHILS NFR BLD: 1.5 % (ref 0–1.9)
BUN SERPL-MCNC: 20 MG/DL (ref 6–20)
CALCIUM SERPL-MCNC: 9.8 MG/DL (ref 8.7–10.5)
CHLORIDE SERPL-SCNC: 105 MMOL/L (ref 95–110)
CO2 SERPL-SCNC: 25 MMOL/L (ref 23–29)
CREAT SERPL-MCNC: 1.5 MG/DL (ref 0.5–1.4)
DIFFERENTIAL METHOD BLD: ABNORMAL
EOSINOPHIL # BLD AUTO: 0.1 K/UL (ref 0–0.5)
EOSINOPHIL NFR BLD: 1.9 % (ref 0–8)
ERYTHROCYTE [DISTWIDTH] IN BLOOD BY AUTOMATED COUNT: 15.5 % (ref 11.5–14.5)
EST. GFR  (NO RACE VARIABLE): 58.9 ML/MIN/1.73 M^2
GLUCOSE SERPL-MCNC: 230 MG/DL (ref 70–110)
HCT VFR BLD AUTO: 38.6 % (ref 40–54)
HGB BLD-MCNC: 12.5 G/DL (ref 14–18)
IMM GRANULOCYTES # BLD AUTO: 0.02 K/UL (ref 0–0.04)
IMM GRANULOCYTES NFR BLD AUTO: 0.4 % (ref 0–0.5)
LYMPHOCYTES # BLD AUTO: 0.7 K/UL (ref 1–4.8)
LYMPHOCYTES NFR BLD: 15 % (ref 18–48)
MAGNESIUM SERPL-MCNC: 1.7 MG/DL (ref 1.6–2.6)
MCH RBC QN AUTO: 30 PG (ref 27–31)
MCHC RBC AUTO-ENTMCNC: 32.4 G/DL (ref 32–36)
MCV RBC AUTO: 93 FL (ref 82–98)
MONOCYTES # BLD AUTO: 0.5 K/UL (ref 0.3–1)
MONOCYTES NFR BLD: 10.5 % (ref 4–15)
NEUTROPHILS # BLD AUTO: 3.3 K/UL (ref 1.8–7.7)
NEUTROPHILS NFR BLD: 70.7 % (ref 38–73)
NRBC BLD-RTO: 0 /100 WBC
PHOSPHATE SERPL-MCNC: 2.4 MG/DL (ref 2.7–4.5)
PLATELET # BLD AUTO: 235 K/UL (ref 150–450)
PMV BLD AUTO: 10.7 FL (ref 9.2–12.9)
POTASSIUM SERPL-SCNC: 5.1 MMOL/L (ref 3.5–5.1)
RBC # BLD AUTO: 4.17 M/UL (ref 4.6–6.2)
SODIUM SERPL-SCNC: 139 MMOL/L (ref 136–145)
TACROLIMUS BLD-MCNC: 8.8 NG/ML (ref 5–15)
WBC # BLD AUTO: 4.67 K/UL (ref 3.9–12.7)

## 2024-08-15 PROCEDURE — 85025 COMPLETE CBC W/AUTO DIFF WBC: CPT | Mod: TXP | Performed by: INTERNAL MEDICINE

## 2024-08-15 PROCEDURE — 36415 COLL VENOUS BLD VENIPUNCTURE: CPT | Mod: TXP | Performed by: INTERNAL MEDICINE

## 2024-08-15 PROCEDURE — 80197 ASSAY OF TACROLIMUS: CPT | Mod: TXP | Performed by: INTERNAL MEDICINE

## 2024-08-15 PROCEDURE — 80069 RENAL FUNCTION PANEL: CPT | Mod: TXP | Performed by: INTERNAL MEDICINE

## 2024-08-15 PROCEDURE — 83735 ASSAY OF MAGNESIUM: CPT | Mod: TXP | Performed by: INTERNAL MEDICINE

## 2024-08-15 RX ORDER — SULFAMETHOXAZOLE AND TRIMETHOPRIM 400; 80 MG/1; MG/1
1 TABLET ORAL EVERY MORNING
Qty: 30 TABLET | Refills: 5 | Status: SHIPPED | OUTPATIENT
Start: 2024-08-15 | End: 2025-02-11

## 2024-08-15 RX ORDER — FAMOTIDINE 20 MG/1
20 TABLET, FILM COATED ORAL NIGHTLY
Qty: 30 TABLET | Refills: 0 | Status: SHIPPED | OUTPATIENT
Start: 2024-08-15 | End: 2024-09-14

## 2024-08-15 RX ORDER — CALCITRIOL 0.5 UG/1
0.5 CAPSULE ORAL 2 TIMES DAILY
Qty: 60 CAPSULE | Refills: 5 | Status: SHIPPED | OUTPATIENT
Start: 2024-08-15

## 2024-08-15 RX ORDER — ONDANSETRON 4 MG/1
4 TABLET, ORALLY DISINTEGRATING ORAL EVERY 8 HOURS PRN
Qty: 20 TABLET | Refills: 1 | Status: SHIPPED | OUTPATIENT
Start: 2024-08-15

## 2024-08-15 RX ORDER — ACYCLOVIR 400 MG/1
400 TABLET ORAL 2 TIMES DAILY
Qty: 60 TABLET | Refills: 2 | Status: SHIPPED | OUTPATIENT
Start: 2024-08-15 | End: 2024-11-13

## 2024-08-15 NOTE — TELEPHONE ENCOUNTER
----- Message from Bella Purdy DO sent at 8/15/2024 12:49 PM CDT -----  Stable graft function. Acceptable electrolytes  Needs better glycemic control if these were fasting labs.  Acceptable FK level

## 2024-08-19 ENCOUNTER — EXTERNAL HOME HEALTH (OUTPATIENT)
Dept: HOME HEALTH SERVICES | Facility: HOSPITAL | Age: 44
End: 2024-08-19
Payer: COMMERCIAL

## 2024-08-19 ENCOUNTER — LAB VISIT (OUTPATIENT)
Dept: LAB | Facility: HOSPITAL | Age: 44
End: 2024-08-19
Payer: COMMERCIAL

## 2024-08-19 ENCOUNTER — OFFICE VISIT (OUTPATIENT)
Dept: TRANSPLANT | Facility: CLINIC | Age: 44
End: 2024-08-19
Payer: COMMERCIAL

## 2024-08-19 VITALS
OXYGEN SATURATION: 100 % | HEIGHT: 76 IN | DIASTOLIC BLOOD PRESSURE: 78 MMHG | SYSTOLIC BLOOD PRESSURE: 153 MMHG | BODY MASS INDEX: 25.59 KG/M2 | WEIGHT: 210.13 LBS | TEMPERATURE: 97 F | RESPIRATION RATE: 18 BRPM | HEART RATE: 73 BPM

## 2024-08-19 DIAGNOSIS — E83.42 HYPOMAGNESEMIA: ICD-10-CM

## 2024-08-19 DIAGNOSIS — Z94.0 STATUS POST KIDNEY TRANSPLANT: Primary | ICD-10-CM

## 2024-08-19 DIAGNOSIS — E10.22 TYPE 1 DIABETES MELLITUS WITH STAGE 5 CHRONIC KIDNEY DISEASE NOT ON CHRONIC DIALYSIS: ICD-10-CM

## 2024-08-19 DIAGNOSIS — Z94.0 KIDNEY REPLACED BY TRANSPLANT: ICD-10-CM

## 2024-08-19 DIAGNOSIS — I10 HYPERTENSION, UNSPECIFIED TYPE: ICD-10-CM

## 2024-08-19 DIAGNOSIS — Z79.60 LONG-TERM USE OF IMMUNOSUPPRESSANT MEDICATION: ICD-10-CM

## 2024-08-19 DIAGNOSIS — N18.5 TYPE 1 DIABETES MELLITUS WITH STAGE 5 CHRONIC KIDNEY DISEASE NOT ON CHRONIC DIALYSIS: ICD-10-CM

## 2024-08-19 DIAGNOSIS — Z57.8 OCCUPATIONAL EXPOSURE TO OTHER RISK FACTORS: ICD-10-CM

## 2024-08-19 DIAGNOSIS — Z76.82 AWAITING ORGAN TRANSPLANT: ICD-10-CM

## 2024-08-19 DIAGNOSIS — E78.5 HYPERLIPIDEMIA, UNSPECIFIED HYPERLIPIDEMIA TYPE: ICD-10-CM

## 2024-08-19 LAB
ALBUMIN SERPL BCP-MCNC: 4.1 G/DL (ref 3.5–5.2)
ALBUMIN SERPL BCP-MCNC: 4.1 G/DL (ref 3.5–5.2)
ALP SERPL-CCNC: 99 U/L (ref 55–135)
ALT SERPL W/O P-5'-P-CCNC: 33 U/L (ref 10–44)
ANION GAP SERPL CALC-SCNC: 5 MMOL/L (ref 8–16)
AST SERPL-CCNC: 19 U/L (ref 10–40)
BASOPHILS # BLD AUTO: 0.06 K/UL (ref 0–0.2)
BASOPHILS NFR BLD: 1.2 % (ref 0–1.9)
BILIRUB DIRECT SERPL-MCNC: 0.2 MG/DL (ref 0.1–0.3)
BILIRUB SERPL-MCNC: 0.5 MG/DL (ref 0.1–1)
BUN SERPL-MCNC: 22 MG/DL (ref 6–20)
CALCIUM SERPL-MCNC: 9.7 MG/DL (ref 8.7–10.5)
CHLORIDE SERPL-SCNC: 106 MMOL/L (ref 95–110)
CO2 SERPL-SCNC: 27 MMOL/L (ref 23–29)
CREAT SERPL-MCNC: 1.5 MG/DL (ref 0.5–1.4)
DIFFERENTIAL METHOD BLD: ABNORMAL
EOSINOPHIL # BLD AUTO: 0.1 K/UL (ref 0–0.5)
EOSINOPHIL NFR BLD: 1.4 % (ref 0–8)
ERYTHROCYTE [DISTWIDTH] IN BLOOD BY AUTOMATED COUNT: 15.1 % (ref 11.5–14.5)
EST. GFR  (NO RACE VARIABLE): 58.9 ML/MIN/1.73 M^2
GLUCOSE SERPL-MCNC: 317 MG/DL (ref 70–110)
HCT VFR BLD AUTO: 36.3 % (ref 40–54)
HGB BLD-MCNC: 12.1 G/DL (ref 14–18)
IMM GRANULOCYTES # BLD AUTO: 0.02 K/UL (ref 0–0.04)
IMM GRANULOCYTES NFR BLD AUTO: 0.4 % (ref 0–0.5)
LYMPHOCYTES # BLD AUTO: 0.7 K/UL (ref 1–4.8)
LYMPHOCYTES NFR BLD: 13.9 % (ref 18–48)
MAGNESIUM SERPL-MCNC: 1.6 MG/DL (ref 1.6–2.6)
MCH RBC QN AUTO: 30.8 PG (ref 27–31)
MCHC RBC AUTO-ENTMCNC: 33.3 G/DL (ref 32–36)
MCV RBC AUTO: 92 FL (ref 82–98)
MONOCYTES # BLD AUTO: 0.5 K/UL (ref 0.3–1)
MONOCYTES NFR BLD: 9.8 % (ref 4–15)
NEUTROPHILS # BLD AUTO: 3.7 K/UL (ref 1.8–7.7)
NEUTROPHILS NFR BLD: 73.3 % (ref 38–73)
NRBC BLD-RTO: 0 /100 WBC
PHOSPHATE SERPL-MCNC: 2.6 MG/DL (ref 2.7–4.5)
PLATELET # BLD AUTO: 229 K/UL (ref 150–450)
PMV BLD AUTO: 11 FL (ref 9.2–12.9)
POTASSIUM SERPL-SCNC: 4.5 MMOL/L (ref 3.5–5.1)
PROT SERPL-MCNC: 7.1 G/DL (ref 6–8.4)
RBC # BLD AUTO: 3.93 M/UL (ref 4.6–6.2)
SODIUM SERPL-SCNC: 138 MMOL/L (ref 136–145)
TACROLIMUS BLD-MCNC: 12.5 NG/ML (ref 5–15)
WBC # BLD AUTO: 5.02 K/UL (ref 3.9–12.7)

## 2024-08-19 PROCEDURE — 85025 COMPLETE CBC W/AUTO DIFF WBC: CPT | Mod: TXP | Performed by: INTERNAL MEDICINE

## 2024-08-19 PROCEDURE — 80197 ASSAY OF TACROLIMUS: CPT | Mod: TXP | Performed by: INTERNAL MEDICINE

## 2024-08-19 PROCEDURE — 87522 HEPATITIS C REVRS TRNSCRPJ: CPT | Mod: TXP | Performed by: INTERNAL MEDICINE

## 2024-08-19 PROCEDURE — 87799 DETECT AGENT NOS DNA QUANT: CPT | Mod: NTX | Performed by: INTERNAL MEDICINE

## 2024-08-19 PROCEDURE — 87517 HEPATITIS B DNA QUANT: CPT | Mod: TXP | Performed by: INTERNAL MEDICINE

## 2024-08-19 PROCEDURE — 83735 ASSAY OF MAGNESIUM: CPT | Mod: TXP | Performed by: INTERNAL MEDICINE

## 2024-08-19 PROCEDURE — 84075 ASSAY ALKALINE PHOSPHATASE: CPT | Mod: TXP | Performed by: INTERNAL MEDICINE

## 2024-08-19 PROCEDURE — 3062F POS MACROALBUMINURIA REV: CPT | Mod: CPTII,S$GLB,, | Performed by: NURSE PRACTITIONER

## 2024-08-19 PROCEDURE — 3044F HG A1C LEVEL LT 7.0%: CPT | Mod: CPTII,S$GLB,, | Performed by: NURSE PRACTITIONER

## 2024-08-19 PROCEDURE — 36415 COLL VENOUS BLD VENIPUNCTURE: CPT | Mod: NTX | Performed by: INTERNAL MEDICINE

## 2024-08-19 PROCEDURE — 80069 RENAL FUNCTION PANEL: CPT | Mod: TXP | Performed by: INTERNAL MEDICINE

## 2024-08-19 PROCEDURE — 84450 TRANSFERASE (AST) (SGOT): CPT | Mod: TXP | Performed by: INTERNAL MEDICINE

## 2024-08-19 PROCEDURE — 1159F MED LIST DOCD IN RCRD: CPT | Mod: CPTII,S$GLB,, | Performed by: NURSE PRACTITIONER

## 2024-08-19 PROCEDURE — 3008F BODY MASS INDEX DOCD: CPT | Mod: CPTII,S$GLB,, | Performed by: NURSE PRACTITIONER

## 2024-08-19 PROCEDURE — 99999 PR PBB SHADOW E&M-EST. PATIENT-LVL IV: CPT | Mod: PBBFAC,,, | Performed by: NURSE PRACTITIONER

## 2024-08-19 PROCEDURE — 3078F DIAST BP <80 MM HG: CPT | Mod: CPTII,S$GLB,, | Performed by: NURSE PRACTITIONER

## 2024-08-19 PROCEDURE — 3077F SYST BP >= 140 MM HG: CPT | Mod: CPTII,S$GLB,, | Performed by: NURSE PRACTITIONER

## 2024-08-19 PROCEDURE — 3066F NEPHROPATHY DOC TX: CPT | Mod: CPTII,S$GLB,, | Performed by: NURSE PRACTITIONER

## 2024-08-19 PROCEDURE — 87536 HIV-1 QUANT&REVRSE TRNSCRPJ: CPT | Mod: TXP | Performed by: INTERNAL MEDICINE

## 2024-08-19 PROCEDURE — 99215 OFFICE O/P EST HI 40 MIN: CPT | Mod: S$GLB,,, | Performed by: NURSE PRACTITIONER

## 2024-08-19 RX ORDER — FAMOTIDINE 20 MG/1
20 TABLET, FILM COATED ORAL NIGHTLY
Qty: 30 TABLET | Refills: 3 | Status: SHIPPED | OUTPATIENT
Start: 2024-08-19

## 2024-08-19 SDOH — SOCIAL DETERMINANTS OF HEALTH (SDOH): OCCUPATIONAL EXPOSURE TO OTHER RISK FACTORS: Z57.8

## 2024-08-19 NOTE — LETTER
August 19, 2024        Jose Mckinney  [address]  [address]  [facility] [address]  Phone: [phone]  Fax: [phone]             [facility]- Transplant 1st Fl  [address]  [facility] [address]  Phone: [phone]   Patient: Dejuan Diaz Jr.   MR Number: [id number]   YOB: 1980   Date of Visit: 8/19/2024       Dear Dr. Jose Mckinney    Thank you for referring Dejuan Diaz to me for evaluation. Attached you will find relevant portions of my assessment and plan of care.    If you have questions, please do not hesitate to call me. I look forward to following Dejuan Diaz along with you.    Sincerely,    Angie Grace, NP    Enclosure    If you would like to receive this communication electronically, please contact [email or web address] or [phone] to request [email or web address] Link access.    [email or web address] Link is a tool which provides read-only access to select patient information with whom you have a relationship. Its easy to use and provides real time access to review your patients record including encounter summaries, notes, results, and demographic information.    If you feel you have received this communication in error or would no longer like to receive these types of communications, please e-mail [email or web address]    Tetracycline Pregnancy And Lactation Text: This medication is Pregnancy Category D and not consider safe during pregnancy. It is also excreted in breast milk.

## 2024-08-19 NOTE — PROGRESS NOTES
Kidney Post-Transplant Assessment    Referring Physician: Jose Mckinney  Current Nephrologist: Jose Mckinney    ORGAN: LEFT KIDNEY  Donor Type: living  PHS Increased Risk: no  Cold Ischemia: 32 mins  Induction Medications: thymo    Subjective:     CC:  Reassessment of renal allograft function and management of chronic immunosuppression.    HPI:  Mr. Diaz is a 43 y.o. year old White male who received a living kidney transplant on 24. His most recent creatinine is 1.5 . He takes mycophenolate mofetil, prednisone, and tacrolimus for maintenance immunosuppression. His post transplant course has been uncomplicated to date.    Hospitalization/ ED visits    Interval HX:  Reports doing well. Some mild intermittent discomfort near incision area.      Intake 3L  UOP good  --130/80s  Peripheral edema: none  Weight: stable  Appetite: good  Wound--healed  fx assessment active, walks twice a day  Lab /diagnostic results reviewed with patient today.   All questions answered        Current Outpatient Medications:     acyclovir (ZOVIRAX) 400 MG tablet, Take 1 tablet (400 mg total) by mouth 2 (two) times daily. Stop 10/14/24, Disp: 60 tablet, Rfl: 2    atorvastatin (LIPITOR) 40 MG tablet, Take 40 mg by mouth once daily., Disp: , Rfl:     blood-glucose meter,continuous (DEXCOM G6  MISC), by Misc.(Non-Drug; Combo Route) route., Disp: , Rfl:     blood-glucose transmitter (DEXCOM G6 TRANSMITTER MISC),  Dexcom G6 transmitter, See Instructions, uad with dexcom G6 sensor; change transmitter q 90 days, # 1 EA, 3 Refill(s), Pharmacy: [facility], 187, cm, 23 14:55:00 CDT, Height/Length Measured, 105.1, kg, 23 14:55:00 CDT, Weight Dosing, Disp: , Rfl:     calcitRIOL (ROCALTROL) 0.5 MCG Cap, Take 1 capsule (0.5 mcg total) by mouth 2 (two) times daily., Disp: 60 capsule, Rfl: 5    DEXCOM G6 SENSOR Adelina, SMARTSI Each Topical Every 10 Days, Disp: , Rfl:     insulin lispro (HUMALOG U-100 INSULIN)  100 unit/mL injection,  See Instructions, for use in insulin pump; max daily dose 100 units, # 90 mL, 1 Refill(s), Maintenance, Pharmacy: [facility], 187, cm, 02/23/23 11:24:00 CST, Height/Length Measured, 102.5, kg, 02/23/23 11:24:00 CST, Weight Dosing, Disp: , Rfl:     insulin pump cart,auto,BT/cntr (OMNIPOD 5 G6 INTRO KIT, GEN 5, SUBQ),  Omnipod 5 G6 Intro Kit (Gen 5), See Instructions, use as directed, # 1 EA, 0 Refill(s), Pharmacy: [facility], 187, cm, 12/30/22 9:14:00 CST, Height/Length Measured, 102.3, kg, 12/30/22 9:14:00 CST, Weight Dosing, Disp: , Rfl:     k phos di & mono-sod phos mono (K-PHOS-NEUTRAL) 250 mg Tab, Take 2 tablets by mouth 3 (three) times daily., Disp: 180 tablet, Rfl: 11    magnesium oxide (MAG-OX) 400 mg (241.3 mg magnesium) tablet, Take 2 tablets (800 mg total) by mouth 2 (two) times daily., Disp: 120 tablet, Rfl: 11    multivitamin Tab, Take 1 tablet by mouth once daily., Disp: 30 tablet, Rfl: 5    mycophenolate (CELLCEPT) 250 mg Cap, Take 4 capsules (1,000 mg total) by mouth 2 (two) times daily., Disp: 240 capsule, Rfl: 11    ondansetron (ZOFRAN-ODT) 4 MG TbDL, Take 1 tablet (4 mg total) by mouth every 8 (eight) hours as needed (nausea)., Disp: 20 tablet, Rfl: 1    oxyCODONE (ROXICODONE) 5 MG immediate release tablet, Take 1 tablet (5 mg total) by mouth every 4 (four) hours as needed for Pain., Disp: 30 tablet, Rfl: 0    predniSONE (DELTASONE) 5 MG tablet, Take by mouth daily: 20mg 7/19-7/25, 15mg 7/26-8/1, 10mg 8/2-8/8, 5mg 8/9-, Disp: 75 tablet, Rfl: 5    sulfamethoxazole-trimethoprim 400-80mg (BACTRIM,SEPTRA) 400-80 mg per tablet, Take 1 tablet by mouth every morning. Stop 1/12/25, Disp: 30 tablet, Rfl: 5    tacrolimus (PROGRAF) 1 MG Cap, Take 4 capsules (4 mg total) by mouth every 12 (twelve) hours., Disp: 240 capsule, Rfl: 11    famotidine (PEPCID) 20 MG tablet, Take 1 tablet (20 mg total) by mouth every evening. Stop 8/15/24, Disp: 30 tablet, Rfl: 3      Past  "Medical History:   Diagnosis Date    Anemia     Diabetes mellitus type II     Disorder of kidney and ureter     GERD (gastroesophageal reflux disease)     Hypertension     Proteinuria          Review of Systems   Constitutional:  Negative for appetite change, chills, fatigue and fever.   HENT:  Negative for trouble swallowing.    Respiratory:  Negative for cough, chest tightness, shortness of breath and wheezing.    Cardiovascular:  Negative for chest pain, palpitations and leg swelling.   Gastrointestinal:  Negative for abdominal pain, constipation, diarrhea and nausea.   Genitourinary:  Negative for difficulty urinating, frequency and urgency.   Musculoskeletal:  Negative for arthralgias and myalgias.   Skin:  Negative for rash.   Allergic/Immunologic: Positive for immunocompromised state.   Neurological:  Negative for dizziness, weakness, light-headedness and headaches.   Psychiatric/Behavioral:  Negative for sleep disturbance.        Objective:   Blood pressure (!) 153/78, pulse 73, temperature 97.3 °F (36.3 °C), temperature source Temporal, resp. rate 18, height 6' 4" (1.93 m), weight 95.3 kg (210 lb 1.6 oz), SpO2 100%.body mass index is 25.57 kg/m².    Physical Exam  Constitutional:       General: He is not in acute distress.     Appearance: He is well-developed. He is not diaphoretic.   Cardiovascular:      Rate and Rhythm: Normal rate and regular rhythm.      Heart sounds: Normal heart sounds.   Pulmonary:      Effort: Pulmonary effort is normal.      Breath sounds: Normal breath sounds.   Abdominal:      General: Bowel sounds are normal.      Palpations: Abdomen is soft.   Musculoskeletal:         General: No tenderness. Normal range of motion.   Skin:     General: Skin is warm and dry.      Findings: No rash.      Nails: There is no clubbing.   Neurological:      Mental Status: He is alert and oriented to person, place, and time.   Psychiatric:         Behavior: Behavior normal.         Labs:  Lab Results " "  Component Value Date    WBC 5.02 08/19/2024    HGB 12.1 (L) 08/19/2024    HCT 36.3 (L) 08/19/2024     08/19/2024    K 4.5 08/19/2024     08/19/2024    CO2 27 08/19/2024    BUN 22 (H) 08/19/2024    CREATININE 1.5 (H) 08/19/2024    EGFRNORACEVR 58.9 (A) 08/19/2024    CALCIUM 9.7 08/19/2024    PHOS 2.6 (L) 08/19/2024    MG 1.6 08/19/2024    ALBUMIN 4.1 08/19/2024    ALBUMIN 4.1 08/19/2024    AST 19 08/19/2024    ALT 33 08/19/2024    UTPCR 0.18 08/12/2024    .1 (H) 07/16/2024    TACROLIMUS 8.8 08/15/2024       No results found for: "EXTANC", "EXTWBC", "EXTSEGS", "EXTPLATELETS", "EXTHEMOGLOBI", "EXTHEMATOCRI", "EXTCREATININ", "EXTSODIUM", "EXTPOTASSIUM", "EXTBUN", "EXTCO2", "EXTCALCIUM", "EXTPHOSPHORU", "EXTGLUCOSE", "EXTALBUMIN", "EXTAST", "EXTALT", "EXTBILITOTAL", "EXTLIPASE", "EXTAMYLASE"    No results found for: "EXTCYCLOSLVL", "EXTSIROLIMUS", "EXTTACROLVL", "EXTPROTCRE", "EXTPTHINTACT", "EXTPROTEINUA", "EXTWBCUA", "EXTRBCUA"    Labs were reviewed with the patient    Assessment:     1. Status post kidney transplant    2. Hypertension, unspecified type    3. Hyperlipidemia, unspecified hyperlipidemia type    4. Long-term use of immunosuppressant medication    5. Type 1 diabetes mellitus with stage 5 chronic kidney disease not on chronic dialysis        Plan:   Continue current IS therapy regimen  No change in Cr  Pending Tac level  Pending Bx results  Elevated glucose--following with his local endocrinologist for management (recently adjusted)  Will discuss with Dr. Lu: "lateral wall lesion of the bladder". During the stent removal yesterday, urology didn't notice any mass or lesions       1. CKD stage: will continue follow up as per our center guidelines. patient to continue close follow up with the local General nephrologist. Education provided in appropriate fluid intake, potassium intake. Continue with oral hydration.      2. Immunosuppression: Prograf trough8.8, therapeutic target 8-10. " "Continue Prograf 4/4, [id number] Mg BID, and Prednisone 5 mg QD  Lab Results   Component Value Date    TACROLIMUS 8.8 08/15/2024    TACROLIMUS 8.6 08/12/2024    TACROLIMUS 9.1 08/08/2024     No results found for: "CYCLOSPORINE"  Will closely monitor for toxicities, education provided about adherence to medicines and need to communicate any side effect to the transplant nurse or physician.    type II DM:                - followed by his endocrinologist with setting on insulin pump adjusted              - currently listed inactive for a pancreas transplant      3. Allograft Function:stable at baseline for the patient. Continue follow up as per our guidelines and with the local General nephrologist. Communication will be sent today.  Lab Results   Component Value Date    CREATININE 1.5 (H) 08/19/2024    CREATININE 1.5 (H) 08/15/2024    CREATININE 1.4 08/14/2024     Lab Results   Component Value Date    AMYLASE 46 04/17/2024    LIPASE 25 04/17/2024       4. Hypertension management:  Continue with home blood pressure monitoring, low salt and healthy life discussed with the patient.  BP Readings from Last 3 Encounters:   08/19/24 (!) 153/78   08/14/24 133/76   08/07/24 (!) 146/87       5. Metabolic Bone Disease/Secondary Hyperparathyroidism:calcium and phosphorus level discussed with the patient, patient will continue follow up with the general nephrologist for management of metabolic bone disease calcium and phosphorus as per our center protocol. Will monitor PTH, Vit D level, calcium.   Lab Results   Component Value Date    .1 (H) 07/16/2024    CALCIUM 9.7 08/19/2024    PHOS 2.6 (L) 08/19/2024    PHOS 2.4 (L) 08/15/2024    PHOS 2.8 08/12/2024       6. Electrolytes: reviewed with the patient, essentially within the normal range no need for acute changes today, will monitor as per our center guidelines.  Lab Results   Component Value Date     08/19/2024    K 4.5 08/19/2024     08/19/2024    CO2 27 " "08/19/2024    CO2 25 08/15/2024    CO2 24 08/14/2024       7. Anemia: will continue monitoring as per our center guidelines. No indication for acute intervention today.  Lab Results   Component Value Date    WBC 5.02 08/19/2024    HGB 12.1 (L) 08/19/2024    HCT 36.3 (L) 08/19/2024    MCV 92 08/19/2024     08/19/2024       8.Proteinuria: will continue with pr/cr ratio as per our center guidelines  Lab Results   Component Value Date    PROTEINURINE 14 08/12/2024    CREATRANDUR 79.6 08/12/2024    UTPCR 0.18 08/12/2024    UTPCR 0.44 (H) 08/05/2024    UTPCR 0.28 (H) 07/29/2024        9. BK virus infection screening: will continue with urine or blood PCR as per our guidelines to prevent BK virus viremia and allograft dysfunction  No results found for: "BKVIRUSDNAUR", "BKQUANTURINE", "BKVIRUSLOG", "BKVIRUSURINE", "BKVIRUSPCRQB"      10. Weight education: provided during the clinic visit.  Body mass index is 25.57 kg/m².     11.Patient safety education regarding immunosuppression including prophylaxis posttransplant for CMV, PCP : Education provided about vaccination and prevention of infections.    12.  Cytopenias: no significant cytopenias will monitor as per our guidelines. Medicine list reviewed including potential causes of drug-induced cytopenias  Lab Results   Component Value Date    WBC 5.02 08/19/2024    HGB 12.1 (L) 08/19/2024    HCT 36.3 (L) 08/19/2024    MCV 92 08/19/2024     08/19/2024       13. Post-transplant Prophylaxis; CMV Infection, PJP and Candida mucosistis and other indicated for this particular patient.   - cont Acyclovir until 10/14/24              -  cont on Bactrim until 1/12/25      Follow-up:   Clinic: return to transplant clinic weekly for the first month after transplant; every 2 weeks during months 2-3; then at 6-, 9-, 12-, 18-, 24-, and 36- months post-transplant to reassess for complications from immunosuppression toxicity and monitor for rejection.  Annually " thereafter.    Labs: since patient remains at high risk for rejection and drug-related complications that warrant close monitoring, labs will be ordered as follows: continue twice weekly CBC, renal panel, and drug level for first month; then same labs once weekly through 3rd month post-transplant.  Urine for UA and protein/creatinine ratio monthly.  Serum BK - PCR at 1-, 3-, 6-, 9-, 12-, 18-, 24-, 36- 48-, and 60 months post-transplant.  Hepatic panel at 1-, 2-, 3-, 6-, 9-, 12-, 18-, 24-, and 36- months post-transplant.    Education:   Material provided to the patient.  Patient reminded to call with any health changes since these can be early signs of significant complications.  Also, I advised the patient to be sure any new medications or changes of old medications are discussed with either a pharmacist or physician knowledgeable with transplant to avoid rejection/drug toxicity related to significant drug interactions.    Exercise: reminded Gab of the importance of regular exercise for weight management, blood sugar and blood pressure management.  I also explained exercise has been shown to improve cardiovascular health, energy level, and sleep hygiene.  Lastly, I advised him that cardiovascular complications are leading cause of death for renal transplant recipients, and regular exercise can help lower this risk.    I spoke with the patient for 30 minutes. More than half dedicated to counseling and education. All questions answered    TRENA Mcneal  Transplant Nephrology    Follow-up:   Clinic: return to transplant clinic weekly for the first month after transplant; every 2 weeks during months 2-3; then at 6-, 9-, 12-, 18-, 24-, and 36- months post-transplant to reassess for complications from immunosuppression toxicity and monitor for rejection.  Annually thereafter.    Labs: since patient remains at high risk for rejection and drug-related complications that warrant close monitoring, labs will be  ordered as follows: continue twice weekly CBC, renal panel, and drug level for first month; then same labs once weekly through 3rd month post-transplant.  Urine for UA and protein/creatinine ratio monthly.  Serum BK - PCR at 1-, 3-, 6-, 9-, 12-, 18-, 24-, 36-, 48-, and 60 months post-transplant.  Hepatic panel at 1-, 2-, 3-, 6-, 9-, 12-, 18-, 24-, and 36- months post-transplant.    Angie Grace NP       Education:   Material provided to the patient.  Patient reminded to call with any health changes since these can be early signs of significant complications.  Also, I advised the patient to be sure any new medications or changes of old medications are discussed with either a pharmacist or physician knowledgeable with transplant to avoid rejection/drug toxicity related to significant drug interactions.    Patient advised that it is recommended that all transplanted patients, and their close contacts and household members receive Covid vaccination.

## 2024-08-19 NOTE — TELEPHONE ENCOUNTER
Message sent to pt verifying true 12 hour level and correct dose taken - instructed pt to lower prograf dose to 4mg in AM and 3mg in PM. Repeat labs scheduled 8/26.   ----- Message from Bella Purdy DO sent at 8/19/2024  2:29 PM CDT -----  Stable graft function but remains at higher end. Still awaiting result of kidney biopsy   Acceptable electrolytes  High FK. Is it a true FK trough? If so, decrease FK to 4/3 and check FK level next week

## 2024-08-20 LAB
HCV RNA SERPL QL NAA+PROBE: NOT DETECTED
HCV RNA SPEC NAA+PROBE-ACNC: NOT DETECTED IU/ML
HEPATITIS B VIRUS DNA: NORMAL
HEPATITIS B VIRUS PCR, QUANT: NOT DETECTED IU/ML
HIV1 RNA # SERPL NAA+PROBE: NOT DETECTED COPIES/ML
HIV1 RNA SERPL QL NAA+PROBE: NOT DETECTED

## 2024-08-20 RX ORDER — TACROLIMUS 1 MG/1
CAPSULE ORAL
Qty: 210 CAPSULE | Refills: 11 | Status: SHIPPED | OUTPATIENT
Start: 2024-08-20 | End: 2025-08-20

## 2024-08-21 LAB
BKV DNA SERPL NAA+PROBE-ACNC: <125 COPIES/ML
BKV DNA SERPL NAA+PROBE-LOG#: <2.1 LOG (10) COPIES/ML
BKV DNA SERPL QL NAA+PROBE: NOT DETECTED

## 2024-08-22 ENCOUNTER — DOCUMENT SCAN (OUTPATIENT)
Dept: HOME HEALTH SERVICES | Facility: HOSPITAL | Age: 44
End: 2024-08-22
Payer: COMMERCIAL

## 2024-08-26 ENCOUNTER — PATIENT MESSAGE (OUTPATIENT)
Dept: TRANSPLANT | Facility: CLINIC | Age: 44
End: 2024-08-26
Payer: COMMERCIAL

## 2024-08-26 ENCOUNTER — LAB VISIT (OUTPATIENT)
Dept: LAB | Facility: HOSPITAL | Age: 44
End: 2024-08-26
Attending: INTERNAL MEDICINE
Payer: COMMERCIAL

## 2024-08-26 DIAGNOSIS — Z94.0 KIDNEY REPLACED BY TRANSPLANT: ICD-10-CM

## 2024-08-26 DIAGNOSIS — E83.42 HYPOMAGNESEMIA: ICD-10-CM

## 2024-08-26 LAB
ALBUMIN SERPL BCP-MCNC: 3.8 G/DL (ref 3.5–5.2)
ANION GAP SERPL CALC-SCNC: 10 MMOL/L (ref 8–16)
BASOPHILS # BLD AUTO: 0.05 K/UL (ref 0–0.2)
BASOPHILS NFR BLD: 1 % (ref 0–1.9)
BUN SERPL-MCNC: 24 MG/DL (ref 6–20)
CALCIUM SERPL-MCNC: 9.3 MG/DL (ref 8.7–10.5)
CHLORIDE SERPL-SCNC: 109 MMOL/L (ref 95–110)
CO2 SERPL-SCNC: 24 MMOL/L (ref 23–29)
CREAT SERPL-MCNC: 1.6 MG/DL (ref 0.5–1.4)
DIFFERENTIAL METHOD BLD: ABNORMAL
EOSINOPHIL # BLD AUTO: 0.1 K/UL (ref 0–0.5)
EOSINOPHIL NFR BLD: 1 % (ref 0–8)
ERYTHROCYTE [DISTWIDTH] IN BLOOD BY AUTOMATED COUNT: 14.9 % (ref 11.5–14.5)
EST. GFR  (NO RACE VARIABLE): 54.5 ML/MIN/1.73 M^2
GLUCOSE SERPL-MCNC: 144 MG/DL (ref 70–110)
HCT VFR BLD AUTO: 37.3 % (ref 40–54)
HGB BLD-MCNC: 12.2 G/DL (ref 14–18)
IMM GRANULOCYTES # BLD AUTO: 0.03 K/UL (ref 0–0.04)
IMM GRANULOCYTES NFR BLD AUTO: 0.6 % (ref 0–0.5)
LYMPHOCYTES # BLD AUTO: 0.8 K/UL (ref 1–4.8)
LYMPHOCYTES NFR BLD: 15.2 % (ref 18–48)
MAGNESIUM SERPL-MCNC: 1.6 MG/DL (ref 1.6–2.6)
MCH RBC QN AUTO: 30.3 PG (ref 27–31)
MCHC RBC AUTO-ENTMCNC: 32.7 G/DL (ref 32–36)
MCV RBC AUTO: 93 FL (ref 82–98)
MONOCYTES # BLD AUTO: 0.5 K/UL (ref 0.3–1)
MONOCYTES NFR BLD: 11 % (ref 4–15)
NEUTROPHILS # BLD AUTO: 3.5 K/UL (ref 1.8–7.7)
NEUTROPHILS NFR BLD: 71.2 % (ref 38–73)
NRBC BLD-RTO: 0 /100 WBC
PHOSPHATE SERPL-MCNC: 2.6 MG/DL (ref 2.7–4.5)
PLATELET # BLD AUTO: 227 K/UL (ref 150–450)
PMV BLD AUTO: 10 FL (ref 9.2–12.9)
POTASSIUM SERPL-SCNC: 4.3 MMOL/L (ref 3.5–5.1)
RBC # BLD AUTO: 4.03 M/UL (ref 4.6–6.2)
SODIUM SERPL-SCNC: 143 MMOL/L (ref 136–145)
WBC # BLD AUTO: 4.93 K/UL (ref 3.9–12.7)

## 2024-08-26 PROCEDURE — 36415 COLL VENOUS BLD VENIPUNCTURE: CPT | Mod: TXP | Performed by: INTERNAL MEDICINE

## 2024-08-26 PROCEDURE — 80197 ASSAY OF TACROLIMUS: CPT | Mod: TXP | Performed by: INTERNAL MEDICINE

## 2024-08-26 PROCEDURE — 80069 RENAL FUNCTION PANEL: CPT | Mod: TXP | Performed by: INTERNAL MEDICINE

## 2024-08-26 PROCEDURE — 85025 COMPLETE CBC W/AUTO DIFF WBC: CPT | Mod: TXP | Performed by: INTERNAL MEDICINE

## 2024-08-26 PROCEDURE — 83735 ASSAY OF MAGNESIUM: CPT | Mod: TXP | Performed by: INTERNAL MEDICINE

## 2024-08-27 DIAGNOSIS — Z76.82 AWAITING ORGAN TRANSPLANT: ICD-10-CM

## 2024-08-27 LAB — TACROLIMUS BLD-MCNC: 9.6 NG/ML (ref 5–15)

## 2024-08-27 RX ORDER — TACROLIMUS 1 MG/1
3 CAPSULE ORAL EVERY 12 HOURS
Qty: 180 CAPSULE | Refills: 11 | Status: SHIPPED | OUTPATIENT
Start: 2024-08-27 | End: 2025-08-27

## 2024-08-27 NOTE — TELEPHONE ENCOUNTER
Message sent to pt instructing him to decrease prograf dose to 3mg twice a day. Pt verbalized understanding.  ----- Message from Bella Purdy DO sent at 8/27/2024 12:34 PM CDT -----  Decrease FK to 3 mg BID from 4/3

## 2024-09-03 ENCOUNTER — LAB VISIT (OUTPATIENT)
Dept: LAB | Facility: HOSPITAL | Age: 44
End: 2024-09-03
Attending: INTERNAL MEDICINE
Payer: COMMERCIAL

## 2024-09-03 DIAGNOSIS — Z94.0 KIDNEY REPLACED BY TRANSPLANT: ICD-10-CM

## 2024-09-03 DIAGNOSIS — E83.42 HYPOMAGNESEMIA: ICD-10-CM

## 2024-09-03 LAB
ALBUMIN SERPL BCP-MCNC: 4.1 G/DL (ref 3.5–5.2)
ANION GAP SERPL CALC-SCNC: 11 MMOL/L (ref 8–16)
BASOPHILS # BLD AUTO: 0.06 K/UL (ref 0–0.2)
BASOPHILS NFR BLD: 1.2 % (ref 0–1.9)
BUN SERPL-MCNC: 24 MG/DL (ref 6–20)
CALCIUM SERPL-MCNC: 9.6 MG/DL (ref 8.7–10.5)
CHLORIDE SERPL-SCNC: 107 MMOL/L (ref 95–110)
CO2 SERPL-SCNC: 22 MMOL/L (ref 23–29)
CREAT SERPL-MCNC: 1.6 MG/DL (ref 0.5–1.4)
DIFFERENTIAL METHOD BLD: ABNORMAL
EOSINOPHIL # BLD AUTO: 0.1 K/UL (ref 0–0.5)
EOSINOPHIL NFR BLD: 1.2 % (ref 0–8)
ERYTHROCYTE [DISTWIDTH] IN BLOOD BY AUTOMATED COUNT: 14.6 % (ref 11.5–14.5)
EST. GFR  (NO RACE VARIABLE): 54.5 ML/MIN/1.73 M^2
GLUCOSE SERPL-MCNC: 176 MG/DL (ref 70–110)
HCT VFR BLD AUTO: 40 % (ref 40–54)
HGB BLD-MCNC: 13.3 G/DL (ref 14–18)
IMM GRANULOCYTES # BLD AUTO: 0.01 K/UL (ref 0–0.04)
IMM GRANULOCYTES NFR BLD AUTO: 0.2 % (ref 0–0.5)
LYMPHOCYTES # BLD AUTO: 0.8 K/UL (ref 1–4.8)
LYMPHOCYTES NFR BLD: 15.9 % (ref 18–48)
MAGNESIUM SERPL-MCNC: 1.7 MG/DL (ref 1.6–2.6)
MCH RBC QN AUTO: 30.3 PG (ref 27–31)
MCHC RBC AUTO-ENTMCNC: 33.3 G/DL (ref 32–36)
MCV RBC AUTO: 91 FL (ref 82–98)
MONOCYTES # BLD AUTO: 0.6 K/UL (ref 0.3–1)
MONOCYTES NFR BLD: 11.4 % (ref 4–15)
NEUTROPHILS # BLD AUTO: 3.5 K/UL (ref 1.8–7.7)
NEUTROPHILS NFR BLD: 70.1 % (ref 38–73)
NRBC BLD-RTO: 0 /100 WBC
PHOSPHATE SERPL-MCNC: 2.3 MG/DL (ref 2.7–4.5)
PLATELET # BLD AUTO: 250 K/UL (ref 150–450)
PMV BLD AUTO: 9.9 FL (ref 9.2–12.9)
POTASSIUM SERPL-SCNC: 4.6 MMOL/L (ref 3.5–5.1)
RBC # BLD AUTO: 4.39 M/UL (ref 4.6–6.2)
SODIUM SERPL-SCNC: 140 MMOL/L (ref 136–145)
WBC # BLD AUTO: 4.98 K/UL (ref 3.9–12.7)

## 2024-09-03 PROCEDURE — 80069 RENAL FUNCTION PANEL: CPT | Mod: TXP | Performed by: INTERNAL MEDICINE

## 2024-09-03 PROCEDURE — 80197 ASSAY OF TACROLIMUS: CPT | Mod: TXP | Performed by: INTERNAL MEDICINE

## 2024-09-03 PROCEDURE — 85025 COMPLETE CBC W/AUTO DIFF WBC: CPT | Mod: TXP | Performed by: INTERNAL MEDICINE

## 2024-09-03 PROCEDURE — 36415 COLL VENOUS BLD VENIPUNCTURE: CPT | Mod: TXP | Performed by: INTERNAL MEDICINE

## 2024-09-03 PROCEDURE — 83735 ASSAY OF MAGNESIUM: CPT | Mod: TXP | Performed by: INTERNAL MEDICINE

## 2024-09-04 LAB — TACROLIMUS BLD-MCNC: 9.8 NG/ML (ref 5–15)

## 2024-09-09 ENCOUNTER — TELEPHONE (OUTPATIENT)
Dept: TRANSPLANT | Facility: CLINIC | Age: 44
End: 2024-09-09
Payer: COMMERCIAL

## 2024-09-09 ENCOUNTER — LAB VISIT (OUTPATIENT)
Dept: LAB | Facility: HOSPITAL | Age: 44
End: 2024-09-09
Attending: INTERNAL MEDICINE
Payer: COMMERCIAL

## 2024-09-09 DIAGNOSIS — E83.42 HYPOMAGNESEMIA: ICD-10-CM

## 2024-09-09 DIAGNOSIS — Z94.0 KIDNEY REPLACED BY TRANSPLANT: ICD-10-CM

## 2024-09-09 DIAGNOSIS — Z79.899 IMMUNOSUPPRESSIVE MANAGEMENT ENCOUNTER FOLLOWING KIDNEY TRANSPLANT: Primary | ICD-10-CM

## 2024-09-09 DIAGNOSIS — Z94.0 IMMUNOSUPPRESSIVE MANAGEMENT ENCOUNTER FOLLOWING KIDNEY TRANSPLANT: Primary | ICD-10-CM

## 2024-09-09 LAB
ALBUMIN SERPL BCP-MCNC: 4.2 G/DL (ref 3.5–5.2)
ANION GAP SERPL CALC-SCNC: 8 MMOL/L (ref 8–16)
BASOPHILS # BLD AUTO: 0.05 K/UL (ref 0–0.2)
BASOPHILS NFR BLD: 1 % (ref 0–1.9)
BUN SERPL-MCNC: 21 MG/DL (ref 6–20)
CALCIUM SERPL-MCNC: 9.8 MG/DL (ref 8.7–10.5)
CHLORIDE SERPL-SCNC: 110 MMOL/L (ref 95–110)
CO2 SERPL-SCNC: 23 MMOL/L (ref 23–29)
CREAT SERPL-MCNC: 1.6 MG/DL (ref 0.5–1.4)
DIFFERENTIAL METHOD BLD: ABNORMAL
EOSINOPHIL # BLD AUTO: 0 K/UL (ref 0–0.5)
EOSINOPHIL NFR BLD: 0.6 % (ref 0–8)
ERYTHROCYTE [DISTWIDTH] IN BLOOD BY AUTOMATED COUNT: 14.2 % (ref 11.5–14.5)
EST. GFR  (NO RACE VARIABLE): 54.5 ML/MIN/1.73 M^2
GLUCOSE SERPL-MCNC: 237 MG/DL (ref 70–110)
HCT VFR BLD AUTO: 41 % (ref 40–54)
HGB BLD-MCNC: 13.6 G/DL (ref 14–18)
IMM GRANULOCYTES # BLD AUTO: 0.03 K/UL (ref 0–0.04)
IMM GRANULOCYTES NFR BLD AUTO: 0.6 % (ref 0–0.5)
LYMPHOCYTES # BLD AUTO: 0.7 K/UL (ref 1–4.8)
LYMPHOCYTES NFR BLD: 14.5 % (ref 18–48)
MAGNESIUM SERPL-MCNC: 1.6 MG/DL (ref 1.6–2.6)
MCH RBC QN AUTO: 30.4 PG (ref 27–31)
MCHC RBC AUTO-ENTMCNC: 33.2 G/DL (ref 32–36)
MCV RBC AUTO: 92 FL (ref 82–98)
MONOCYTES # BLD AUTO: 0.6 K/UL (ref 0.3–1)
MONOCYTES NFR BLD: 11.2 % (ref 4–15)
NEUTROPHILS # BLD AUTO: 3.6 K/UL (ref 1.8–7.7)
NEUTROPHILS NFR BLD: 72.1 % (ref 38–73)
NRBC BLD-RTO: 0 /100 WBC
PHOSPHATE SERPL-MCNC: 2 MG/DL (ref 2.7–4.5)
PLATELET # BLD AUTO: 239 K/UL (ref 150–450)
PMV BLD AUTO: 10 FL (ref 9.2–12.9)
POTASSIUM SERPL-SCNC: 4.4 MMOL/L (ref 3.5–5.1)
RBC # BLD AUTO: 4.48 M/UL (ref 4.6–6.2)
SODIUM SERPL-SCNC: 141 MMOL/L (ref 136–145)
WBC # BLD AUTO: 4.98 K/UL (ref 3.9–12.7)

## 2024-09-09 PROCEDURE — 36415 COLL VENOUS BLD VENIPUNCTURE: CPT | Mod: TXP | Performed by: INTERNAL MEDICINE

## 2024-09-09 PROCEDURE — 80069 RENAL FUNCTION PANEL: CPT | Mod: TXP | Performed by: INTERNAL MEDICINE

## 2024-09-09 PROCEDURE — 80197 ASSAY OF TACROLIMUS: CPT | Mod: TXP | Performed by: INTERNAL MEDICINE

## 2024-09-09 PROCEDURE — 83735 ASSAY OF MAGNESIUM: CPT | Mod: TXP | Performed by: INTERNAL MEDICINE

## 2024-09-09 PROCEDURE — 85025 COMPLETE CBC W/AUTO DIFF WBC: CPT | Mod: TXP | Performed by: INTERNAL MEDICINE

## 2024-09-09 NOTE — TELEPHONE ENCOUNTER
----- Message from Bella Purdy DO sent at 9/9/2024 11:05 AM CDT -----  Stable graft function. Can we check EBV IgG as well as Qantiferon to assess whether he would be a candidate for IV monthly micah    Needs better glycemic control.     Cont with phos supplements

## 2024-09-10 LAB — TACROLIMUS BLD-MCNC: 9 NG/ML (ref 5–15)

## 2024-09-16 ENCOUNTER — LAB VISIT (OUTPATIENT)
Dept: LAB | Facility: HOSPITAL | Age: 44
End: 2024-09-16
Attending: INTERNAL MEDICINE
Payer: COMMERCIAL

## 2024-09-16 DIAGNOSIS — Z79.899 IMMUNOSUPPRESSIVE MANAGEMENT ENCOUNTER FOLLOWING KIDNEY TRANSPLANT: ICD-10-CM

## 2024-09-16 DIAGNOSIS — Z94.0 IMMUNOSUPPRESSIVE MANAGEMENT ENCOUNTER FOLLOWING KIDNEY TRANSPLANT: ICD-10-CM

## 2024-09-16 DIAGNOSIS — E83.42 HYPOMAGNESEMIA: ICD-10-CM

## 2024-09-16 DIAGNOSIS — Z94.0 KIDNEY REPLACED BY TRANSPLANT: ICD-10-CM

## 2024-09-16 LAB
ALBUMIN SERPL BCP-MCNC: 4.4 G/DL (ref 3.5–5.2)
ANION GAP SERPL CALC-SCNC: 11 MMOL/L (ref 8–16)
BACTERIA #/AREA URNS HPF: NORMAL /HPF
BASOPHILS # BLD AUTO: 0.06 K/UL (ref 0–0.2)
BASOPHILS NFR BLD: 1 % (ref 0–1.9)
BILIRUB UR QL STRIP: NEGATIVE
BUN SERPL-MCNC: 18 MG/DL (ref 6–20)
CALCIUM SERPL-MCNC: 9.8 MG/DL (ref 8.7–10.5)
CHLORIDE SERPL-SCNC: 107 MMOL/L (ref 95–110)
CLARITY UR: CLEAR
CO2 SERPL-SCNC: 25 MMOL/L (ref 23–29)
COLOR UR: YELLOW
CREAT SERPL-MCNC: 1.5 MG/DL (ref 0.5–1.4)
CREAT UR-MCNC: 132.3 MG/DL (ref 23–375)
DIFFERENTIAL METHOD BLD: ABNORMAL
EOSINOPHIL # BLD AUTO: 0.1 K/UL (ref 0–0.5)
EOSINOPHIL NFR BLD: 1 % (ref 0–8)
ERYTHROCYTE [DISTWIDTH] IN BLOOD BY AUTOMATED COUNT: 13.9 % (ref 11.5–14.5)
EST. GFR  (NO RACE VARIABLE): 58.9 ML/MIN/1.73 M^2
GLUCOSE SERPL-MCNC: 118 MG/DL (ref 70–110)
GLUCOSE UR QL STRIP: ABNORMAL
HCT VFR BLD AUTO: 43.6 % (ref 40–54)
HGB BLD-MCNC: 14.5 G/DL (ref 14–18)
HGB UR QL STRIP: ABNORMAL
HYALINE CASTS #/AREA URNS LPF: 0 /LPF
IMM GRANULOCYTES # BLD AUTO: 0.02 K/UL (ref 0–0.04)
IMM GRANULOCYTES NFR BLD AUTO: 0.3 % (ref 0–0.5)
KETONES UR QL STRIP: NEGATIVE
LEUKOCYTE ESTERASE UR QL STRIP: NEGATIVE
LYMPHOCYTES # BLD AUTO: 0.6 K/UL (ref 1–4.8)
LYMPHOCYTES NFR BLD: 10.5 % (ref 18–48)
MAGNESIUM SERPL-MCNC: 1.8 MG/DL (ref 1.6–2.6)
MCH RBC QN AUTO: 30 PG (ref 27–31)
MCHC RBC AUTO-ENTMCNC: 33.3 G/DL (ref 32–36)
MCV RBC AUTO: 90 FL (ref 82–98)
MICROSCOPIC COMMENT: NORMAL
MONOCYTES # BLD AUTO: 0.6 K/UL (ref 0.3–1)
MONOCYTES NFR BLD: 9.8 % (ref 4–15)
NEUTROPHILS # BLD AUTO: 4.6 K/UL (ref 1.8–7.7)
NEUTROPHILS NFR BLD: 77.4 % (ref 38–73)
NITRITE UR QL STRIP: NEGATIVE
NRBC BLD-RTO: 0 /100 WBC
PH UR STRIP: 6 [PH] (ref 5–8)
PHOSPHATE SERPL-MCNC: 2.3 MG/DL (ref 2.7–4.5)
PLATELET # BLD AUTO: 240 K/UL (ref 150–450)
PMV BLD AUTO: 10.1 FL (ref 9.2–12.9)
POTASSIUM SERPL-SCNC: 4 MMOL/L (ref 3.5–5.1)
PROT UR QL STRIP: ABNORMAL
PROT UR-MCNC: 39 MG/DL (ref 0–15)
PROT/CREAT UR: 0.29 MG/G{CREAT} (ref 0–0.2)
RBC # BLD AUTO: 4.84 M/UL (ref 4.6–6.2)
RBC #/AREA URNS HPF: 1 /HPF (ref 0–4)
SODIUM SERPL-SCNC: 143 MMOL/L (ref 136–145)
SP GR UR STRIP: 1.02 (ref 1–1.03)
URN SPEC COLLECT METH UR: ABNORMAL
UROBILINOGEN UR STRIP-ACNC: NEGATIVE EU/DL
WBC # BLD AUTO: 6 K/UL (ref 3.9–12.7)
WBC #/AREA URNS HPF: 0 /HPF (ref 0–5)

## 2024-09-16 PROCEDURE — 85025 COMPLETE CBC W/AUTO DIFF WBC: CPT | Mod: NTX | Performed by: INTERNAL MEDICINE

## 2024-09-16 PROCEDURE — 83735 ASSAY OF MAGNESIUM: CPT | Mod: TXP | Performed by: INTERNAL MEDICINE

## 2024-09-16 PROCEDURE — 84156 ASSAY OF PROTEIN URINE: CPT | Mod: TXP | Performed by: INTERNAL MEDICINE

## 2024-09-16 PROCEDURE — 80197 ASSAY OF TACROLIMUS: CPT | Mod: NTX | Performed by: INTERNAL MEDICINE

## 2024-09-16 PROCEDURE — 87799 DETECT AGENT NOS DNA QUANT: CPT | Mod: NTX | Performed by: INTERNAL MEDICINE

## 2024-09-16 PROCEDURE — 86665 EPSTEIN-BARR CAPSID VCA: CPT | Mod: TXP | Performed by: INTERNAL MEDICINE

## 2024-09-16 PROCEDURE — 86480 TB TEST CELL IMMUN MEASURE: CPT | Mod: TXP | Performed by: INTERNAL MEDICINE

## 2024-09-16 PROCEDURE — 81000 URINALYSIS NONAUTO W/SCOPE: CPT | Mod: NTX | Performed by: INTERNAL MEDICINE

## 2024-09-16 PROCEDURE — 80069 RENAL FUNCTION PANEL: CPT | Mod: NTX | Performed by: INTERNAL MEDICINE

## 2024-09-16 PROCEDURE — 36415 COLL VENOUS BLD VENIPUNCTURE: CPT | Mod: TXP | Performed by: INTERNAL MEDICINE

## 2024-09-17 LAB
EBV VCA IGG SER QL IA: POSITIVE
GAMMA INTERFERON BACKGROUND BLD IA-ACNC: 0.01 IU/ML
M TB IFN-G CD4+ BCKGRND COR BLD-ACNC: 0.01 IU/ML
M TB IFN-G CD4+ BCKGRND COR BLD-ACNC: 0.02 IU/ML
MITOGEN IGNF BCKGRD COR BLD-ACNC: 1.22 IU/ML
TACROLIMUS BLD-MCNC: 6.9 NG/ML (ref 5–15)
TB GOLD PLUS: NEGATIVE

## 2024-09-19 ENCOUNTER — PATIENT MESSAGE (OUTPATIENT)
Dept: TRANSPLANT | Facility: CLINIC | Age: 44
End: 2024-09-19
Payer: COMMERCIAL

## 2024-09-20 DIAGNOSIS — Z94.0 KIDNEY REPLACED BY TRANSPLANT: Primary | ICD-10-CM

## 2024-09-23 ENCOUNTER — OFFICE VISIT (OUTPATIENT)
Dept: TRANSPLANT | Facility: CLINIC | Age: 44
End: 2024-09-23
Payer: COMMERCIAL

## 2024-09-23 ENCOUNTER — LAB VISIT (OUTPATIENT)
Dept: LAB | Facility: HOSPITAL | Age: 44
End: 2024-09-23
Payer: COMMERCIAL

## 2024-09-23 VITALS
HEART RATE: 75 BPM | BODY MASS INDEX: 26.36 KG/M2 | RESPIRATION RATE: 18 BRPM | DIASTOLIC BLOOD PRESSURE: 95 MMHG | OXYGEN SATURATION: 100 % | HEIGHT: 76 IN | SYSTOLIC BLOOD PRESSURE: 144 MMHG | TEMPERATURE: 97 F | WEIGHT: 216.5 LBS

## 2024-09-23 DIAGNOSIS — Z94.0 KIDNEY REPLACED BY TRANSPLANT: ICD-10-CM

## 2024-09-23 DIAGNOSIS — E83.42 HYPOMAGNESEMIA: ICD-10-CM

## 2024-09-23 DIAGNOSIS — E83.39 HYPOPHOSPHATEMIA: ICD-10-CM

## 2024-09-23 DIAGNOSIS — Z79.60 LONG-TERM USE OF IMMUNOSUPPRESSANT MEDICATION: Primary | ICD-10-CM

## 2024-09-23 DIAGNOSIS — Z94.0 STATUS POST KIDNEY TRANSPLANT: ICD-10-CM

## 2024-09-23 LAB
ALBUMIN SERPL BCP-MCNC: 4.1 G/DL (ref 3.5–5.2)
ANION GAP SERPL CALC-SCNC: 9 MMOL/L (ref 8–16)
BASOPHILS # BLD AUTO: 0.07 K/UL (ref 0–0.2)
BASOPHILS NFR BLD: 1.4 % (ref 0–1.9)
BUN SERPL-MCNC: 23 MG/DL (ref 6–20)
CALCIUM SERPL-MCNC: 9.7 MG/DL (ref 8.7–10.5)
CHLORIDE SERPL-SCNC: 103 MMOL/L (ref 95–110)
CO2 SERPL-SCNC: 22 MMOL/L (ref 23–29)
CREAT SERPL-MCNC: 1.6 MG/DL (ref 0.5–1.4)
DIFFERENTIAL METHOD BLD: ABNORMAL
EOSINOPHIL # BLD AUTO: 0.1 K/UL (ref 0–0.5)
EOSINOPHIL NFR BLD: 1.2 % (ref 0–8)
ERYTHROCYTE [DISTWIDTH] IN BLOOD BY AUTOMATED COUNT: 13.7 % (ref 11.5–14.5)
EST. GFR  (NO RACE VARIABLE): 54.5 ML/MIN/1.73 M^2
GLUCOSE SERPL-MCNC: 341 MG/DL (ref 70–110)
HCT VFR BLD AUTO: 41.5 % (ref 40–54)
HGB BLD-MCNC: 14 G/DL (ref 14–18)
IMM GRANULOCYTES # BLD AUTO: 0.03 K/UL (ref 0–0.04)
IMM GRANULOCYTES NFR BLD AUTO: 0.6 % (ref 0–0.5)
LYMPHOCYTES # BLD AUTO: 0.7 K/UL (ref 1–4.8)
LYMPHOCYTES NFR BLD: 13.7 % (ref 18–48)
MAGNESIUM SERPL-MCNC: 1.9 MG/DL (ref 1.6–2.6)
MCH RBC QN AUTO: 30.7 PG (ref 27–31)
MCHC RBC AUTO-ENTMCNC: 33.7 G/DL (ref 32–36)
MCV RBC AUTO: 91 FL (ref 82–98)
MONOCYTES # BLD AUTO: 0.6 K/UL (ref 0.3–1)
MONOCYTES NFR BLD: 12.3 % (ref 4–15)
NEUTROPHILS # BLD AUTO: 3.5 K/UL (ref 1.8–7.7)
NEUTROPHILS NFR BLD: 70.8 % (ref 38–73)
NRBC BLD-RTO: 0 /100 WBC
PHOSPHATE SERPL-MCNC: 2.2 MG/DL (ref 2.7–4.5)
PLATELET # BLD AUTO: 282 K/UL (ref 150–450)
PMV BLD AUTO: 10.9 FL (ref 9.2–12.9)
POTASSIUM SERPL-SCNC: 4.5 MMOL/L (ref 3.5–5.1)
RBC # BLD AUTO: 4.56 M/UL (ref 4.6–6.2)
SODIUM SERPL-SCNC: 134 MMOL/L (ref 136–145)
TACROLIMUS BLD-MCNC: 8.4 NG/ML (ref 5–15)
WBC # BLD AUTO: 4.89 K/UL (ref 3.9–12.7)

## 2024-09-23 PROCEDURE — 85025 COMPLETE CBC W/AUTO DIFF WBC: CPT | Mod: TXP | Performed by: INTERNAL MEDICINE

## 2024-09-23 PROCEDURE — 80197 ASSAY OF TACROLIMUS: CPT | Mod: TXP | Performed by: INTERNAL MEDICINE

## 2024-09-23 PROCEDURE — 80069 RENAL FUNCTION PANEL: CPT | Mod: TXP | Performed by: INTERNAL MEDICINE

## 2024-09-23 PROCEDURE — 99999 PR PBB SHADOW E&M-EST. PATIENT-LVL IV: CPT | Mod: PBBFAC,,, | Performed by: INTERNAL MEDICINE

## 2024-09-23 PROCEDURE — 36415 COLL VENOUS BLD VENIPUNCTURE: CPT | Mod: TXP | Performed by: INTERNAL MEDICINE

## 2024-09-23 PROCEDURE — 83735 ASSAY OF MAGNESIUM: CPT | Mod: TXP | Performed by: INTERNAL MEDICINE

## 2024-09-23 RX ORDER — PANTOPRAZOLE SODIUM 40 MG/1
40 TABLET, DELAYED RELEASE ORAL DAILY
Qty: 90 TABLET | Refills: 3 | Status: SHIPPED | OUTPATIENT
Start: 2024-09-23 | End: 2025-09-23

## 2024-09-23 RX ORDER — CALCITRIOL 0.5 UG/1
0.5 CAPSULE ORAL DAILY
Qty: 60 CAPSULE | Refills: 5 | Status: SHIPPED | OUTPATIENT
Start: 2024-09-23

## 2024-09-23 RX ORDER — ATORVASTATIN CALCIUM 40 MG/1
40 TABLET, FILM COATED ORAL DAILY
Qty: 90 TABLET | Refills: 3 | Status: SHIPPED | OUTPATIENT
Start: 2024-09-23

## 2024-09-23 RX ORDER — EPINEPHRINE 0.3 MG/.3ML
0.3 INJECTION SUBCUTANEOUS ONCE AS NEEDED
OUTPATIENT
Start: 2024-09-23

## 2024-09-23 RX ORDER — DIPHENHYDRAMINE HYDROCHLORIDE 50 MG/ML
50 INJECTION INTRAMUSCULAR; INTRAVENOUS ONCE AS NEEDED
OUTPATIENT
Start: 2024-09-23

## 2024-09-23 RX ORDER — HEPARIN 100 UNIT/ML
500 SYRINGE INTRAVENOUS
OUTPATIENT
Start: 2024-09-23

## 2024-09-23 NOTE — LETTER
September 26, 2024        Jose Mckinney  [address] [facility] [address]  [address]  [facility] [place] [address]  Phone: [phone]  Fax: [phone]             [facility] Transplant 1st Fl  [address]  [facility] [address]  Phone: [phone]   Patient: Dejuan Diaz Jr.   MR Number: [id number]   YOB: 1980   Date of Visit: 9/23/2024       Dear Dr. Jose Mckinney    Thank you for referring Dejuan Diaz to me for evaluation. Attached you will find relevant portions of my assessment and plan of care.    If you have questions, please do not hesitate to call me. I look forward to following Dejuan Diaz along with you.    Sincerely,    Bella Purdy, DO    Enclosure    If you would like to receive this communication electronically, please contact [email or web address] or [phone] to request [employer] access.    [employer] is a tool which provides read-only access to select patient information with whom you have a relationship. Its easy to use and provides real time access to review your patients record including encounter summaries, notes, results, and demographic information.    If you feel you have received this communication in error or would no longer like to receive these types of communications, please e-mail [email or web address]

## 2024-09-26 NOTE — PROGRESS NOTES
Kidney Post-Transplant Assessment    Referring Physician: Jose Mckinney  Current Nephrologist: Jose Mckinney    ORGAN: LEFT KIDNEY  Donor Type: living  PHS Increased Risk: no  Cold Ischemia: 32 mins  Induction Medications: Thymo     Subjective:     CC:  Reassessment of renal allograft function and management of chronic immunosuppression.    HPI:  Mr. Diaz is a 43 y.o. year old White male who received a living kidney transplant on 7/16/24.     Since the last visit patient was noted to have elevated creatinine to 1.6 with a kidney biopsy performed which did not show any rejection or any signs of ATI. Patient states that since then he has been doing well despite having his dad pass away end of last month. Patient does state that he is having difficulty getting his sugars controlled and has been working with his endocrinologist to get it controlled.     Patient otherwise denies any fever, runny nose, watery eyes. Does admit to HARP for which he takes Tylenol. Denies any nausea, vomiting, heart burn, SOB or CP. Does admit to some abdominal pain which last for only a few minutes. Denies any diarrhea, dysuria or frequency    SBP at home less than 130 usually. HR however runs in 's     Of note, patient admits to wanting to talk to finance regarding billing after transplantation     Review of Systems   Constitutional:  Negative for activity change, appetite change, chills and fever.   HENT:  Negative for congestion, sneezing and sore throat.    Eyes:  Negative for pain and itching.   Respiratory:  Negative for apnea, cough, shortness of breath and wheezing.    Cardiovascular:  Negative for chest pain.   Gastrointestinal:  Negative for abdominal pain, constipation, diarrhea, nausea and vomiting.   Genitourinary:  Negative for difficulty urinating, dysuria and frequency.   Musculoskeletal:  Negative for back pain, joint swelling and neck pain.   Skin:  Negative for color change.   Neurological:  Negative for  "dizziness, light-headedness and headaches.   Psychiatric/Behavioral:  Negative for behavioral problems and confusion. The patient is not nervous/anxious.        Objective:   Blood pressure (!) 144/95, pulse 75, temperature 97.3 °F (36.3 °C), temperature source Temporal, resp. rate 18, height 6' 3.98" (1.93 m), weight 98.2 kg (216 lb 7.9 oz), SpO2 100%.body mass index is 26.36 kg/m².    Physical Exam  Vitals reviewed.   Constitutional:       General: He is not in acute distress.     Appearance: Normal appearance. He is not ill-appearing or toxic-appearing.      Comments: Appears stated age   HENT:      Head: Normocephalic and atraumatic.      Right Ear: External ear normal.      Left Ear: External ear normal.      Nose: Nose normal.   Eyes:      General: No scleral icterus.     Conjunctiva/sclera: Conjunctivae normal.   Cardiovascular:      Rate and Rhythm: Normal rate and regular rhythm.      Pulses: Normal pulses.      Heart sounds: Normal heart sounds. No murmur heard.     No friction rub.   Pulmonary:      Effort: Pulmonary effort is normal. No respiratory distress.      Breath sounds: Normal breath sounds. No wheezing or rhonchi.   Abdominal:      General: Bowel sounds are normal. There is no distension.      Palpations: Abdomen is soft.      Tenderness: There is no abdominal tenderness. There is no guarding.   Musculoskeletal:         General: No swelling or deformity. Normal range of motion.      Cervical back: Normal range of motion and neck supple. No tenderness.      Right lower leg: No edema.      Left lower leg: No edema.   Skin:     General: Skin is warm and dry.      Coloration: Skin is not jaundiced.      Findings: No erythema or rash.   Neurological:      General: No focal deficit present.      Mental Status: He is alert and oriented to person, place, and time.      Motor: No weakness.   Psychiatric:         Mood and Affect: Mood normal.         Behavior: Behavior normal.         Labs:  Lab Results " "  Component Value Date    WBC 4.89 09/23/2024    HGB 14.0 09/23/2024    HCT 41.5 09/23/2024     (L) 09/23/2024    K 4.5 09/23/2024     09/23/2024    CO2 22 (L) 09/23/2024    BUN 23 (H) 09/23/2024    CREATININE 1.6 (H) 09/23/2024    EGFRNORACEVR 54.5 (A) 09/23/2024    CALCIUM 9.7 09/23/2024    PHOS 2.2 (L) 09/23/2024    MG 1.9 09/23/2024    ALBUMIN 4.1 09/23/2024    AST 19 08/19/2024    ALT 33 08/19/2024    UTPCR 0.29 (H) 09/16/2024    .1 (H) 07/16/2024    TACROLIMUS 8.4 09/23/2024       No results found for: "EXTANC", "EXTWBC", "EXTSEGS", "EXTPLATELETS", "EXTHEMOGLOBI", "EXTHEMATOCRI", "EXTCREATININ", "EXTSODIUM", "EXTPOTASSIUM", "EXTBUN", "EXTCO2", "EXTCALCIUM", "EXTPHOSPHORU", "EXTGLUCOSE", "EXTALBUMIN", "EXTAST", "EXTALT", "EXTBILITOTAL", "EXTLIPASE", "EXTAMYLASE"    No results found for: "EXTCYCLOSLVL", "EXTSIROLIMUS", "EXTTACROLVL", "EXTPROTCRE", "EXTPTHINTACT", "EXTPROTEINUA", "EXTWBCUA", "EXTRBCUA"    Labs were reviewed with the patient    Assessment and Plan   43 yr old with CKD stage V secondary to type II DM s/p a living unrelated kidney transplant on 7/16/24. 37% PRA. Crossmatch negative but noted initially to have a DQ7 that was felt to be a false positive reactivity    1) JONATHAN of living unrelated kidney transplant:   - creatinine remains at higher end of normal around 1.6 with unremarkable kidney transplant biopsy  - discussed with patient regarding pending work up for switching prograf to IV monthly micah  - will discuss with urology regarding potential need for cystoscopy given US kidney transplant on 8/5/24 that showed lateral bladder wall lesion after a complicated ureteral stent removal   - FK level pending. Cont on FK 3 mg BID  - cont on Cellcept 1 gram BID   - cont on prednisone 5 mg daily  - completed Acyclovir    -  cont on Bactrim until 1/12/25  2) HTN:   - controlled off medications   - can take Norvasc 5 mg as needed for SBP> 150  3) type II DM:    - followed by his " endocrinologist with setting on insulin pump adjusted   - currently listed inactive for a pancreas transplant   4) Hypomagnesemia:   - cont on MgOx 800 mg BID   5) Hypophosphatemia:   - cont on Kphos 500 mg BID   - increase foods rich in phos  6) HLD:   - on Lipitor  7) Secondary hyperparathyroidism   - cont on calcitriol.     Labs and RTC per protocol    Bella Purdy DO   Transplant Nephrology

## 2024-09-27 ENCOUNTER — TELEPHONE (OUTPATIENT)
Dept: UROLOGY | Facility: CLINIC | Age: 44
End: 2024-09-27
Payer: COMMERCIAL

## 2024-09-27 DIAGNOSIS — Z94.0 KIDNEY REPLACED BY TRANSPLANT: Primary | ICD-10-CM

## 2024-09-27 NOTE — TELEPHONE ENCOUNTER
Called patient to schedule CT and virtual with Dr. Tomas.  No answer, left message for return call.

## 2024-09-30 ENCOUNTER — LAB VISIT (OUTPATIENT)
Dept: LAB | Facility: HOSPITAL | Age: 44
End: 2024-09-30
Attending: INTERNAL MEDICINE
Payer: COMMERCIAL

## 2024-09-30 DIAGNOSIS — Z94.0 KIDNEY REPLACED BY TRANSPLANT: ICD-10-CM

## 2024-09-30 DIAGNOSIS — E83.42 HYPOMAGNESEMIA: ICD-10-CM

## 2024-09-30 LAB
ALBUMIN SERPL BCP-MCNC: 4.1 G/DL (ref 3.5–5.2)
ANION GAP SERPL CALC-SCNC: 15 MMOL/L (ref 8–16)
BASOPHILS # BLD AUTO: 0.04 K/UL (ref 0–0.2)
BASOPHILS NFR BLD: 0.9 % (ref 0–1.9)
BUN SERPL-MCNC: 25 MG/DL (ref 6–20)
CALCIUM SERPL-MCNC: 10.1 MG/DL (ref 8.7–10.5)
CHLORIDE SERPL-SCNC: 106 MMOL/L (ref 95–110)
CO2 SERPL-SCNC: 21 MMOL/L (ref 23–29)
CREAT SERPL-MCNC: 1.5 MG/DL (ref 0.5–1.4)
DIFFERENTIAL METHOD BLD: ABNORMAL
EOSINOPHIL # BLD AUTO: 0 K/UL (ref 0–0.5)
EOSINOPHIL NFR BLD: 0.7 % (ref 0–8)
ERYTHROCYTE [DISTWIDTH] IN BLOOD BY AUTOMATED COUNT: 13.5 % (ref 11.5–14.5)
EST. GFR  (NO RACE VARIABLE): 58.9 ML/MIN/1.73 M^2
GLUCOSE SERPL-MCNC: 208 MG/DL (ref 70–110)
HCT VFR BLD AUTO: 42.5 % (ref 40–54)
HGB BLD-MCNC: 14.3 G/DL (ref 14–18)
IMM GRANULOCYTES # BLD AUTO: 0.05 K/UL (ref 0–0.04)
IMM GRANULOCYTES NFR BLD AUTO: 1.2 % (ref 0–0.5)
LYMPHOCYTES # BLD AUTO: 0.6 K/UL (ref 1–4.8)
LYMPHOCYTES NFR BLD: 13.1 % (ref 18–48)
MAGNESIUM SERPL-MCNC: 1.6 MG/DL (ref 1.6–2.6)
MCH RBC QN AUTO: 30.4 PG (ref 27–31)
MCHC RBC AUTO-ENTMCNC: 33.6 G/DL (ref 32–36)
MCV RBC AUTO: 90 FL (ref 82–98)
MONOCYTES # BLD AUTO: 0.5 K/UL (ref 0.3–1)
MONOCYTES NFR BLD: 12 % (ref 4–15)
NEUTROPHILS # BLD AUTO: 3.1 K/UL (ref 1.8–7.7)
NEUTROPHILS NFR BLD: 72.1 % (ref 38–73)
NRBC BLD-RTO: 0 /100 WBC
PHOSPHATE SERPL-MCNC: 2.4 MG/DL (ref 2.7–4.5)
PLATELET # BLD AUTO: 234 K/UL (ref 150–450)
PMV BLD AUTO: 10.5 FL (ref 9.2–12.9)
POTASSIUM SERPL-SCNC: 4.3 MMOL/L (ref 3.5–5.1)
RBC # BLD AUTO: 4.71 M/UL (ref 4.6–6.2)
SODIUM SERPL-SCNC: 142 MMOL/L (ref 136–145)
WBC # BLD AUTO: 4.34 K/UL (ref 3.9–12.7)

## 2024-09-30 PROCEDURE — 36415 COLL VENOUS BLD VENIPUNCTURE: CPT | Mod: TXP | Performed by: INTERNAL MEDICINE

## 2024-09-30 PROCEDURE — 83735 ASSAY OF MAGNESIUM: CPT | Mod: TXP | Performed by: INTERNAL MEDICINE

## 2024-09-30 PROCEDURE — 80197 ASSAY OF TACROLIMUS: CPT | Mod: TXP | Performed by: INTERNAL MEDICINE

## 2024-09-30 PROCEDURE — 80069 RENAL FUNCTION PANEL: CPT | Mod: TXP | Performed by: INTERNAL MEDICINE

## 2024-09-30 PROCEDURE — 85025 COMPLETE CBC W/AUTO DIFF WBC: CPT | Mod: TXP | Performed by: INTERNAL MEDICINE

## 2024-10-01 LAB — TACROLIMUS BLD-MCNC: 8.2 NG/ML (ref 5–15)

## 2024-10-02 ENCOUNTER — HOSPITAL ENCOUNTER (OUTPATIENT)
Dept: RADIOLOGY | Facility: HOSPITAL | Age: 44
Discharge: HOME | End: 2024-10-02
Attending: UROLOGY
Payer: COMMERCIAL

## 2024-10-02 DIAGNOSIS — Z94.0 KIDNEY REPLACED BY TRANSPLANT: ICD-10-CM

## 2024-10-02 PROCEDURE — 25500020 PHARM REV CODE 255: Performed by: UROLOGY

## 2024-10-02 PROCEDURE — 74178 CT ABD&PLV WO CNTR FLWD CNTR: CPT | Mod: 26,,, | Performed by: RADIOLOGY

## 2024-10-02 PROCEDURE — 74178 CT ABD&PLV WO CNTR FLWD CNTR: CPT | Mod: TC

## 2024-10-02 RX ORDER — IODIXANOL 320 MG/ML
100 INJECTION, SOLUTION INTRAVASCULAR
Status: COMPLETED | OUTPATIENT
Start: 2024-10-02 | End: 2024-10-02

## 2024-10-02 RX ADMIN — IODIXANOL 100 ML: 320 INJECTION, SOLUTION INTRAVASCULAR at 02:10

## 2024-10-07 ENCOUNTER — OFFICE VISIT (OUTPATIENT)
Dept: UROLOGY | Facility: CLINIC | Age: 44
End: 2024-10-07
Payer: COMMERCIAL

## 2024-10-07 ENCOUNTER — LAB VISIT (OUTPATIENT)
Dept: LAB | Facility: HOSPITAL | Age: 44
End: 2024-10-07
Attending: INTERNAL MEDICINE
Payer: COMMERCIAL

## 2024-10-07 DIAGNOSIS — Z94.0 KIDNEY REPLACED BY TRANSPLANT: ICD-10-CM

## 2024-10-07 DIAGNOSIS — E83.42 HYPOMAGNESEMIA: ICD-10-CM

## 2024-10-07 DIAGNOSIS — Z94.0 KIDNEY REPLACED BY TRANSPLANT: Primary | ICD-10-CM

## 2024-10-07 LAB
ALBUMIN SERPL BCP-MCNC: 4.1 G/DL (ref 3.5–5.2)
ANION GAP SERPL CALC-SCNC: 14 MMOL/L (ref 8–16)
BASOPHILS # BLD AUTO: 0.03 K/UL (ref 0–0.2)
BASOPHILS NFR BLD: 1.2 % (ref 0–1.9)
BUN SERPL-MCNC: 30 MG/DL (ref 6–20)
CALCIUM SERPL-MCNC: 9.4 MG/DL (ref 8.7–10.5)
CHLORIDE SERPL-SCNC: 100 MMOL/L (ref 95–110)
CO2 SERPL-SCNC: 19 MMOL/L (ref 23–29)
CREAT SERPL-MCNC: 1.6 MG/DL (ref 0.5–1.4)
DIFFERENTIAL METHOD BLD: ABNORMAL
EOSINOPHIL # BLD AUTO: 0 K/UL (ref 0–0.5)
EOSINOPHIL NFR BLD: 1.2 % (ref 0–8)
ERYTHROCYTE [DISTWIDTH] IN BLOOD BY AUTOMATED COUNT: 13.3 % (ref 11.5–14.5)
EST. GFR  (NO RACE VARIABLE): 54.5 ML/MIN/1.73 M^2
GLUCOSE SERPL-MCNC: 366 MG/DL (ref 70–110)
HCT VFR BLD AUTO: 40.2 % (ref 40–54)
HGB BLD-MCNC: 13.8 G/DL (ref 14–18)
IMM GRANULOCYTES # BLD AUTO: 0.06 K/UL (ref 0–0.04)
IMM GRANULOCYTES NFR BLD AUTO: 2.4 % (ref 0–0.5)
LYMPHOCYTES # BLD AUTO: 0.4 K/UL (ref 1–4.8)
LYMPHOCYTES NFR BLD: 14 % (ref 18–48)
MAGNESIUM SERPL-MCNC: 1.8 MG/DL (ref 1.6–2.6)
MCH RBC QN AUTO: 30.3 PG (ref 27–31)
MCHC RBC AUTO-ENTMCNC: 34.3 G/DL (ref 32–36)
MCV RBC AUTO: 88 FL (ref 82–98)
MONOCYTES # BLD AUTO: 0.6 K/UL (ref 0.3–1)
MONOCYTES NFR BLD: 22.4 % (ref 4–15)
NEUTROPHILS # BLD AUTO: 1.5 K/UL (ref 1.8–7.7)
NEUTROPHILS NFR BLD: 58.8 % (ref 38–73)
NRBC BLD-RTO: 0 /100 WBC
PHOSPHATE SERPL-MCNC: 2.1 MG/DL (ref 2.7–4.5)
PLATELET # BLD AUTO: 235 K/UL (ref 150–450)
PMV BLD AUTO: 10.2 FL (ref 9.2–12.9)
POTASSIUM SERPL-SCNC: 4.6 MMOL/L (ref 3.5–5.1)
RBC # BLD AUTO: 4.55 M/UL (ref 4.6–6.2)
SODIUM SERPL-SCNC: 133 MMOL/L (ref 136–145)
WBC # BLD AUTO: 2.5 K/UL (ref 3.9–12.7)

## 2024-10-07 PROCEDURE — 80069 RENAL FUNCTION PANEL: CPT | Mod: TXP | Performed by: INTERNAL MEDICINE

## 2024-10-07 PROCEDURE — 85025 COMPLETE CBC W/AUTO DIFF WBC: CPT | Mod: TXP | Performed by: INTERNAL MEDICINE

## 2024-10-07 PROCEDURE — 83735 ASSAY OF MAGNESIUM: CPT | Mod: TXP | Performed by: INTERNAL MEDICINE

## 2024-10-07 PROCEDURE — 36415 COLL VENOUS BLD VENIPUNCTURE: CPT | Mod: TXP | Performed by: INTERNAL MEDICINE

## 2024-10-07 PROCEDURE — 80197 ASSAY OF TACROLIMUS: CPT | Mod: TXP | Performed by: INTERNAL MEDICINE

## 2024-10-07 RX ORDER — DIAZEPAM 5 MG/1
5 TABLET ORAL ONCE
Qty: 1 TABLET | Refills: 0 | Status: SHIPPED | OUTPATIENT
Start: 2024-10-07 | End: 2024-10-07

## 2024-10-07 NOTE — PROGRESS NOTES
[facility]  Urologic Oncology    The patient location is:  [place]  The chief complaint leading to consultation is:  Bladder mass identified on ultrasound    Visit type: audiovisual    Face to Face time with patient:  7 minutes  Fifteen minutes of total time spent on the encounter, which includes face to face time and non-face to face time preparing to see the patient (eg, review of tests), Obtaining and/or reviewing separately obtained history, Documenting clinical information in the electronic or other health record, Independently interpreting results (not separately reported) and communicating results to the patient/family/caregiver, or Care coordination (not separately reported).     Each patient to whom he or she provides medical services by telemedicine is:  (1) informed of the relationship between the physician and patient and the respective role of any other health care provider with respect to management of the patient; and (2) notified that he or she may decline to receive medical services by telemedicine and may withdraw from such care at any time.    Date of Service: 10/07/2024    Urologic Oncology Problem List:  Status post renal transplant then cystoscopic stent removal on 08/05/2024.  Confirmed 1 ureteral stent and stent was removed intact without fragmentation  Posttransplant bladder mass identified on ultrasound but appears to be corresponding to the ureteral orifice.  CT urogram performed thereafter she was no enhancing masses or lesions within the urinary bladder    History of Present Illness:   History of Present Illness            Patient is a very pleasant 43-year-old male who is status post renal transplant to the left iliac fossa.  I removed his ureteral stent personally and confirmed at the time that there was 1 ureteral stent utilize with no fragmentation.  He then had a bladder ultrasound during a renal transplant ultrasound, and a bladder mass was noted on the lateral  bladder wall, I have reviewed the images of this myself and personally interpreted this, I believe this is likely to correspond to the sofya ureteral orifice with some edema status post stent placement.  We obtained a CT urogram and I have reviewed these images personally and in detail as well, there was no enhancing mass or bladder wall tumor.  I discussed this with the patient and his wife    Allergies:  Review of patient's allergies indicates:   Allergen Reactions    Codeine Hallucinations    Vancomycin analogues Other (See Comments)     Burning and  itchig of skin        Medications per EMR:  (Not in a hospital admission)      Past Medical History:  Past Medical History:   Diagnosis Date    Anemia     Diabetes mellitus type II     Disorder of kidney and ureter     GERD (gastroesophageal reflux disease)     Hypertension     Proteinuria         Past Surgical History:  Past Surgical History:   Procedure Laterality Date    INNER EAR SURGERY      for tinnitus    KIDNEY TRANSPLANT Left 7/16/2024    Procedure: TRANSPLANT, KIDNEY;  Surgeon: Erma Gómez MD;  Location: [address];  Service: Transplant;  Laterality: Left;        Family History:  Family History   Problem Relation Name Age of Onset    Kidney disease Mother      Heart disease Mother      Diabetes Mother      Cancer Father      Heart disease Sister          Social History:  Social History     Tobacco Use    Smoking status: Never    Smokeless tobacco: Never   Substance Use Topics    Alcohol use: No          OBJECTIVE:     There were no vitals filed for this visit.     Physical Exam            Physical Exam    General: No acute distress. Nontoxic appearing.  HENT: Normocephalic. Atraumatic.  Respiratory: Normal respiratory effort. No conversational dyspnea. No audible wheezing.  Abdomen: No obvious distension.  Skin: No visible abnormalities.  Extremities: No edema upper extremities. No edema lower extremities.  Neurological: Alert and oriented x3.  Normal speech.  Psychiatric: Normal mood. Normal affect. No evidence of SI.         LABS:    CBC:  Lab Results   Component Value Date    WBC 2.50 (L) 10/07/2024    HGB 13.8 (L) 10/07/2024    HCT 40.2 10/07/2024    MCV 88 10/07/2024     10/07/2024         BMP:  Lab Results   Component Value Date     (L) 10/07/2024    K 4.6 10/07/2024     10/07/2024    CO2 19 (L) 10/07/2024    BUN 30 (H) 10/07/2024    CREATININE 1.6 (H) 10/07/2024    CALCIUM 9.4 10/07/2024    ANIONGAP 14 10/07/2024    EGFRNORACEVR 54.5 (A) 10/07/2024         ASSESSMENT/PLAN:     Assessment & Plan            Assessment: 43-year-old male status post renal transplant and stent removal, with a bladder mass identified on renal ultrasound but not CT urogram     Plan:   Discussed the pros cons alternatives of proceeding with flexible cystoscopy again to visualize the bladder from the inside and confirmed there are no masses or tumors.  He was in agreement to proceed with this, and I offered to send him a Valium to his pharmacy on file which he was in agreement with.  We will aim for 10/21 for repeat flexible cystoscopy, he understands he needs to bring a  and I sent this Valium to his pharmacy    I spent a total of 15 minutes on the day of the visit.This includes face to face time and non-face to face time preparing to see the patient (eg, review of tests), obtaining and/or reviewing separately obtained history, documenting clinical information in the electronic or other health record, independently interpreting results and communicating results to the patient/family/caregiver, or care coordinator    This encounter was dictated and transcribed using Deep[employer]ribe and [employer], please excuse any typographical or grammatical errors.

## 2024-10-08 DIAGNOSIS — Z76.82 AWAITING ORGAN TRANSPLANT: ICD-10-CM

## 2024-10-08 LAB — TACROLIMUS BLD-MCNC: 7.5 NG/ML (ref 5–15)

## 2024-10-08 RX ORDER — MYCOPHENOLATE MOFETIL 250 MG/1
500 CAPSULE ORAL 2 TIMES DAILY
Qty: 120 CAPSULE | Refills: 11 | Status: SHIPPED | OUTPATIENT
Start: 2024-10-08 | End: 2025-10-08

## 2024-10-08 NOTE — TELEPHONE ENCOUNTER
Message sent to pt instructing him to decrease cellcept dose to 500mg twice a day. Repeat labs scheduled 10/14    ----- Message -----  From: Bella Purdy DO  Sent: 10/7/2024   4:00 PM CDT  To: [facility] Post-Kidney Transplant Clinical    Remains with elevated sugars. Has an endocrinologist that he is working with. Pseudohyponatrmia  Graft function at higher end of normal  ANC low. Decrease Cellcept to 500 mg BID. Recheck CBC with diff next week   FK level pending

## 2024-10-13 ENCOUNTER — NURSE TRIAGE (OUTPATIENT)
Dept: ADMINISTRATIVE | Facility: CLINIC | Age: 44
End: 2024-10-13
Payer: COMMERCIAL

## 2024-10-14 ENCOUNTER — HOSPITAL ENCOUNTER (INPATIENT)
Facility: HOSPITAL | Age: 44
LOS: 6 days | Discharge: HOME | DRG: 809 | End: 2024-10-20
Attending: INTERNAL MEDICINE | Admitting: INTERNAL MEDICINE
Payer: COMMERCIAL

## 2024-10-14 ENCOUNTER — LAB VISIT (OUTPATIENT)
Dept: LAB | Facility: HOSPITAL | Age: 44
End: 2024-10-14
Attending: INTERNAL MEDICINE
Payer: COMMERCIAL

## 2024-10-14 ENCOUNTER — HOSPITAL ENCOUNTER (EMERGENCY)
Facility: HOSPITAL | Age: 44
Discharge: ANOTHER HOSPITAL | End: 2024-10-14
Attending: EMERGENCY MEDICINE
Payer: COMMERCIAL

## 2024-10-14 VITALS
HEART RATE: 95 BPM | TEMPERATURE: 100 F | RESPIRATION RATE: 18 BRPM | OXYGEN SATURATION: 96 % | WEIGHT: 213 LBS | SYSTOLIC BLOOD PRESSURE: 155 MMHG | BODY MASS INDEX: 27.34 KG/M2 | HEIGHT: 74 IN | DIASTOLIC BLOOD PRESSURE: 96 MMHG

## 2024-10-14 DIAGNOSIS — D70.9 NEUTROPENIC FEVER: Primary | ICD-10-CM

## 2024-10-14 DIAGNOSIS — D63.8 ANEMIA OF CHRONIC DISEASE: ICD-10-CM

## 2024-10-14 DIAGNOSIS — E78.49 OTHER HYPERLIPIDEMIA: ICD-10-CM

## 2024-10-14 DIAGNOSIS — E10.69 TYPE 1 DIABETES MELLITUS WITH HYPEROSMOLARITY WITHOUT COMA: ICD-10-CM

## 2024-10-14 DIAGNOSIS — E87.0 TYPE 1 DIABETES MELLITUS WITH HYPEROSMOLARITY WITHOUT COMA: ICD-10-CM

## 2024-10-14 DIAGNOSIS — Z76.82 AWAITING ORGAN TRANSPLANT: ICD-10-CM

## 2024-10-14 DIAGNOSIS — Z79.60 LONG-TERM USE OF IMMUNOSUPPRESSANT MEDICATION: ICD-10-CM

## 2024-10-14 DIAGNOSIS — R50.81 NEUTROPENIC FEVER: Primary | ICD-10-CM

## 2024-10-14 DIAGNOSIS — R50.9 FEVER: ICD-10-CM

## 2024-10-14 DIAGNOSIS — E78.5 HYPERLIPIDEMIA, UNSPECIFIED HYPERLIPIDEMIA TYPE: ICD-10-CM

## 2024-10-14 DIAGNOSIS — Z94.0 KIDNEY REPLACED BY TRANSPLANT: ICD-10-CM

## 2024-10-14 DIAGNOSIS — E10.65 TYPE 1 DIABETES MELLITUS WITH HYPERGLYCEMIA: ICD-10-CM

## 2024-10-14 DIAGNOSIS — N25.81 SECONDARY RENAL HYPERPARATHYROIDISM: ICD-10-CM

## 2024-10-14 DIAGNOSIS — E83.39 HYPOPHOSPHATEMIA: ICD-10-CM

## 2024-10-14 DIAGNOSIS — Z29.89 PROPHYLACTIC IMMUNOTHERAPY: ICD-10-CM

## 2024-10-14 DIAGNOSIS — Z91.89 AT RISK FOR OPPORTUNISTIC INFECTIONS: ICD-10-CM

## 2024-10-14 DIAGNOSIS — Z96.41 INSULIN PUMP STATUS: ICD-10-CM

## 2024-10-14 DIAGNOSIS — E83.42 HYPOMAGNESEMIA: ICD-10-CM

## 2024-10-14 DIAGNOSIS — Z94.0 STATUS POST KIDNEY TRANSPLANT: ICD-10-CM

## 2024-10-14 DIAGNOSIS — D72.819 LEUKOPENIA, UNSPECIFIED TYPE: ICD-10-CM

## 2024-10-14 DIAGNOSIS — E10.65 TYPE 1 DIABETES MELLITUS WITH HYPEROSMOLARITY WITHOUT COMA: ICD-10-CM

## 2024-10-14 LAB
ALBUMIN SERPL BCP-MCNC: 3.9 G/DL (ref 3.5–5.2)
ANION GAP SERPL CALC-SCNC: 14 MMOL/L (ref 8–16)
ANISOCYTOSIS BLD QL SMEAR: SLIGHT
BACTERIA #/AREA URNS HPF: NORMAL /HPF
BASOPHILS NFR BLD: 2 % (ref 0–1.9)
BILIRUB UR QL STRIP: NEGATIVE
BUN SERPL-MCNC: 17 MG/DL (ref 6–20)
CALCIUM SERPL-MCNC: 9.9 MG/DL (ref 8.7–10.5)
CHLORIDE SERPL-SCNC: 103 MMOL/L (ref 95–110)
CLARITY UR: CLEAR
CO2 SERPL-SCNC: 22 MMOL/L (ref 23–29)
COLOR UR: YELLOW
CREAT SERPL-MCNC: 1.5 MG/DL (ref 0.5–1.4)
DIFFERENTIAL METHOD BLD: ABNORMAL
EOSINOPHIL NFR BLD: 2 % (ref 0–8)
ERYTHROCYTE [DISTWIDTH] IN BLOOD BY AUTOMATED COUNT: 13.1 % (ref 11.5–14.5)
EST. GFR  (NO RACE VARIABLE): 58.9 ML/MIN/1.73 M^2
GLUCOSE SERPL-MCNC: 320 MG/DL (ref 70–110)
GLUCOSE UR QL STRIP: ABNORMAL
GROUP A STREP, MOLECULAR: NEGATIVE
HCT VFR BLD AUTO: 42.6 % (ref 40–54)
HGB BLD-MCNC: 14.2 G/DL (ref 14–18)
HGB UR QL STRIP: ABNORMAL
HYALINE CASTS #/AREA URNS LPF: 0 /LPF
IMM GRANULOCYTES # BLD AUTO: ABNORMAL K/UL (ref 0–0.04)
IMM GRANULOCYTES NFR BLD AUTO: ABNORMAL % (ref 0–0.5)
INFLUENZA A, MOLECULAR: NEGATIVE
INFLUENZA B, MOLECULAR: NEGATIVE
KETONES UR QL STRIP: ABNORMAL
LEUKOCYTE ESTERASE UR QL STRIP: NEGATIVE
LYMPHOCYTES NFR BLD: 48 % (ref 18–48)
MAGNESIUM SERPL-MCNC: 1.6 MG/DL (ref 1.6–2.6)
MCH RBC QN AUTO: 30.1 PG (ref 27–31)
MCHC RBC AUTO-ENTMCNC: 33.3 G/DL (ref 32–36)
MCV RBC AUTO: 90 FL (ref 82–98)
MICROSCOPIC COMMENT: NORMAL
MONOCYTES NFR BLD: 40 % (ref 4–15)
NEUTROPHILS NFR BLD: 2 % (ref 38–73)
NEUTS BAND NFR BLD MANUAL: 6 %
NITRITE UR QL STRIP: NEGATIVE
NRBC BLD-RTO: 0 /100 WBC
PH UR STRIP: 6 [PH] (ref 5–8)
PHOSPHATE SERPL-MCNC: 1.7 MG/DL (ref 2.7–4.5)
PLATELET # BLD AUTO: 225 K/UL (ref 150–450)
PLATELET BLD QL SMEAR: ABNORMAL
PMV BLD AUTO: 10.1 FL (ref 9.2–12.9)
POTASSIUM SERPL-SCNC: 4.8 MMOL/L (ref 3.5–5.1)
PROT UR QL STRIP: ABNORMAL
RBC # BLD AUTO: 4.71 M/UL (ref 4.6–6.2)
RBC #/AREA URNS HPF: 3 /HPF (ref 0–4)
SARS-COV-2 RDRP RESP QL NAA+PROBE: NEGATIVE
SODIUM SERPL-SCNC: 139 MMOL/L (ref 136–145)
SP GR UR STRIP: 1.01 (ref 1–1.03)
SPECIMEN SOURCE: NORMAL
URN SPEC COLLECT METH UR: ABNORMAL
UROBILINOGEN UR STRIP-ACNC: NEGATIVE EU/DL
WBC # BLD AUTO: 0.73 K/UL (ref 3.9–12.7)
WBC #/AREA URNS HPF: 0 /HPF (ref 0–5)

## 2024-10-14 PROCEDURE — 99285 EMERGENCY DEPT VISIT HI MDM: CPT | Mod: 25,NTX

## 2024-10-14 PROCEDURE — 96365 THER/PROPH/DIAG IV INF INIT: CPT | Mod: NTX

## 2024-10-14 PROCEDURE — 63600175 PHARM REV CODE 636 W HCPCS: Mod: NTX | Performed by: EMERGENCY MEDICINE

## 2024-10-14 PROCEDURE — 87798 DETECT AGENT NOS DNA AMP: CPT | Mod: 59,NTX | Performed by: INTERNAL MEDICINE

## 2024-10-14 PROCEDURE — 87502 INFLUENZA DNA AMP PROBE: CPT | Mod: NTX | Performed by: EMERGENCY MEDICINE

## 2024-10-14 PROCEDURE — 83735 ASSAY OF MAGNESIUM: CPT | Mod: TXP | Performed by: INTERNAL MEDICINE

## 2024-10-14 PROCEDURE — 81000 URINALYSIS NONAUTO W/SCOPE: CPT | Mod: NTX | Performed by: EMERGENCY MEDICINE

## 2024-10-14 PROCEDURE — 63600175 PHARM REV CODE 636 W HCPCS: Mod: NTX

## 2024-10-14 PROCEDURE — 71045 X-RAY EXAM CHEST 1 VIEW: CPT | Mod: TC,NTX

## 2024-10-14 PROCEDURE — 25000003 PHARM REV CODE 250: Mod: NTX | Performed by: EMERGENCY MEDICINE

## 2024-10-14 PROCEDURE — U0002 COVID-19 LAB TEST NON-CDC: HCPCS | Mod: NTX | Performed by: EMERGENCY MEDICINE

## 2024-10-14 PROCEDURE — 87651 STREP A DNA AMP PROBE: CPT | Mod: NTX | Performed by: EMERGENCY MEDICINE

## 2024-10-14 PROCEDURE — 96375 TX/PRO/DX INJ NEW DRUG ADDON: CPT | Mod: NTX

## 2024-10-14 PROCEDURE — 87040 BLOOD CULTURE FOR BACTERIA: CPT | Mod: 59,NTX | Performed by: EMERGENCY MEDICINE

## 2024-10-14 PROCEDURE — 25000003 PHARM REV CODE 250: Mod: NTX

## 2024-10-14 PROCEDURE — 36415 COLL VENOUS BLD VENIPUNCTURE: CPT | Mod: NTX | Performed by: INTERNAL MEDICINE

## 2024-10-14 PROCEDURE — 27000207 HC ISOLATION: Mod: NTX

## 2024-10-14 PROCEDURE — 71045 X-RAY EXAM CHEST 1 VIEW: CPT | Mod: 26,NTX,, | Performed by: RADIOLOGY

## 2024-10-14 PROCEDURE — 80069 RENAL FUNCTION PANEL: CPT | Mod: NTX | Performed by: INTERNAL MEDICINE

## 2024-10-14 PROCEDURE — 99223 1ST HOSP IP/OBS HIGH 75: CPT | Mod: NTX,,, | Performed by: NURSE PRACTITIONER

## 2024-10-14 PROCEDURE — 87070 CULTURE OTHR SPECIMN AEROBIC: CPT | Mod: NTX | Performed by: NURSE PRACTITIONER

## 2024-10-14 PROCEDURE — 80197 ASSAY OF TACROLIMUS: CPT | Mod: TXP | Performed by: INTERNAL MEDICINE

## 2024-10-14 PROCEDURE — 87633 RESP VIRUS 12-25 TARGETS: CPT | Mod: NTX | Performed by: INTERNAL MEDICINE

## 2024-10-14 PROCEDURE — 20600001 HC STEP DOWN PRIVATE ROOM: Mod: NTX

## 2024-10-14 RX ORDER — CALCITRIOL 0.5 UG/1
0.5 CAPSULE ORAL DAILY
Status: DISCONTINUED | OUTPATIENT
Start: 2024-10-15 | End: 2024-10-20 | Stop reason: HOSPADM

## 2024-10-14 RX ORDER — PANTOPRAZOLE SODIUM 40 MG/1
40 TABLET, DELAYED RELEASE ORAL DAILY
Status: DISCONTINUED | OUTPATIENT
Start: 2024-10-15 | End: 2024-10-20 | Stop reason: HOSPADM

## 2024-10-14 RX ORDER — PREDNISONE 5 MG/1
5 TABLET ORAL DAILY
Status: DISCONTINUED | OUTPATIENT
Start: 2024-10-15 | End: 2024-10-20 | Stop reason: HOSPADM

## 2024-10-14 RX ORDER — TACROLIMUS 1 MG/1
3 CAPSULE ORAL 2 TIMES DAILY
Status: DISCONTINUED | OUTPATIENT
Start: 2024-10-15 | End: 2024-10-18

## 2024-10-14 RX ORDER — IBUPROFEN 200 MG
16 TABLET ORAL
Status: DISCONTINUED | OUTPATIENT
Start: 2024-10-14 | End: 2024-10-20 | Stop reason: HOSPADM

## 2024-10-14 RX ORDER — ACETAMINOPHEN 325 MG/1
650 TABLET ORAL EVERY 4 HOURS PRN
Status: DISCONTINUED | OUTPATIENT
Start: 2024-10-14 | End: 2024-10-20 | Stop reason: HOSPADM

## 2024-10-14 RX ORDER — ACETAMINOPHEN 500 MG
1000 TABLET ORAL
Status: COMPLETED | OUTPATIENT
Start: 2024-10-14 | End: 2024-10-14

## 2024-10-14 RX ORDER — SODIUM CHLORIDE 0.9 % (FLUSH) 0.9 %
10 SYRINGE (ML) INJECTION
Status: DISCONTINUED | OUTPATIENT
Start: 2024-10-14 | End: 2024-10-20 | Stop reason: HOSPADM

## 2024-10-14 RX ORDER — INSULIN ASPART 100 [IU]/ML
0-5 INJECTION, SOLUTION INTRAVENOUS; SUBCUTANEOUS
Status: DISCONTINUED | OUTPATIENT
Start: 2024-10-14 | End: 2024-10-15

## 2024-10-14 RX ORDER — PROCHLORPERAZINE EDISYLATE 5 MG/ML
5 INJECTION INTRAMUSCULAR; INTRAVENOUS EVERY 6 HOURS PRN
Status: DISCONTINUED | OUTPATIENT
Start: 2024-10-14 | End: 2024-10-20 | Stop reason: HOSPADM

## 2024-10-14 RX ORDER — DIPHENHYDRAMINE HYDROCHLORIDE 50 MG/ML
25 INJECTION INTRAMUSCULAR; INTRAVENOUS
Status: COMPLETED | OUTPATIENT
Start: 2024-10-14 | End: 2024-10-14

## 2024-10-14 RX ORDER — ATORVASTATIN CALCIUM 40 MG/1
40 TABLET, FILM COATED ORAL DAILY
Status: DISCONTINUED | OUTPATIENT
Start: 2024-10-15 | End: 2024-10-20 | Stop reason: HOSPADM

## 2024-10-14 RX ORDER — TALC
6 POWDER (GRAM) TOPICAL NIGHTLY PRN
Status: DISCONTINUED | OUTPATIENT
Start: 2024-10-14 | End: 2024-10-20 | Stop reason: HOSPADM

## 2024-10-14 RX ORDER — ONDANSETRON 8 MG/1
8 TABLET, ORALLY DISINTEGRATING ORAL EVERY 6 HOURS PRN
Status: DISCONTINUED | OUTPATIENT
Start: 2024-10-14 | End: 2024-10-20 | Stop reason: HOSPADM

## 2024-10-14 RX ORDER — GLUCAGON 1 MG
1 KIT INJECTION
Status: DISCONTINUED | OUTPATIENT
Start: 2024-10-14 | End: 2024-10-20 | Stop reason: HOSPADM

## 2024-10-14 RX ORDER — HEPARIN SODIUM 5000 [USP'U]/ML
5000 INJECTION, SOLUTION INTRAVENOUS; SUBCUTANEOUS EVERY 8 HOURS
Status: DISCONTINUED | OUTPATIENT
Start: 2024-10-15 | End: 2024-10-20 | Stop reason: HOSPADM

## 2024-10-14 RX ORDER — IBUPROFEN 200 MG
24 TABLET ORAL
Status: DISCONTINUED | OUTPATIENT
Start: 2024-10-14 | End: 2024-10-20 | Stop reason: HOSPADM

## 2024-10-14 RX ORDER — LANOLIN ALCOHOL/MO/W.PET/CERES
800 CREAM (GRAM) TOPICAL 2 TIMES DAILY
Status: DISCONTINUED | OUTPATIENT
Start: 2024-10-14 | End: 2024-10-16

## 2024-10-14 RX ADMIN — ACETAMINOPHEN 1000 MG: 500 TABLET ORAL at 07:10

## 2024-10-14 RX ADMIN — ACETAMINOPHEN 1000 MG: 500 TABLET ORAL at 12:10

## 2024-10-14 RX ADMIN — DIPHENHYDRAMINE HYDROCHLORIDE 25 MG: 50 INJECTION INTRAMUSCULAR; INTRAVENOUS at 02:10

## 2024-10-14 RX ADMIN — CEFEPIME 2 G: 2 INJECTION, POWDER, FOR SOLUTION INTRAVENOUS at 11:10

## 2024-10-14 RX ADMIN — CEFTRIAXONE SODIUM 1 G: 1 INJECTION, POWDER, FOR SOLUTION INTRAMUSCULAR; INTRAVENOUS at 01:10

## 2024-10-14 RX ADMIN — VANCOMYCIN HYDROCHLORIDE 2250 MG: 1.25 INJECTION, POWDER, LYOPHILIZED, FOR SOLUTION INTRAVENOUS at 02:10

## 2024-10-14 NOTE — HPI
Mr. Diaz is a 43 yr male who received a LURT 7/16/24 for T2DM and HTN. No intra-op issues noted. Cr trended down, good UOP, dc'd without issue. Has insulin pump for DM2 management. Cr elvie to 1.6, had biopsy 8/14 which did not show rejection or ATI. On 10/14 patient presented to OSH ER for c/o fever, sore throat, cough, myalgias, mild nasal congestion. Tmax 101, first fever on 10/12. He also had routine lab work this morning that revealed severe neutropenia at 0.73, ANC 58. Denies CP, SOB, NVD, dysuria, changes in UOP. Denies any sick contacts. Labs in ED notable for negative COVID, flu A/B, strepA. Labs this morning with Cr at BL 1.5. UA likely without infection, CXR without acute abnormality. BC from OSH pending. Patient transferred to [facility] for neutropenic fever. Plan for CMV PCR, RIP, throat culture, transplant ID consult, IV antibiotics. Will hold MMF and bactrim. Neupogen per protocol. D/w Dr. Martell.

## 2024-10-14 NOTE — ASSESSMENT & PLAN NOTE
- suspect drug induced vs infection  - ANC ~ 50 at OSH on routine morning labs   - fever x >48 hours, tmax 101   - rocephin and vanc at OSH, starting cefepime on admit and continue vanc  - neupogen 480 mch x 1 on admit  - UA, CXR appear without infection from OSH   - BC, CMV, RIP, and throat culture pending   - EBV positive last month, will recheck   - ID consulted

## 2024-10-14 NOTE — TELEPHONE ENCOUNTER
Patient's girlfriend called in reporting the patient was out of town and returned home today. Today patient started with fever and body aches. His current temp is 100.8. Patient has a headache and sore throat. Tylenol was given around 6pm. Kidney transplant in July of this year. Dispo provided -see MD within 4 hours or PCP triage (Unable to reach out to [facility] since patient is in [place]). Called OCP for transplant, Dr Barnes who advised for patient to be seen in the ER tonight. Patient's girlfriend reported they will check the temperature once more and then decide. Reinforced advice of being seen tonight. Instructed to call back with additional questions or worsening of symptoms. Patient & his girlfriend verbalized understanding.     Reason for Disposition   Transplant patient (e.g., kidney, liver, lung, heart)    Additional Information   Negative: Shock suspected (e.g., cold/pale/clammy skin, too weak to stand, low BP, rapid pulse)   Negative: Difficult to awaken or acting confused (e.g., disoriented, slurred speech)   Negative: [1] Difficulty breathing AND [2] bluish lips, tongue or face   Negative: New-onset rash with multiple purple (or blood-colored) spots or dots   Negative: Sounds like a life-threatening emergency to the triager   Negative: [1] Headache AND [2] stiff neck (can't touch chin to chest)   Negative: Difficulty breathing   Negative: IV Drug Use (IVDU)   Negative: [1] Drinking very little AND [2] dehydration suspected (e.g., no urine > 12 hours, very dry mouth, very lightheaded)   Negative: Widespread rash and cause unknown   Negative: Patient sounds very sick or weak to the triager  (Exception: Mild weakness AND hasn't taken fever medicine.)   Negative: Fever > 104 F (40 C)   Negative: [1] Fever > 101 F (38.3 C) AND [2] age > 60 years   Negative: [1] Fever > 100 F (37.8 C) AND [2] bedridden (e.g., CVA, chronic illness, recovering from surgery)   Negative: [1] Fever > 100 F (37.8 C) AND [2]  indwelling urinary catheter (e.g., Ortiz, Coude)   Negative: [1] Fever > 100 F (37.8 C) AND [2] has port (portacath), central line, or PICC line   Negative: [1] Fever > 100 F (37.8 C) AND [2] diabetes mellitus or weak immune system (e.g., HIV positive, cancer chemo, splenectomy, organ transplant, chronic steroids)   Negative: [1] Fever > 100 F (37.8 C) AND [2] surgery in the last month    Protocols used: Fever-A-AH

## 2024-10-14 NOTE — ED PROVIDER NOTES
Encounter Date: 10/14/2024       History     Chief Complaint   Patient presents with    Fever     Pt. C/o fever, body aches, sore throat, cough since yesterday. States he is a transplant pt. And also has low WBC.     Patient is a 43-year-old male, here from home via private vehicle for evaluation and treatment of sore throat, fever, and low blood counts.  Patient was a kidney transplant recipient, with his transplant done in June of this year.  He is on multiple anti-rejection medications.  Routine blood work done this morning showed a white blood cell count of 0.73.  H&H good, platelets good.  Electrolytes are fine.  BUN is 17, creatinine 1.5.  Patient states his fever has been as high as 101°.  Fever 1st started on Saturday, 2 days ago.  He reports sore throat, fever, and myalgias.  Mild nasal congestion as well.  No cough, no shortness of breath.  No dysuria or hematuria.  Still producing normal amount of urine.  Transplant was performed at [facility] and he receives all of his transplant medical care there.      Review of patient's allergies indicates:   Allergen Reactions    Codeine Hallucinations    Vancomycin analogues Other (See Comments)     Burning and  itchig of skin     Past Medical History:   Diagnosis Date    Anemia     Diabetes mellitus type II     Disorder of kidney and ureter     GERD (gastroesophageal reflux disease)     Hypertension     Proteinuria      Past Surgical History:   Procedure Laterality Date    INNER EAR SURGERY      for tinnitus    KIDNEY TRANSPLANT Left 7/16/2024    Procedure: TRANSPLANT, KIDNEY;  Surgeon: Erma Gómez MD;  Location: [facility] OR [address] [place] [address];  Service: Transplant;  Laterality: Left;     Family History   Problem Relation Name Age of Onset    Kidney disease Mother      Heart disease Mother      Diabetes Mother      Cancer Father      Heart disease Sister       Social History     Tobacco Use    Smoking status: Never    Smokeless tobacco: Never   Substance Use Topics     Alcohol use: No    Drug use: No     Review of Systems   Constitutional:  Positive for chills and fever.   HENT:  Positive for congestion and sore throat.    Musculoskeletal:  Positive for myalgias.   All other systems reviewed and are negative.      Physical Exam     Initial Vitals [10/14/24 1023]   BP Pulse Resp Temp SpO2   (!) 174/95 98 17 100.3 °F (37.9 °C) 99 %      MAP       --         Physical Exam    Nursing note and vitals reviewed.  Constitutional: He appears well-developed and well-nourished. He is not diaphoretic. No distress.   HENT:   Head: Normocephalic and atraumatic.   Nose: Nose normal. Mouth/Throat: No oropharyngeal exudate.   Posterior oropharynx is mildly erythematous.  No edema, no exudates.    Eyes: Conjunctivae and EOM are normal. Pupils are equal, round, and reactive to light. No scleral icterus.   Neck: Neck supple. No JVD present.   Normal range of motion.  Cardiovascular:  Normal rate, regular rhythm, normal heart sounds and intact distal pulses.           No murmur heard.  Pulmonary/Chest: Breath sounds normal. No stridor. No respiratory distress. He has no wheezes. He has no rhonchi. He has no rales.   Abdominal: Abdomen is soft. Bowel sounds are normal. He exhibits no distension. There is no abdominal tenderness.   Musculoskeletal:         General: No tenderness or edema. Normal range of motion.      Cervical back: Normal range of motion and neck supple.     Neurological: He is alert and oriented to person, place, and time. He has normal strength. No cranial nerve deficit or sensory deficit. GCS score is 15. GCS eye subscore is 4. GCS verbal subscore is 5. GCS motor subscore is 6.   Skin: Skin is warm and dry. Capillary refill takes less than 2 seconds. No rash and no abscess noted. No erythema. No pallor.   Psychiatric: He has a normal mood and affect. His behavior is normal.         ED Course   Procedures  Labs Reviewed   URINALYSIS, REFLEX TO URINE CULTURE - Abnormal       Result  Value    Specimen UA Urine, Unspecified      Color, UA Yellow      Appearance, UA Clear      pH, UA 6.0      Specific Gravity, UA 1.015      Protein, UA 1+ (*)     Glucose, UA 2+ (*)     Ketones, UA 3+ (*)     Bilirubin (UA) Negative      Occult Blood UA Trace (*)     Nitrite, UA Negative      Urobilinogen, UA Negative      Leukocytes, UA Negative      Narrative:     Preferred Collection Type->Urine, Clean Catch  Specimen Source->Urine   INFLUENZA A & B BY MOLECULAR    Influenza A, Molecular Negative      Influenza B, Molecular Negative      Flu A & B Source Nasal Swab     GROUP A STREP, MOLECULAR    Group A Strep, Molecular Negative     CULTURE, BLOOD    Blood Culture, Routine No Growth to date     CULTURE, BLOOD    Blood Culture, Routine No Growth to date     SARS-COV-2 RNA AMPLIFICATION, QUAL    SARS-CoV-2 RNA, Amplification, Qual Negative     URINALYSIS MICROSCOPIC    RBC, UA 3      WBC, UA 0      Bacteria Rare      Hyaline Casts, UA 0      Microscopic Comment SEE COMMENT      Narrative:     Preferred Collection Type->Urine, Clean Catch  Specimen Source->Urine          Imaging Results              X-Ray Chest AP Portable (Final result)  Result time 10/14/24 11:39:31   Notes recorded by Bella Purdy DO on 10/14/2024 at 12:48 PM CDT  CXR unremarkable     Final result by Alexandre Aguilar MD (10/14/24 11:39:31)                   Impression:      No acute chest disease.      Electronically signed by: Alexandre Aguilar  Date:    10/14/2024  Time:    11:39               Narrative:    EXAMINATION:  XR CHEST AP PORTABLE    CLINICAL HISTORY:  Fever, unspecified    TECHNIQUE:  Portable view of the chest was performed.    COMPARISON:  07/02/2024.    FINDINGS:  Lungs are clear.  No focal consolidation.  Heart size normal.  Mediastinal contours unremarkable.  Trachea midline.    Bony thorax intact.                                       Medications   acetaminophen tablet 1,000 mg (1,000 mg Oral Given 10/14/24 1201)    cefTRIAXone (Rocephin) 1 g in D5W 100 mL IVPB (MB+) (0 g Intravenous Stopped 10/14/24 1430)   vancomycin (VANCOCIN) 2,250 mg in D5W 500 mL IVPB (2,250 mg Intravenous New Bag 10/14/24 1456)   diphenhydrAMINE injection 25 mg (25 mg Intravenous Given 10/14/24 1455)   acetaminophen tablet 1,000 mg (1,000 mg Oral Given 10/14/24 1911)     Medical Decision Making  Differential includes viral illness such as COVID-19, or influenza, urinary tract infection, pneumonia, other opportunistic infection, etc.    Patient is neutropenic secondary to his medications, and he was now with a fever.  His BUN and creatinine are good.  I have been in communication with his transplant doctor and nephrologist wants him to be transferred to the main campus.  Patient was in agreement with this.  Patient has been accepted at that facility, now waiting for transportation.    Amount and/or Complexity of Data Reviewed  Labs: ordered.  Radiology: ordered.    Risk  OTC drugs.  Prescription drug management.                                      Clinical Impression:  Final diagnoses:  [R50.9] Fever  [D70.9, R50.81] Neutropenic fever (Primary)          ED Disposition Condition    Transfer to Another Facility Damon Genao MD  10/15/24 9035

## 2024-10-14 NOTE — ED NOTES
Pt states onset of sore throat Saturday currently rated 8/10 described as contstant/sore. States onset of fever 10/13/24. Pt denies other symptoms, discomfort, or pain. Pt states this morning he had blood work drawn revealing neutropenia.

## 2024-10-14 NOTE — ED NOTES
Received Report from Zoë Watts RN. 1st contact with pt. Pt seen in bed with blanket pulled above their head. Nurse introduced hisself to pt. Pt Aox4 currently c/o sore throat and fever. Respirations even and unloabored. IV Vanc currently infusing @ 100.

## 2024-10-14 NOTE — ASSESSMENT & PLAN NOTE
- cont prograf and pred   - MMF held for fever   - monitor tac levels for toxicity and therapeutic efforts

## 2024-10-15 PROBLEM — E83.39 HYPOPHOSPHATEMIA: Status: ACTIVE | Noted: 2024-10-15

## 2024-10-15 PROBLEM — N18.5 CKD (CHRONIC KIDNEY DISEASE) STAGE 5, GFR LESS THAN 15 ML/MIN: Status: RESOLVED | Noted: 2024-04-17 | Resolved: 2024-10-15

## 2024-10-15 PROBLEM — Z01.818 PRE-TRANSPLANT EVALUATION FOR KIDNEY TRANSPLANT: Status: RESOLVED | Noted: 2023-11-29 | Resolved: 2024-10-15

## 2024-10-15 PROBLEM — I10 HYPERTENSION: Status: RESOLVED | Noted: 2023-11-29 | Resolved: 2024-10-15

## 2024-10-15 PROBLEM — E87.5 HYPERKALEMIA: Status: RESOLVED | Noted: 2024-06-13 | Resolved: 2024-10-15

## 2024-10-15 PROBLEM — N18.6 ESRD (END STAGE RENAL DISEASE): Status: RESOLVED | Noted: 2024-04-18 | Resolved: 2024-10-15

## 2024-10-15 PROBLEM — I10 ESSENTIAL HYPERTENSION: Status: RESOLVED | Noted: 2023-08-05 | Resolved: 2024-10-15

## 2024-10-15 PROBLEM — E10.69 TYPE 1 DIABETES MELLITUS WITH HYPEROSMOLARITY WITHOUT COMA: Status: ACTIVE | Noted: 2024-10-15

## 2024-10-15 PROBLEM — E10.65 TYPE 1 DIABETES MELLITUS WITH HYPEROSMOLARITY WITHOUT COMA: Status: ACTIVE | Noted: 2024-10-15

## 2024-10-15 PROBLEM — E83.42 HYPOMAGNESEMIA: Status: ACTIVE | Noted: 2024-10-15

## 2024-10-15 PROBLEM — R53.1 WEAKNESS: Status: RESOLVED | Noted: 2024-07-17 | Resolved: 2024-10-15

## 2024-10-15 PROBLEM — E87.0 TYPE 1 DIABETES MELLITUS WITH HYPEROSMOLARITY WITHOUT COMA: Status: ACTIVE | Noted: 2024-10-15

## 2024-10-15 PROBLEM — D62 ACUTE ON CHRONIC BLOOD LOSS ANEMIA: Status: RESOLVED | Noted: 2024-07-17 | Resolved: 2024-10-15

## 2024-10-15 LAB
ADENOVIRUS: NOT DETECTED
ALBUMIN SERPL BCP-MCNC: 3.5 G/DL (ref 3.5–5.2)
ALBUMIN SERPL BCP-MCNC: 3.5 G/DL (ref 3.5–5.2)
ALP SERPL-CCNC: 77 U/L (ref 55–135)
ALT SERPL W/O P-5'-P-CCNC: 18 U/L (ref 10–44)
ANION GAP SERPL CALC-SCNC: 14 MMOL/L (ref 8–16)
AST SERPL-CCNC: 13 U/L (ref 10–40)
BASOPHILS # BLD AUTO: 0.03 K/UL (ref 0–0.2)
BASOPHILS NFR BLD: 3.5 % (ref 0–1.9)
BILIRUB DIRECT SERPL-MCNC: 0.3 MG/DL (ref 0.1–0.3)
BILIRUB SERPL-MCNC: 0.8 MG/DL (ref 0.1–1)
BORDETELLA PARAPERTUSSIS (IS1001): NOT DETECTED
BORDETELLA PERTUSSIS (PTXP): NOT DETECTED
BUN SERPL-MCNC: 14 MG/DL (ref 6–20)
CALCIUM SERPL-MCNC: 9.8 MG/DL (ref 8.7–10.5)
CHLAMYDIA PNEUMONIAE: NOT DETECTED
CHLORIDE SERPL-SCNC: 102 MMOL/L (ref 95–110)
CMV DNA SPEC QL NAA+PROBE: NORMAL
CO2 SERPL-SCNC: 21 MMOL/L (ref 23–29)
CORONAVIRUS 229E, COMMON COLD VIRUS: NOT DETECTED
CORONAVIRUS HKU1, COMMON COLD VIRUS: NOT DETECTED
CORONAVIRUS NL63, COMMON COLD VIRUS: NOT DETECTED
CORONAVIRUS OC43, COMMON COLD VIRUS: NOT DETECTED
CREAT SERPL-MCNC: 1.2 MG/DL (ref 0.5–1.4)
CYTOMEGALOVIRUS PCR, QUANT: NOT DETECTED IU/ML
DIFFERENTIAL METHOD BLD: ABNORMAL
EOSINOPHIL # BLD AUTO: 0 K/UL (ref 0–0.5)
EOSINOPHIL NFR BLD: 3.5 % (ref 0–8)
EPSTEIN-BARR VIRUS DNA: NORMAL
EPSTEIN-BARR VIRUS PCR, QUANT: NOT DETECTED IU/ML
ERYTHROCYTE [DISTWIDTH] IN BLOOD BY AUTOMATED COUNT: 13.2 % (ref 11.5–14.5)
EST. GFR  (NO RACE VARIABLE): >60 ML/MIN/1.73 M^2
FLUBV RNA NPH QL NAA+NON-PROBE: NOT DETECTED
GLUCOSE SERPL-MCNC: 273 MG/DL (ref 70–110)
HCT VFR BLD AUTO: 41 % (ref 40–54)
HGB BLD-MCNC: 13.6 G/DL (ref 14–18)
HPIV1 RNA NPH QL NAA+NON-PROBE: NOT DETECTED
HPIV2 RNA NPH QL NAA+NON-PROBE: NOT DETECTED
HPIV3 RNA NPH QL NAA+NON-PROBE: NOT DETECTED
HPIV4 RNA NPH QL NAA+NON-PROBE: NOT DETECTED
HUMAN METAPNEUMOVIRUS: NOT DETECTED
IMM GRANULOCYTES # BLD AUTO: 0.01 K/UL (ref 0–0.04)
IMM GRANULOCYTES NFR BLD AUTO: 1.2 % (ref 0–0.5)
INFLUENZA A (SUBTYPES H1,H1-2009,H3): NOT DETECTED
LYMPHOCYTES # BLD AUTO: 0.4 K/UL (ref 1–4.8)
LYMPHOCYTES NFR BLD: 50.6 % (ref 18–48)
MAGNESIUM SERPL-MCNC: 1.8 MG/DL (ref 1.6–2.6)
MCH RBC QN AUTO: 30.1 PG (ref 27–31)
MCHC RBC AUTO-ENTMCNC: 33.2 G/DL (ref 32–36)
MCV RBC AUTO: 91 FL (ref 82–98)
MONOCYTES # BLD AUTO: 0.3 K/UL (ref 0.3–1)
MONOCYTES NFR BLD: 36.5 % (ref 4–15)
MYCOPLASMA PNEUMONIAE: NOT DETECTED
NEUTROPHILS # BLD AUTO: 0 K/UL (ref 1.8–7.7)
NEUTROPHILS NFR BLD: 4.7 % (ref 38–73)
NRBC BLD-RTO: 0 /100 WBC
PHOSPHATE SERPL-MCNC: 1.7 MG/DL (ref 2.7–4.5)
PLATELET # BLD AUTO: 232 K/UL (ref 150–450)
PMV BLD AUTO: 10.5 FL (ref 9.2–12.9)
POCT GLUCOSE: 268 MG/DL (ref 70–110)
POCT GLUCOSE: 310 MG/DL (ref 70–110)
POCT GLUCOSE: 323 MG/DL (ref 70–110)
POCT GLUCOSE: 331 MG/DL (ref 70–110)
POTASSIUM SERPL-SCNC: 4.5 MMOL/L (ref 3.5–5.1)
PROT SERPL-MCNC: 7.2 G/DL (ref 6–8.4)
RBC # BLD AUTO: 4.52 M/UL (ref 4.6–6.2)
RESPIRATORY INFECTION PANEL SOURCE: NORMAL
RSV RNA NPH QL NAA+NON-PROBE: NOT DETECTED
RV+EV RNA NPH QL NAA+NON-PROBE: NOT DETECTED
SARS-COV-2 RNA RESP QL NAA+PROBE: NOT DETECTED
SODIUM SERPL-SCNC: 137 MMOL/L (ref 136–145)
TACROLIMUS BLD-MCNC: 6.1 NG/ML (ref 5–15)
TACROLIMUS BLD-MCNC: 7.3 NG/ML (ref 5–15)
VANCOMYCIN SERPL-MCNC: 11.9 UG/ML
WBC # BLD AUTO: 0.85 K/UL (ref 3.9–12.7)

## 2024-10-15 PROCEDURE — 25000003 PHARM REV CODE 250: Mod: NTX

## 2024-10-15 PROCEDURE — 63600175 PHARM REV CODE 636 W HCPCS: Mod: NTX | Performed by: NURSE PRACTITIONER

## 2024-10-15 PROCEDURE — 80197 ASSAY OF TACROLIMUS: CPT | Mod: NTX

## 2024-10-15 PROCEDURE — 80202 ASSAY OF VANCOMYCIN: CPT | Mod: NTX

## 2024-10-15 PROCEDURE — 99223 1ST HOSP IP/OBS HIGH 75: CPT | Mod: NTX,,, | Performed by: INTERNAL MEDICINE

## 2024-10-15 PROCEDURE — 36415 COLL VENOUS BLD VENIPUNCTURE: CPT | Mod: NTX

## 2024-10-15 PROCEDURE — 99233 SBSQ HOSP IP/OBS HIGH 50: CPT | Mod: NTX,,, | Performed by: PHYSICIAN ASSISTANT

## 2024-10-15 PROCEDURE — 83735 ASSAY OF MAGNESIUM: CPT | Mod: NTX

## 2024-10-15 PROCEDURE — 25000003 PHARM REV CODE 250: Mod: NTX | Performed by: PHYSICIAN ASSISTANT

## 2024-10-15 PROCEDURE — 27000207 HC ISOLATION: Mod: NTX

## 2024-10-15 PROCEDURE — 20600001 HC STEP DOWN PRIVATE ROOM: Mod: NTX

## 2024-10-15 PROCEDURE — 80069 RENAL FUNCTION PANEL: CPT | Mod: NTX

## 2024-10-15 PROCEDURE — 99222 1ST HOSP IP/OBS MODERATE 55: CPT | Mod: NTX,,, | Performed by: NURSE PRACTITIONER

## 2024-10-15 PROCEDURE — 85025 COMPLETE CBC W/AUTO DIFF WBC: CPT | Mod: NTX

## 2024-10-15 PROCEDURE — 63600175 PHARM REV CODE 636 W HCPCS: Mod: JZ,JB,JG,NTX | Performed by: PHYSICIAN ASSISTANT

## 2024-10-15 PROCEDURE — 63600175 PHARM REV CODE 636 W HCPCS: Mod: NTX

## 2024-10-15 PROCEDURE — 80076 HEPATIC FUNCTION PANEL: CPT | Mod: NTX | Performed by: NURSE PRACTITIONER

## 2024-10-15 PROCEDURE — 87799 DETECT AGENT NOS DNA QUANT: CPT | Mod: NTX

## 2024-10-15 RX ORDER — GUAIFENESIN AND DEXTROMETHORPHAN HYDROBROMIDE 10; 100 MG/5ML; MG/5ML
5 SYRUP ORAL EVERY 4 HOURS PRN
Status: DISCONTINUED | OUTPATIENT
Start: 2024-10-15 | End: 2024-10-20 | Stop reason: HOSPADM

## 2024-10-15 RX ORDER — INSULIN ASPART 100 [IU]/ML
8 INJECTION, SOLUTION INTRAVENOUS; SUBCUTANEOUS
Status: DISCONTINUED | OUTPATIENT
Start: 2024-10-15 | End: 2024-10-15

## 2024-10-15 RX ORDER — INSULIN GLARGINE 100 [IU]/ML
30 INJECTION, SOLUTION SUBCUTANEOUS DAILY
Status: DISCONTINUED | OUTPATIENT
Start: 2024-10-16 | End: 2024-10-16

## 2024-10-15 RX ORDER — INSULIN ASPART 100 [IU]/ML
0-10 INJECTION, SOLUTION INTRAVENOUS; SUBCUTANEOUS
Status: DISCONTINUED | OUTPATIENT
Start: 2024-10-15 | End: 2024-10-20 | Stop reason: HOSPADM

## 2024-10-15 RX ORDER — INSULIN GLARGINE 100 [IU]/ML
24 INJECTION, SOLUTION SUBCUTANEOUS DAILY
Status: DISCONTINUED | OUTPATIENT
Start: 2024-10-15 | End: 2024-10-15

## 2024-10-15 RX ORDER — ATOVAQUONE 750 MG/5ML
1500 SUSPENSION ORAL DAILY
Status: DISCONTINUED | OUTPATIENT
Start: 2024-10-15 | End: 2024-10-16

## 2024-10-15 RX ORDER — INSULIN ASPART 100 [IU]/ML
10 INJECTION, SOLUTION INTRAVENOUS; SUBCUTANEOUS
Status: DISCONTINUED | OUTPATIENT
Start: 2024-10-16 | End: 2024-10-16

## 2024-10-15 RX ADMIN — HEPARIN SODIUM 5000 UNITS: 5000 INJECTION INTRAVENOUS; SUBCUTANEOUS at 02:10

## 2024-10-15 RX ADMIN — INSULIN ASPART 2 UNITS: 100 INJECTION, SOLUTION INTRAVENOUS; SUBCUTANEOUS at 12:10

## 2024-10-15 RX ADMIN — INSULIN GLARGINE 24 UNITS: 100 INJECTION, SOLUTION SUBCUTANEOUS at 11:10

## 2024-10-15 RX ADMIN — Medication 800 MG: at 08:10

## 2024-10-15 RX ADMIN — INSULIN ASPART 3 UNITS: 100 INJECTION, SOLUTION INTRAVENOUS; SUBCUTANEOUS at 08:10

## 2024-10-15 RX ADMIN — CALCITRIOL 0.5 MCG: 0.5 CAPSULE, LIQUID FILLED ORAL at 08:10

## 2024-10-15 RX ADMIN — INSULIN ASPART 8 UNITS: 100 INJECTION, SOLUTION INTRAVENOUS; SUBCUTANEOUS at 01:10

## 2024-10-15 RX ADMIN — TACROLIMUS 3 MG: 1 CAPSULE ORAL at 08:10

## 2024-10-15 RX ADMIN — INSULIN ASPART 4 UNITS: 100 INJECTION, SOLUTION INTRAVENOUS; SUBCUTANEOUS at 01:10

## 2024-10-15 RX ADMIN — ACETAMINOPHEN 650 MG: 325 TABLET ORAL at 05:10

## 2024-10-15 RX ADMIN — INSULIN ASPART 8 UNITS: 100 INJECTION, SOLUTION INTRAVENOUS; SUBCUTANEOUS at 05:10

## 2024-10-15 RX ADMIN — Medication 2 TABLET: at 03:10

## 2024-10-15 RX ADMIN — TACROLIMUS 3 MG: 1 CAPSULE ORAL at 05:10

## 2024-10-15 RX ADMIN — CEFEPIME 2 G: 2 INJECTION, POWDER, FOR SOLUTION INTRAVENOUS at 08:10

## 2024-10-15 RX ADMIN — HEPARIN SODIUM 5000 UNITS: 5000 INJECTION INTRAVENOUS; SUBCUTANEOUS at 05:10

## 2024-10-15 RX ADMIN — CEFEPIME 2 G: 2 INJECTION, POWDER, FOR SOLUTION INTRAVENOUS at 05:10

## 2024-10-15 RX ADMIN — ATORVASTATIN CALCIUM 40 MG: 40 TABLET, FILM COATED ORAL at 08:10

## 2024-10-15 RX ADMIN — Medication 2 TABLET: at 08:10

## 2024-10-15 RX ADMIN — INSULIN ASPART 5 UNITS: 100 INJECTION, SOLUTION INTRAVENOUS; SUBCUTANEOUS at 05:10

## 2024-10-15 RX ADMIN — INSULIN ASPART 4 UNITS: 100 INJECTION, SOLUTION INTRAVENOUS; SUBCUTANEOUS at 09:10

## 2024-10-15 RX ADMIN — ATOVAQUONE ORAL SUSPENSION 1500 MG: 750 SUSPENSION ORAL at 11:10

## 2024-10-15 RX ADMIN — PREDNISONE 5 MG: 5 TABLET ORAL at 08:10

## 2024-10-15 RX ADMIN — SODIUM CHLORIDE 500 ML: 9 INJECTION, SOLUTION INTRAVENOUS at 11:10

## 2024-10-15 RX ADMIN — HEPARIN SODIUM 5000 UNITS: 5000 INJECTION INTRAVENOUS; SUBCUTANEOUS at 08:10

## 2024-10-15 RX ADMIN — FILGRASTIM-SNDZ 480 MCG: 300 INJECTION, SOLUTION INTRAVENOUS; SUBCUTANEOUS at 11:10

## 2024-10-15 RX ADMIN — SODIUM PHOSPHATE, MONOBASIC, MONOHYDRATE AND SODIUM PHOSPHATE, DIBASIC, ANHYDROUS 30 MMOL: 142; 276 INJECTION, SOLUTION INTRAVENOUS at 12:10

## 2024-10-15 RX ADMIN — PANTOPRAZOLE SODIUM 40 MG: 40 TABLET, DELAYED RELEASE ORAL at 08:10

## 2024-10-15 NOTE — CONSULTS
[facility] - Transplant Stepdown  Infectious Disease  Consult Note    Patient Name: Dejuan Diaz Jr.  MRN: [id number]  Admission Date: 10/14/2024  Hospital Length of Stay: 1 days  Attending Physician: Al Martell MD  Primary Care Provider: Val, Primary Doctor     Isolation Status: Enhanced Respiratory    Patient information was obtained from patient, spouse/SO, past medical records, and ER records.      Inpatient consult to Infectious Diseases  Consult performed by: Belen Iniguez MD  Consult ordered by: Claire Espinoza PA-C        Assessment/Plan:     Oncology  * Neutropenic fever  Mr. Dejuan Diaz is a 44 yo male with a PMHX of kidney transplant (06/2024, on tacro/MMF/prednisone), T2DM, and HTN who initially presented with sore throat, fever, myalgias, and nasal congestion, along with extreme fatigue and lethargy. Patient stated that 2 days ago he noted severe sore throat, causing difficulty swallowing, he also noted some right sided anterior cervical tederness. Tmax was 101. He was taking prophylaxis with bactrim and recentely stopped taking acylclovir.  AF and VSS. Labs notable for WBC of 0.85, ANC 40. UA unremarkable, RIP was negative. COVID/flu negative. CxR negative.     Likely etiology undetected viral illness.     Recommendations:  - fu blood cultures  - fu throat swab  - continue cefepime   - fu EBV and CMV labs  - if patient clinical status worsens, can consider adding on IV gancyclovir        Thank you for your consult. I will follow-up with patient. Please contact us if you have any additional questions.    Belen Iniguez MD  Infectious Disease  [facility] - Transplant Stepdown    Subjective:     Principal Problem: Neutropenic fever    HPI: Mr. Dejuan Diaz is a 44 yo male with a PMHX of kidney transplant (06/2024, on tacro/MMF/prednisone), T2DM, and HTN who initially presented with sore throat, fever, myalgias, and nasal congestion, along with extreme fatigue and lethargy. Patient stated  that 2 days ago he noted severe sore throat, causing difficulty swallowing, he also noted some right sided anterior cervical tederness. Tmax was 101. He was taking prophylaxis with bactrim and recentely stopped taking acylclovir. Recently sustained a minor cut from barbwire. He is uptodate on vaccinations, pending 2nd shingles and flu shot. He reports sore throat has improved and has a dry cough. He works as a , but no other recent outdoor hobbies. Denies any recent sick contact, HA, N/V, SOB, chest pain, abdominal pain, constipation, diarrhea, or dysuria, or worsening skin changes.      He is AF and VSS. Labs notable for WBC of 0.85, ANC 40. UA unremarkable, RIP was negative. COVID/flu negative. CxR negative.     Past Medical History:   Diagnosis Date    Anemia     Diabetes mellitus type II     Disorder of kidney and ureter     GERD (gastroesophageal reflux disease)     Hypertension     Proteinuria        Past Surgical History:   Procedure Laterality Date    INNER EAR SURGERY      for tinnitus    KIDNEY TRANSPLANT Left 7/16/2024    Procedure: TRANSPLANT, KIDNEY;  Surgeon: Erma Gómez MD;  Location: [facility] OR [address] [place] [address];  Service: Transplant;  Laterality: Left;       Review of patient's allergies indicates:   Allergen Reactions    Codeine Hallucinations    Vancomycin analogues Other (See Comments)     Burning and  itchig of skin       Medications:  Medications Prior to Admission   Medication Sig    atorvastatin (LIPITOR) 40 MG tablet Take 1 tablet (40 mg total) by mouth once daily.    blood-glucose meter,continuous (DEXCOM G6  MISC) by Misc.(Non-Drug; Combo Route) route.    blood-glucose transmitter (DEXCOM G6 TRANSMITTER MISC)   Dexcom G6 transmitter, See Instructions, uad with dexcom G6 sensor; change transmitter q 90 days, # 1 EA, 3 Refill(s), Pharmacy: [id number], 187, cm, 08/03/23 14:55:00 CDT, Height/Length Measured, 105.1, kg, 08/03/23 14:55:00 CDT, Weight Dosing    calcitRIOL  (ROCALTROL) 0.5 MCG Cap Take 1 capsule (0.5 mcg total) by mouth once daily.    DEXCOM G6 SENSOR Adelina SMARTSI Each Topical Every 10 Days    diazePAM (VALIUM) 5 MG tablet Take 1 tablet (5 mg total) by mouth once. Take 1 hour prior to planned procedure for 1 dose    insulin lispro (HUMALOG U-100 INSULIN) 100 unit/mL injection   See Instructions, for use in insulin pump; max daily dose 100 units, # 90 mL, 1 Refill(s), Maintenance, Pharmacy: [phone], 187, cm, 23 11:24:00 CST, Height/Length Measured, 102.5, kg, 23 11:24:00 CST, Weight Dosing    insulin pump cart,auto,BT/cntr (OMNIPOD 5 G6 INTRO KIT, GEN 5, SUBQ)   Omnipod 5 G6 Intro Kit (Gen 5), See Instructions, use as directed, # 1 EA, 0 Refill(s), Pharmacy: [phone], 187, cm, 22 9:14:00 CST, Height/Length Measured, 102.3, kg, 22 9:14:00 CST, Weight Dosing    k phos di & mono-sod phos mono (K-PHOS-NEUTRAL) 250 mg Tab Take 2 tablets by mouth 3 (three) times daily.    magnesium oxide (MAG-OX) 400 mg (241.3 mg magnesium) tablet Take 2 tablets (800 mg total) by mouth 2 (two) times daily.    multivitamin Tab Take 1 tablet by mouth once daily.    mycophenolate (CELLCEPT) 250 mg Cap Take 2 capsules (500 mg total) by mouth 2 (two) times daily.    ondansetron (ZOFRAN-ODT) 4 MG TbDL Take 1 tablet (4 mg total) by mouth every 8 (eight) hours as needed (nausea).    pantoprazole (PROTONIX) 40 MG tablet Take 1 tablet (40 mg total) by mouth once daily.    predniSONE (DELTASONE) 5 MG tablet Take by mouth daily: 20mg -, 15mg -8/, 10mg 8/2-/8, 5mg 8/9-    sulfamethoxazole-trimethoprim 400-80mg (BACTRIM,SEPTRA) 400-80 mg per tablet Take 1 tablet by mouth every morning. Stop 25    tacrolimus (PROGRAF) 1 MG Cap Take 3 capsules (3 mg total) by mouth every 12 (twelve) hours.     Antibiotics (From admission, onward)      Start     Stop Route Frequency Ordered    10/14/24 4375  ceFEPIme (MAXIPIME) 2 g in D5W 100 mL IVPB (MB+)          -- IV Every 8 hours (non-standard times) 10/14/24 2304          Antifungals (From admission, onward)      None          Antivirals (From admission, onward)      None             Immunization History   Administered Date(s) Administered    Hepatitis A, Adult 11/29/2023, 05/24/2024    Hepatitis B (recombinant) Adjuvanted, 2 dose 11/29/2023, 12/28/2023    Pneumococcal Conjugate - 20 Valent 05/24/2024    Pneumococcal Polysaccharide - 23 Valent 06/21/2018    Tdap 11/29/2023    Zoster Recombinant 06/14/2024       Family History       Problem Relation (Age of Onset)    Cancer Father    Diabetes Mother    Heart disease Mother, Sister    Kidney disease Mother          Social History     Socioeconomic History    Marital status:    Tobacco Use    Smoking status: Never    Smokeless tobacco: Never   Substance and Sexual Activity    Alcohol use: No    Drug use: No    Sexual activity: Yes     Social Drivers of Health     Financial Resource Strain: Low Risk  (10/15/2024)    Overall Financial Resource Strain (CARDIA)     Difficulty of Paying Living Expenses: Not very hard   Food Insecurity: No Food Insecurity (10/15/2024)    Hunger Vital Sign     Worried About Running Out of Food in the Last Year: Never true     Ran Out of Food in the Last Year: Never true   Transportation Needs: No Transportation Needs (10/15/2024)    TRANSPORTATION NEEDS     Transportation : No   Physical Activity: Insufficiently Active (11/14/2023)    Exercise Vital Sign     Days of Exercise per Week: 2 days     Minutes of Exercise per Session: 40 min   Stress: No Stress Concern Present (10/15/2024)    [place] [employer] - Occupational Stress Questionnaire     Feeling of Stress : Not at all   Recent Concern: Stress - Stress Concern Present (7/17/2024)    [place] [employer] - Occupational Stress Questionnaire     Feeling of Stress : To some extent   Housing Stability: Low Risk  (10/15/2024)    Housing  Stability Vital Sign     Unable to Pay for Housing in the Last Year: No     Homeless in the Last Year: No     Review of Systems   Constitutional:  Negative for chills and fever.   HENT:  Negative for trouble swallowing.    Respiratory:  Positive for cough (dry cough). Negative for shortness of breath.    Cardiovascular:  Negative for chest pain and leg swelling.   Gastrointestinal:  Negative for abdominal pain, constipation, diarrhea, nausea and vomiting.   Genitourinary:  Negative for difficulty urinating.   Musculoskeletal:  Negative for arthralgias.   Allergic/Immunologic: Positive for immunocompromised state.   Neurological:  Negative for weakness and headaches.   Psychiatric/Behavioral:  Negative for agitation and confusion. The patient is not nervous/anxious.      Objective:     Vital Signs (Most Recent):  Temp: 97.7 °F (36.5 °C) (10/15/24 1606)  Pulse: 89 (10/15/24 1606)  Resp: 20 (10/15/24 1606)  BP: 121/77 (10/15/24 1606)  SpO2: 96 % (10/15/24 1606) Vital Signs (24h Range):  Temp:  [97.7 °F (36.5 °C)-100.2 °F (37.9 °C)] 97.7 °F (36.5 °C)  Pulse:  [] 89  Resp:  [18-20] 20  SpO2:  [93 %-98 %] 96 %  BP: (121-155)/(77-96) 121/77     Weight: 95.1 kg (209 lb 10.5 oz)  Body mass index is 26.92 kg/m².    Estimated Creatinine Clearance: 92.3 mL/min (based on SCr of 1.2 mg/dL).     Physical Exam  Constitutional:       General: He is not in acute distress.     Appearance: Normal appearance. He is not ill-appearing.   HENT:      Head: Normocephalic and atraumatic.      Mouth/Throat:      Mouth: Mucous membranes are moist.   Eyes:      Extraocular Movements: Extraocular movements intact.      Conjunctiva/sclera: Conjunctivae normal.   Cardiovascular:      Rate and Rhythm: Normal rate and regular rhythm.      Heart sounds: Normal heart sounds.   Pulmonary:      Effort: Pulmonary effort is normal. No respiratory distress.      Breath sounds: Normal breath sounds.   Abdominal:      General: Abdomen is flat. Bowel  sounds are normal. There is no distension.      Palpations: Abdomen is soft.      Tenderness: There is no abdominal tenderness. There is no guarding.   Musculoskeletal:         General: No swelling.      Right lower leg: No edema.      Left lower leg: No edema.   Lymphadenopathy:      Cervical: Cervical adenopathy (tenderness to palpation of right side) present.   Skin:     General: Skin is warm.   Neurological:      General: No focal deficit present.      Mental Status: He is alert and oriented to person, place, and time.   Psychiatric:         Mood and Affect: Mood normal.         Behavior: Behavior normal.          Significant Labs: All pertinent labs within the past 24 hours have been reviewed.    Significant Imaging: I have reviewed all pertinent imaging results/findings within the past 24 hours.

## 2024-10-15 NOTE — ASSESSMENT & PLAN NOTE
BG goal 140-180    Start Lantus 24 units once daily (0.5 u/kg dosing) Slight reduction from home basal dosing  Start Novolog 8 units TID with meals (basal/prandial match)  Low Dose Correction Scale  BG monitoring ac/hs  Diet diabetic 2000 Calories (up to 75 gm per meal)    ** Please call Endocrine for any BG related issues **    Discharge plans: TBD         To room with c/o right elbow pain that started yesterday after tripping over pump at a [employer] pump.

## 2024-10-15 NOTE — NURSING
Pt. Arrived to [facility] [id number] via stretcher. H. Robert NP (BAIRON) notified   VSS, spo2 >94% on room air.   Skin CDI.   Respiratory panel and throat culture collected and sent   Antibiotics administered--pt tolerated well.  Wife at bedside--attentive to pt  Bed in low locked position, call light within reach, pt instructed to call for assistance as needed, WCTM.

## 2024-10-15 NOTE — PROGRESS NOTES
Transplant  Admit Note     SW met with patient and significant other Naz Graham to assess needs. Patient is a 43 y.o. single male, s/p kidney transplant from 7/16/2024, admitted for: Neutropenic fever [D70.9, R50.81].      Patient admitted from ED on 10/14/2024 .  At this time, patient presents as alert and oriented x 4, pleasant, good eye contact, well groomed, recall good, concentration/judgement good, average intelligence, calm, communicative, cooperative, and asking and answering questions appropriately.  At this time, patients caregiver presents as alert and oriented x 4, pleasant, good eye contact, well groomed, recall good, concentration/judgement good, average intelligence, calm, communicative, cooperative, and asking and answering questions appropriately.    Household/Family Systems     Patient resides with patient's mother, at [address] [place] [address].  Support system includes his significant other Naz Graham ([phone]), and his sister Ginger Carrero ([phone])  Patient does not have dependents that are need of being cared for.     Patients primary caregiver is Naz Graham, patients significant other, phone number [phone].  Confirmed patients contact information is [phone] (home);   Telephone Information:   Mobile [phone]   .    During admission, patient's caregiver plans to stay in patient's room.  Confirmed patient and patients caregivers do have access to reliable transportation.    Cognitive Status/Learning     Patient reports reading ability as college and states patient does not have difficulty with N/A.  Patient reports patient learns best by multisensory learning.   Needed: No.   Highest education level: Post-College Graduate Degree    Vocation/Disability   .  Working for Income: yes  If yes, working activity level: Working Full Time  Patient in  and reports currently working remotely / virtually.  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    Adherence     Patient reports a high level of adherence to patients health care regimen.  Adherence counseling and education provided. Patient verbalizes understanding.    Substance Use    Patient reports the following substance usage.    Tobacco: none, patient denies any use.  Alcohol: none, patient denies any use.  Illicit Drugs/Non-prescribed Medications: none, patient denies any use.  Patient states clear understanding of the potential impact of substance use.  Substance abstinence/cessation counseling, education and resources provided and reviewed.     Services Utilizing/ADLS    Infusion Service: Prior to admission, patient utilizing? no  Home Health: Prior to admission, patient utilizing? no  DME: Prior to admission, yes - insulin pump; omnipod & dexcom for BG management  Pulmonary/Cardiac Rehab: Prior to admission, no  Dialysis:  Prior to admission, no  Transplant Specialty Pharmacy:  Prior to admission, yes; [facility] in [place], [place].    Prior to admission, patient reports patient was independent with ADLS and was driving.  Patient reports patient is able to care for self at this time..  Patient indicates a willingness to care for self once medically cleared to do so.    Insurance/Medications    Insured by  Payer/Plan Subscr  Sex Relation Sub. Ins. ID Effective Group Num   1. [employer]* FRED TORRES* 1980 Male Self [id number]                                    [address] [id number]   2. GENERIC COMME* FRED TORRES* 1980 Male Self [id number] 10/5/23                                    [address], [address] [address]     Primary Insurance (for UNOS reporting): Private Insurance  Secondary Insurance (for UNOS reporting): Private Insurance    Patient reports having questions or concerns regarding bills he is obtaining from [facility].  Pt expresses concern that transplant-related services are not being  filed with his '[employer]' policy which he believes should be the primary payor for all transplant related claims.  Upon review of pt's coverage information, SW unable to find insurance policy information pertaining to '[employer]'.   sent message to transplant  requesting follow-up with pt to assist further.    Patient reports patient is able to obtain and afford medications at this time and at time of discharge.    Living Will/Healthcare Power of     Patient states patient has a LW and/or HCPA.   provided education regarding LW and HCPA and the completion of forms.    Coping/Mental Health    Patient is coping adequately with the aid of  family members and friends.  Patient denies mental health difficulties.  Patient and caregivers denied needing or wanting additional resources or referrals at this time. Patient and caregivers agree to contact transplant team with needs, questions, or concerns as they arise.     Discharge Planning    At time of discharge, patient plans to return to patient's home under the care of self and/or s/o Naz Graham.  Patients significant other will transport patient.  Per rounds today, expected discharge date has not been medically determined at this time. Patient and patients caregiver  verbalize understanding and are involved in treatment planning and discharge process.    Additional Concerns    Patient's caretaker denies additional needs and/or concerns at this time. Patient is being followed for needs, education, resources, information, emotional support, supportive counseling, and for supportive and skilled discharge plan of care.  providing ongoing psychosocial support, education, resources and d/c planning as needed.  SW remains available.  remains available. Patient's caregiver verbalizes understanding and agreement with information reviewed,  availability and how to access available  resources as needed. Patient denies additional needs and/or concerns at this time. Patient verbalizes understanding and agreement with information reviewed, social work availability, and how to access available resources as needed.

## 2024-10-15 NOTE — PROGRESS NOTES
Pharmacokinetic Initial Assessment: IV Vancomycin    Assessment/Plan:    Initiate intravenous vancomycin with loading dose of 2250 mg once in an outside ED followed by a maintenance dose of vancomycin 1500 mg IV every 12 hours  Desired empiric serum trough concentration is 15 to 20 mcg/mL  Draw vancomycin trough level 60 min prior to fourth dose on 10/16 at approximately 0830  Pharmacy will continue to follow and monitor vancomycin.        Thank you for the consult,   Barbi Reaves       Patient brief summary:  Dejuan Diaz Jr. is a 43 y.o. male initiated on antimicrobial therapy with IV Vancomycin for treatment of suspected neutropenic fever    Drug Allergies:   Review of patient's allergies indicates:   Allergen Reactions    Codeine Hallucinations    Vancomycin analogues Other (See Comments)     Burning and  itchig of skin       Actual Body Weight:   95.1 kg    Renal Function:   Estimated Creatinine Clearance: 92.3 mL/min (based on SCr of 1.2 mg/dL).,     Dialysis Method (if applicable):  N/A

## 2024-10-15 NOTE — SUBJECTIVE & OBJECTIVE
Interval HPI:   Overnight events: Admitted to TSU. BG above goal ranges on prn SQ insulin correction scale. Remains on Prednisone 5 mg daily. Diet diabetic 2000 Calories (up to 75 gm per meal)    Eatin%  Nausea: No  Hypoglycemia and intervention: No  Fever: No  TPN and/or TF: No  If yes, type of TF/TPN and rate: n/a    PMH, PSH, FH, SH reviewed     ROS:  Constitutional: Positive for fatigue.   Eyes: Negative for visual disturbance.  Respiratory: Negative for cough.   Cardiovascular: Negative for chest pain.  Gastrointestinal: Negative for nausea.  Endocrine: Negative for polyuria, polydipsia.  Musculoskeletal: Positive for myalgias  Skin: Negative for rash.  Neurological: Negative for syncope.  Psychiatric/Behavioral: Negative for depression.      Review of Systems    Current Medications and/or Treatments Impacting Glycemic Control  Immunotherapy:    Immunosuppressants           Stop Route Frequency     tacrolimus capsule 3 mg         -- Oral 2 times daily          Steroids:   Hormones (From admission, onward)      Start     Stop Route Frequency Ordered    10/15/24 0900  predniSONE tablet 5 mg         -- Oral Daily 10/14/24 2304    10/14/24 2304  melatonin tablet 6 mg         -- Oral Nightly PRN 10/14/24 2304          Pressors:    Autonomic Drugs (From admission, onward)      None          Hyperglycemia/Diabetes Medications:   Antihyperglycemics (From admission, onward)      Start     Stop Route Frequency Ordered    10/15/24 1130  insulin aspart U-100 pen 8 Units         -- SubQ 3 times daily with meals 10/15/24 0952    10/15/24 1000  insulin glargine U-100 (Lantus) pen 24 Units         -- SubQ Daily 10/15/24 0952    10/14/24 2304  insulin aspart U-100 pen 0-5 Units         -- SubQ Before meals & nightly PRN 10/14/24 2304             PHYSICAL EXAMINATION:  Vitals:    10/15/24 0806   BP: 134/81   Pulse: 87   Resp: 20   Temp: 98.8 °F (37.1 °C)     Body mass index is 26.92 kg/m².     Physical Exam    Constitutional: Well developed, well nourished, NAD.  ENT: External ears no masses with nose patent; normal hearing.  Neck: Supple; trachea midline.  Cardiovascular: Normal heart sounds, no LE edema. DP +2 bilaterally.  Lungs: Normal effort; lungs anterior bilaterally clear to auscultation.  Abdomen: Soft, no masses, no hernias.  MS: No clubbing or cyanosis of nails noted; unable to assess gait.  Skin: No rashes, lesions, or ulcers; no nodules. Injection sites are ok. No lipo hypertropthy or atrophy.  Psychiatric: Good judgement and insight; normal mood and affect.  Neurological: Cranial nerves are grossly intact.   Foot: Nails in good condition, no amputations noted.

## 2024-10-15 NOTE — CONSULTS
[facility] - Transplant Stepdown  Endocrinology  Diabetes Consult Note    Consult Requested by: Al Martell MD   Reason for admit: Neutropenic fever    HISTORY OF PRESENT ILLNESS:  Admit Date: 10/14/24     Reason for Consult: Management of T1DM, Hyperglycemia      Surgical Procedure and Date:  Status post kidney transplant 2024     Diabetes diagnosis year:  Over 10 years ago     Home Diabetes Medications:    Recently taken off of Omnipod 5 by primary endocrinologist in [place] and placed on the following  Toujeo 28 units once daily  Novolog ICR 1:15 (1 unit per 15g of carbs) TID with meals      How often checking glucose at home?  Dexcom   BG readings on regimen: mostly 250-300s  Hypoglycemia on the regimen?  No  Missed doses on regimen?  No     Diabetes Complications include:     Hyperglycemia and Diabetic nephropathy       Complicating diabetes co morbidities:   ESRD        HPI:   Patient is a 43 y.o. male with T1DM, CKD s/p living donor kidney txp  on 24 for DM/HTN . He has an insulin pump. He presented to OSH ER on 10/14 with complaints of fever, sore throat, cough, myalgias, and fevers. [place] [facility] [place]. Plan for CMV PCR, RIP, throat culture, transplant ID consult, IV antibiotics. Endocrinology consulted for management of T1DM.        Interval HPI:   Overnight events: Admitted to TSU. BG above goal ranges on prn SQ insulin correction scale. Remains on Prednisone 5 mg daily. Diet diabetic 2000 Calories (up to 75 gm per meal)    Eatin%  Nausea: No  Hypoglycemia and intervention: No  Fever: No  TPN and/or TF: No  If yes, type of TF/TPN and rate: n/a    PMH, PSH, FH, SH reviewed     ROS:  Constitutional: Positive for fatigue.   Eyes: Negative for visual disturbance.  Respiratory: Negative for cough.   Cardiovascular: Negative for chest pain.  Gastrointestinal: Negative for nausea.  Endocrine: Negative for polyuria, polydipsia.  Musculoskeletal: Positive for  myalgias  Skin: Negative for rash.  Neurological: Negative for syncope.  Psychiatric/Behavioral: Negative for depression.      Review of Systems    Current Medications and/or Treatments Impacting Glycemic Control  Immunotherapy:    Immunosuppressants           Stop Route Frequency     tacrolimus capsule 3 mg         -- Oral 2 times daily          Steroids:   Hormones (From admission, onward)      Start     Stop Route Frequency Ordered    10/15/24 0900  predniSONE tablet 5 mg         -- Oral Daily 10/14/24 2304    10/14/24 2304  melatonin tablet 6 mg         -- Oral Nightly PRN 10/14/24 2304          Pressors:    Autonomic Drugs (From admission, onward)      None          Hyperglycemia/Diabetes Medications:   Antihyperglycemics (From admission, onward)      Start     Stop Route Frequency Ordered    10/15/24 1130  insulin aspart U-100 pen 8 Units         -- SubQ 3 times daily with meals 10/15/24 0952    10/15/24 1000  insulin glargine U-100 (Lantus) pen 24 Units         -- SubQ Daily 10/15/24 0952    10/14/24 2304  insulin aspart U-100 pen 0-5 Units         -- SubQ Before meals & nightly PRN 10/14/24 2304             PHYSICAL EXAMINATION:  Vitals:    10/15/24 0806   BP: 134/81   Pulse: 87   Resp: 20   Temp: 98.8 °F (37.1 °C)     Body mass index is 26.92 kg/m².     Physical Exam   Constitutional: Well developed, well nourished, NAD.  ENT: External ears no masses with nose patent; normal hearing.  Neck: Supple; trachea midline.  Cardiovascular: Normal heart sounds, no LE edema. DP +2 bilaterally.  Lungs: Normal effort; lungs anterior bilaterally clear to auscultation.  Abdomen: Soft, no masses, no hernias.  MS: No clubbing or cyanosis of nails noted; unable to assess gait.  Skin: No rashes, lesions, or ulcers; no nodules. Injection sites are ok. No lipo hypertropthy or atrophy.  Psychiatric: Good judgement and insight; normal mood and affect.  Neurological: Cranial nerves are grossly intact.   Foot: Nails in good  "condition, no amputations noted.      Labs Reviewed and Include   Recent Labs   Lab 10/15/24  0520   *   CALCIUM 9.8   ALBUMIN 3.5  3.5   PROT 7.2      K 4.5   CO2 21*      BUN 14   CREATININE 1.2   ALKPHOS 77   ALT 18   AST 13   BILITOT 0.8     Lab Results   Component Value Date    WBC 0.85 (LL) 10/15/2024    HGB 13.6 (L) 10/15/2024    HCT 41.0 10/15/2024    MCV 91 10/15/2024     10/15/2024     No results for input(s): "TSH", "FREET4" in the last 168 hours.  Lab Results   Component Value Date    HGBA1C 5.3 07/16/2024       Nutritional status:   Body mass index is 26.92 kg/m².  Lab Results   Component Value Date    ALBUMIN 3.5 10/15/2024    ALBUMIN 3.5 10/15/2024    ALBUMIN 3.9 10/14/2024     No results found for: "PREALBUMIN"    Estimated Creatinine Clearance: 92.3 mL/min (based on SCr of 1.2 mg/dL).    Accu-Checks  Recent Labs     10/15/24  0009 10/15/24  0844   POCTGLUCOSE 331* 268*        ASSESSMENT and PLAN    Renal/  Status post kidney transplant  Managed per primary team  Optimize BG control      Oncology  * Neutropenic fever  Managed per primary team          Endocrine  Insulin pump status  Off since admit       Type 1 diabetes mellitus  BG goal 140-180    Start Lantus 24 units once daily (0.5 u/kg dosing) Slight reduction from home basal dosing  Start Novolog 8 units TID with meals (basal/prandial match)  Low Dose Correction Scale  BG monitoring ac/hs  Diet diabetic 2000 Calories (up to 75 gm per meal)    ** Please call Endocrine for any BG related issues **    Discharge plans: TBD          Palliative Care  Long-term use of immunosuppressant medication  May increase insulin resistance.             Plan discussed with patient, family, and RN at bedside.     Lizz Orona NP  Endocrinology  [facility] - Transplant Stepdown  "

## 2024-10-15 NOTE — PLAN OF CARE
Pt AAOx4. Independent. Wife at bedside. VS stable. BG elevated - SSI given. Na phos hung. WBC 0.85. Neupogen given. Lantus am and novolog with meals started. Atovaquone started. NS 500cc bolus given. 3 BM.

## 2024-10-15 NOTE — ASSESSMENT & PLAN NOTE
- Suspect drug induced vs infection  - ANC ~ 50 at OSH on routine morning labs   - Fever x >48 hours, tmax 101   - Rocephin and Vanc at OSH, started Cefepime on admit and continue Vanc.  - ANC 0 on 10/15 -> Neupogen 480 mcg x 1   - UA, CXR appear without infection from OSH   - Blood cx NGTD  - RIP neg  - CMV and throat culture pending   - EBV positive last month, recheck pending  - ID consulted, appreciate assistance.

## 2024-10-15 NOTE — H&P
[facility] - Transplant Stepdown  Kidney Transplant  H&P      Subjective:     Chief Complaint/Reason for Admission: neutropenic fever    History of Present Illness:  Mr. Diaz is a 43 yr male who received a LURT 24 for T2DM and HTN. No intra-op issues noted. Cr trended down, good UOP, dc'd without issue. Has insulin pump for DM2 management. Cr elvie to 1.6, had biopsy  which did not show rejection or ATI. On 10/14 patient presented to OSH ER for c/o fever, sore throat, cough, myalgias, mild nasal congestion. Tmax 101, first fever on 10/12. He also had routine lab work this morning that revealed severe neutropenia at 0.73, ANC 58. Denies CP, SOB, NVD, dysuria, changes in UOP. Denies any sick contacts. Labs in ED notable for negative COVID, flu A/B, strepA. Labs this morning with Cr at BL 1.5. UA likely without infection, CXR without acute abnormality. BC from OSH pending. Patient transferred to [facility] for neutropenic fever. Plan for CMV PCR, RIP, throat culture, transplant ID consult, IV antibiotics. Will hold MMF and bactrim. Neupogen per protocol. D/w Dr. Martell.      PTA Medications   Medication Sig    atorvastatin (LIPITOR) 40 MG tablet Take 1 tablet (40 mg total) by mouth once daily.    blood-glucose meter,continuous (DEXCOM G6  MISC) by Misc.(Non-Drug; Combo Route) route.    blood-glucose transmitter (DEXCOM G6 TRANSMITTER MISC)   Dexcom G6 transmitter, See Instructions, uad with dexcom G6 sensor; change transmitter q 90 days, # 1 EA, 3 Refill(s), Pharmacy: [id number], 187, cm, 23 14:55:00 CDT, Height/Length Measured, 105.1, kg, 23 14:55:00 CDT, Weight Dosing    calcitRIOL (ROCALTROL) 0.5 MCG Cap Take 1 capsule (0.5 mcg total) by mouth once daily.    DEXCOM G6 SENSOR Adelina SMARTSI Each Topical Every 10 Days    diazePAM (VALIUM) 5 MG tablet Take 1 tablet (5 mg total) by mouth once. Take 1 hour prior to planned procedure for 1 dose    insulin lispro (HUMALOG U-100 INSULIN) 100  unit/mL injection   See Instructions, for use in insulin pump; max daily dose 100 units, # 90 mL, 1 Refill(s), Maintenance, Pharmacy: [id number], 187, cm, 02/23/23 11:24:00 CST, Height/Length Measured, 102.5, kg, 02/23/23 11:24:00 CST, Weight Dosing    insulin pump cart,auto,BT/cntr (OMNIPOD 5 G6 INTRO KIT, GEN 5, SUBQ)   Omnipod 5 G6 Intro Kit (Gen 5), See Instructions, use as directed, # 1 EA, 0 Refill(s), Pharmacy: [id number], 187, cm, 12/30/22 9:14:00 CST, Height/Length Measured, 102.3, kg, 12/30/22 9:14:00 CST, Weight Dosing    k phos di & mono-sod phos mono (K-PHOS-NEUTRAL) 250 mg Tab Take 2 tablets by mouth 3 (three) times daily.    magnesium oxide (MAG-OX) 400 mg (241.3 mg magnesium) tablet Take 2 tablets (800 mg total) by mouth 2 (two) times daily.    multivitamin Tab Take 1 tablet by mouth once daily.    mycophenolate (CELLCEPT) 250 mg Cap Take 2 capsules (500 mg total) by mouth 2 (two) times daily.    ondansetron (ZOFRAN-ODT) 4 MG TbDL Take 1 tablet (4 mg total) by mouth every 8 (eight) hours as needed (nausea).    pantoprazole (PROTONIX) 40 MG tablet Take 1 tablet (40 mg total) by mouth once daily.    predniSONE (DELTASONE) 5 MG tablet Take by mouth daily: 20mg 7/19-7/25, 15mg 7/26-8/1, 10mg 8/2-8/8, 5mg 8/9-    sulfamethoxazole-trimethoprim 400-80mg (BACTRIM,SEPTRA) 400-80 mg per tablet Take 1 tablet by mouth every morning. Stop 1/12/25    tacrolimus (PROGRAF) 1 MG Cap Take 3 capsules (3 mg total) by mouth every 12 (twelve) hours.       Review of patient's allergies indicates:   Allergen Reactions    Codeine Hallucinations    Vancomycin analogues Other (See Comments)     Burning and  itchig of skin       Past Medical History:   Diagnosis Date    Anemia     Diabetes mellitus type II     Disorder of kidney and ureter     GERD (gastroesophageal reflux disease)     Hypertension     Proteinuria      Past Surgical History:   Procedure Laterality Date    INNER EAR SURGERY      for tinnitus  "   KIDNEY TRANSPLANT Left 7/16/2024    Procedure: TRANSPLANT, KIDNEY;  Surgeon: Erma Gómez MD;  Location: [facility] OR [address] [place] [address];  Service: Transplant;  Laterality: Left;     Family History       Problem Relation (Age of Onset)    Cancer Father    Diabetes Mother    Heart disease Mother, Sister    Kidney disease Mother          Tobacco Use    Smoking status: Never    Smokeless tobacco: Never   Substance and Sexual Activity    Alcohol use: No    Drug use: No    Sexual activity: Yes        Review of Systems   Constitutional:  Positive for appetite change, fatigue and fever. Negative for chills.   HENT:  Positive for postnasal drip, sore throat and trouble swallowing.    Respiratory:  Positive for cough. Negative for chest tightness and shortness of breath.    Cardiovascular:  Negative for chest pain, palpitations and leg swelling.   Gastrointestinal:  Negative for abdominal distention, abdominal pain, constipation, diarrhea, nausea and vomiting.   Genitourinary:  Negative for difficulty urinating, dysuria, frequency and urgency.   Musculoskeletal:  Negative for back pain and neck pain.   Skin:  Negative for color change, pallor, rash and wound.   Allergic/Immunologic: Positive for immunocompromised state.   Neurological:  Negative for dizziness, weakness and headaches.   Psychiatric/Behavioral:  Negative for confusion and sleep disturbance.      Objective:     Vital Signs (Most Recent):  Temp: 98.5 °F (36.9 °C) (10/14/24 2300)  Pulse: 91 (10/14/24 2300)  Resp: 18 (10/14/24 2300)  BP: 135/79 (10/14/24 2300)  SpO2: 98 % (10/14/24 2300)  Height: 6' 2" (188 cm)  Weight: 95.1 kg (209 lb 10.5 oz)  Body mass index is 26.92 kg/m².      Physical Exam  Vitals and nursing note reviewed.   Constitutional:       General: He is awake.      Appearance: Normal appearance.   ENT:      Throat: erythema to posterior pharynx and tonsil area, no exudate.   Eyes:      General: No scleral icterus.  Cardiovascular:      Rate and Rhythm: " "Normal rate and regular rhythm.      Pulses: Normal pulses.   Pulmonary:      Effort: Pulmonary effort is normal. No respiratory distress.      Breath sounds: Normal breath sounds. No decreased breath sounds, wheezing or rales.   Abdominal:      General: A surgical scar is present. Bowel sounds are normal. There is no distension.      Palpations: Abdomen is soft.      Tenderness: There is no abdominal tenderness. There is no guarding or rebound.   Musculoskeletal:         General: No tenderness. Normal range of motion.   Skin:     General: Skin is warm and dry.      Coloration: Skin is not pale.      Findings: No erythema or rash.   Neurological:      Mental Status: He is alert and oriented to person, place, and time.   Psychiatric:         Speech: Speech normal.         Behavior: Behavior normal. Behavior is cooperative.          Laboratory  CBC:   Recent Labs   Lab 10/14/24  0758   WBC 0.73*   RBC 4.71   HGB 14.2   HCT 42.6      MCV 90   MCH 30.1   MCHC 33.3     CMP:   Recent Labs   Lab 10/14/24  0758   *   CALCIUM 9.9   ALBUMIN 3.9      K 4.8   CO2 22*      BUN 17   CREATININE 1.5*     Coagulation: No results for input(s): "PT", "APTT" in the last 168 hours.  Labs within the past 24 hours have been reviewed.    Diagnostic Results:  US - Kidney: Results for orders placed during the hospital encounter of 08/05/24    US Transplant Kidney With Doppler    Narrative  EXAMINATION:  US TRANSPLANT KIDNEY WITH DOPPLER    CLINICAL HISTORY:  Kidney transplant status    TECHNIQUE:  Real time gray scale and doppler ultrasound was performed over the patient's renal allograft.    COMPARISON:  None    FINDINGS:  Status post renal allograft in the left lower quadrant, 07/16/2024.  The allograft measures 11.4 cm. Normal perfusion. Mild hydronephrosis.    Small fluid collection inferior to the incision site measuring 1.2 x 0.9 x 2.2 cm.    Vasculature:    Resistive indices ranged from 0.72 to 0.81.    Main " renal artery peak systolic velocity: 200cm/sec with normal waveform.    Renal artery/iliac ratio: 1.43.    The main renal vein is patent.    Echogenic focus of the bladder wall, protruding into the bladder lumen measuring 1.3 x 2.0 x 1.4 cm.    Impression  Mild hydronephrosis of the allograft.  Resistive indices ranging normal to mildly elevated.  Recommend continued follow-up.    2.2 cm subincisional fluid collection likely representing postoperative hematoma, seroma or combination there of.    Lateral bladder wall lesion.  Recommend further evaluation with cystoscopy as clinically indicated.    This report was flagged in Epic as containing an incidental finding.    Electronically signed by resident: Doug Sheldon  Date:    08/05/2024  Time:    16:08    Electronically signed by: Gomez Dover  Date:    08/05/2024  Time:    17:35    Assessment/Plan:     Cardiac/Vascular  Hyperlipidemia  - cont statin      Renal/  Status post kidney transplant  - Cr at BL       ID  At risk for opportunistic infections  - held bactrim for neutropenia   - resume when/if appropriate for PJP ppx       Immunology/Multi System  Prophylactic immunotherapy  - cont prograf and pred   - MMF held for fever   - monitor tac levels for toxicity and therapeutic efforts       Oncology  * Neutropenic fever  - suspect drug induced vs infection  - ANC ~ 50 at OSH on routine morning labs   - fever x >48 hours, tmax 101   - rocephin and vanc at OSH, starting cefepime on admit and continue vanc  - neupogen 480 mch x 1 on admit  - UA, CXR appear without infection from OSH   - BC, CMV, RIP, and throat culture pending   - EBV positive last month, will recheck   - ID consulted       Anemia of chronic disease  - h/h stable today   - monitor with cbc     Endocrine  Insulin pump status  - endocrine consulted, appreciate assistance      Secondary renal hyperparathyroidism  - cont home meds       Palliative Care  Long-term use of immunosuppressant  medication  - see prophylactic immunotherapy          Yaquelin Jones, DNP  Kidney Transplant  [facility] - Transplant Stepdown

## 2024-10-15 NOTE — HPI
Mr. Dejuan Diaz is a 44 yo male with a PMHX of kidney transplant (06/2024, on tacro/MMF/prednisone), T2DM, and HTN who initially presented with sore throat, fever, myalgias, and nasal congestion, along with extreme fatigue and lethargy. Patient stated that 2 days ago he noted severe sore throat, causing difficulty swallowing, he also noted some right sided anterior cervical tederness. Tmax was 101. He was taking prophylaxis with bactrim and recentely stopped taking acylclovir. Recently sustained a minor cut from [employer]. He is uptodate on vaccinations, pending 2nd shingles and flu shot. He reports sore throat has improved and has a dry cough. He works as a , but no other recent outdoor hobbies. Denies any recent sick contact, HA, N/V, SOB, chest pain, abdominal pain, constipation, diarrhea, or dysuria, or worsening skin changes.      He is AF and VSS. Labs notable for WBC of 0.85, ANC 40. UA unremarkable, RIP was negative. COVID/flu negative. CxR negative.

## 2024-10-15 NOTE — ASSESSMENT & PLAN NOTE
- Endocrine consulted, appreciate assistance  - 3+ ketones in urine  - Check beta hydroxy  - Give 500 cc NS bolus  - WCTM

## 2024-10-15 NOTE — HOSPITAL COURSE
Mr. Diaz was transferred from [facility] ED 10/14 for neutropenic fever with associated sore throat and myalgias.    Severe neutropenia/neutropenic fever: ID and heme following. Cellcept and Bactrim held on admit. B12 and folate wnl. Copper pending. Received Zarxio (Filgrastim-sndz) for ANC 0 10/15-10/17.  10/18, but held off on additional Zarxio due to patient's severe bone/back pain from prior injections.  Negative Infectious workup: Flu, COVID, strep negative, RIP, UA, blood cx, CMV, EBV, throat cx, stool no neutrophils seen, rotavirus, giardia. CT maxillofacial/neck unremarkable.   Pending Infectious workup: Stool cx, stool e. Coli antigen, parvovirus, [place], ehrlichia antibody panel, adenovirus PCR, spotted fever group antibodies, lyme disease, karius  Abx: Cefepime/Vanc started on admit. Vanc discontinued 10/16. Empiric Doxycycline added 10/18.    Interval History: NAEON. Patient remains afebrile on Cefepime. Empiric Doxy added today. See above for pending infectious workup. Patient reports sore throat improving (& erythema improved), but c/o night sweats. Adding TSH, T4, cortisol, CRP, and hepatic function panel to AM labs. Replacing phos IV and resume oral phos as stools now formed. Also add Ergo weekly. Patient c/o severe back pain due to Zarxio injections. Zyrtec, Tylenol, Tramadol, and Robaxin available for pain control.  today, but holding off on Zarxio to give patient a break. Heme following, appreciate assistance. WCTM.    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 He is scheduled for cytoscopy on 10/21. Will need to reschedule.

## 2024-10-15 NOTE — ASSESSMENT & PLAN NOTE
- Continue Prograf and Pred.  - MMF held for neutropenia and fever.  - Monitor Prograf levels for toxicity and therapeutic efforts.

## 2024-10-15 NOTE — SUBJECTIVE & OBJECTIVE
Subjective:   History of Present Illness:  Mr. Diaz is a 43 yr male who received a LURT 7/16/24 for T2DM and HTN. No intra-op issues noted. Cr trended down, good UOP, dc'd without issue. Has insulin pump for DM2 management. Cr elvie to 1.6, had biopsy 8/14 which did not show rejection or ATI. On 10/14 patient presented to OSH ER for c/o fever, sore throat, cough, myalgias, mild nasal congestion. Tmax 101, first fever on 10/12. He also had routine lab work this morning that revealed severe neutropenia at 0.73, ANC 58. Denies CP, SOB, NVD, dysuria, changes in UOP. Denies any sick contacts. Labs in ED notable for negative COVID, flu A/B, strepA. Labs this morning with Cr at BL 1.5. UA likely without infection, CXR without acute abnormality. BC from OSH pending. Patient transferred to [facility] for neutropenic fever. Plan for CMV PCR, RIP, throat culture, transplant ID consult, IV antibiotics. Will hold MMF and bactrim. Neupogen per protocol. D/w Dr. Martell.    Mr. Diaz is a 43 y.o. year old male who is status post Pancreas Transplant Waitlist - Qualified: 2/19/2024 - Inactive.  His maintenance immunosuppression consists of:   Immunosuppressants (From admission, onward)      Start     Stop Route Frequency Ordered    10/15/24 0800  tacrolimus capsule 3 mg         -- Oral 2 times daily 10/14/24 2304                Hospital Course:  Mr. Diaz was transferred from OSH ED 10/14 for neutropenic fever with associated sore throat and myalgias. Infectious workup initiated, Cefepime/Vanc started, and ID consulted. Flu, COVID, and strep negative. RIP negative. UA neg for infection. Blood cx NGTD.     Interval History: Tmax 100.3F since admission to C. Continue BS abx. Infectious workup negative thus far. CMV PCR, EBV PCR, and throat culture pending. 3+ ketones in UA, beta hydroxy ordered. Patient having difficulty with PO intake due to sore throat. Give 500 cc NS bolus. Cont chloraseptic spray. F/U ID recs, may need imaging  and/or EGD. Renal function at baseline. WCTM.    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 He is scheduled for cytoscopy on 10/21.      Past Medical, Surgical, Family, and Social History:   Unchanged from H&P.    Scheduled Meds:   atorvastatin  40 mg Oral Daily    atovaquone  1,500 mg Oral Daily    calcitRIOL  0.5 mcg Oral Daily    ceFEPime IV (PEDS and ADULTS)  2 g Intravenous Q8H    filgrastim-sndz  5 mcg/kg Subcutaneous Daily    heparin (porcine)  5,000 Units Subcutaneous Q8H    insulin aspart U-100  8 Units Subcutaneous TIDWM    insulin glargine U-100  24 Units Subcutaneous Daily    k phos di & mono-sod phos mono  500 mg Oral TID    magnesium oxide  800 mg Oral BID    pantoprazole  40 mg Oral Daily    predniSONE  5 mg Oral Daily    sodium chloride 0.9%  500 mL Intravenous Once    sodium phosphate 30 mmol in D5W 250 mL IVPB  30 mmol Intravenous Once    tacrolimus  3 mg Oral BID     Continuous Infusions:  PRN Meds:  Current Facility-Administered Medications:     acetaminophen, 650 mg, Oral, Q4H PRN    dextromethorphan-guaiFENesin  mg/5 ml, 5 mL, Oral, Q4H PRN    dextrose 10%, 12.5 g, Intravenous, PRN    dextrose 10%, 25 g, Intravenous, PRN    glucagon (human recombinant), 1 mg, Intramuscular, PRN    glucose, 16 g, Oral, PRN    glucose, 24 g, Oral, PRN    insulin aspart U-100, 0-5 Units, Subcutaneous, QID (AC + HS) PRN    melatonin, 6 mg, Oral, Nightly PRN    ondansetron, 8 mg, Oral, Q6H PRN    prochlorperazine, 5 mg, Intravenous, Q6H PRN    sodium chloride 0.9%, 10 mL, Intravenous, PRN    Intake/Output - Last 3 Shifts         10/13 0700  10/14 0659 10/14 0700  10/15 0659 10/15 0700  10/16 0659    Urine (mL/kg/hr)  1250 750 (1.6)    Stool   0    Total Output  1250 750    Net  -1250 -750           Stool Occurrence   2 x             Review of  "Systems   Constitutional:  Positive for appetite change, fatigue and fever.   HENT:  Positive for postnasal drip, sore throat and trouble swallowing.    Eyes:  Negative for photophobia and redness.   Respiratory:  Positive for cough. Negative for shortness of breath, wheezing and stridor.    Cardiovascular:  Negative for chest pain, palpitations and leg swelling.   Gastrointestinal:  Negative for abdominal distention, abdominal pain, constipation, diarrhea, nausea and vomiting.   Genitourinary:  Negative for decreased urine volume, difficulty urinating and dysuria.   Musculoskeletal:  Positive for myalgias.   Skin:  Negative for rash and wound.   Allergic/Immunologic: Positive for immunocompromised state.   Neurological:  Positive for headaches. Negative for dizziness and light-headedness.   Psychiatric/Behavioral:  Negative for agitation, behavioral problems, confusion, decreased concentration and hallucinations.       Objective:     Vital Signs (Most Recent):  Temp: 98.8 °F (37.1 °C) (10/15/24 0806)  Pulse: 87 (10/15/24 0806)  Resp: 20 (10/15/24 0806)  BP: 134/81 (10/15/24 0806)  SpO2: (!) 93 % (10/15/24 0806) Vital Signs (24h Range):  Temp:  [98.5 °F (36.9 °C)-100.2 °F (37.9 °C)] 98.8 °F (37.1 °C)  Pulse:  [] 87  Resp:  [18-20] 20  SpO2:  [93 %-98 %] 93 %  BP: (134-155)/(79-96) 134/81     Weight: 95.1 kg (209 lb 10.5 oz)  Height: 6' 2" (188 cm)  Body mass index is 26.92 kg/m².     Physical Exam  Vitals and nursing note reviewed.   Constitutional:       General: He is awake. He is not in acute distress.     Appearance: Normal appearance.   HENT:      Head: Normocephalic and atraumatic.      Mouth/Throat:      Pharynx: Posterior oropharyngeal erythema present.   Eyes:      General: No scleral icterus.        Right eye: No discharge.         Left eye: No discharge.   Cardiovascular:      Rate and Rhythm: Normal rate and regular rhythm.      Pulses: Normal pulses.      Heart sounds: No murmur heard.  Pulmonary: "      Effort: Pulmonary effort is normal. No respiratory distress.      Breath sounds: No decreased breath sounds, wheezing or rales.   Abdominal:      General: A surgical scar is present. Bowel sounds are normal. There is no distension.      Palpations: Abdomen is soft.      Tenderness: There is no abdominal tenderness. There is no guarding or rebound.   Musculoskeletal:         General: No tenderness. Normal range of motion.      Right lower leg: No edema.      Left lower leg: No edema.   Skin:     General: Skin is warm and dry.      Capillary Refill: Capillary refill takes less than 2 seconds.      Coloration: Skin is not pale.      Findings: No erythema or rash.   Neurological:      General: No focal deficit present.      Mental Status: He is alert and oriented to person, place, and time.   Psychiatric:         Mood and Affect: Mood normal.         Speech: Speech normal.         Behavior: Behavior normal. Behavior is cooperative.         Thought Content: Thought content normal.          Laboratory:  CBC:   Recent Labs   Lab 10/14/24  0758 10/15/24  0520   WBC 0.73* 0.85*   RBC 4.71 4.52*   HGB 14.2 13.6*   HCT 42.6 41.0    232   MCV 90 91   MCH 30.1 30.1   MCHC 33.3 33.2     CMP:   Recent Labs   Lab 10/14/24  0758 10/15/24  0520   * 273*   CALCIUM 9.9 9.8   ALBUMIN 3.9 3.5  3.5   PROT  --  7.2    137   K 4.8 4.5   CO2 22* 21*    102   BUN 17 14   CREATININE 1.5* 1.2   ALKPHOS  --  77   ALT  --  18   AST  --  13       Diagnostic Results:  Reviewed

## 2024-10-15 NOTE — SUBJECTIVE & OBJECTIVE
Past Medical History:   Diagnosis Date    Anemia     Diabetes mellitus type II     Disorder of kidney and ureter     GERD (gastroesophageal reflux disease)     Hypertension     Proteinuria        Past Surgical History:   Procedure Laterality Date    INNER EAR SURGERY      for tinnitus    KIDNEY TRANSPLANT Left 2024    Procedure: TRANSPLANT, KIDNEY;  Surgeon: Erma Gómez MD;  Location: [facility] OR [address] [place] [address];  Service: Transplant;  Laterality: Left;       Review of patient's allergies indicates:   Allergen Reactions    Codeine Hallucinations    Vancomycin analogues Other (See Comments)     Burning and  itchig of skin       Medications:  Medications Prior to Admission   Medication Sig    atorvastatin (LIPITOR) 40 MG tablet Take 1 tablet (40 mg total) by mouth once daily.    blood-glucose meter,continuous (DEXCOM G6  MISC) by Misc.(Non-Drug; Combo Route) route.    blood-glucose transmitter (DEXCOM G6 TRANSMITTER MISC)   Dexcom G6 transmitter, See Instructions, uad with dexcom G6 sensor; change transmitter q 90 days, # 1 EA, 3 Refill(s), Pharmacy: [phone], 187, cm, 23 14:55:00 CDT, Height/Length Measured, 105.1, kg, 23 14:55:00 CDT, Weight Dosing    calcitRIOL (ROCALTROL) 0.5 MCG Cap Take 1 capsule (0.5 mcg total) by mouth once daily.    DEXCOM G6 SENSOR Adelina SMARTSI Each Topical Every 10 Days    diazePAM (VALIUM) 5 MG tablet Take 1 tablet (5 mg total) by mouth once. Take 1 hour prior to planned procedure for 1 dose    insulin lispro (HUMALOG U-100 INSULIN) 100 unit/mL injection   See Instructions, for use in insulin pump; max daily dose 100 units, # 90 mL, 1 Refill(s), Maintenance, Pharmacy: [phone], 187, cm, 23 11:24:00 CST, Height/Length Measured, 102.5, kg, 23 11:24:00 CST, Weight Dosing    insulin pump cart,auto,BT/cntr (OMNIPOD 5 G6 INTRO KIT, GEN 5, SUBQ)   Omnipod 5 G6 Intro Kit (Gen 5), See Instructions, use as directed, # 1 EA, 0 Refill(s),  Pharmacy: [phone], 187, cm, 12/30/22 9:14:00 CST, Height/Length Measured, 102.3, kg, 12/30/22 9:14:00 CST, Weight Dosing    k phos di & mono-sod phos mono (K-PHOS-NEUTRAL) 250 mg Tab Take 2 tablets by mouth 3 (three) times daily.    magnesium oxide (MAG-OX) 400 mg (241.3 mg magnesium) tablet Take 2 tablets (800 mg total) by mouth 2 (two) times daily.    multivitamin Tab Take 1 tablet by mouth once daily.    mycophenolate (CELLCEPT) 250 mg Cap Take 2 capsules (500 mg total) by mouth 2 (two) times daily.    ondansetron (ZOFRAN-ODT) 4 MG TbDL Take 1 tablet (4 mg total) by mouth every 8 (eight) hours as needed (nausea).    pantoprazole (PROTONIX) 40 MG tablet Take 1 tablet (40 mg total) by mouth once daily.    predniSONE (DELTASONE) 5 MG tablet Take by mouth daily: 20mg 7/19-7/25, 15mg 7/26-8/1, 10mg 8/2-8/8, 5mg 8/9-    sulfamethoxazole-trimethoprim 400-80mg (BACTRIM,SEPTRA) 400-80 mg per tablet Take 1 tablet by mouth every morning. Stop 1/12/25    tacrolimus (PROGRAF) 1 MG Cap Take 3 capsules (3 mg total) by mouth every 12 (twelve) hours.     Antibiotics (From admission, onward)      Start     Stop Route Frequency Ordered    10/14/24 2315  ceFEPIme (MAXIPIME) 2 g in D5W 100 mL IVPB (MB+)         -- IV Every 8 hours (non-standard times) 10/14/24 2304          Antifungals (From admission, onward)      None          Antivirals (From admission, onward)      None             Immunization History   Administered Date(s) Administered    Hepatitis A, Adult 11/29/2023, 05/24/2024    Hepatitis B (recombinant) Adjuvanted, 2 dose 11/29/2023, 12/28/2023    Pneumococcal Conjugate - 20 Valent 05/24/2024    Pneumococcal Polysaccharide - 23 Valent 06/21/2018    Tdap 11/29/2023    Zoster Recombinant 06/14/2024       Family History       Problem Relation (Age of Onset)    Cancer Father    Diabetes Mother    Heart disease Mother, Sister    Kidney disease Mother          Social History     Socioeconomic History    Marital  status:    Tobacco Use    Smoking status: Never    Smokeless tobacco: Never   Substance and Sexual Activity    Alcohol use: No    Drug use: No    Sexual activity: Yes     Social Drivers of Health     Financial Resource Strain: Low Risk  (10/15/2024)    Overall Financial Resource Strain (CARDIA)     Difficulty of Paying Living Expenses: Not very hard   Food Insecurity: No Food Insecurity (10/15/2024)    Hunger Vital Sign     Worried About Running Out of Food in the Last Year: Never true     Ran Out of Food in the Last Year: Never true   Transportation Needs: No Transportation Needs (10/15/2024)    TRANSPORTATION NEEDS     Transportation : No   Physical Activity: Insufficiently Active (11/14/2023)    Exercise Vital Sign     Days of Exercise per Week: 2 days     Minutes of Exercise per Session: 40 min   Stress: No Stress Concern Present (10/15/2024)    [employer] - Occupational Stress Questionnaire     Feeling of Stress : Not at all   Recent Concern: Stress - Stress Concern Present (7/17/2024)    [employer] - Occupational Stress Questionnaire     Feeling of Stress : To some extent   Housing Stability: Low Risk  (10/15/2024)    Housing Stability Vital Sign     Unable to Pay for Housing in the Last Year: No     Homeless in the Last Year: No     Review of Systems   Constitutional:  Negative for chills and fever.   HENT:  Negative for trouble swallowing.    Respiratory:  Positive for cough (dry cough). Negative for shortness of breath.    Cardiovascular:  Negative for chest pain and leg swelling.   Gastrointestinal:  Negative for abdominal pain, constipation, diarrhea, nausea and vomiting.   Genitourinary:  Negative for difficulty urinating.   Musculoskeletal:  Negative for arthralgias.   Allergic/Immunologic: Positive for immunocompromised state.   Neurological:  Negative for weakness and headaches.   Psychiatric/Behavioral:  Negative for agitation and  confusion. The patient is not nervous/anxious.      Objective:     Vital Signs (Most Recent):  Temp: 97.7 °F (36.5 °C) (10/15/24 1606)  Pulse: 89 (10/15/24 1606)  Resp: 20 (10/15/24 1606)  BP: 121/77 (10/15/24 1606)  SpO2: 96 % (10/15/24 1606) Vital Signs (24h Range):  Temp:  [97.7 °F (36.5 °C)-100.2 °F (37.9 °C)] 97.7 °F (36.5 °C)  Pulse:  [] 89  Resp:  [18-20] 20  SpO2:  [93 %-98 %] 96 %  BP: (121-155)/(77-96) 121/77     Weight: 95.1 kg (209 lb 10.5 oz)  Body mass index is 26.92 kg/m².    Estimated Creatinine Clearance: 92.3 mL/min (based on SCr of 1.2 mg/dL).     Physical Exam  Constitutional:       General: He is not in acute distress.     Appearance: Normal appearance. He is not ill-appearing.   HENT:      Head: Normocephalic and atraumatic.      Mouth/Throat:      Mouth: Mucous membranes are moist.   Eyes:      Extraocular Movements: Extraocular movements intact.      Conjunctiva/sclera: Conjunctivae normal.   Cardiovascular:      Rate and Rhythm: Normal rate and regular rhythm.      Heart sounds: Normal heart sounds.   Pulmonary:      Effort: Pulmonary effort is normal. No respiratory distress.      Breath sounds: Normal breath sounds.   Abdominal:      General: Abdomen is flat. Bowel sounds are normal. There is no distension.      Palpations: Abdomen is soft.      Tenderness: There is no abdominal tenderness. There is no guarding.   Musculoskeletal:         General: No swelling.      Right lower leg: No edema.      Left lower leg: No edema.   Lymphadenopathy:      Cervical: Cervical adenopathy (tenderness to palpation of right side) present.   Skin:     General: Skin is warm.   Neurological:      General: No focal deficit present.      Mental Status: He is alert and oriented to person, place, and time.   Psychiatric:         Mood and Affect: Mood normal.         Behavior: Behavior normal.          Significant Labs: All pertinent labs within the past 24 hours have been reviewed.    Significant Imaging:  I have reviewed all pertinent imaging results/findings within the past 24 hours.

## 2024-10-15 NOTE — ED NOTES
Alerted by Karin NICK the pt inquired about taking his anti rejection medication. Confirmed with MD that medication was OK to take. Pt took 3 1mg Prograf pills.

## 2024-10-15 NOTE — PROGRESS NOTES
[facility] - Transplant Stepdown  Kidney Transplant  Progress Note      Reason for Follow-up: Reassessment of Pancreas Transplant Waitlist - Qualified: 2/19/2024 - Inactive recipient and management of immunosuppression.    ORGAN: LEFT KIDNEY      Donor Type: Living      Donor CMV Status:    Donor HBcAB:   Donor HCV Status:   Donor HBV KAVITA:   Donor HCV KAVITA:       Subjective:   History of Present Illness:  Mr. Diaz is a 43 yr male who received a LURT 7/16/24 for T2DM and HTN. No intra-op issues noted. Cr trended down, good UOP, dc'd without issue. Has insulin pump for DM2 management. Cr elvie to 1.6, had biopsy 8/14 which did not show rejection or ATI. On 10/14 patient presented to OSH ER for c/o fever, sore throat, cough, myalgias, mild nasal congestion. Tmax 101, first fever on 10/12. He also had routine lab work this morning that revealed severe neutropenia at 0.73, ANC 58. Denies CP, SOB, NVD, dysuria, changes in UOP. Denies any sick contacts. Labs in ED notable for negative COVID, flu A/B, strepA. Labs this morning with Cr at BL 1.5. UA likely without infection, CXR without acute abnormality. BC from OSH pending. Patient transferred to C for neutropenic fever. Plan for CMV PCR, RIP, throat culture, transplant ID consult, IV antibiotics. Will hold MMF and bactrim. Neupogen per protocol. D/w Dr. Martell.    Mr. Diaz is a 43 y.o. year old male who is status post Pancreas Transplant Waitlist - Qualified: 2/19/2024 - Inactive.  His maintenance immunosuppression consists of:   Immunosuppressants (From admission, onward)      Start     Stop Route Frequency Ordered    10/15/24 0800  tacrolimus capsule 3 mg         -- Oral 2 times daily 10/14/24 2304                Hospital Course:  Mr. Diaz was transferred from OSH ED 10/14 for neutropenic fever with associated sore throat and myalgias. Infectious workup initiated, Cefepime/Vanc started, and ID consulted. Flu, COVID, and strep negative. RIP negative. UA neg for  infection. Blood cx NGTD.     Interval History: Tmax 100.3F since admission to [facility]. Continue BS abx. Infectious workup negative thus far. CMV PCR, EBV PCR, and throat culture pending. 3+ ketones in UA, beta hydroxy ordered. Patient having difficulty with PO intake due to sore throat. Give 500 cc NS bolus. Cont chloraseptic spray. F/U ID recs, may need imaging and/or EGD. Renal function at baseline. WCTM.    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 He is scheduled for cytoscopy on 10/21.      Past Medical, Surgical, Family, and Social History:   Unchanged from H&P.    Scheduled Meds:   atorvastatin  40 mg Oral Daily    atovaquone  1,500 mg Oral Daily    calcitRIOL  0.5 mcg Oral Daily    ceFEPime IV (PEDS and ADULTS)  2 g Intravenous Q8H    filgrastim-sndz  5 mcg/kg Subcutaneous Daily    heparin (porcine)  5,000 Units Subcutaneous Q8H    insulin aspart U-100  8 Units Subcutaneous TIDWM    insulin glargine U-100  24 Units Subcutaneous Daily    k phos di & mono-sod phos mono  500 mg Oral TID    magnesium oxide  800 mg Oral BID    pantoprazole  40 mg Oral Daily    predniSONE  5 mg Oral Daily    sodium chloride 0.9%  500 mL Intravenous Once    sodium phosphate 30 mmol in D5W 250 mL IVPB  30 mmol Intravenous Once    tacrolimus  3 mg Oral BID     Continuous Infusions:  PRN Meds:  Current Facility-Administered Medications:     acetaminophen, 650 mg, Oral, Q4H PRN    dextromethorphan-guaiFENesin  mg/5 ml, 5 mL, Oral, Q4H PRN    dextrose 10%, 12.5 g, Intravenous, PRN    dextrose 10%, 25 g, Intravenous, PRN    glucagon (human recombinant), 1 mg, Intramuscular, PRN    glucose, 16 g, Oral, PRN    glucose, 24 g, Oral, PRN    insulin aspart U-100, 0-5 Units, Subcutaneous, QID (AC + HS) PRN    melatonin, 6 mg, Oral, Nightly PRN    ondansetron, 8 mg, Oral, Q6H  "PRN    prochlorperazine, 5 mg, Intravenous, Q6H PRN    sodium chloride 0.9%, 10 mL, Intravenous, PRN    Intake/Output - Last 3 Shifts         10/13 0700  10/14 0659 10/14 0700  10/15 0659 10/15 0700  10/16 0659    Urine (mL/kg/hr)  1250 750 (1.6)    Stool   0    Total Output  1250 750    Net  -1250 -750           Stool Occurrence   2 x             Review of Systems   Constitutional:  Positive for appetite change, fatigue and fever.   HENT:  Positive for postnasal drip, sore throat and trouble swallowing.    Eyes:  Negative for photophobia and redness.   Respiratory:  Positive for cough. Negative for shortness of breath, wheezing and stridor.    Cardiovascular:  Negative for chest pain, palpitations and leg swelling.   Gastrointestinal:  Negative for abdominal distention, abdominal pain, constipation, diarrhea, nausea and vomiting.   Genitourinary:  Negative for decreased urine volume, difficulty urinating and dysuria.   Musculoskeletal:  Positive for myalgias.   Skin:  Negative for rash and wound.   Allergic/Immunologic: Positive for immunocompromised state.   Neurological:  Positive for headaches. Negative for dizziness and light-headedness.   Psychiatric/Behavioral:  Negative for agitation, behavioral problems, confusion, decreased concentration and hallucinations.       Objective:     Vital Signs (Most Recent):  Temp: 98.8 °F (37.1 °C) (10/15/24 0806)  Pulse: 87 (10/15/24 0806)  Resp: 20 (10/15/24 0806)  BP: 134/81 (10/15/24 0806)  SpO2: (!) 93 % (10/15/24 0806) Vital Signs (24h Range):  Temp:  [98.5 °F (36.9 °C)-100.2 °F (37.9 °C)] 98.8 °F (37.1 °C)  Pulse:  [] 87  Resp:  [18-20] 20  SpO2:  [93 %-98 %] 93 %  BP: (134-155)/(79-96) 134/81     Weight: 95.1 kg (209 lb 10.5 oz)  Height: 6' 2" (188 cm)  Body mass index is 26.92 kg/m².     Physical Exam  Vitals and nursing note reviewed.   Constitutional:       General: He is awake. He is not in acute distress.     Appearance: Normal appearance.   HENT:      " Head: Normocephalic and atraumatic.      Mouth/Throat:      Pharynx: Posterior oropharyngeal erythema present.   Eyes:      General: No scleral icterus.        Right eye: No discharge.         Left eye: No discharge.   Cardiovascular:      Rate and Rhythm: Normal rate and regular rhythm.      Pulses: Normal pulses.      Heart sounds: No murmur heard.  Pulmonary:      Effort: Pulmonary effort is normal. No respiratory distress.      Breath sounds: No decreased breath sounds, wheezing or rales.   Abdominal:      General: A surgical scar is present. Bowel sounds are normal. There is no distension.      Palpations: Abdomen is soft.      Tenderness: There is no abdominal tenderness. There is no guarding or rebound.   Musculoskeletal:         General: No tenderness. Normal range of motion.      Right lower leg: No edema.      Left lower leg: No edema.   Skin:     General: Skin is warm and dry.      Capillary Refill: Capillary refill takes less than 2 seconds.      Coloration: Skin is not pale.      Findings: No erythema or rash.   Neurological:      General: No focal deficit present.      Mental Status: He is alert and oriented to person, place, and time.   Psychiatric:         Mood and Affect: Mood normal.         Speech: Speech normal.         Behavior: Behavior normal. Behavior is cooperative.         Thought Content: Thought content normal.          Laboratory:  CBC:   Recent Labs   Lab 10/14/24  0758 10/15/24  0520   WBC 0.73* 0.85*   RBC 4.71 4.52*   HGB 14.2 13.6*   HCT 42.6 41.0    232   MCV 90 91   MCH 30.1 30.1   MCHC 33.3 33.2     CMP:   Recent Labs   Lab 10/14/24  0758 10/15/24  0520   * 273*   CALCIUM 9.9 9.8   ALBUMIN 3.9 3.5  3.5   PROT  --  7.2    137   K 4.8 4.5   CO2 22* 21*    102   BUN 17 14   CREATININE 1.5* 1.2   ALKPHOS  --  77   ALT  --  18   AST  --  13       Diagnostic Results:  Reviewed  Assessment/Plan:     * Neutropenic fever  - Suspect drug induced vs infection  -  "ANC ~ 50 at OSH on routine morning labs   - Fever x >48 hours, tmax 101   - Rocephin and Vanc at OSH, started Cefepime on admit and continue Vanc.  - ANC 0 on 10/15 -> Neupogen 480 mcg x 1   - UA, CXR appear without infection from OSH   - Blood cx NGTD  - RIP neg  - CMV and throat culture pending   - EBV positive last month, recheck pending  - ID consulted, appreciate assistance.      Hypophosphatemia  - Continue PO phos replacement and IV PRN.  - Check levels daily.      Hypomagnesemia  - Continue Mag Ox.  - Check levels daily.      Type 1 diabetes mellitus with hyperosmolarity without coma   - Endocrine consulted, appreciate assistance  - 3+ ketones in urine  - Check beta hydroxy  - Give 500 cc NS bolus  - WCTM      Insulin pump status  - See "Type 1 DM."      Anemia of chronic disease  - H/H stable.  - Monitor with daily cbc.    Prophylactic immunotherapy  - Continue Prograf and Pred.  - MMF held for neutropenia and fever.  - Monitor Prograf levels for toxicity and therapeutic efforts.      Long-term use of immunosuppressant medication  - See "prophylactic immunotherapy."      At risk for opportunistic infections  - Bactrim held for neutropenia.  - Resume when/if appropriate for PJP ppx       Status post kidney transplant  - Cr at BL   - Monitor with daily labs.    Secondary renal hyperparathyroidism  - Continue Calcitriol.       Hyperlipidemia  - Continue statin.          Discharge Planning:  Not today.    Medical decision making for this encounter includes review of pertinent labs and diagnostic studies, assessment and planning, discussions with consulting providers, discussion with patient/family, and participation in multidisciplinary rounds. Time spent caring for patient: 60 minutes    Claire Damon PA-C  Kidney Transplant  [facility] - Transplant Stepdown  "

## 2024-10-15 NOTE — HPI
Admit Date: 10/14/24     Reason for Consult: Management of T1DM, Hyperglycemia      Surgical Procedure and Date:  Status post kidney transplant 7/16/ 2024     Diabetes diagnosis year:  Over 10 years ago     Home Diabetes Medications:    Recently taken off of Omnipod 5 by primary endocrinologist in [place] and placed on the following  Toujeo 28 units once daily  Novolog ICR 1:15 (1 unit per 15g of carbs) TID with meals      How often checking glucose at home?  Dexcom   BG readings on regimen: mostly 250-300s  Hypoglycemia on the regimen?  No  Missed doses on regimen?  No     Diabetes Complications include:     Hyperglycemia and Diabetic nephropathy       Complicating diabetes co morbidities:   ESRD        HPI:   Patient is a 43 y.o. male with T1DM, CKD s/p living donor kidney txp  on 7/16/24 for DM/HTN . He has an insulin pump. He presented to OSH ER on 10/14 with complaints of fever, sore throat, cough, myalgias, and fevers. [place] [facility]. Plan for CMV PCR, RIP, throat culture, transplant ID consult, IV antibiotics. Endocrinology consulted for management of T1DM.

## 2024-10-15 NOTE — ASSESSMENT & PLAN NOTE
Mr. Dejuan Diaz is a 42 yo male with a PMHX of kidney transplant (06/2024, on tacro/MMF/prednisone), T2DM, and HTN who initially presented with sore throat, fever, myalgias, and nasal congestion, along with extreme fatigue and lethargy. Patient stated that 2 days ago he noted severe sore throat, causing difficulty swallowing, he also noted some right sided anterior cervical tederness. Tmax was 101. He was taking prophylaxis with bactrim and recentely stopped taking acylclovir.  AF and VSS. Labs notable for WBC of 0.85, ANC 40. UA unremarkable, RIP was negative. COVID/flu negative. CxR negative.     Likely etiology undetected viral illness.     Recommendations:  - fu blood cultures  - fu throat swab  - continue cefepime   - fu EBV and CMV labs  - if patient clinical status worsens, can consider adding on IV gancyclovir

## 2024-10-15 NOTE — SUBJECTIVE & OBJECTIVE
Subjective:     Chief Complaint/Reason for Admission: neutropenic fever    History of Present Illness:  Mr. Diaz is a 43 yr male who received a LURT 24 for T2DM and HTN. No intra-op issues noted. Cr trended down, good UOP, dc'd without issue. Has insulin pump for DM2 management. Cr elvie to 1.6, had biopsy  which did not show rejection or ATI. On 10/14 patient presented to OSH ER for c/o fever, sore throat, cough, myalgias, mild nasal congestion. Tmax 101, first fever on 10/12. He also had routine lab work this morning that revealed severe neutropenia at 0.73, ANC 58. Denies CP, SOB, NVD, dysuria, changes in UOP. Denies any sick contacts. Labs in ED notable for negative COVID, flu A/B, strepA. Labs this morning with Cr at BL 1.5. UA likely without infection, CXR without acute abnormality. BC from OSH pending. Patient transferred to C for neutropenic fever. Plan for CMV PCR, RIP, throat culture, transplant ID consult, IV antibiotics. Will hold MMF and bactrim. Neupogen per protocol. D/w Dr. Martell.      PTA Medications   Medication Sig    atorvastatin (LIPITOR) 40 MG tablet Take 1 tablet (40 mg total) by mouth once daily.    blood-glucose meter,continuous (DEXCOM G6  MISC) by Misc.(Non-Drug; Combo Route) route.    blood-glucose transmitter (DEXCOM G6 TRANSMITTER MISC)   Dexcom G6 transmitter, See Instructions, uad with dexcom G6 sensor; change transmitter q 90 days, # 1 EA, 3 Refill(s), Pharmacy: [id number], 187, cm, 23 14:55:00 CDT, Height/Length Measured, 105.1, kg, 23 14:55:00 CDT, Weight Dosing    calcitRIOL (ROCALTROL) 0.5 MCG Cap Take 1 capsule (0.5 mcg total) by mouth once daily.    DEXCOM G6 SENSOR Adelina SMARTSI Each Topical Every 10 Days    diazePAM (VALIUM) 5 MG tablet Take 1 tablet (5 mg total) by mouth once. Take 1 hour prior to planned procedure for 1 dose    insulin lispro (HUMALOG U-100 INSULIN) 100 unit/mL injection   See Instructions, for use in insulin  pump; max daily dose 100 units, # 90 mL, 1 Refill(s), Maintenance, Pharmacy: [id number], 187, cm, 02/23/23 11:24:00 CST, Height/Length Measured, 102.5, kg, 02/23/23 11:24:00 CST, Weight Dosing    insulin pump cart,auto,BT/cntr (OMNIPOD 5 G6 INTRO KIT, GEN 5, SUBQ)   Omnipod 5 G6 Intro Kit (Gen 5), See Instructions, use as directed, # 1 EA, 0 Refill(s), Pharmacy: [id number], 187, cm, 12/30/22 9:14:00 CST, Height/Length Measured, 102.3, kg, 12/30/22 9:14:00 CST, Weight Dosing    k phos di & mono-sod phos mono (K-PHOS-NEUTRAL) 250 mg Tab Take 2 tablets by mouth 3 (three) times daily.    magnesium oxide (MAG-OX) 400 mg (241.3 mg magnesium) tablet Take 2 tablets (800 mg total) by mouth 2 (two) times daily.    multivitamin Tab Take 1 tablet by mouth once daily.    mycophenolate (CELLCEPT) 250 mg Cap Take 2 capsules (500 mg total) by mouth 2 (two) times daily.    ondansetron (ZOFRAN-ODT) 4 MG TbDL Take 1 tablet (4 mg total) by mouth every 8 (eight) hours as needed (nausea).    pantoprazole (PROTONIX) 40 MG tablet Take 1 tablet (40 mg total) by mouth once daily.    predniSONE (DELTASONE) 5 MG tablet Take by mouth daily: 20mg 7/19-7/25, 15mg 7/26-8/1, 10mg 8/2-8/8, 5mg 8/9-    sulfamethoxazole-trimethoprim 400-80mg (BACTRIM,SEPTRA) 400-80 mg per tablet Take 1 tablet by mouth every morning. Stop 1/12/25    tacrolimus (PROGRAF) 1 MG Cap Take 3 capsules (3 mg total) by mouth every 12 (twelve) hours.       Review of patient's allergies indicates:   Allergen Reactions    Codeine Hallucinations    Vancomycin analogues Other (See Comments)     Burning and  itchig of skin       Past Medical History:   Diagnosis Date    Anemia     Diabetes mellitus type II     Disorder of kidney and ureter     GERD (gastroesophageal reflux disease)     Hypertension     Proteinuria      Past Surgical History:   Procedure Laterality Date    INNER EAR SURGERY      for tinnitus    KIDNEY TRANSPLANT Left 7/16/2024    Procedure:  "TRANSPLANT, KIDNEY;  Surgeon: Erma Gómez MD;  Location: [facility] OR [address] [place] [address];  Service: Transplant;  Laterality: Left;     Family History       Problem Relation (Age of Onset)    Cancer Father    Diabetes Mother    Heart disease Mother, Sister    Kidney disease Mother          Tobacco Use    Smoking status: Never    Smokeless tobacco: Never   Substance and Sexual Activity    Alcohol use: No    Drug use: No    Sexual activity: Yes        Review of Systems   Constitutional:  Positive for appetite change, fatigue and fever. Negative for chills.   HENT:  Positive for postnasal drip, sore throat and trouble swallowing.    Respiratory:  Positive for cough. Negative for chest tightness and shortness of breath.    Cardiovascular:  Negative for chest pain, palpitations and leg swelling.   Gastrointestinal:  Negative for abdominal distention, abdominal pain, constipation, diarrhea, nausea and vomiting.   Genitourinary:  Negative for difficulty urinating, dysuria, frequency and urgency.   Musculoskeletal:  Negative for back pain and neck pain.   Skin:  Negative for color change, pallor, rash and wound.   Allergic/Immunologic: Positive for immunocompromised state.   Neurological:  Negative for dizziness, weakness and headaches.   Psychiatric/Behavioral:  Negative for confusion and sleep disturbance.      Objective:     Vital Signs (Most Recent):  Temp: 98.5 °F (36.9 °C) (10/14/24 2300)  Pulse: 91 (10/14/24 2300)  Resp: 18 (10/14/24 2300)  BP: 135/79 (10/14/24 2300)  SpO2: 98 % (10/14/24 2300)  Height: 6' 2" (188 cm)  Weight: 95.1 kg (209 lb 10.5 oz)  Body mass index is 26.92 kg/m².      Physical Exam  Vitals and nursing note reviewed.   Constitutional:       General: He is awake.      Appearance: Normal appearance.   Eyes:      General: No scleral icterus.  Cardiovascular:      Rate and Rhythm: Normal rate and regular rhythm.      Pulses: Normal pulses.   Pulmonary:      Effort: Pulmonary effort is normal. No respiratory " "distress.      Breath sounds: Normal breath sounds. No decreased breath sounds, wheezing or rales.   Abdominal:      General: A surgical scar is present. Bowel sounds are normal. There is no distension.      Palpations: Abdomen is soft.      Tenderness: There is no abdominal tenderness. There is no guarding or rebound.   Musculoskeletal:         General: No tenderness. Normal range of motion.   Skin:     General: Skin is warm and dry.      Coloration: Skin is not pale.      Findings: No erythema or rash.   Neurological:      Mental Status: He is alert and oriented to person, place, and time.   Psychiatric:         Speech: Speech normal.         Behavior: Behavior normal. Behavior is cooperative.          Laboratory  CBC:   Recent Labs   Lab 10/14/24  0758   WBC 0.73*   RBC 4.71   HGB 14.2   HCT 42.6      MCV 90   MCH 30.1   MCHC 33.3     CMP:   Recent Labs   Lab 10/14/24  0758   *   CALCIUM 9.9   ALBUMIN 3.9      K 4.8   CO2 22*      BUN 17   CREATININE 1.5*     Coagulation: No results for input(s): "PT", "APTT" in the last 168 hours.  Labs within the past 24 hours have been reviewed.    Diagnostic Results:  US - Kidney: Results for orders placed during the hospital encounter of 08/05/24    US Transplant Kidney With Doppler    Narrative  EXAMINATION:  US TRANSPLANT KIDNEY WITH DOPPLER    CLINICAL HISTORY:  Kidney transplant status    TECHNIQUE:  Real time gray scale and doppler ultrasound was performed over the patient's renal allograft.    COMPARISON:  None    FINDINGS:  Status post renal allograft in the left lower quadrant, 07/16/2024.  The allograft measures 11.4 cm. Normal perfusion. Mild hydronephrosis.    Small fluid collection inferior to the incision site measuring 1.2 x 0.9 x 2.2 cm.    Vasculature:    Resistive indices ranged from 0.72 to 0.81.    Main renal artery peak systolic velocity: 200cm/sec with normal waveform.    Renal artery/iliac ratio: 1.43.    The main renal vein is " patent.    Echogenic focus of the bladder wall, protruding into the bladder lumen measuring 1.3 x 2.0 x 1.4 cm.    Impression  Mild hydronephrosis of the allograft.  Resistive indices ranging normal to mildly elevated.  Recommend continued follow-up.    2.2 cm subincisional fluid collection likely representing postoperative hematoma, seroma or combination there of.    Lateral bladder wall lesion.  Recommend further evaluation with cystoscopy as clinically indicated.    This report was flagged in Epic as containing an incidental finding.    Electronically signed by resident: Doug Sheldon  Date:    08/05/2024  Time:    16:08    Electronically signed by: Gomez Dover  Date:    08/05/2024  Time:    17:35

## 2024-10-16 ENCOUNTER — PATIENT MESSAGE (OUTPATIENT)
Dept: TRANSPLANT | Facility: CLINIC | Age: 44
End: 2024-10-16
Payer: COMMERCIAL

## 2024-10-16 PROBLEM — D72.819 LEUKOPENIA: Status: ACTIVE | Noted: 2024-10-16

## 2024-10-16 PROBLEM — E10.65 TYPE 1 DIABETES MELLITUS WITH HYPERGLYCEMIA: Status: ACTIVE | Noted: 2024-10-16

## 2024-10-16 LAB
ALBUMIN SERPL BCP-MCNC: 3.2 G/DL (ref 3.5–5.2)
ANION GAP SERPL CALC-SCNC: 14 MMOL/L (ref 8–16)
ANISOCYTOSIS BLD QL SMEAR: SLIGHT
BASOPHILS # BLD AUTO: 0.02 K/UL (ref 0–0.2)
BASOPHILS # BLD AUTO: 0.02 K/UL (ref 0–0.2)
BASOPHILS NFR BLD: 3 % (ref 0–1.9)
BASOPHILS NFR BLD: 3 % (ref 0–1.9)
BUN SERPL-MCNC: 15 MG/DL (ref 6–20)
CALCIUM SERPL-MCNC: 9.8 MG/DL (ref 8.7–10.5)
CHLORIDE SERPL-SCNC: 103 MMOL/L (ref 95–110)
CO2 SERPL-SCNC: 21 MMOL/L (ref 23–29)
CREAT SERPL-MCNC: 1.2 MG/DL (ref 0.5–1.4)
DIFFERENTIAL METHOD BLD: ABNORMAL
DIFFERENTIAL METHOD BLD: ABNORMAL
EOSINOPHIL # BLD AUTO: 0 K/UL (ref 0–0.5)
EOSINOPHIL # BLD AUTO: 0 K/UL (ref 0–0.5)
EOSINOPHIL NFR BLD: 6 % (ref 0–8)
EOSINOPHIL NFR BLD: 6 % (ref 0–8)
ERYTHROCYTE [DISTWIDTH] IN BLOOD BY AUTOMATED COUNT: 12.9 % (ref 11.5–14.5)
ERYTHROCYTE [DISTWIDTH] IN BLOOD BY AUTOMATED COUNT: 12.9 % (ref 11.5–14.5)
EST. GFR  (NO RACE VARIABLE): >60 ML/MIN/1.73 M^2
GLUCOSE SERPL-MCNC: 249 MG/DL (ref 70–110)
HCT VFR BLD AUTO: 39.9 % (ref 40–54)
HCT VFR BLD AUTO: 40 % (ref 40–54)
HGB BLD-MCNC: 13.2 G/DL (ref 14–18)
HGB BLD-MCNC: 13.2 G/DL (ref 14–18)
IMM GRANULOCYTES # BLD AUTO: 0.02 K/UL (ref 0–0.04)
IMM GRANULOCYTES # BLD AUTO: 0.02 K/UL (ref 0–0.04)
IMM GRANULOCYTES NFR BLD AUTO: 3 % (ref 0–0.5)
IMM GRANULOCYTES NFR BLD AUTO: 3 % (ref 0–0.5)
LYMPHOCYTES # BLD AUTO: 0.3 K/UL (ref 1–4.8)
LYMPHOCYTES # BLD AUTO: 0.4 K/UL (ref 1–4.8)
LYMPHOCYTES NFR BLD: 40.3 % (ref 18–48)
LYMPHOCYTES NFR BLD: 52.2 % (ref 18–48)
MAGNESIUM SERPL-MCNC: 1.7 MG/DL (ref 1.6–2.6)
MCH RBC QN AUTO: 29.9 PG (ref 27–31)
MCH RBC QN AUTO: 30.4 PG (ref 27–31)
MCHC RBC AUTO-ENTMCNC: 33 G/DL (ref 32–36)
MCHC RBC AUTO-ENTMCNC: 33.1 G/DL (ref 32–36)
MCV RBC AUTO: 91 FL (ref 82–98)
MCV RBC AUTO: 92 FL (ref 82–98)
MONOCYTES # BLD AUTO: 0.2 K/UL (ref 0.3–1)
MONOCYTES # BLD AUTO: 0.3 K/UL (ref 0.3–1)
MONOCYTES NFR BLD: 29.9 % (ref 4–15)
MONOCYTES NFR BLD: 43.3 % (ref 4–15)
NEUTROPHILS # BLD AUTO: 0 K/UL (ref 1.8–7.7)
NEUTROPHILS # BLD AUTO: 0 K/UL (ref 1.8–7.7)
NEUTROPHILS NFR BLD: 4.4 % (ref 38–73)
NEUTROPHILS NFR BLD: 5.9 % (ref 38–73)
NRBC BLD-RTO: 0 /100 WBC
NRBC BLD-RTO: 0 /100 WBC
PATH REV BLD -IMP: NORMAL
PATH REV BLD -IMP: NORMAL
PHOSPHATE SERPL-MCNC: 1.6 MG/DL (ref 2.7–4.5)
PLATELET # BLD AUTO: 207 K/UL (ref 150–450)
PLATELET # BLD AUTO: 230 K/UL (ref 150–450)
PMV BLD AUTO: 10.6 FL (ref 9.2–12.9)
PMV BLD AUTO: 10.7 FL (ref 9.2–12.9)
POCT GLUCOSE: 206 MG/DL (ref 70–110)
POCT GLUCOSE: 253 MG/DL (ref 70–110)
POCT GLUCOSE: 267 MG/DL (ref 70–110)
POCT GLUCOSE: 321 MG/DL (ref 70–110)
POCT GLUCOSE: 371 MG/DL (ref 70–110)
POIKILOCYTOSIS BLD QL SMEAR: SLIGHT
POLYCHROMASIA BLD QL SMEAR: ABNORMAL
POTASSIUM SERPL-SCNC: 4.1 MMOL/L (ref 3.5–5.1)
RBC # BLD AUTO: 4.34 M/UL (ref 4.6–6.2)
RBC # BLD AUTO: 4.41 M/UL (ref 4.6–6.2)
SODIUM SERPL-SCNC: 138 MMOL/L (ref 136–145)
TACROLIMUS BLD-MCNC: 8.2 NG/ML (ref 5–15)
WBC # BLD AUTO: 0.67 K/UL (ref 3.9–12.7)
WBC # BLD AUTO: 0.67 K/UL (ref 3.9–12.7)

## 2024-10-16 PROCEDURE — 83735 ASSAY OF MAGNESIUM: CPT | Mod: NTX

## 2024-10-16 PROCEDURE — 36415 COLL VENOUS BLD VENIPUNCTURE: CPT | Mod: NTX

## 2024-10-16 PROCEDURE — 80197 ASSAY OF TACROLIMUS: CPT | Mod: NTX

## 2024-10-16 PROCEDURE — 63600175 PHARM REV CODE 636 W HCPCS: Mod: NTX

## 2024-10-16 PROCEDURE — 25000003 PHARM REV CODE 250: Mod: NTX | Performed by: PHYSICIAN ASSISTANT

## 2024-10-16 PROCEDURE — 89055 LEUKOCYTE ASSESSMENT FECAL: CPT | Mod: NTX | Performed by: PHYSICIAN ASSISTANT

## 2024-10-16 PROCEDURE — 63600175 PHARM REV CODE 636 W HCPCS: Mod: JZ,JB,JG,NTX | Performed by: PHYSICIAN ASSISTANT

## 2024-10-16 PROCEDURE — 99233 SBSQ HOSP IP/OBS HIGH 50: CPT | Mod: NTX,,, | Performed by: INTERNAL MEDICINE

## 2024-10-16 PROCEDURE — 36415 COLL VENOUS BLD VENIPUNCTURE: CPT | Mod: NTX | Performed by: PHYSICIAN ASSISTANT

## 2024-10-16 PROCEDURE — 85025 COMPLETE CBC W/AUTO DIFF WBC: CPT | Mod: NTX | Performed by: PHYSICIAN ASSISTANT

## 2024-10-16 PROCEDURE — 85060 BLOOD SMEAR INTERPRETATION: CPT | Mod: NTX,,, | Performed by: PATHOLOGY

## 2024-10-16 PROCEDURE — 63600175 PHARM REV CODE 636 W HCPCS: Mod: NTX | Performed by: NURSE PRACTITIONER

## 2024-10-16 PROCEDURE — 87425 ROTAVIRUS AG IA: CPT | Mod: NTX | Performed by: PHYSICIAN ASSISTANT

## 2024-10-16 PROCEDURE — 27000207 HC ISOLATION: Mod: NTX

## 2024-10-16 PROCEDURE — 87045 FECES CULTURE AEROBIC BACT: CPT | Mod: NTX | Performed by: PHYSICIAN ASSISTANT

## 2024-10-16 PROCEDURE — 99232 SBSQ HOSP IP/OBS MODERATE 35: CPT | Mod: NTX,,, | Performed by: NURSE PRACTITIONER

## 2024-10-16 PROCEDURE — 87328 CRYPTOSPORIDIUM AG IA: CPT | Mod: NTX | Performed by: PHYSICIAN ASSISTANT

## 2024-10-16 PROCEDURE — 85025 COMPLETE CBC W/AUTO DIFF WBC: CPT | Mod: 91,NTX

## 2024-10-16 PROCEDURE — 20600001 HC STEP DOWN PRIVATE ROOM: Mod: NTX

## 2024-10-16 PROCEDURE — 63600175 PHARM REV CODE 636 W HCPCS: Mod: NTX | Performed by: INTERNAL MEDICINE

## 2024-10-16 PROCEDURE — 80069 RENAL FUNCTION PANEL: CPT | Mod: NTX

## 2024-10-16 PROCEDURE — 87427 SHIGA-LIKE TOXIN AG IA: CPT | Mod: 59,NTX | Performed by: PHYSICIAN ASSISTANT

## 2024-10-16 PROCEDURE — 87329 GIARDIA AG IA: CPT | Mod: NTX | Performed by: PHYSICIAN ASSISTANT

## 2024-10-16 PROCEDURE — 99233 SBSQ HOSP IP/OBS HIGH 50: CPT | Mod: NTX,,, | Performed by: PHYSICIAN ASSISTANT

## 2024-10-16 PROCEDURE — 99223 1ST HOSP IP/OBS HIGH 75: CPT | Mod: NTX,,, | Performed by: INTERNAL MEDICINE

## 2024-10-16 PROCEDURE — 87046 STOOL CULTR AEROBIC BACT EA: CPT | Mod: NTX | Performed by: PHYSICIAN ASSISTANT

## 2024-10-16 PROCEDURE — 25000003 PHARM REV CODE 250: Mod: NTX

## 2024-10-16 PROCEDURE — 87449 NOS EACH ORGANISM AG IA: CPT | Mod: NTX | Performed by: PHYSICIAN ASSISTANT

## 2024-10-16 RX ORDER — MAGNESIUM SULFATE HEPTAHYDRATE 40 MG/ML
2 INJECTION, SOLUTION INTRAVENOUS ONCE
Status: COMPLETED | OUTPATIENT
Start: 2024-10-16 | End: 2024-10-16

## 2024-10-16 RX ORDER — INSULIN ASPART 100 [IU]/ML
15 INJECTION, SOLUTION INTRAVENOUS; SUBCUTANEOUS
Status: DISCONTINUED | OUTPATIENT
Start: 2024-10-16 | End: 2024-10-20 | Stop reason: HOSPADM

## 2024-10-16 RX ORDER — INSULIN ASPART 100 [IU]/ML
12 INJECTION, SOLUTION INTRAVENOUS; SUBCUTANEOUS
Status: DISCONTINUED | OUTPATIENT
Start: 2024-10-16 | End: 2024-10-16

## 2024-10-16 RX ORDER — INSULIN GLARGINE 100 [IU]/ML
36 INJECTION, SOLUTION SUBCUTANEOUS DAILY
Status: DISCONTINUED | OUTPATIENT
Start: 2024-10-16 | End: 2024-10-17

## 2024-10-16 RX ORDER — CEFEPIME HYDROCHLORIDE 2 G/1
2 INJECTION, POWDER, FOR SOLUTION INTRAVENOUS
Status: DISCONTINUED | OUTPATIENT
Start: 2024-10-16 | End: 2024-10-20 | Stop reason: HOSPADM

## 2024-10-16 RX ADMIN — TACROLIMUS 3 MG: 1 CAPSULE ORAL at 09:10

## 2024-10-16 RX ADMIN — ACETAMINOPHEN 650 MG: 325 TABLET ORAL at 05:10

## 2024-10-16 RX ADMIN — FILGRASTIM-SNDZ 480 MCG: 480 INJECTION, SOLUTION INTRAVENOUS; SUBCUTANEOUS at 02:10

## 2024-10-16 RX ADMIN — HEPARIN SODIUM 5000 UNITS: 5000 INJECTION INTRAVENOUS; SUBCUTANEOUS at 01:10

## 2024-10-16 RX ADMIN — ACETAMINOPHEN 650 MG: 325 TABLET ORAL at 11:10

## 2024-10-16 RX ADMIN — TACROLIMUS 3 MG: 1 CAPSULE ORAL at 05:10

## 2024-10-16 RX ADMIN — PREDNISONE 5 MG: 5 TABLET ORAL at 09:10

## 2024-10-16 RX ADMIN — HEPARIN SODIUM 5000 UNITS: 5000 INJECTION INTRAVENOUS; SUBCUTANEOUS at 08:10

## 2024-10-16 RX ADMIN — ACETAMINOPHEN 650 MG: 325 TABLET ORAL at 09:10

## 2024-10-16 RX ADMIN — CEFEPIME 2 G: 2 INJECTION, POWDER, FOR SOLUTION INTRAVENOUS at 05:10

## 2024-10-16 RX ADMIN — INSULIN ASPART 4 UNITS: 100 INJECTION, SOLUTION INTRAVENOUS; SUBCUTANEOUS at 05:10

## 2024-10-16 RX ADMIN — INSULIN ASPART 3 UNITS: 100 INJECTION, SOLUTION INTRAVENOUS; SUBCUTANEOUS at 09:10

## 2024-10-16 RX ADMIN — MAGNESIUM SULFATE HEPTAHYDRATE 2 G: 40 INJECTION, SOLUTION INTRAVENOUS at 11:10

## 2024-10-16 RX ADMIN — HEPARIN SODIUM 5000 UNITS: 5000 INJECTION INTRAVENOUS; SUBCUTANEOUS at 05:10

## 2024-10-16 RX ADMIN — INSULIN ASPART 6 UNITS: 100 INJECTION, SOLUTION INTRAVENOUS; SUBCUTANEOUS at 09:10

## 2024-10-16 RX ADMIN — CEFEPIME 2 G: 2 INJECTION, POWDER, FOR SOLUTION INTRAVENOUS at 09:10

## 2024-10-16 RX ADMIN — INSULIN ASPART 10 UNITS: 100 INJECTION, SOLUTION INTRAVENOUS; SUBCUTANEOUS at 09:10

## 2024-10-16 RX ADMIN — ATORVASTATIN CALCIUM 40 MG: 40 TABLET, FILM COATED ORAL at 09:10

## 2024-10-16 RX ADMIN — INSULIN ASPART 8 UNITS: 100 INJECTION, SOLUTION INTRAVENOUS; SUBCUTANEOUS at 01:10

## 2024-10-16 RX ADMIN — INSULIN ASPART 15 UNITS: 100 INJECTION, SOLUTION INTRAVENOUS; SUBCUTANEOUS at 05:10

## 2024-10-16 RX ADMIN — SODIUM PHOSPHATE, MONOBASIC, MONOHYDRATE AND SODIUM PHOSPHATE, DIBASIC, ANHYDROUS 39.99 MMOL: 142; 276 INJECTION, SOLUTION INTRAVENOUS at 03:10

## 2024-10-16 RX ADMIN — CALCITRIOL 0.5 MCG: 0.5 CAPSULE, LIQUID FILLED ORAL at 09:10

## 2024-10-16 RX ADMIN — CEFEPIME 2 G: 2 INJECTION, POWDER, FOR SOLUTION INTRAVENOUS at 12:10

## 2024-10-16 RX ADMIN — INSULIN GLARGINE 36 UNITS: 100 INJECTION, SOLUTION SUBCUTANEOUS at 09:10

## 2024-10-16 RX ADMIN — INSULIN ASPART 15 UNITS: 100 INJECTION, SOLUTION INTRAVENOUS; SUBCUTANEOUS at 01:10

## 2024-10-16 RX ADMIN — PANTOPRAZOLE SODIUM 40 MG: 40 TABLET, DELAYED RELEASE ORAL at 09:10

## 2024-10-16 NOTE — SUBJECTIVE & OBJECTIVE
"  Subjective:   History of Present Illness:  Mr. Diaz is a 43 yr male who received a LURT 7/16/24 for T2DM and HTN. No intra-op issues noted. Cr trended down, good UOP, dc'd without issue. Has insulin pump for DM2 management. Cr elvie to 1.6, had biopsy 8/14 which did not show rejection or ATI. On 10/14 patient presented to OSH ER for c/o fever, sore throat, cough, myalgias, mild nasal congestion. Tmax 101, first fever on 10/12. He also had routine lab work this morning that revealed severe neutropenia at 0.73, ANC 58. Denies CP, SOB, NVD, dysuria, changes in UOP. Denies any sick contacts. Labs in ED notable for negative COVID, flu A/B, strepA. Labs this morning with Cr at BL 1.5. UA likely without infection, CXR without acute abnormality. BC from OSH pending. Patient transferred to C for neutropenic fever. Plan for CMV PCR, RIP, throat culture, transplant ID consult, IV antibiotics. Will hold MMF and bactrim. Neupogen per protocol. D/w Dr. Martell.    Mr. Diaz is a 43 y.o. year old male who is status post Pancreas Transplant Waitlist - Qualified: 2/19/2024 - Inactive.  His maintenance immunosuppression consists of:   Immunosuppressants (From admission, onward)      Start     Stop Route Frequency Ordered    10/15/24 0800  tacrolimus capsule 3 mg         -- Oral 2 times daily 10/14/24 2304            Hospital Course:  Mr. Diaz was transferred from OSH ED 10/14 for neutropenic fever with associated sore throat and myalgias. Infectious workup initiated, Cefepime/Vanc started, and ID consulted. Flu, COVID, and strep negative. RIP negative. UA neg for infection. Blood cx NGTD.     Interval History: Patient reports sore throat improving slowly. Cont chloraseptic spray. He was able to eat some solid food, but developed diarrhea mid day yesterday. Reports he had "watery stools" x 8 as of this AM. Placed orders for stool studies, but unable to send C. Diff as next BM was more formed. Hold oral phos and mag for now; " replete IV PRN. Also holding Atovaquone. ANC is still zero. Give Neupogen again today. Also consulted heme/onc, appreciate assistance. He remains afebrile on Cefepime/Vanc. ID is following and have added additional labs to the workup. CMV and EBV are negative. WCTM.    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 He is scheduled for cytoscopy on 10/21.        Past Medical, Surgical, Family, and Social History:   Unchanged from H&P.    Scheduled Meds:   atorvastatin  40 mg Oral Daily    calcitRIOL  0.5 mcg Oral Daily    ceFEPime IV (PEDS and ADULTS)  2 g Intravenous Q8H    heparin (porcine)  5,000 Units Subcutaneous Q8H    insulin aspart U-100  15 Units Subcutaneous TIDWM    insulin glargine U-100  36 Units Subcutaneous Daily    magnesium sulfate IVPB  2 g Intravenous Once    pantoprazole  40 mg Oral Daily    predniSONE  5 mg Oral Daily    sodium phosphate 39.99 mmol in D5W 250 mL IVPB  39.99 mmol Intravenous Once    tacrolimus  3 mg Oral BID     Continuous Infusions:  PRN Meds:  Current Facility-Administered Medications:     acetaminophen, 650 mg, Oral, Q4H PRN    dextromethorphan-guaiFENesin  mg/5 ml, 5 mL, Oral, Q4H PRN    dextrose 10%, 12.5 g, Intravenous, PRN    dextrose 10%, 25 g, Intravenous, PRN    glucagon (human recombinant), 1 mg, Intramuscular, PRN    glucose, 16 g, Oral, PRN    glucose, 24 g, Oral, PRN    insulin aspart U-100, 0-10 Units, Subcutaneous, QID (AC + HS) PRN    melatonin, 6 mg, Oral, Nightly PRN    ondansetron, 8 mg, Oral, Q6H PRN    prochlorperazine, 5 mg, Intravenous, Q6H PRN    sodium chloride 0.9%, 10 mL, Intravenous, PRN    Intake/Output - Last 3 Shifts         10/14 0700  10/15 0659 10/15 0700  10/16 0659 10/16 0700  10/17 0659    IV Piggyback  398.3     Total Intake(mL/kg)  398.3 (4.2)     Urine (mL/kg/hr) 1250 1350  "(0.6)     Stool  0     Total Output 1250 1350     Net -1250 -951.7            Stool Occurrence  7 x              Review of Systems   Constitutional:  Positive for appetite change, fatigue and fever.   HENT:  Positive for congestion, postnasal drip, sore throat and trouble swallowing.    Eyes:  Negative for photophobia and redness.   Respiratory:  Positive for cough. Negative for shortness of breath, wheezing and stridor.    Cardiovascular:  Negative for chest pain, palpitations and leg swelling.   Gastrointestinal:  Positive for diarrhea. Negative for abdominal distention, abdominal pain, constipation, nausea and vomiting.   Genitourinary:  Negative for decreased urine volume, difficulty urinating and dysuria.   Musculoskeletal:  Positive for myalgias.   Skin:  Negative for rash and wound.   Allergic/Immunologic: Positive for immunocompromised state.   Neurological:  Positive for headaches. Negative for dizziness and light-headedness.   Psychiatric/Behavioral:  Negative for agitation, behavioral problems, confusion, decreased concentration and hallucinations.       Objective:     Vital Signs (Most Recent):  Temp: 98.3 °F (36.8 °C) (10/16/24 1203)  Pulse: 95 (10/16/24 1203)  Resp: 18 (10/16/24 1203)  BP: 131/70 (10/16/24 1203)  SpO2: 100 % (10/16/24 1203) Vital Signs (24h Range):  Temp:  [97.7 °F (36.5 °C)-98.8 °F (37.1 °C)] 98.3 °F (36.8 °C)  Pulse:  [84-96] 95  Resp:  [18-20] 18  SpO2:  [95 %-100 %] 100 %  BP: (121-136)/(70-81) 131/70     Weight: 95.1 kg (209 lb 10.5 oz)  Height: 6' 2" (188 cm)  Body mass index is 26.92 kg/m².     Physical Exam  Vitals and nursing note reviewed.   Constitutional:       General: He is awake. He is not in acute distress.     Appearance: Normal appearance.   HENT:      Head: Normocephalic and atraumatic.      Mouth/Throat:      Pharynx: Posterior oropharyngeal erythema present.   Eyes:      General: No scleral icterus.        Right eye: No discharge.         Left eye: No discharge. "   Cardiovascular:      Rate and Rhythm: Normal rate and regular rhythm.      Pulses: Normal pulses.      Heart sounds: No murmur heard.  Pulmonary:      Effort: Pulmonary effort is normal. No respiratory distress.      Breath sounds: No decreased breath sounds, wheezing or rales.   Abdominal:      General: A surgical scar is present. Bowel sounds are normal. There is no distension.      Palpations: Abdomen is soft.      Tenderness: There is no abdominal tenderness. There is no guarding or rebound.   Musculoskeletal:         General: No tenderness. Normal range of motion.      Right lower leg: No edema.      Left lower leg: No edema.   Skin:     General: Skin is warm and dry.      Capillary Refill: Capillary refill takes less than 2 seconds.      Coloration: Skin is not pale.      Findings: No erythema or rash.   Neurological:      General: No focal deficit present.      Mental Status: He is alert and oriented to person, place, and time.   Psychiatric:         Mood and Affect: Mood normal.         Speech: Speech normal.         Behavior: Behavior normal. Behavior is cooperative.         Thought Content: Thought content normal.          Laboratory:  CBC:   Recent Labs   Lab 10/15/24  0520 10/16/24  0612 10/16/24  1031   WBC 0.85* 0.67* 0.67*   RBC 4.52* 4.34* 4.41*   HGB 13.6* 13.2* 13.2*   HCT 41.0 39.9* 40.0    230 207   MCV 91 92 91   MCH 30.1 30.4 29.9   MCHC 33.2 33.1 33.0     CMP:   Recent Labs   Lab 10/14/24  0758 10/15/24  0520 10/16/24  0613   * 273* 249*   CALCIUM 9.9 9.8 9.8   ALBUMIN 3.9 3.5  3.5 3.2*   PROT  --  7.2  --     137 138   K 4.8 4.5 4.1   CO2 22* 21* 21*    102 103   BUN 17 14 15   CREATININE 1.5* 1.2 1.2   ALKPHOS  --  77  --    ALT  --  18  --    AST  --  13  --        Diagnostic Results:  Reviewed

## 2024-10-16 NOTE — PROGRESS NOTES
[facility]  Kidney Transplant  Progress Note      Reason for Follow-up: Reassessment of Pancreas Transplant Waitlist - Qualified: 2/19/2024 - Inactive recipient and management of immunosuppression.    ORGAN: LEFT KIDNEY      Donor Type: Living      Donor CMV Status:    Donor HBcAB:   Donor HCV Status:   Donor HBV KAVITA:   Donor HCV KAVITA:       Subjective:   History of Present Illness:  Mr. Diaz is a 43 yr male who received a LURT 7/16/24 for T2DM and HTN. No intra-op issues noted. Cr trended down, good UOP, dc'd without issue. Has insulin pump for DM2 management. Cr elvie to 1.6, had biopsy 8/14 which did not show rejection or ATI. On 10/14 patient presented to OSH ER for c/o fever, sore throat, cough, myalgias, mild nasal congestion. Tmax 101, first fever on 10/12. He also had routine lab work this morning that revealed severe neutropenia at 0.73, ANC 58. Denies CP, SOB, NVD, dysuria, changes in UOP. Denies any sick contacts. Labs in ED notable for negative COVID, flu A/B, strepA. Labs this morning with Cr at BL 1.5. UA likely without infection, CXR without acute abnormality. BC from OSH pending. Patient transferred to C for neutropenic fever. Plan for CMV PCR, RIP, throat culture, transplant ID consult, IV antibiotics. Will hold MMF and bactrim. Neupogen per protocol. D/w Dr. Martell.    Mr. Diaz is a 43 y.o. year old male who is status post Pancreas Transplant Waitlist - Qualified: 2/19/2024 - Inactive.  His maintenance immunosuppression consists of:   Immunosuppressants (From admission, onward)      Start     Stop Route Frequency Ordered    10/15/24 0800  tacrolimus capsule 3 mg         -- Oral 2 times daily 10/14/24 2304            Hospital Course:  Mr. Diaz was transferred from OSH ED 10/14 for neutropenic fever with associated sore throat and myalgias. Infectious workup initiated, Cefepime/Vanc started, and ID consulted. Flu, COVID, and strep negative. RIP negative. UA neg for  "infection. Blood cx NGTD.     Interval History: Patient reports sore throat improving slowly. Cont chloraseptic spray. He was able to eat some solid food, but developed diarrhea mid day yesterday. Reports he had "watery stools" x 8 as of this AM. Placed orders for stool studies, but unable to send C. Diff as next BM was more formed. Hold oral phos and mag for now; replete IV PRN. Also holding Atovaquone. ANC is still zero. Give Neupogen again today. Also consulted heme/onc, appreciate assistance. He remains afebrile on Cefepime/Vanc. ID is following and have added additional labs to the workup. CMV and EBV are negative. WCTM.    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 He is scheduled for cytoscopy on 10/21.        Past Medical, Surgical, Family, and Social History:   Unchanged from H&P.    Scheduled Meds:   atorvastatin  40 mg Oral Daily    calcitRIOL  0.5 mcg Oral Daily    ceFEPime IV (PEDS and ADULTS)  2 g Intravenous Q8H    heparin (porcine)  5,000 Units Subcutaneous Q8H    insulin aspart U-100  15 Units Subcutaneous TIDWM    insulin glargine U-100  36 Units Subcutaneous Daily    magnesium sulfate IVPB  2 g Intravenous Once    pantoprazole  40 mg Oral Daily    predniSONE  5 mg Oral Daily    sodium phosphate 39.99 mmol in D5W 250 mL IVPB  39.99 mmol Intravenous Once    tacrolimus  3 mg Oral BID     Continuous Infusions:  PRN Meds:  Current Facility-Administered Medications:     acetaminophen, 650 mg, Oral, Q4H PRN    dextromethorphan-guaiFENesin  mg/5 ml, 5 mL, Oral, Q4H PRN    dextrose 10%, 12.5 g, Intravenous, PRN    dextrose 10%, 25 g, Intravenous, PRN    glucagon (human recombinant), 1 mg, Intramuscular, PRN    glucose, 16 g, Oral, PRN    glucose, 24 g, Oral, PRN    insulin aspart U-100, 0-10 Units, Subcutaneous, QID (AC + HS) PRN    " "melatonin, 6 mg, Oral, Nightly PRN    ondansetron, 8 mg, Oral, Q6H PRN    prochlorperazine, 5 mg, Intravenous, Q6H PRN    sodium chloride 0.9%, 10 mL, Intravenous, PRN    Intake/Output - Last 3 Shifts         10/14 0700  10/15 0659 10/15 0700  10/16 0659 10/16 0700  10/17 0659    IV Piggyback  398.3     Total Intake(mL/kg)  398.3 (4.2)     Urine (mL/kg/hr) 1250 1350 (0.6)     Stool  0     Total Output 1250 1350     Net -1250 -951.7            Stool Occurrence  7 x              Review of Systems   Constitutional:  Positive for appetite change, fatigue and fever.   HENT:  Positive for congestion, postnasal drip, sore throat and trouble swallowing.    Eyes:  Negative for photophobia and redness.   Respiratory:  Positive for cough. Negative for shortness of breath, wheezing and stridor.    Cardiovascular:  Negative for chest pain, palpitations and leg swelling.   Gastrointestinal:  Positive for diarrhea. Negative for abdominal distention, abdominal pain, constipation, nausea and vomiting.   Genitourinary:  Negative for decreased urine volume, difficulty urinating and dysuria.   Musculoskeletal:  Positive for myalgias.   Skin:  Negative for rash and wound.   Allergic/Immunologic: Positive for immunocompromised state.   Neurological:  Positive for headaches. Negative for dizziness and light-headedness.   Psychiatric/Behavioral:  Negative for agitation, behavioral problems, confusion, decreased concentration and hallucinations.       Objective:     Vital Signs (Most Recent):  Temp: 98.3 °F (36.8 °C) (10/16/24 1203)  Pulse: 95 (10/16/24 1203)  Resp: 18 (10/16/24 1203)  BP: 131/70 (10/16/24 1203)  SpO2: 100 % (10/16/24 1203) Vital Signs (24h Range):  Temp:  [97.7 °F (36.5 °C)-98.8 °F (37.1 °C)] 98.3 °F (36.8 °C)  Pulse:  [84-96] 95  Resp:  [18-20] 18  SpO2:  [95 %-100 %] 100 %  BP: (121-136)/(70-81) 131/70     Weight: 95.1 kg (209 lb 10.5 oz)  Height: 6' 2" (188 cm)  Body mass index is 26.92 kg/m².     Physical Exam  Vitals " and nursing note reviewed.   Constitutional:       General: He is awake. He is not in acute distress.     Appearance: Normal appearance.   HENT:      Head: Normocephalic and atraumatic.      Mouth/Throat:      Pharynx: Posterior oropharyngeal erythema present.   Eyes:      General: No scleral icterus.        Right eye: No discharge.         Left eye: No discharge.   Cardiovascular:      Rate and Rhythm: Normal rate and regular rhythm.      Pulses: Normal pulses.      Heart sounds: No murmur heard.  Pulmonary:      Effort: Pulmonary effort is normal. No respiratory distress.      Breath sounds: No decreased breath sounds, wheezing or rales.   Abdominal:      General: A surgical scar is present. Bowel sounds are normal. There is no distension.      Palpations: Abdomen is soft.      Tenderness: There is no abdominal tenderness. There is no guarding or rebound.   Musculoskeletal:         General: No tenderness. Normal range of motion.      Right lower leg: No edema.      Left lower leg: No edema.   Skin:     General: Skin is warm and dry.      Capillary Refill: Capillary refill takes less than 2 seconds.      Coloration: Skin is not pale.      Findings: No erythema or rash.   Neurological:      General: No focal deficit present.      Mental Status: He is alert and oriented to person, place, and time.   Psychiatric:         Mood and Affect: Mood normal.         Speech: Speech normal.         Behavior: Behavior normal. Behavior is cooperative.         Thought Content: Thought content normal.          Laboratory:  CBC:   Recent Labs   Lab 10/15/24  0520 10/16/24  0612 10/16/24  1031   WBC 0.85* 0.67* 0.67*   RBC 4.52* 4.34* 4.41*   HGB 13.6* 13.2* 13.2*   HCT 41.0 39.9* 40.0    230 207   MCV 91 92 91   MCH 30.1 30.4 29.9   MCHC 33.2 33.1 33.0     CMP:   Recent Labs   Lab 10/14/24  0758 10/15/24  0520 10/16/24  0613   * 273* 249*   CALCIUM 9.9 9.8 9.8   ALBUMIN 3.9 3.5  3.5 3.2*   PROT  --  7.2  --      "137 138   K 4.8 4.5 4.1   CO2 22* 21* 21*    102 103   BUN 17 14 15   CREATININE 1.5* 1.2 1.2   ALKPHOS  --  77  --    ALT  --  18  --    AST  --  13  --        Diagnostic Results:  Reviewed  Assessment/Plan:     * Neutropenic fever  - Suspect drug induced vs infection  - ANC ~ 50 at OSH on routine morning labs   - Fever x >48 hours, tmax 101   - Rocephin and Vanc at OSH, started Cefepime on admit and continue Vanc.  - ANC 0 on 10/15 -> Neupogen x 1, ANC again 0 10/15 -> Neupogen #2  - UA, CXR appear without infection from OSH   - Blood cx NGTD  - RIP neg  - CMV neg  - EBV positive last month, neg this admission  - Throat cx pending  - ID following, sending off additional labwork with AM labs 10/17  - Peripheral smear ordered and heme/onc consulted, appreciate assistance.  - WCTM.      Hypophosphatemia  - PO phos held 10/16 for diarrhea (was on 500 mg KPN TID)  - IV replacement 30 mmol Naphos 10/15 and 40 mmol Naphos 10/16  - Check levels daily.      Hypomagnesemia  - Hold oral Mag 10/16 for diarrhea  - Replace IV PRN  - Check levels daily      Type 1 diabetes mellitus with hyperosmolarity without coma   - Endocrine consulted, appreciate assistance  - 3+ ketones in urine  - Check beta hydroxy  - Give 500 cc NS bolus 10/15  - WCTM      Insulin pump status  - See "Type 1 DM."      Anemia of chronic disease  - H/H stable.  - Monitor with daily cbc.    Prophylactic immunotherapy  - Continue Prograf and Pred.  - MMF held for neutropenia and fever.  - Monitor Prograf levels for toxicity and therapeutic efforts.      Long-term use of immunosuppressant medication  - See "prophylactic immunotherapy."      At risk for opportunistic infections  - Bactrim held for neutropenia & Atovaquone started 10/15  - Atovaquone held 10/16 for diarrhea  - Resume when/if appropriate for PJP ppx       Status post kidney transplant  - Cr at BL   - Monitor with daily labs.    Secondary renal hyperparathyroidism  - Continue Calcitriol. "       Hyperlipidemia  - Continue statin.          Discharge Planning:  Not today.    Medical decision making for this encounter includes review of pertinent labs and diagnostic studies, assessment and planning, discussions with consulting providers, discussion with patient/family, and participation in multidisciplinary rounds. Time spent caring for patient: 90 minutes    Claire Damon PA-C  Kidney Transplant  [facility]

## 2024-10-16 NOTE — PROGRESS NOTES
"[facility]plant Stepdown  Endocrinology  Progress Note    Admit Date: 10/14/2024     Admit Date: 10/14/24     Reason for Consult: Management of T1DM, Hyperglycemia      Surgical Procedure and Date:  Status post kidney transplant 2024     Diabetes diagnosis year:  Over 10 years ago     Home Diabetes Medications:    Recently taken off of Omnipod 5 by primary endocrinologist in [place] and placed on the following  Toujeo 28 units once daily  Novolog ICR 1:15 (1 unit per 15g of carbs) TID with meals      How often checking glucose at home?  Dexcom   BG readings on regimen: mostly 250-300s  Hypoglycemia on the regimen?  No  Missed doses on regimen?  No     Diabetes Complications include:     Hyperglycemia and Diabetic nephropathy       Complicating diabetes co morbidities:   ESRD        HPI:   Patient is a 43 y.o. male with T1DM, CKD s/p living donor kidney txp  on 24 for DM/HTN . He has an insulin pump. He presented to OSH ER on 10/14 with complaints of fever, sore throat, cough, myalgias, and fevers. He was transferred to [facility] for neutropenic fever. Plan for CMV PCR, RIP, throat culture, transplant ID consult, IV antibiotics. Endocrinology consulted for management of T1DM.        Interval HPI:   Overnight events: Remains in TSU. BG above goal ranges on current SQ insulin regimen. Remains on Prednisone 5 mg daily. Diet diabetic 2000 Calories (up to 75 gm per meal)    Eatin%  Nausea: No  Hypoglycemia and intervention: No  Fever: No  TPN and/or TF: No  If yes, type of TF/TPN and rate: n/a    /72   Pulse 85   Temp 98.2 °F (36.8 °C)   Resp 20   Ht 6' 2" (1.88 m)   Wt 95.1 kg (209 lb 10.5 oz)   SpO2 97%   BMI 26.92 kg/m²     Labs Reviewed and Include    Recent Labs   Lab 10/16/24  0613   *   CALCIUM 9.8   ALBUMIN 3.2*      K 4.1   CO2 21*      BUN 15   CREATININE 1.2     Lab Results   Component Value Date    WBC 0.67 (LL) 10/16/2024    HGB 13.2 (L) 10/16/2024    HCT " "39.9 (L) 10/16/2024    MCV 92 10/16/2024     10/16/2024     No results for input(s): "TSH", "FREET4" in the last 168 hours.  Lab Results   Component Value Date    HGBA1C 5.3 07/16/2024       Nutritional status:   Body mass index is 26.92 kg/m².  Lab Results   Component Value Date    ALBUMIN 3.2 (L) 10/16/2024    ALBUMIN 3.5 10/15/2024    ALBUMIN 3.5 10/15/2024     No results found for: "PREALBUMIN"    Estimated Creatinine Clearance: 92.3 mL/min (based on SCr of 1.2 mg/dL).    Accu-Checks  Recent Labs     10/15/24  0009 10/15/24  0844 10/15/24  1341 10/15/24  1742 10/15/24  2157   POCTGLUCOSE 331* 268* 310* 371* 323*       Current Medications and/or Treatments Impacting Glycemic Control  Immunotherapy:    Immunosuppressants           Stop Route Frequency     tacrolimus capsule 3 mg         -- Oral 2 times daily          Steroids:   Hormones (From admission, onward)      Start     Stop Route Frequency Ordered    10/15/24 0900  predniSONE tablet 5 mg         -- Oral Daily 10/14/24 2304    10/14/24 2304  melatonin tablet 6 mg         -- Oral Nightly PRN 10/14/24 2304          Pressors:    Autonomic Drugs (From admission, onward)      None          Hyperglycemia/Diabetes Medications:   Antihyperglycemics (From admission, onward)      Start     Stop Route Frequency Ordered    10/16/24 1130  insulin aspart U-100 pen 12 Units         -- SubQ 3 times daily with meals 10/16/24 1014    10/16/24 0900  insulin glargine U-100 (Lantus) pen 36 Units         -- SubQ Daily 10/16/24 0839    10/15/24 2132  insulin aspart U-100 pen 0-10 Units         -- SubQ Before meals & nightly PRN 10/15/24 2032            ASSESSMENT and PLAN    Renal/  Status post kidney transplant  Managed per primary team  Optimize BG control      Oncology  * Neutropenic fever  Managed per primary team          Endocrine  Insulin pump status  Off since admit       Type 1 diabetes mellitus with hyperglycemia  BG goal 140-180    BG has been significantly " above goal. Will increase SQ insulin regimen. If BG continues significantly above goal- will consider transition insulin infusion this evening.     Plan:  -Increase Lantus to 36 units daily (0.8 u/kg dosing)  -Increase Novolog to 15 units TID with meals   -Continue Moderate Dose Correction Scale  -BG monitoring ac/hs  -Obtain updated hemoglobin A1c    ** Please call Endocrine for any BG related issues **    Lab Results   Component Value Date    HGBA1C 5.3 07/16/2024               Palliative Care  Long-term use of immunosuppressant medication  May increase insulin resistance.             Lizz Orona, NP  Endocrinology  [facility] Trans[facility]

## 2024-10-16 NOTE — CONSULTS
[facility]plant [facility]  Hematology/Oncology  Consult Note    Patient Name: Dejuan Diaz Jr.  MRN: [id number]  Admission Date: 10/14/2024  Hospital Length of Stay: 2 days  Code Status: Full Code   Attending Provider: Al Martell MD  Consulting Provider: Juan R Aguilar DO  Primary Care Physician: Val, Primary Doctor  Principal Problem:Neutropenic fever    Inpatient consult to Hematology/Oncology  Consult performed by: Juan R Aguilar DO  Consult ordered by: Claire Damon PA-C        Subjective:     HPI:  Patient is a 43 year-old with a PMH of LURT 7/16/24 for T2DM and HTN who presented to [facility] for a clinical presentation of fever, sore throat, cough, myalgias, and nasal congestion with a fever of 101F. He presented with ANC of 58 and WBC of 0.73. Infectious work-up of negative COVID, flu A/B, strepA. He was transferred to [facility] with MMF and Bactrim held. He was given neupogen as well on transfer. Extensive respiratory culture ordered and throat culture both being negative. ID consulted as well with patient currently on cefepime. WBC on 9/30/24 noted to be 4.3, CMV and EBV negative on admit. Hematology consulted for leukopenia. At the bedside, patient reports overall feeling poorly with significant fatigue, sore throat and body aches. He denies any known sick contacts and reports compliance with his anti-rejection regimen.           Oncology Treatment Plan:   [No matching plan found]    Medications:  Continuous Infusions:  Scheduled Meds:   atorvastatin  40 mg Oral Daily    calcitRIOL  0.5 mcg Oral Daily    ceFEPime IV (PEDS and ADULTS)  2 g Intravenous Q8H    filgrastim-sndz  480 mcg Subcutaneous Once    heparin (porcine)  5,000 Units Subcutaneous Q8H    insulin aspart U-100  15 Units Subcutaneous TIDWM    insulin glargine U-100  36 Units Subcutaneous Daily    pantoprazole  40 mg Oral Daily    predniSONE  5 mg Oral Daily    sodium phosphate 39.99 mmol in D5W 250 mL IVPB  39.99 mmol Intravenous Once     tacrolimus  3 mg Oral BID     PRN Meds:  Current Facility-Administered Medications:     acetaminophen, 650 mg, Oral, Q4H PRN    dextromethorphan-guaiFENesin  mg/5 ml, 5 mL, Oral, Q4H PRN    dextrose 10%, 12.5 g, Intravenous, PRN    dextrose 10%, 25 g, Intravenous, PRN    glucagon (human recombinant), 1 mg, Intramuscular, PRN    glucose, 16 g, Oral, PRN    glucose, 24 g, Oral, PRN    insulin aspart U-100, 0-10 Units, Subcutaneous, QID (AC + HS) PRN    melatonin, 6 mg, Oral, Nightly PRN    ondansetron, 8 mg, Oral, Q6H PRN    prochlorperazine, 5 mg, Intravenous, Q6H PRN    sodium chloride 0.9%, 10 mL, Intravenous, PRN     Review of patient's allergies indicates:   Allergen Reactions    Codeine Hallucinations    Vancomycin analogues Other (See Comments)     Burning and  itchig of skin        Past Medical History:   Diagnosis Date    Anemia     Diabetes mellitus type II     Disorder of kidney and ureter     GERD (gastroesophageal reflux disease)     Hypertension     Proteinuria      Past Surgical History:   Procedure Laterality Date    INNER EAR SURGERY      for tinnitus    KIDNEY TRANSPLANT Left 7/16/2024    Procedure: TRANSPLANT, KIDNEY;  Surgeon: Erma Gómez MD;  Location: [facility] OR [address] [place] [address];  Service: Transplant;  Laterality: Left;     Family History       Problem Relation (Age of Onset)    Cancer Father    Diabetes Mother    Heart disease Mother, Sister    Kidney disease Mother          Tobacco Use    Smoking status: Never    Smokeless tobacco: Never   Substance and Sexual Activity    Alcohol use: No    Drug use: No    Sexual activity: Yes       Review of Systems   Constitutional:  Positive for activity change, fatigue, fever and unexpected weight change.   Neurological:  Positive for weakness.     Objective:     Vital Signs (Most Recent):  Temp: 98.3 °F (36.8 °C) (10/16/24 1203)  Pulse: 95 (10/16/24 1203)  Resp: 18 (10/16/24 1203)  BP: 131/70 (10/16/24 1203)  SpO2: 100 % (10/16/24 1203) Vital Signs (24h  Range):  Temp:  [97.7 °F (36.5 °C)-98.8 °F (37.1 °C)] 98.3 °F (36.8 °C)  Pulse:  [84-96] 95  Resp:  [18-20] 18  SpO2:  [95 %-100 %] 100 %  BP: (121-136)/(70-81) 131/70     Weight: 95.1 kg (209 lb 10.5 oz)  Body mass index is 26.92 kg/m².  Body surface area is 2.23 meters squared.      Intake/Output Summary (Last 24 hours) at 10/16/2024 1532  Last data filed at 10/16/2024 1528  Gross per 24 hour   Intake 398.31 ml   Output 2000 ml   Net -1601.69 ml        Physical Exam  Constitutional:       Appearance: Normal appearance.   Cardiovascular:      Rate and Rhythm: Normal rate and regular rhythm.   Pulmonary:      Effort: Pulmonary effort is normal.   Abdominal:      General: Abdomen is flat.   Neurological:      General: No focal deficit present.      Mental Status: He is alert and oriented to person, place, and time.   Psychiatric:         Mood and Affect: Mood normal.          Significant Labs:   All pertinent labs from the last 24 hours have been reviewed.    Diagnostic Results:  I have reviewed all pertinent imaging results/findings within the past 24 hours.  Assessment/Plan:     Leukopenia  -Patient is a 43 year-old with a PMH of [facility] 7/16/24 for T2DM and HTN who presented to [facility] for a clinical presentation of fever, sore throat, cough, myalgias, and nasal congestion with a fever of 101F.   -He presented with ANC of 58 and WBC of 0.73. Infectious work-up of negative COVID, flu A/B, strepA. He was transferred to [facility] with MMF and Bactrim held.   -He was given neupogen as well on transfer. Extensive respiratory culture ordered and throat culture both being negative. ID consulted as well with patient currently on cefepime. WBC on 9/30/24 noted to be 4.3, CMV and EBV negative on admit.   -Hematology consulted for leukopenia.   Recommendations:  -Likely secondary to viral illness given symptoms although infectious panel negative at this time  -Follow-up smear, B12, Copper, & Folate  -Potentially related to Bactrim vs.  MMF, agree with holding at this time pending work-up  -MMF carries at 19-49% risk of leukopenia with a peak incidence within 6 months of solid transplant  -Will follow-up with patient   -No indications for bone marrow biopsy at this time            Thank you for your consult. I will follow-up with patient. Please contact us if you have any additional questions.    Juan R Aguilar, DO  Hematology/Oncology  [facility] Transplant [facility]

## 2024-10-16 NOTE — SUBJECTIVE & OBJECTIVE
"Interval HPI:   Overnight events: Remains in TSU. BG above goal ranges on current SQ insulin regimen. Remains on Prednisone 5 mg daily. Diet diabetic 2000 Calories (up to 75 gm per meal)    Eatin%  Nausea: No  Hypoglycemia and intervention: No  Fever: No  TPN and/or TF: No  If yes, type of TF/TPN and rate: n/a    /72   Pulse 85   Temp 98.2 °F (36.8 °C)   Resp 20   Ht 6' 2" (1.88 m)   Wt 95.1 kg (209 lb 10.5 oz)   SpO2 97%   BMI 26.92 kg/m²     Labs Reviewed and Include    Recent Labs   Lab 10/16/24  0613   *   CALCIUM 9.8   ALBUMIN 3.2*      K 4.1   CO2 21*      BUN 15   CREATININE 1.2     Lab Results   Component Value Date    WBC 0.67 (LL) 10/16/2024    HGB 13.2 (L) 10/16/2024    HCT 39.9 (L) 10/16/2024    MCV 92 10/16/2024     10/16/2024     No results for input(s): "TSH", "FREET4" in the last 168 hours.  Lab Results   Component Value Date    HGBA1C 5.3 2024       Nutritional status:   Body mass index is 26.92 kg/m².  Lab Results   Component Value Date    ALBUMIN 3.2 (L) 10/16/2024    ALBUMIN 3.5 10/15/2024    ALBUMIN 3.5 10/15/2024     No results found for: "PREALBUMIN"    Estimated Creatinine Clearance: 92.3 mL/min (based on SCr of 1.2 mg/dL).    Accu-Checks  Recent Labs     10/15/24  0009 10/15/24  0844 10/15/24  1341 10/15/24  1742 10/15/24  2157   POCTGLUCOSE 331* 268* 310* 371* 323*       Current Medications and/or Treatments Impacting Glycemic Control  Immunotherapy:    Immunosuppressants           Stop Route Frequency     tacrolimus capsule 3 mg         -- Oral 2 times daily          Steroids:   Hormones (From admission, onward)      Start     Stop Route Frequency Ordered    10/15/24 0900  predniSONE tablet 5 mg         -- Oral Daily 10/14/24 2304    10/14/24 2304  melatonin tablet 6 mg         -- Oral Nightly PRN 10/14/24 2304          Pressors:    Autonomic Drugs (From admission, onward)      None          Hyperglycemia/Diabetes Medications: "   Antihyperglycemics (From admission, onward)      Start     Stop Route Frequency Ordered    10/16/24 1130  insulin aspart U-100 pen 12 Units         -- SubQ 3 times daily with meals 10/16/24 1014    10/16/24 0900  insulin glargine U-100 (Lantus) pen 36 Units         -- SubQ Daily 10/16/24 0839    10/15/24 2132  insulin aspart U-100 pen 0-10 Units         -- SubQ Before meals & nightly PRN 10/15/24 2032

## 2024-10-16 NOTE — ASSESSMENT & PLAN NOTE
- Endocrine consulted, appreciate assistance  - 3+ ketones in urine  - Check beta hydroxy  - Give 500 cc NS bolus 10/15  - WCTM

## 2024-10-16 NOTE — PLAN OF CARE
Pt AAOx4, VSS on RA. Cont Cefepime. BG monitored AC/HS, SSI administered per MAR. Plan for beta-hydroxy lab this am. Pt with 4 episodes of diarrhea overnight, c/o rectal discomfort. Pt up and ambulatory independently, remains free from falls/injury. Bed in lowest locked position, call light within reach, wife at bedside attentive to pt, POC ongoing.

## 2024-10-16 NOTE — ASSESSMENT & PLAN NOTE
- Suspect drug induced vs infection  - ANC ~ 50 at OSH on routine morning labs   - Fever x >48 hours, tmax 101   - Rocephin and Vanc at OSH, started Cefepime on admit and continue Vanc.  - ANC 0 on 10/15 -> Neupogen x 1, ANC again 0 10/15 -> Neupogen #2  - UA, CXR appear without infection from OSH   - Blood cx NGTD  - RIP neg  - CMV neg  - EBV positive last month, neg this admission  - Throat cx pending  - ID following, sending off additional labwork with AM labs 10/17  - Peripheral smear ordered and heme/onc consulted, appreciate assistance.  - ARI.

## 2024-10-16 NOTE — SUBJECTIVE & OBJECTIVE
Oncology Treatment Plan:   [No matching plan found]    Medications:  Continuous Infusions:  Scheduled Meds:   atorvastatin  40 mg Oral Daily    calcitRIOL  0.5 mcg Oral Daily    ceFEPime IV (PEDS and ADULTS)  2 g Intravenous Q8H    filgrastim-sndz  480 mcg Subcutaneous Once    heparin (porcine)  5,000 Units Subcutaneous Q8H    insulin aspart U-100  15 Units Subcutaneous TIDWM    insulin glargine U-100  36 Units Subcutaneous Daily    pantoprazole  40 mg Oral Daily    predniSONE  5 mg Oral Daily    sodium phosphate 39.99 mmol in D5W 250 mL IVPB  39.99 mmol Intravenous Once    tacrolimus  3 mg Oral BID     PRN Meds:  Current Facility-Administered Medications:     acetaminophen, 650 mg, Oral, Q4H PRN    dextromethorphan-guaiFENesin  mg/5 ml, 5 mL, Oral, Q4H PRN    dextrose 10%, 12.5 g, Intravenous, PRN    dextrose 10%, 25 g, Intravenous, PRN    glucagon (human recombinant), 1 mg, Intramuscular, PRN    glucose, 16 g, Oral, PRN    glucose, 24 g, Oral, PRN    insulin aspart U-100, 0-10 Units, Subcutaneous, QID (AC + HS) PRN    melatonin, 6 mg, Oral, Nightly PRN    ondansetron, 8 mg, Oral, Q6H PRN    prochlorperazine, 5 mg, Intravenous, Q6H PRN    sodium chloride 0.9%, 10 mL, Intravenous, PRN     Review of patient's allergies indicates:   Allergen Reactions    Codeine Hallucinations    Vancomycin analogues Other (See Comments)     Burning and  itchig of skin        Past Medical History:   Diagnosis Date    Anemia     Diabetes mellitus type II     Disorder of kidney and ureter     GERD (gastroesophageal reflux disease)     Hypertension     Proteinuria      Past Surgical History:   Procedure Laterality Date    INNER EAR SURGERY      for tinnitus    KIDNEY TRANSPLANT Left 7/16/2024    Procedure: TRANSPLANT, KIDNEY;  Surgeon: Erma Gómez MD;  Location: [facility] OR [address] [place] [address];  Service: Transplant;  Laterality: Left;     Family History       Problem Relation (Age of Onset)    Cancer Father    Diabetes Mother    Heart  disease Mother, Sister    Kidney disease Mother          Tobacco Use    Smoking status: Never    Smokeless tobacco: Never   Substance and Sexual Activity    Alcohol use: No    Drug use: No    Sexual activity: Yes       Review of Systems   Constitutional:  Positive for activity change, fatigue, fever and unexpected weight change.   Neurological:  Positive for weakness.     Objective:     Vital Signs (Most Recent):  Temp: 98.3 °F (36.8 °C) (10/16/24 1203)  Pulse: 95 (10/16/24 1203)  Resp: 18 (10/16/24 1203)  BP: 131/70 (10/16/24 1203)  SpO2: 100 % (10/16/24 1203) Vital Signs (24h Range):  Temp:  [97.7 °F (36.5 °C)-98.8 °F (37.1 °C)] 98.3 °F (36.8 °C)  Pulse:  [84-96] 95  Resp:  [18-20] 18  SpO2:  [95 %-100 %] 100 %  BP: (121-136)/(70-81) 131/70     Weight: 95.1 kg (209 lb 10.5 oz)  Body mass index is 26.92 kg/m².  Body surface area is 2.23 meters squared.      Intake/Output Summary (Last 24 hours) at 10/16/2024 1532  Last data filed at 10/16/2024 1528  Gross per 24 hour   Intake 398.31 ml   Output 2000 ml   Net -1601.69 ml        Physical Exam  Constitutional:       Appearance: Normal appearance.   Cardiovascular:      Rate and Rhythm: Normal rate and regular rhythm.   Pulmonary:      Effort: Pulmonary effort is normal.   Abdominal:      General: Abdomen is flat.   Neurological:      General: No focal deficit present.      Mental Status: He is alert and oriented to person, place, and time.   Psychiatric:         Mood and Affect: Mood normal.          Significant Labs:   All pertinent labs from the last 24 hours have been reviewed.    Diagnostic Results:  I have reviewed all pertinent imaging results/findings within the past 24 hours.

## 2024-10-16 NOTE — ASSESSMENT & PLAN NOTE
-Patient is a 43 year-old with a PMH of LURT 7/16/24 for T2DM and HTN who presented to [facility] for a clinical presentation of fever, sore throat, cough, myalgias, and nasal congestion with a fever of 101F.   -He presented with ANC of 58 and WBC of 0.73. Infectious work-up of negative COVID, flu A/B, strepA. He was transferred to [facility] with MMF and Bactrim held.   -He was given neupogen as well on transfer. Extensive respiratory culture ordered and throat culture both being negative. ID consulted as well with patient currently on cefepime. WBC on 9/30/24 noted to be 4.3, CMV and EBV negative on admit.   -Hematology consulted for leukopenia.   Recommendations:  -Likely secondary to viral illness given symptoms although infectious panel negative at this time  -Follow-up smear, B12, Copper, & Folate  -Potentially related to Bactrim vs. MMF, agree with holding at this time pending work-up  -MMF carries at 19-49% risk of leukopenia with a peak incidence within 6 months of solid transplant  -Will follow-up with patient   -No indications for bone marrow biopsy at this time

## 2024-10-16 NOTE — PROGRESS NOTES
[facility] - Transplant Stepdown  Infectious Disease  Progress Note    Patient Name: Dejuan Diaz Jr.  MRN: [id number]  Admission Date: 10/14/2024  Length of Stay: 2 days  Attending Physician: Al Martell MD  Primary Care Provider: Val, Primary Doctor    Isolation Status: Special Contact  Assessment/Plan:      Oncology  * Neutropenic fever  Mr. Dejuan Diaz is a 44 yo male with a PMHX of kidney transplant (06/2024, on tacro/MMF/prednisone), T2DM, and HTN who initially presented with sore throat, fever, myalgias, and nasal congestion, along with extreme fatigue and lethargy. Patient stated that 2 days ago he noted severe sore throat, causing difficulty swallowing, he also noted some right sided anterior cervical tederness. Tmax was 101. He was taking prophylaxis with bactrim and recentely stopped taking acylclovir.  AF and VSS. Labs notable for WBC of 0.85, ANC 40. UA unremarkable, RIP was negative. COVID/flu negative. CxR negative.     Likely etiology undetected viral illness. EBV and CMV negative. Throat swabs negative. Blood cx negative. Had an acute episode of diarrhea, likely 2/2 abx use but will need to r/o infectious vs CMV colitis     Recommendations:  - fu stool studies  - continue cefepime   - if patient clinical status worsens, can consider adding on IV gancyclovir        Thank you for your consult. I will follow-up with patient. Please contact us if you have any additional questions.    Belen Iniguez MD  Infectious Disease  [facility] - Transplant Stepdown    Subjective:     Principal Problem:Neutropenic fever    HPI: Mr. Dejuan Diaz is a 44 yo male with a PMHX of kidney transplant (06/2024, on tacro/MMF/prednisone), T2DM, and HTN who initially presented with sore throat, fever, myalgias, and nasal congestion, along with extreme fatigue and lethargy. Patient stated that 2 days ago he noted severe sore throat, causing difficulty swallowing, he also noted some right sided anterior cervical tederness.  Tmax was 101. He was taking prophylaxis with bactrim and recentely stopped taking acylclovir. Recently sustained a minor cut from barbwire. He is uptodate on vaccinations, pending 2nd shingles and flu shot. He reports sore throat has improved and has a dry cough. He works as a , but no other recent outdoor hobbies. Denies any recent sick contact, HA, N/V, SOB, chest pain, abdominal pain, constipation, diarrhea, or dysuria, or worsening skin changes.      He is AF and VSS. Labs notable for WBC of 0.85, ANC 40. UA unremarkable, RIP was negative. COVID/flu negative. CxR negative.   Interval History: 4 episodes of diarrhea, 3 watery episodes 1 semi-formed stool. NAOE. VSS.     Review of Systems   Constitutional:  Negative for chills and fever.   HENT:  Negative for trouble swallowing.    Respiratory:  Positive for cough (dry cough). Negative for shortness of breath.    Cardiovascular:  Negative for chest pain and leg swelling.   Gastrointestinal:  Negative for abdominal pain, constipation, diarrhea, nausea and vomiting.   Genitourinary:  Negative for difficulty urinating.   Musculoskeletal:  Negative for arthralgias.   Allergic/Immunologic: Positive for immunocompromised state.   Neurological:  Negative for weakness and headaches.   Psychiatric/Behavioral:  Negative for agitation and confusion. The patient is not nervous/anxious.      Objective:     Vital Signs (Most Recent):  Temp: 98.2 °F (36.8 °C) (10/16/24 1545)  Pulse: 80 (10/16/24 1545)  Resp: 18 (10/16/24 1545)  BP: 131/84 (10/16/24 1545)  SpO2: 98 % (10/16/24 1545) Vital Signs (24h Range):  Temp:  [98 °F (36.7 °C)-98.8 °F (37.1 °C)] 98.2 °F (36.8 °C)  Pulse:  [80-96] 80  Resp:  [18-20] 18  SpO2:  [95 %-100 %] 98 %  BP: (125-136)/(70-84) 131/84     Weight: 95.1 kg (209 lb 10.5 oz)  Body mass index is 26.92 kg/m².    Estimated Creatinine Clearance: 92.3 mL/min (based on SCr of 1.2 mg/dL).     Physical Exam  Constitutional:       General: He is not in  acute distress.     Appearance: Normal appearance. He is not ill-appearing.   HENT:      Head: Normocephalic and atraumatic.      Mouth/Throat:      Mouth: Mucous membranes are moist.   Eyes:      Extraocular Movements: Extraocular movements intact.      Conjunctiva/sclera: Conjunctivae normal.   Cardiovascular:      Rate and Rhythm: Normal rate and regular rhythm.      Heart sounds: Normal heart sounds.   Pulmonary:      Effort: Pulmonary effort is normal. No respiratory distress.      Breath sounds: Normal breath sounds.   Abdominal:      General: Abdomen is flat. Bowel sounds are normal. There is no distension.      Palpations: Abdomen is soft.      Tenderness: There is no abdominal tenderness. There is no guarding.   Musculoskeletal:         General: No swelling.      Right lower leg: No edema.      Left lower leg: No edema.   Lymphadenopathy:      Cervical: Cervical adenopathy (tenderness to palpation of right side) present.   Skin:     General: Skin is warm.   Neurological:      General: No focal deficit present.      Mental Status: He is alert and oriented to person, place, and time.   Psychiatric:         Mood and Affect: Mood normal.         Behavior: Behavior normal.          Significant Labs: All pertinent labs within the past 24 hours have been reviewed.    Significant Imaging: I have reviewed all pertinent imaging results/findings within the past 24 hours.

## 2024-10-16 NOTE — PLAN OF CARE
Pt AAOx4. Independent. Wife at bedside. VS stable. BG elevated - SSI given. Na phos hung. WBC 0.67. Neupogen given. Lantus am and novolog with meals cont. Atovaquone d/c. 3 BM. PRN tylenol given. Mag x1 rider given. Stool studies sent.

## 2024-10-16 NOTE — HPI
Patient is a 43 year-old with a PMH of LURT 7/16/24 for T2DM and HTN who presented to [facility] for a clinical presentation of fever, sore throat, cough, myalgias, and nasal congestion with a fever of 101F. He presented with ANC of 58 and WBC of 0.73. Infectious work-up of negative COVID, flu A/B, strepA. He was transferred to [facility] with MMF and Bactrim held. He was given neupogen as well on transfer. Extensive respiratory culture ordered and throat culture both being negative. ID consulted as well with patient currently on cefepime. WBC on 9/30/24 noted to be 4.3, CMV and EBV negative on admit. Hematology consulted for leukopenia. At the bedside, patient reports overall feeling poorly with significant fatigue, sore throat and body aches. He denies any known sick contacts and reports compliance with his anti-rejection regimen.

## 2024-10-16 NOTE — ASSESSMENT & PLAN NOTE
- Bactrim held for neutropenia & Atovaquone started 10/15  - Atovaquone held 10/16 for diarrhea  - Resume when/if appropriate for PJP ppx

## 2024-10-16 NOTE — ASSESSMENT & PLAN NOTE
Mr. Dejuan Diaz is a 42 yo male with a PMHX of kidney transplant (06/2024, on tacro/MMF/prednisone), T2DM, and HTN who initially presented with sore throat, fever, myalgias, and nasal congestion, along with extreme fatigue and lethargy. Patient stated that 2 days ago he noted severe sore throat, causing difficulty swallowing, he also noted some right sided anterior cervical tederness. Tmax was 101. He was taking prophylaxis with bactrim and recentely stopped taking acylclovir.  AF and VSS. Labs notable for WBC of 0.85, ANC 40. UA unremarkable, RIP was negative. COVID/flu negative. CxR negative.     Likely etiology undetected viral illness. EBV and CMV negative. Throat swabs negative. Blood cx negative. Had an acute episode of diarrhea, likely 2/2 abx use but will need to r/o infectious vs CMV colitis     Recommendations:  - fu stool studies  - continue cefepime   - if patient clinical status worsens, can consider adding on IV gancyclovir

## 2024-10-16 NOTE — ASSESSMENT & PLAN NOTE
- PO phos held 10/16 for diarrhea (was on 500 mg KPN TID)  - IV replacement 30 mmol Naphos 10/15 and 40 mmol Naphos 10/16  - Check levels daily.

## 2024-10-16 NOTE — SUBJECTIVE & OBJECTIVE
Interval History: 4 episodes of diarrhea, 3 watery episodes 1 semi-formed stool. NAOE. VSS.     Review of Systems   Constitutional:  Negative for chills and fever.   HENT:  Negative for trouble swallowing.    Respiratory:  Positive for cough (dry cough). Negative for shortness of breath.    Cardiovascular:  Negative for chest pain and leg swelling.   Gastrointestinal:  Negative for abdominal pain, constipation, diarrhea, nausea and vomiting.   Genitourinary:  Negative for difficulty urinating.   Musculoskeletal:  Negative for arthralgias.   Allergic/Immunologic: Positive for immunocompromised state.   Neurological:  Negative for weakness and headaches.   Psychiatric/Behavioral:  Negative for agitation and confusion. The patient is not nervous/anxious.      Objective:     Vital Signs (Most Recent):  Temp: 98.2 °F (36.8 °C) (10/16/24 1545)  Pulse: 80 (10/16/24 1545)  Resp: 18 (10/16/24 1545)  BP: 131/84 (10/16/24 1545)  SpO2: 98 % (10/16/24 1545) Vital Signs (24h Range):  Temp:  [98 °F (36.7 °C)-98.8 °F (37.1 °C)] 98.2 °F (36.8 °C)  Pulse:  [80-96] 80  Resp:  [18-20] 18  SpO2:  [95 %-100 %] 98 %  BP: (125-136)/(70-84) 131/84     Weight: 95.1 kg (209 lb 10.5 oz)  Body mass index is 26.92 kg/m².    Estimated Creatinine Clearance: 92.3 mL/min (based on SCr of 1.2 mg/dL).     Physical Exam  Constitutional:       General: He is not in acute distress.     Appearance: Normal appearance. He is not ill-appearing.   HENT:      Head: Normocephalic and atraumatic.      Mouth/Throat:      Mouth: Mucous membranes are moist.   Eyes:      Extraocular Movements: Extraocular movements intact.      Conjunctiva/sclera: Conjunctivae normal.   Cardiovascular:      Rate and Rhythm: Normal rate and regular rhythm.      Heart sounds: Normal heart sounds.   Pulmonary:      Effort: Pulmonary effort is normal. No respiratory distress.      Breath sounds: Normal breath sounds.   Abdominal:      General: Abdomen is flat. Bowel sounds are normal.  There is no distension.      Palpations: Abdomen is soft.      Tenderness: There is no abdominal tenderness. There is no guarding.   Musculoskeletal:         General: No swelling.      Right lower leg: No edema.      Left lower leg: No edema.   Lymphadenopathy:      Cervical: Cervical adenopathy (tenderness to palpation of right side) present.   Skin:     General: Skin is warm.   Neurological:      General: No focal deficit present.      Mental Status: He is alert and oriented to person, place, and time.   Psychiatric:         Mood and Affect: Mood normal.         Behavior: Behavior normal.          Significant Labs: All pertinent labs within the past 24 hours have been reviewed.    Significant Imaging: I have reviewed all pertinent imaging results/findings within the past 24 hours.

## 2024-10-16 NOTE — ASSESSMENT & PLAN NOTE
BG goal 140-180    BG has been significantly above goal. Will increase SQ insulin regimen. If BG continues significantly above goal- will consider transition insulin infusion this evening.     Plan:  -Increase Lantus to 36 units daily (0.8 u/kg dosing)  -Increase Novolog to 15 units TID with meals   -Continue Moderate Dose Correction Scale  -BG monitoring ac/hs  -Obtain updated hemoglobin A1c    ** Please call Endocrine for any BG related issues **    Lab Results   Component Value Date    HGBA1C 5.3 07/16/2024

## 2024-10-17 PROBLEM — E10.65 TYPE 1 DIABETES MELLITUS WITH HYPEROSMOLARITY WITHOUT COMA: Status: ACTIVE | Noted: 2024-10-17

## 2024-10-17 PROBLEM — E87.0 TYPE 1 DIABETES MELLITUS WITH HYPEROSMOLARITY WITHOUT COMA: Status: ACTIVE | Noted: 2024-10-17

## 2024-10-17 PROBLEM — E10.69 TYPE 1 DIABETES MELLITUS WITH HYPEROSMOLARITY WITHOUT COMA: Status: ACTIVE | Noted: 2024-10-17

## 2024-10-17 LAB
ALBUMIN SERPL BCP-MCNC: 3.1 G/DL (ref 3.5–5.2)
ANION GAP SERPL CALC-SCNC: 9 MMOL/L (ref 8–16)
BACTERIA THROAT CULT: NORMAL
BASOPHILS # BLD AUTO: 0.03 K/UL (ref 0–0.2)
BASOPHILS NFR BLD: 4.5 % (ref 0–1.9)
BUN SERPL-MCNC: 14 MG/DL (ref 6–20)
CALCIUM SERPL-MCNC: 9.5 MG/DL (ref 8.7–10.5)
CHLORIDE SERPL-SCNC: 103 MMOL/L (ref 95–110)
CO2 SERPL-SCNC: 25 MMOL/L (ref 23–29)
CREAT SERPL-MCNC: 1.1 MG/DL (ref 0.5–1.4)
CRYPTOSP AG STL QL IA: NEGATIVE
DIFFERENTIAL METHOD BLD: ABNORMAL
EOSINOPHIL # BLD AUTO: 0 K/UL (ref 0–0.5)
EOSINOPHIL NFR BLD: 6 % (ref 0–8)
ERYTHROCYTE [DISTWIDTH] IN BLOOD BY AUTOMATED COUNT: 12.9 % (ref 11.5–14.5)
EST. GFR  (NO RACE VARIABLE): >60 ML/MIN/1.73 M^2
ESTIMATED AVG GLUCOSE: 197 MG/DL (ref 68–131)
FOLATE SERPL-MCNC: 8.5 NG/ML (ref 4–24)
G LAMBLIA AG STL QL IA: NEGATIVE
GLUCOSE SERPL-MCNC: 189 MG/DL (ref 70–110)
HBA1C MFR BLD: 8.5 % (ref 4–5.6)
HCT VFR BLD AUTO: 39 % (ref 40–54)
HGB BLD-MCNC: 13 G/DL (ref 14–18)
IMM GRANULOCYTES # BLD AUTO: 0.03 K/UL (ref 0–0.04)
IMM GRANULOCYTES NFR BLD AUTO: 4.5 % (ref 0–0.5)
LYMPHOCYTES # BLD AUTO: 0.3 K/UL (ref 1–4.8)
LYMPHOCYTES NFR BLD: 37.3 % (ref 18–48)
MAGNESIUM SERPL-MCNC: 1.7 MG/DL (ref 1.6–2.6)
MCH RBC QN AUTO: 30 PG (ref 27–31)
MCHC RBC AUTO-ENTMCNC: 33.3 G/DL (ref 32–36)
MCV RBC AUTO: 90 FL (ref 82–98)
MONOCYTES # BLD AUTO: 0.3 K/UL (ref 0.3–1)
MONOCYTES NFR BLD: 41.8 % (ref 4–15)
NEUTROPHILS # BLD AUTO: 0 K/UL (ref 1.8–7.7)
NEUTROPHILS NFR BLD: 5.9 % (ref 38–73)
NRBC BLD-RTO: 0 /100 WBC
PATH REV BLD -IMP: NORMAL
PHOSPHATE SERPL-MCNC: 1.8 MG/DL (ref 2.7–4.5)
PLATELET # BLD AUTO: 258 K/UL (ref 150–450)
PMV BLD AUTO: 10.6 FL (ref 9.2–12.9)
POCT GLUCOSE: 129 MG/DL (ref 70–110)
POCT GLUCOSE: 165 MG/DL (ref 70–110)
POCT GLUCOSE: 203 MG/DL (ref 70–110)
POCT GLUCOSE: 208 MG/DL (ref 70–110)
POCT GLUCOSE: 208 MG/DL (ref 70–110)
POTASSIUM SERPL-SCNC: 3.8 MMOL/L (ref 3.5–5.1)
RBC # BLD AUTO: 4.34 M/UL (ref 4.6–6.2)
RV AG STL QL IA.RAPID: NEGATIVE
SODIUM SERPL-SCNC: 137 MMOL/L (ref 136–145)
TACROLIMUS BLD-MCNC: 8.5 NG/ML (ref 5–15)
VIT B12 SERPL-MCNC: 603 PG/ML (ref 210–950)
WBC # BLD AUTO: 0.67 K/UL (ref 3.9–12.7)
WBC #/AREA STL HPF: NORMAL /[HPF]

## 2024-10-17 PROCEDURE — 36415 COLL VENOUS BLD VENIPUNCTURE: CPT | Mod: NTX | Performed by: INTERNAL MEDICINE

## 2024-10-17 PROCEDURE — 86747 PARVOVIRUS ANTIBODY: CPT | Mod: NTX | Performed by: INTERNAL MEDICINE

## 2024-10-17 PROCEDURE — 63600175 PHARM REV CODE 636 W HCPCS: Mod: NTX

## 2024-10-17 PROCEDURE — 80069 RENAL FUNCTION PANEL: CPT | Mod: NTX

## 2024-10-17 PROCEDURE — 83735 ASSAY OF MAGNESIUM: CPT | Mod: NTX

## 2024-10-17 PROCEDURE — 99233 SBSQ HOSP IP/OBS HIGH 50: CPT | Mod: NTX,,, | Performed by: INTERNAL MEDICINE

## 2024-10-17 PROCEDURE — 36415 COLL VENOUS BLD VENIPUNCTURE: CPT | Mod: NTX | Performed by: NURSE PRACTITIONER

## 2024-10-17 PROCEDURE — 86788 WEST NILE VIRUS AB IGM: CPT | Mod: NTX | Performed by: INTERNAL MEDICINE

## 2024-10-17 PROCEDURE — 87799 DETECT AGENT NOS DNA QUANT: CPT | Mod: NTX | Performed by: INTERNAL MEDICINE

## 2024-10-17 PROCEDURE — 25000003 PHARM REV CODE 250: Mod: NTX | Performed by: PHYSICIAN ASSISTANT

## 2024-10-17 PROCEDURE — 63600175 PHARM REV CODE 636 W HCPCS: Mod: NTX | Performed by: PHYSICIAN ASSISTANT

## 2024-10-17 PROCEDURE — 99233 SBSQ HOSP IP/OBS HIGH 50: CPT | Mod: NTX,,, | Performed by: PHYSICIAN ASSISTANT

## 2024-10-17 PROCEDURE — 86618 LYME DISEASE ANTIBODY: CPT | Mod: NTX | Performed by: INTERNAL MEDICINE

## 2024-10-17 PROCEDURE — 86789 WEST NILE VIRUS ANTIBODY: CPT | Mod: NTX | Performed by: INTERNAL MEDICINE

## 2024-10-17 PROCEDURE — 85060 BLOOD SMEAR INTERPRETATION: CPT | Mod: NTX,,, | Performed by: PATHOLOGY

## 2024-10-17 PROCEDURE — 82607 VITAMIN B-12: CPT | Mod: NTX | Performed by: STUDENT IN AN ORGANIZED HEALTH CARE EDUCATION/TRAINING PROGRAM

## 2024-10-17 PROCEDURE — 83036 HEMOGLOBIN GLYCOSYLATED A1C: CPT | Mod: NTX | Performed by: NURSE PRACTITIONER

## 2024-10-17 PROCEDURE — 25000003 PHARM REV CODE 250: Mod: NTX

## 2024-10-17 PROCEDURE — 86757 RICKETTSIA ANTIBODY: CPT | Mod: 91,NTX | Performed by: INTERNAL MEDICINE

## 2024-10-17 PROCEDURE — 85025 COMPLETE CBC W/AUTO DIFF WBC: CPT | Mod: NTX

## 2024-10-17 PROCEDURE — 82746 ASSAY OF FOLIC ACID SERUM: CPT | Mod: NTX | Performed by: STUDENT IN AN ORGANIZED HEALTH CARE EDUCATION/TRAINING PROGRAM

## 2024-10-17 PROCEDURE — 94761 N-INVAS EAR/PLS OXIMETRY MLT: CPT | Mod: NTX

## 2024-10-17 PROCEDURE — 82525 ASSAY OF COPPER: CPT | Mod: NTX | Performed by: STUDENT IN AN ORGANIZED HEALTH CARE EDUCATION/TRAINING PROGRAM

## 2024-10-17 PROCEDURE — 87799 DETECT AGENT NOS DNA QUANT: CPT | Mod: 91,NTX | Performed by: INTERNAL MEDICINE

## 2024-10-17 PROCEDURE — 20600001 HC STEP DOWN PRIVATE ROOM: Mod: NTX

## 2024-10-17 PROCEDURE — 63600175 PHARM REV CODE 636 W HCPCS: Mod: NTX | Performed by: NURSE PRACTITIONER

## 2024-10-17 PROCEDURE — 99232 SBSQ HOSP IP/OBS MODERATE 35: CPT | Mod: NTX,,, | Performed by: NURSE PRACTITIONER

## 2024-10-17 PROCEDURE — 86666 EHRLICHIA ANTIBODY: CPT | Mod: NTX | Performed by: INTERNAL MEDICINE

## 2024-10-17 PROCEDURE — 63600175 PHARM REV CODE 636 W HCPCS: Mod: NTX | Performed by: INTERNAL MEDICINE

## 2024-10-17 PROCEDURE — 80197 ASSAY OF TACROLIMUS: CPT | Mod: NTX

## 2024-10-17 RX ORDER — INSULIN GLARGINE 100 [IU]/ML
7 INJECTION, SOLUTION SUBCUTANEOUS ONCE
Status: COMPLETED | OUTPATIENT
Start: 2024-10-17 | End: 2024-10-17

## 2024-10-17 RX ORDER — MAGNESIUM SULFATE HEPTAHYDRATE 40 MG/ML
2 INJECTION, SOLUTION INTRAVENOUS ONCE
Status: COMPLETED | OUTPATIENT
Start: 2024-10-17 | End: 2024-10-17

## 2024-10-17 RX ORDER — INSULIN GLARGINE 100 [IU]/ML
43 INJECTION, SOLUTION SUBCUTANEOUS DAILY
Status: DISCONTINUED | OUTPATIENT
Start: 2024-10-18 | End: 2024-10-20 | Stop reason: HOSPADM

## 2024-10-17 RX ADMIN — CEFEPIME 2 G: 2 INJECTION, POWDER, FOR SOLUTION INTRAVENOUS at 08:10

## 2024-10-17 RX ADMIN — TACROLIMUS 3 MG: 1 CAPSULE ORAL at 05:10

## 2024-10-17 RX ADMIN — PREDNISONE 5 MG: 5 TABLET ORAL at 08:10

## 2024-10-17 RX ADMIN — INSULIN ASPART 15 UNITS: 100 INJECTION, SOLUTION INTRAVENOUS; SUBCUTANEOUS at 08:10

## 2024-10-17 RX ADMIN — CEFEPIME 2 G: 2 INJECTION, POWDER, FOR SOLUTION INTRAVENOUS at 05:10

## 2024-10-17 RX ADMIN — ATORVASTATIN CALCIUM 40 MG: 40 TABLET, FILM COATED ORAL at 08:10

## 2024-10-17 RX ADMIN — INSULIN ASPART 4 UNITS: 100 INJECTION, SOLUTION INTRAVENOUS; SUBCUTANEOUS at 02:10

## 2024-10-17 RX ADMIN — PANTOPRAZOLE SODIUM 40 MG: 40 TABLET, DELAYED RELEASE ORAL at 08:10

## 2024-10-17 RX ADMIN — TACROLIMUS 3 MG: 1 CAPSULE ORAL at 08:10

## 2024-10-17 RX ADMIN — CALCITRIOL 0.5 MCG: 0.5 CAPSULE, LIQUID FILLED ORAL at 08:10

## 2024-10-17 RX ADMIN — ACETAMINOPHEN 650 MG: 325 TABLET ORAL at 05:10

## 2024-10-17 RX ADMIN — INSULIN GLARGINE 36 UNITS: 100 INJECTION, SOLUTION SUBCUTANEOUS at 08:10

## 2024-10-17 RX ADMIN — HEPARIN SODIUM 5000 UNITS: 5000 INJECTION INTRAVENOUS; SUBCUTANEOUS at 05:10

## 2024-10-17 RX ADMIN — ACETAMINOPHEN 650 MG: 325 TABLET ORAL at 06:10

## 2024-10-17 RX ADMIN — ACETAMINOPHEN 650 MG: 325 TABLET ORAL at 08:10

## 2024-10-17 RX ADMIN — FILGRASTIM-SNDZ 480 MCG: 480 INJECTION, SOLUTION INTRAVENOUS; SUBCUTANEOUS at 12:10

## 2024-10-17 RX ADMIN — INSULIN ASPART 15 UNITS: 100 INJECTION, SOLUTION INTRAVENOUS; SUBCUTANEOUS at 04:10

## 2024-10-17 RX ADMIN — SODIUM PHOSPHATE, MONOBASIC, MONOHYDRATE AND SODIUM PHOSPHATE, DIBASIC, ANHYDROUS 39.99 MMOL: 142; 276 INJECTION, SOLUTION INTRAVENOUS at 11:10

## 2024-10-17 RX ADMIN — INSULIN GLARGINE 7 UNITS: 100 INJECTION, SOLUTION SUBCUTANEOUS at 08:10

## 2024-10-17 RX ADMIN — CEFEPIME 2 G: 2 INJECTION, POWDER, FOR SOLUTION INTRAVENOUS at 12:10

## 2024-10-17 RX ADMIN — ACETAMINOPHEN 650 MG: 325 TABLET ORAL at 12:10

## 2024-10-17 RX ADMIN — INSULIN ASPART 4 UNITS: 100 INJECTION, SOLUTION INTRAVENOUS; SUBCUTANEOUS at 06:10

## 2024-10-17 RX ADMIN — INSULIN ASPART 4 UNITS: 100 INJECTION, SOLUTION INTRAVENOUS; SUBCUTANEOUS at 08:10

## 2024-10-17 RX ADMIN — MAGNESIUM SULFATE HEPTAHYDRATE 2 G: 40 INJECTION, SOLUTION INTRAVENOUS at 08:10

## 2024-10-17 RX ADMIN — HEPARIN SODIUM 5000 UNITS: 5000 INJECTION INTRAVENOUS; SUBCUTANEOUS at 08:10

## 2024-10-17 RX ADMIN — INSULIN ASPART 15 UNITS: 100 INJECTION, SOLUTION INTRAVENOUS; SUBCUTANEOUS at 11:10

## 2024-10-17 NOTE — ASSESSMENT & PLAN NOTE
BG goal 140-180    BG remains above goal but improving since admission.     Plan:  -Increase Lantus to 43 units daily (20% dose increase)   -Continue Novolog 15 units TID with meals   -Continue Moderate Dose Correction Scale  -BG monitoring ac/hs  -Obtain updated hemoglobin A1c    ** Please call Endocrine for any BG related issues **    Lab Results   Component Value Date    HGBA1C 8.5 (H) 10/17/2024

## 2024-10-17 NOTE — SUBJECTIVE & OBJECTIVE
Subjective:   History of Present Illness:  Mr. Diaz is a 43 yr male who received a LURT 7/16/24 for T2DM and HTN. No intra-op issues noted. Cr trended down, good UOP, dc'd without issue. Has insulin pump for DM2 management. Cr elvie to 1.6, had biopsy 8/14 which did not show rejection or ATI. On 10/14 patient presented to OSH ER for c/o fever, sore throat, cough, myalgias, mild nasal congestion. Tmax 101, first fever on 10/12. He also had routine lab work this morning that revealed severe neutropenia at 0.73, ANC 58. Denies CP, SOB, NVD, dysuria, changes in UOP. Denies any sick contacts. Labs in ED notable for negative COVID, flu A/B, strepA. Labs this morning with Cr at BL 1.5. UA likely without infection, CXR without acute abnormality. BC from OSH pending. Patient transferred to C for neutropenic fever. Plan for CMV PCR, RIP, throat culture, transplant ID consult, IV antibiotics. Will hold MMF and bactrim. Neupogen per protocol. D/w Dr. Martell.    Mr. Diaz is a 43 y.o. year old male who is status post Pancreas Transplant Waitlist - Qualified: 2/19/2024 - Inactive.  His maintenance immunosuppression consists of:   Immunosuppressants (From admission, onward)      Start     Stop Route Frequency Ordered    10/15/24 0800  tacrolimus capsule 3 mg         -- Oral 2 times daily 10/14/24 2304            Hospital Course:  Mr. Diaz was transferred from OSH ED 10/14 for neutropenic fever with associated sore throat and myalgias. Infectious workup initiated, Cefepime/Vanc started, and ID consulted. Flu, COVID, and strep negative. RIP negative. UA neg for infection. Blood cx NGTD. CMV and EBV negative. Throat cx normal respiratory pérez. Developed diarrhea 10/16, but improved with holding mag + phos replacements and Atovaquone. Stool studies: no neutrophils seen, remainder pending.     Interval History: NAEON. Patient reports sore throat continues to improve, but c/o frontal sinus HA. Neutropenia and fever still  unexplained. Plan for CT maxillofacial/neck without contrast. ID following. Patient has remained afebrile x 48 hours. Vanc discontinued 10/16. Continue Cefepime. Extensive workup pending per ID. ANC 0 again today. Plan for Neupogen #3 today. Heme/onc following, appreciate recs. Electrolytes replaced IV. WCTM.    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 He is scheduled for cytoscopy on 10/21.        Past Medical, Surgical, Family, and Social History:   Unchanged from H&P.    Scheduled Meds:   atorvastatin  40 mg Oral Daily    calcitRIOL  0.5 mcg Oral Daily    ceFEPime IV (PEDS and ADULTS)  2 g Intravenous Q8H    filgrastim-sndz  480 mcg Subcutaneous Once    heparin (porcine)  5,000 Units Subcutaneous Q8H    insulin aspart U-100  15 Units Subcutaneous TIDWM    [START ON 10/18/2024] insulin glargine U-100  43 Units Subcutaneous Daily    pantoprazole  40 mg Oral Daily    predniSONE  5 mg Oral Daily    sodium phosphate 39.99 mmol in D5W 250 mL IVPB  39.99 mmol Intravenous Once    tacrolimus  3 mg Oral BID     Continuous Infusions:  PRN Meds:  Current Facility-Administered Medications:     acetaminophen, 650 mg, Oral, Q4H PRN    dextromethorphan-guaiFENesin  mg/5 ml, 5 mL, Oral, Q4H PRN    dextrose 10%, 12.5 g, Intravenous, PRN    dextrose 10%, 25 g, Intravenous, PRN    glucagon (human recombinant), 1 mg, Intramuscular, PRN    glucose, 16 g, Oral, PRN    glucose, 24 g, Oral, PRN    insulin aspart U-100, 0-10 Units, Subcutaneous, QID (AC + HS) PRN    melatonin, 6 mg, Oral, Nightly PRN    ondansetron, 8 mg, Oral, Q6H PRN    prochlorperazine, 5 mg, Intravenous, Q6H PRN    sodium chloride 0.9%, 10 mL, Intravenous, PRN    Intake/Output - Last 3 Shifts         10/15 0700  10/16 0659 10/16 0700  10/17 0659 10/17 0700  10/18 0659    P.O.  1500     IV  "Piggyback 398.3      Total Intake(mL/kg) 398.3 (4.2) 1500 (15.8)     Urine (mL/kg/hr) 1350 (0.6) 2500 (1.1)     Stool 0 0     Total Output 1350 2500     Net -951.7 -1000            Stool Occurrence 7 x 3 x              Review of Systems   Constitutional:  Positive for appetite change, fatigue and fever.   HENT:  Positive for congestion, postnasal drip, sinus pressure, sinus pain and sore throat.    Eyes:  Negative for photophobia and redness.   Respiratory:  Positive for cough. Negative for shortness of breath, wheezing and stridor.    Cardiovascular:  Negative for chest pain, palpitations and leg swelling.   Gastrointestinal:  Negative for abdominal distention, abdominal pain, constipation, nausea and vomiting.        Loose stools improved   Genitourinary:  Negative for decreased urine volume, difficulty urinating and dysuria.   Musculoskeletal:  Positive for myalgias.   Skin:  Negative for rash and wound.   Allergic/Immunologic: Positive for immunocompromised state.   Neurological:  Positive for headaches. Negative for dizziness and light-headedness.   Psychiatric/Behavioral:  Negative for agitation, behavioral problems, confusion, decreased concentration and hallucinations.       Objective:     Vital Signs (Most Recent):  Temp: 98.4 °F (36.9 °C) (10/17/24 0755)  Pulse: 91 (10/17/24 0755)  Resp: 18 (10/17/24 0451)  BP: 122/76 (10/17/24 0755)  SpO2: 95 % (10/17/24 0755) Vital Signs (24h Range):  Temp:  [98.2 °F (36.8 °C)-98.6 °F (37 °C)] 98.4 °F (36.9 °C)  Pulse:  [75-95] 91  Resp:  [18] 18  SpO2:  [93 %-100 %] 95 %  BP: (110-137)/(67-84) 122/76     Weight: 95.1 kg (209 lb 10.5 oz)  Height: 6' 2" (188 cm)  Body mass index is 26.92 kg/m².     Physical Exam  Vitals and nursing note reviewed.   Constitutional:       General: He is awake. He is not in acute distress.     Appearance: Normal appearance.   HENT:      Head: Normocephalic and atraumatic.      Nose:      Right Sinus: Maxillary sinus tenderness and frontal " sinus tenderness present.      Left Sinus: Maxillary sinus tenderness and frontal sinus tenderness present.      Mouth/Throat:      Pharynx: Posterior oropharyngeal erythema present.   Eyes:      General: No scleral icterus.        Right eye: No discharge.         Left eye: No discharge.   Cardiovascular:      Rate and Rhythm: Normal rate and regular rhythm.      Pulses: Normal pulses.      Heart sounds: No murmur heard.  Pulmonary:      Effort: Pulmonary effort is normal. No respiratory distress.      Breath sounds: No decreased breath sounds, wheezing or rales.   Abdominal:      General: A surgical scar is present. Bowel sounds are normal. There is no distension.      Palpations: Abdomen is soft. There is no mass.      Tenderness: There is no abdominal tenderness. There is no guarding or rebound.   Musculoskeletal:         General: No tenderness. Normal range of motion.      Cervical back: Normal range of motion. No rigidity.      Right lower leg: No edema.      Left lower leg: No edema.   Lymphadenopathy:      Cervical: Cervical adenopathy (right on admit, improved by 10/17) present.      Upper Body:      Right upper body: No supraclavicular or axillary adenopathy.      Left upper body: No supraclavicular or axillary adenopathy.      Lower Body: No right inguinal adenopathy. No left inguinal adenopathy.   Skin:     General: Skin is warm and dry.      Capillary Refill: Capillary refill takes less than 2 seconds.      Coloration: Skin is not pale.      Findings: No erythema or rash.   Neurological:      General: No focal deficit present.      Mental Status: He is alert and oriented to person, place, and time.   Psychiatric:         Mood and Affect: Mood normal.         Speech: Speech normal.         Behavior: Behavior normal. Behavior is cooperative.         Thought Content: Thought content normal.         Judgment: Judgment normal.          Laboratory:  CBC:   Recent Labs   Lab 10/16/24  0612 10/16/24  1031  10/17/24  0623   WBC 0.67* 0.67* 0.67*   RBC 4.34* 4.41* 4.34*   HGB 13.2* 13.2* 13.0*   HCT 39.9* 40.0 39.0*    207 258   MCV 92 91 90   MCH 30.4 29.9 30.0   MCHC 33.1 33.0 33.3       Diagnostic Results:  Reviewed and CT scans pending

## 2024-10-17 NOTE — ASSESSMENT & PLAN NOTE
- PO phos held 10/16 for diarrhea (was on 500 mg KPN TID)  - IV replacement 30 mmol Naphos 10/15 and 40 mmol Naphos 10/16 + 10/17  - Check levels daily.

## 2024-10-17 NOTE — PROGRESS NOTES
[facility] - Transplant Stepdown  Infectious Disease  Progress Note    Patient Name: Dejuan Diza Jr.  MRN: [id number]  Admission Date: 10/14/2024  Length of Stay: 3 days  Attending Physician: Al Martell MD  Primary Care Provider: Val, Primary Doctor    Isolation Status: Special Contact  Assessment/Plan:      Oncology  * Neutropenic fever  Mr. Dejuan Diaz is a 44 yo male with a PMHX of kidney transplant (06/2024, on tacro/MMF/prednisone), T2DM, and HTN who initially presented with sore throat, fever, myalgias, and nasal congestion, along with extreme fatigue and lethargy. Patient stated that 2 days ago he noted severe sore throat, causing difficulty swallowing, he also noted some right sided anterior cervical tederness. Tmax was 101. He was taking prophylaxis with bactrim and recentely stopped taking acylclovir.  AF and VSS. Labs notable for WBC of 0.85, ANC 40. UA unremarkable, RIP was negative. COVID/flu negative. CxR negative.     Likely etiology undetected viral illness.  EBV and CMV negative (noted D-/R-). Throat swabs negative. Blood cx negative. Had an acute episode of diarrhea, likely 2/2 abx use but will need to r/o infectious etiology. He additionally had recent travel to [place], will workup serologies listed below.     Recommendations:  - fu stool studies  - fu serologies (Lyme, Ehrlichia, and rickettsia Abs, and Parvovirus)  - fu blood smear  - fu additional viral PCR (thus far negative)   - continue cefepime   - if patient clinical status worsens, can consider adding on IV gancyclovir        Thank you for your consult. I will follow-up with patient. Please contact us if you have any additional questions.    Belen Iniguez MD  Infectious Disease  [facility] - Transplant Stepdown    Subjective:     Principal Problem:Neutropenic fever    HPI: Mr. Dejuan Diaz is a 44 yo male with a PMHX of kidney transplant (06/2024, on tacro/MMF/prednisone), T2DM, and HTN who initially presented with sore throat,  fever, myalgias, and nasal congestion, along with extreme fatigue and lethargy. Patient stated that 2 days ago he noted severe sore throat, causing difficulty swallowing, he also noted some right sided anterior cervical tederness. Tmax was 101. He was taking prophylaxis with bactrim and recentely stopped taking acylclovir. Recently sustained a minor cut from barbwire. He is uptodate on vaccinations, pending 2nd shingles and flu shot. He reports sore throat has improved and has a dry cough. He works as a , but no other recent outdoor hobbies. Denies any recent sick contact, HA, N/V, SOB, chest pain, abdominal pain, constipation, diarrhea, or dysuria, or worsening skin changes.      He is AF and VSS. Labs notable for WBC of 0.85, ANC 40. UA unremarkable, RIP was negative. COVID/flu negative. CxR negative.   Interval History: NAOE. VSS. No acute complaints this morning.     Review of Systems   Constitutional:  Negative for chills and fever.   HENT:  Negative for trouble swallowing.    Respiratory:  Positive for cough (dry cough). Negative for shortness of breath.    Cardiovascular:  Negative for chest pain and leg swelling.   Gastrointestinal:  Negative for abdominal pain, constipation, diarrhea, nausea and vomiting.   Genitourinary:  Negative for difficulty urinating.   Musculoskeletal:  Negative for arthralgias.   Allergic/Immunologic: Positive for immunocompromised state.   Neurological:  Negative for weakness and headaches.   Psychiatric/Behavioral:  Negative for agitation and confusion. The patient is not nervous/anxious.      Objective:     Vital Signs (Most Recent):  Temp: 98.6 °F (37 °C) (10/17/24 1134)  Pulse: 86 (10/17/24 1134)  Resp: 18 (10/17/24 1134)  BP: (!) 141/83 (10/17/24 1134)  SpO2: 96 % (10/17/24 1134) Vital Signs (24h Range):  Temp:  [98.2 °F (36.8 °C)-98.6 °F (37 °C)] 98.6 °F (37 °C)  Pulse:  [75-91] 86  Resp:  [18] 18  SpO2:  [93 %-98 %] 96 %  BP: (110-141)/(67-84) 141/83     Weight:  95.1 kg (209 lb 10.5 oz)  Body mass index is 26.92 kg/m².    Estimated Creatinine Clearance: 100.7 mL/min (based on SCr of 1.1 mg/dL).     Physical Exam  Constitutional:       General: He is not in acute distress.     Appearance: Normal appearance. He is not ill-appearing.   HENT:      Head: Normocephalic and atraumatic.      Mouth/Throat:      Mouth: Mucous membranes are moist.   Eyes:      Extraocular Movements: Extraocular movements intact.      Conjunctiva/sclera: Conjunctivae normal.   Cardiovascular:      Rate and Rhythm: Normal rate and regular rhythm.      Heart sounds: Normal heart sounds.   Pulmonary:      Effort: Pulmonary effort is normal. No respiratory distress.      Breath sounds: Normal breath sounds.   Abdominal:      General: Abdomen is flat. Bowel sounds are normal. There is no distension.      Palpations: Abdomen is soft.      Tenderness: There is no abdominal tenderness. There is no guarding.   Musculoskeletal:         General: No swelling.      Right lower leg: No edema.      Left lower leg: No edema.   Lymphadenopathy:      Cervical: Cervical adenopathy (tenderness to palpation of right side) present.   Skin:     General: Skin is warm.   Neurological:      General: No focal deficit present.      Mental Status: He is alert and oriented to person, place, and time.   Psychiatric:         Mood and Affect: Mood normal.         Behavior: Behavior normal.          Significant Labs: All pertinent labs within the past 24 hours have been reviewed.    Significant Imaging: I have reviewed all pertinent imaging results/findings within the past 24 hours.

## 2024-10-17 NOTE — PROGRESS NOTES
"[facility] - Transplant Stepdown  Endocrinology  Progress Note    Admit Date: 10/14/2024     Admit Date: 10/14/24     Reason for Consult: Management of T1DM, Hyperglycemia      Surgical Procedure and Date:  Status post kidney transplant 2024     Diabetes diagnosis year:  Over 10 years ago     Home Diabetes Medications:    Recently taken off of Omnipod 5 by primary endocrinologist in [place] and placed on the following  Toujeo 28 units once daily  Novolog ICR 1:15 (1 unit per 15g of carbs) TID with meals      How often checking glucose at home?  Dexcom   BG readings on regimen: mostly 250-300s  Hypoglycemia on the regimen?  No  Missed doses on regimen?  No     Diabetes Complications include:     Hyperglycemia and Diabetic nephropathy       Complicating diabetes co morbidities:   ESRD        HPI:   Patient is a 43 y.o. male with T1DM, CKD s/p living donor kidney txp  on 24 for DM/HTN . He has an insulin pump. He presented to OSH ER on 10/14 with complaints of fever, sore throat, cough, myalgias, and fevers. [place] [facility]. Plan for CMV PCR, RIP, throat culture, transplant ID consult, IV antibiotics. Endocrinology consulted for management of T1DM.        Interval HPI:   Overnight events: No acute events overnight. Patient in room 96973/41748 A. Blood glucose improving. BG above goal on current insulin regimen (SSI, prandial, and basal insulin ). Steroid use- Prednisone 5 mg QD.    Renal function- Normal   Vasopressors-  None       Endocrine will continue to follow and manage insulin orders inpatient.         Diet diabetic 2000 Calories (up to 75 gm per meal); Standard Tray     Eatin%  Nausea: No  Hypoglycemia and intervention: No  Fever: No  TPN and/or TF: No  If yes, type of TF/TPN and rate: n/a    /76 (BP Location: Left arm, Patient Position: Sitting)   Pulse 91   Temp 98.4 °F (36.9 °C) (Oral)   Resp 18   Ht 6' 2" (1.88 m)   Wt 95.1 kg (209 lb 10.5 oz)   " "SpO2 95%   BMI 26.92 kg/m²     Labs Reviewed and Include    Recent Labs   Lab 10/17/24  0623   *   CALCIUM 9.5   ALBUMIN 3.1*      K 3.8   CO2 25      BUN 14   CREATININE 1.1     Lab Results   Component Value Date    WBC 0.67 (LL) 10/17/2024    HGB 13.0 (L) 10/17/2024    HCT 39.0 (L) 10/17/2024    MCV 90 10/17/2024     10/17/2024     No results for input(s): "TSH", "FREET4" in the last 168 hours.  Lab Results   Component Value Date    HGBA1C 5.3 07/16/2024       Nutritional status:   Body mass index is 26.92 kg/m².  Lab Results   Component Value Date    ALBUMIN 3.1 (L) 10/17/2024    ALBUMIN 3.2 (L) 10/16/2024    ALBUMIN 3.5 10/15/2024    ALBUMIN 3.5 10/15/2024     No results found for: "PREALBUMIN"    Estimated Creatinine Clearance: 100.7 mL/min (based on SCr of 1.1 mg/dL).    Accu-Checks  Recent Labs     10/15/24  0009 10/15/24  0844 10/15/24  1341 10/15/24  1742 10/15/24  2157 10/16/24  0921 10/16/24  1333 10/16/24  1742 10/16/24  2126 10/17/24  0835   POCTGLUCOSE 331* 268* 310* 371* 323* 253* 321* 206* 267* 203*       Current Medications and/or Treatments Impacting Glycemic Control  Immunotherapy:    Immunosuppressants           Stop Route Frequency     tacrolimus capsule 3 mg         -- Oral 2 times daily          Steroids:   Hormones (From admission, onward)      Start     Stop Route Frequency Ordered    10/15/24 0900  predniSONE tablet 5 mg         -- Oral Daily 10/14/24 2304    10/14/24 2304  melatonin tablet 6 mg         -- Oral Nightly PRN 10/14/24 2304          Pressors:    Autonomic Drugs (From admission, onward)      None          Hyperglycemia/Diabetes Medications:   Antihyperglycemics (From admission, onward)      Start     Stop Route Frequency Ordered    10/18/24 0900  insulin glargine U-100 (Lantus) pen 43 Units         -- SubQ Daily 10/17/24 0842    10/16/24 1200  insulin aspart U-100 pen 15 Units         -- SubQ 3 times daily with meals 10/16/24 1147    10/15/24 2132  " insulin aspart U-100 pen 0-10 Units         -- SubQ Before meals & nightly PRN 10/15/24 2032            ASSESSMENT and PLAN    Renal/  Status post kidney transplant  Managed per primary team  Optimize BG control      Oncology  * Neutropenic fever  Managed per primary team          Endocrine  Insulin pump status  Off since admit       Type 1 diabetes mellitus with hyperglycemia  BG goal 140-180    BG remains above goal but improving since admission.     Plan:  -Increase Lantus to 43 units daily (20% dose increase)   -Continue Novolog 15 units TID with meals   -Continue Moderate Dose Correction Scale  -BG monitoring ac/hs  -Obtain updated hemoglobin A1c    ** Please call Endocrine for any BG related issues **    Lab Results   Component Value Date    HGBA1C 8.5 (H) 10/17/2024               Palliative Care  Long-term use of immunosuppressant medication  May increase insulin resistance.             Lizz Orona, NP  Endocrinology  [facility] - Transplant Stepdown

## 2024-10-17 NOTE — ASSESSMENT & PLAN NOTE
- Suspect drug induced vs infection  - ANC ~ 50 at OSH on routine morning labs   - Fever x >48 hours, tmax 101   - Rocephin and Vanc at OSH, started Cefepime with Vanc.  - Vanc discontinued 10/16. Continue Cefepime.  - ANC 0 on 10/15 -> Neupogen x 1, ANC again 0 10/15 -> Neupogen #2 10/16 -> Neupogen #3 again for ANC 0  - UA, CXR appear without infection from OSH   - Blood cx NGTD  - RIP neg  - CMV neg  - EBV positive last month, neg this admission  - Throat cx normal resp pérez  - ID following, extensive labwork pending.  - CT maxillofacial and neck pending 10/17.  - Peripheral smear ordered and heme/onc following, appreciate assistance.  - WCALFREDO.

## 2024-10-17 NOTE — ASSESSMENT & PLAN NOTE
Mr. Dejuan Diaz is a 44 yo male with a PMHX of kidney transplant (06/2024, on tacro/MMF/prednisone), T2DM, and HTN who initially presented with sore throat, fever, myalgias, and nasal congestion, along with extreme fatigue and lethargy. Patient stated that 2 days ago he noted severe sore throat, causing difficulty swallowing, he also noted some right sided anterior cervical tederness. Tmax was 101. He was taking prophylaxis with bactrim and recentely stopped taking acylclovir.  AF and VSS. Labs notable for WBC of 0.85, ANC 40. UA unremarkable, RIP was negative. COVID/flu negative. CxR negative.     Likely etiology undetected viral illness.  EBV and CMV negative (noted D-/R-). Throat swabs negative. Blood cx negative. Had an acute episode of diarrhea, likely 2/2 abx use but will need to r/o infectious etiology. He additionally had recent travel to [place], will workup serologies listed below.     Recommendations:  - fu stool studies  - fu serologies (Lyme, Ehrlichia, and rickettsia Abs, and Parvovirus)  - fu blood smear  - fu additional viral PCR (thus far negative)   - continue cefepime   - if patient clinical status worsens, can consider adding on IV gancyclovir

## 2024-10-17 NOTE — PLAN OF CARE
Pt. AAOx4, VSS, spo2> 94% on room air.   Pt. Ambulating independently  PRN tylenol given   Antibiotics given per treatment plan--pt tolerating well  Wife at bedside--attentive to pt    Bed in low locked position, call light within reach, pt instructed to call for assistance needed, pt verbalized understanding, remains free from falls this shift. WCTM.

## 2024-10-17 NOTE — SUBJECTIVE & OBJECTIVE
"Interval HPI:   Overnight events: No acute events overnight. Patient in room 36592/38738 A. Blood glucose improving. BG above goal on current insulin regimen (SSI, prandial, and basal insulin ). Steroid use- Prednisone 5 mg QD.    Renal function- Normal   Vasopressors-  None       Endocrine will continue to follow and manage insulin orders inpatient.         Diet diabetic 2000 Calories (up to 75 gm per meal); Standard Tray     Eatin%  Nausea: No  Hypoglycemia and intervention: No  Fever: No  TPN and/or TF: No  If yes, type of TF/TPN and rate: n/a    /76 (BP Location: Left arm, Patient Position: Sitting)   Pulse 91   Temp 98.4 °F (36.9 °C) (Oral)   Resp 18   Ht 6' 2" (1.88 m)   Wt 95.1 kg (209 lb 10.5 oz)   SpO2 95%   BMI 26.92 kg/m²     Labs Reviewed and Include    Recent Labs   Lab 10/17/24  0623   *   CALCIUM 9.5   ALBUMIN 3.1*      K 3.8   CO2 25      BUN 14   CREATININE 1.1     Lab Results   Component Value Date    WBC 0.67 (LL) 10/17/2024    HGB 13.0 (L) 10/17/2024    HCT 39.0 (L) 10/17/2024    MCV 90 10/17/2024     10/17/2024     No results for input(s): "TSH", "FREET4" in the last 168 hours.  Lab Results   Component Value Date    HGBA1C 5.3 2024       Nutritional status:   Body mass index is 26.92 kg/m².  Lab Results   Component Value Date    ALBUMIN 3.1 (L) 10/17/2024    ALBUMIN 3.2 (L) 10/16/2024    ALBUMIN 3.5 10/15/2024    ALBUMIN 3.5 10/15/2024     No results found for: "PREALBUMIN"    Estimated Creatinine Clearance: 100.7 mL/min (based on SCr of 1.1 mg/dL).    Accu-Checks  Recent Labs     10/15/24  0009 10/15/24  0844 10/15/24  1341 10/15/24  1742 10/15/24  2157 10/16/24  0921 10/16/24  1333 10/16/24  1742 10/16/24  2126 10/17/24  0835   POCTGLUCOSE 331* 268* 310* 371* 323* 253* 321* 206* 267* 203*       Current Medications and/or Treatments Impacting Glycemic Control  Immunotherapy:    Immunosuppressants           Stop Route Frequency     tacrolimus " capsule 3 mg         -- Oral 2 times daily          Steroids:   Hormones (From admission, onward)      Start     Stop Route Frequency Ordered    10/15/24 0900  predniSONE tablet 5 mg         -- Oral Daily 10/14/24 2304    10/14/24 2304  melatonin tablet 6 mg         -- Oral Nightly PRN 10/14/24 2304          Pressors:    Autonomic Drugs (From admission, onward)      None          Hyperglycemia/Diabetes Medications:   Antihyperglycemics (From admission, onward)      Start     Stop Route Frequency Ordered    10/18/24 0900  insulin glargine U-100 (Lantus) pen 43 Units         -- SubQ Daily 10/17/24 0842    10/16/24 1200  insulin aspart U-100 pen 15 Units         -- SubQ 3 times daily with meals 10/16/24 1147    10/15/24 2132  insulin aspart U-100 pen 0-10 Units         -- SubQ Before meals & nightly PRN 10/15/24 2032

## 2024-10-17 NOTE — SUBJECTIVE & OBJECTIVE
Interval History: NAOE. VSS. No acute complaints this morning.     Review of Systems   Constitutional:  Negative for chills and fever.   HENT:  Negative for trouble swallowing.    Respiratory:  Positive for cough (dry cough). Negative for shortness of breath.    Cardiovascular:  Negative for chest pain and leg swelling.   Gastrointestinal:  Negative for abdominal pain, constipation, diarrhea, nausea and vomiting.   Genitourinary:  Negative for difficulty urinating.   Musculoskeletal:  Negative for arthralgias.   Allergic/Immunologic: Positive for immunocompromised state.   Neurological:  Negative for weakness and headaches.   Psychiatric/Behavioral:  Negative for agitation and confusion. The patient is not nervous/anxious.      Objective:     Vital Signs (Most Recent):  Temp: 98.6 °F (37 °C) (10/17/24 1134)  Pulse: 86 (10/17/24 1134)  Resp: 18 (10/17/24 1134)  BP: (!) 141/83 (10/17/24 1134)  SpO2: 96 % (10/17/24 1134) Vital Signs (24h Range):  Temp:  [98.2 °F (36.8 °C)-98.6 °F (37 °C)] 98.6 °F (37 °C)  Pulse:  [75-91] 86  Resp:  [18] 18  SpO2:  [93 %-98 %] 96 %  BP: (110-141)/(67-84) 141/83     Weight: 95.1 kg (209 lb 10.5 oz)  Body mass index is 26.92 kg/m².    Estimated Creatinine Clearance: 100.7 mL/min (based on SCr of 1.1 mg/dL).     Physical Exam  Constitutional:       General: He is not in acute distress.     Appearance: Normal appearance. He is not ill-appearing.   HENT:      Head: Normocephalic and atraumatic.      Mouth/Throat:      Mouth: Mucous membranes are moist.   Eyes:      Extraocular Movements: Extraocular movements intact.      Conjunctiva/sclera: Conjunctivae normal.   Cardiovascular:      Rate and Rhythm: Normal rate and regular rhythm.      Heart sounds: Normal heart sounds.   Pulmonary:      Effort: Pulmonary effort is normal. No respiratory distress.      Breath sounds: Normal breath sounds.   Abdominal:      General: Abdomen is flat. Bowel sounds are normal. There is no distension.       Palpations: Abdomen is soft.      Tenderness: There is no abdominal tenderness. There is no guarding.   Musculoskeletal:         General: No swelling.      Right lower leg: No edema.      Left lower leg: No edema.   Lymphadenopathy:      Cervical: Cervical adenopathy (tenderness to palpation of right side) present.   Skin:     General: Skin is warm.   Neurological:      General: No focal deficit present.      Mental Status: He is alert and oriented to person, place, and time.   Psychiatric:         Mood and Affect: Mood normal.         Behavior: Behavior normal.          Significant Labs: All pertinent labs within the past 24 hours have been reviewed.    Significant Imaging: I have reviewed all pertinent imaging results/findings within the past 24 hours.

## 2024-10-17 NOTE — PLAN OF CARE
Pt is Aox4 and ambulatory. Pt received 1x dose of Neupogen. Pt received Magnesium drip and sodium phosphate via IV per orders. CT of neck, resulted negative. Pt has had critical lab of 0.72 WBC. Pt educated to safety precautions. Call bell is within reach.

## 2024-10-17 NOTE — PROGRESS NOTES
[facility] - Transplant Stepdown  Kidney Transplant  Progress Note      Reason for Follow-up: Reassessment of Pancreas Transplant Waitlist - Qualified: 2/19/2024 - Inactive recipient and management of immunosuppression.    ORGAN: LEFT KIDNEY      Donor Type: Living      Donor CMV Status:    Donor HBcAB:   Donor HCV Status:   Donor HBV KAVITA:   Donor HCV KAVITA:       Subjective:   History of Present Illness:  Mr. Diaz is a 43 yr male who received a LURT 7/16/24 for T2DM and HTN. No intra-op issues noted. Cr trended down, good UOP, dc'd without issue. Has insulin pump for DM2 management. Cr elvie to 1.6, had biopsy 8/14 which did not show rejection or ATI. On 10/14 patient presented to OSH ER for c/o fever, sore throat, cough, myalgias, mild nasal congestion. Tmax 101, first fever on 10/12. He also had routine lab work this morning that revealed severe neutropenia at 0.73, ANC 58. Denies CP, SOB, NVD, dysuria, changes in UOP. Denies any sick contacts. Labs in ED notable for negative COVID, flu A/B, strepA. Labs this morning with Cr at BL 1.5. UA likely without infection, CXR without acute abnormality. BC from OSH pending. Patient transferred to C for neutropenic fever. Plan for CMV PCR, RIP, throat culture, transplant ID consult, IV antibiotics. Will hold MMF and bactrim. Neupogen per protocol. D/w Dr. Martell.    Mr. Diaz is a 43 y.o. year old male who is status post Pancreas Transplant Waitlist - Qualified: 2/19/2024 - Inactive.  His maintenance immunosuppression consists of:   Immunosuppressants (From admission, onward)      Start     Stop Route Frequency Ordered    10/15/24 0800  tacrolimus capsule 3 mg         -- Oral 2 times daily 10/14/24 2304            Hospital Course:  Mr. Diaz was transferred from OSH ED 10/14 for neutropenic fever with associated sore throat and myalgias. Infectious workup initiated, Cefepime/Vanc started, and ID consulted. Flu, COVID, and strep negative. RIP negative. UA neg for  infection. Blood cx NGTD. CMV and EBV negative. Throat cx normal respiratory pérez. Developed diarrhea 10/16, but improved with holding mag + phos replacements and Atovaquone. Stool studies: no neutrophils seen, remainder pending.     Interval History: NAEON. Patient reports sore throat continues to improve, but c/o frontal sinus HA. Neutropenia and fever still unexplained. Plan for CT maxillofacial/neck without contrast. ID following. Patient has remained afebrile x 48 hours. Vanc discontinued 10/16. Continue Cefepime. Extensive workup pending per ID. ANC 0 again today. Plan for Neupogen #3 today. Heme/onc following, appreciate recs. Electrolytes replaced IV. WCTM.    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 He is scheduled for cytoscopy on 10/21.        Past Medical, Surgical, Family, and Social History:   Unchanged from H&P.    Scheduled Meds:   atorvastatin  40 mg Oral Daily    calcitRIOL  0.5 mcg Oral Daily    ceFEPime IV (PEDS and ADULTS)  2 g Intravenous Q8H    filgrastim-sndz  480 mcg Subcutaneous Once    heparin (porcine)  5,000 Units Subcutaneous Q8H    insulin aspart U-100  15 Units Subcutaneous TIDWM    [START ON 10/18/2024] insulin glargine U-100  43 Units Subcutaneous Daily    pantoprazole  40 mg Oral Daily    predniSONE  5 mg Oral Daily    sodium phosphate 39.99 mmol in D5W 250 mL IVPB  39.99 mmol Intravenous Once    tacrolimus  3 mg Oral BID     Continuous Infusions:  PRN Meds:  Current Facility-Administered Medications:     acetaminophen, 650 mg, Oral, Q4H PRN    dextromethorphan-guaiFENesin  mg/5 ml, 5 mL, Oral, Q4H PRN    dextrose 10%, 12.5 g, Intravenous, PRN    dextrose 10%, 25 g, Intravenous, PRN    glucagon (human recombinant), 1 mg, Intramuscular, PRN    glucose, 16 g, Oral, PRN    glucose, 24 g, Oral, PRN    insulin  aspart U-100, 0-10 Units, Subcutaneous, QID (AC + HS) PRN    melatonin, 6 mg, Oral, Nightly PRN    ondansetron, 8 mg, Oral, Q6H PRN    prochlorperazine, 5 mg, Intravenous, Q6H PRN    sodium chloride 0.9%, 10 mL, Intravenous, PRN    Intake/Output - Last 3 Shifts         10/15 0700  10/16 0659 10/16 0700  10/17 0659 10/17 0700  10/18 0659    P.O.  1500     IV Piggyback 398.3      Total Intake(mL/kg) 398.3 (4.2) 1500 (15.8)     Urine (mL/kg/hr) 1350 (0.6) 2500 (1.1)     Stool 0 0     Total Output 1350 2500     Net -951.7 -1000            Stool Occurrence 7 x 3 x              Review of Systems   Constitutional:  Positive for appetite change, fatigue and fever.   HENT:  Positive for congestion, postnasal drip, sinus pressure, sinus pain and sore throat.    Eyes:  Negative for photophobia and redness.   Respiratory:  Positive for cough. Negative for shortness of breath, wheezing and stridor.    Cardiovascular:  Negative for chest pain, palpitations and leg swelling.   Gastrointestinal:  Negative for abdominal distention, abdominal pain, constipation, nausea and vomiting.        Loose stools improved   Genitourinary:  Negative for decreased urine volume, difficulty urinating and dysuria.   Musculoskeletal:  Positive for myalgias.   Skin:  Negative for rash and wound.   Allergic/Immunologic: Positive for immunocompromised state.   Neurological:  Positive for headaches. Negative for dizziness and light-headedness.   Psychiatric/Behavioral:  Negative for agitation, behavioral problems, confusion, decreased concentration and hallucinations.       Objective:     Vital Signs (Most Recent):  Temp: 98.4 °F (36.9 °C) (10/17/24 0755)  Pulse: 91 (10/17/24 0755)  Resp: 18 (10/17/24 0451)  BP: 122/76 (10/17/24 0755)  SpO2: 95 % (10/17/24 0755) Vital Signs (24h Range):  Temp:  [98.2 °F (36.8 °C)-98.6 °F (37 °C)] 98.4 °F (36.9 °C)  Pulse:  [75-95] 91  Resp:  [18] 18  SpO2:  [93 %-100 %] 95 %  BP: (110-137)/(67-84) 122/76     Weight: 95.1  "kg (209 lb 10.5 oz)  Height: 6' 2" (188 cm)  Body mass index is 26.92 kg/m².     Physical Exam  Vitals and nursing note reviewed.   Constitutional:       General: He is awake. He is not in acute distress.     Appearance: Normal appearance.   HENT:      Head: Normocephalic and atraumatic.      Nose:      Right Sinus: Maxillary sinus tenderness and frontal sinus tenderness present.      Left Sinus: Maxillary sinus tenderness and frontal sinus tenderness present.      Mouth/Throat:      Pharynx: Posterior oropharyngeal erythema present.   Eyes:      General: No scleral icterus.        Right eye: No discharge.         Left eye: No discharge.   Cardiovascular:      Rate and Rhythm: Normal rate and regular rhythm.      Pulses: Normal pulses.      Heart sounds: No murmur heard.  Pulmonary:      Effort: Pulmonary effort is normal. No respiratory distress.      Breath sounds: No decreased breath sounds, wheezing or rales.   Abdominal:      General: A surgical scar is present. Bowel sounds are normal. There is no distension.      Palpations: Abdomen is soft. There is no mass.      Tenderness: There is no abdominal tenderness. There is no guarding or rebound.   Musculoskeletal:         General: No tenderness. Normal range of motion.      Cervical back: Normal range of motion. No rigidity.      Right lower leg: No edema.      Left lower leg: No edema.   Lymphadenopathy:      Cervical: Cervical adenopathy (right on admit, improved by 10/17) present.      Upper Body:      Right upper body: No supraclavicular or axillary adenopathy.      Left upper body: No supraclavicular or axillary adenopathy.      Lower Body: No right inguinal adenopathy. No left inguinal adenopathy.   Skin:     General: Skin is warm and dry.      Capillary Refill: Capillary refill takes less than 2 seconds.      Coloration: Skin is not pale.      Findings: No erythema or rash.   Neurological:      General: No focal deficit present.      Mental Status: He is " "alert and oriented to person, place, and time.   Psychiatric:         Mood and Affect: Mood normal.         Speech: Speech normal.         Behavior: Behavior normal. Behavior is cooperative.         Thought Content: Thought content normal.         Judgment: Judgment normal.          Laboratory:  CBC:   Recent Labs   Lab 10/16/24  0612 10/16/24  1031 10/17/24  0623   WBC 0.67* 0.67* 0.67*   RBC 4.34* 4.41* 4.34*   HGB 13.2* 13.2* 13.0*   HCT 39.9* 40.0 39.0*    207 258   MCV 92 91 90   MCH 30.4 29.9 30.0   MCHC 33.1 33.0 33.3       Diagnostic Results:  Reviewed and CT scans pending  Assessment/Plan:     * Neutropenic fever  - Suspect drug induced vs infection  - ANC ~ 50 at OSH on routine morning labs   - Fever x >48 hours, tmax 101   - Rocephin and Vanc at OSH, started Cefepime with Vanc.  - Vanc discontinued 10/16. Continue Cefepime.  - ANC 0 on 10/15 -> Neupogen x 1, ANC again 0 10/15 -> Neupogen #2 10/16 -> Neupogen #3 again for ANC 0  - UA, CXR appear without infection from OSH   - Blood cx NGTD  - RIP neg  - CMV neg  - EBV positive last month, neg this admission  - Throat cx normal resp pérez  - ID following, extensive labwork pending.  - CT maxillofacial and neck pending 10/17.  - Peripheral smear ordered and heme/onc following, appreciate assistance.  - WCTM.      Leukopenia  - See "neutropenic fever."      Type 1 diabetes mellitus with hyperglycemia  - Endocrine following, hyperglycemia this admit slowly improving.    Hypophosphatemia  - PO phos held 10/16 for diarrhea (was on 500 mg KPN TID)  - IV replacement 30 mmol Naphos 10/15 and 40 mmol Naphos 10/16 + 10/17  - Check levels daily.      Hypomagnesemia  - Hold oral Mag 10/16 for diarrhea  - Replace IV PRN  - Check levels daily      Insulin pump status  - See "Type 1 DM."      Anemia of chronic disease  - H/H stable.  - Monitor with daily cbc.    Prophylactic immunotherapy  - Continue Prograf and Pred.  - MMF held for neutropenia and fever.  - " "Monitor Prograf levels for toxicity and therapeutic efforts.      Long-term use of immunosuppressant medication  - See "prophylactic immunotherapy."      At risk for opportunistic infections  - Bactrim held for neutropenia & Atovaquone started 10/15  - Atovaquone held 10/16 for diarrhea  - Resume when/if appropriate for PJP ppx       Status post kidney transplant  - Cr at BL   - Monitor with daily labs.    Secondary renal hyperparathyroidism  - Continue Calcitriol.       Hyperlipidemia  - Continue statin.          Discharge Planning:  Not today.    Medical decision making for this encounter includes review of pertinent labs and diagnostic studies, assessment and planning, discussions with consulting providers, discussion with patient/family, and participation in multidisciplinary rounds. Time spent caring for patient: 60 minutes    Claire Damon PA-C  Kidney Transplant  [facility] - Transplant Stepdown  "

## 2024-10-18 PROBLEM — E10.69 TYPE 1 DIABETES MELLITUS WITH HYPEROSMOLARITY WITHOUT COMA: Status: ACTIVE | Noted: 2024-10-18

## 2024-10-18 PROBLEM — E87.0 TYPE 1 DIABETES MELLITUS WITH HYPEROSMOLARITY WITHOUT COMA: Status: ACTIVE | Noted: 2024-10-18

## 2024-10-18 PROBLEM — E10.65 TYPE 1 DIABETES MELLITUS WITH HYPEROSMOLARITY WITHOUT COMA: Status: ACTIVE | Noted: 2024-10-18

## 2024-10-18 LAB
A PHAGOCYTOPH IGG TITR SER IF: NORMAL TITER
ALBUMIN SERPL BCP-MCNC: 3.1 G/DL (ref 3.5–5.2)
ANION GAP SERPL CALC-SCNC: 10 MMOL/L (ref 8–16)
ANISOCYTOSIS BLD QL SMEAR: SLIGHT
BACTERIA STL CULT: NORMAL
BACTERIA STL CULT: NORMAL
BASOPHILS NFR BLD: 0 % (ref 0–1.9)
BUN SERPL-MCNC: 17 MG/DL (ref 6–20)
CALCIUM SERPL-MCNC: 9.6 MG/DL (ref 8.7–10.5)
CHLORIDE SERPL-SCNC: 105 MMOL/L (ref 95–110)
CO2 SERPL-SCNC: 24 MMOL/L (ref 23–29)
CREAT SERPL-MCNC: 1.2 MG/DL (ref 0.5–1.4)
DIFFERENTIAL METHOD BLD: ABNORMAL
E CHAFFEENSIS IGG TITR SER IF: NORMAL TITER
EOSINOPHIL NFR BLD: 1.9 % (ref 0–8)
ERYTHROCYTE [DISTWIDTH] IN BLOOD BY AUTOMATED COUNT: 12.8 % (ref 11.5–14.5)
EST. GFR  (NO RACE VARIABLE): >60 ML/MIN/1.73 M^2
GLUCOSE SERPL-MCNC: 198 MG/DL (ref 70–110)
HCT VFR BLD AUTO: 39 % (ref 40–54)
HGB BLD-MCNC: 12.4 G/DL (ref 14–18)
IMM GRANULOCYTES # BLD AUTO: ABNORMAL K/UL (ref 0–0.04)
IMM GRANULOCYTES NFR BLD AUTO: ABNORMAL % (ref 0–0.5)
LYMPHOCYTES NFR BLD: 51.9 % (ref 18–48)
MAGNESIUM SERPL-MCNC: 1.6 MG/DL (ref 1.6–2.6)
MCH RBC QN AUTO: 29.5 PG (ref 27–31)
MCHC RBC AUTO-ENTMCNC: 31.8 G/DL (ref 32–36)
MCV RBC AUTO: 93 FL (ref 82–98)
MONOCYTES NFR BLD: 28.9 % (ref 4–15)
MYELOCYTES NFR BLD MANUAL: 1.9 %
NEUTROPHILS NFR BLD: 15.4 % (ref 38–73)
NRBC BLD-RTO: 3 /100 WBC
PHOSPHATE SERPL-MCNC: 1.6 MG/DL (ref 2.7–4.5)
PLATELET # BLD AUTO: 237 K/UL (ref 150–450)
PMV BLD AUTO: 10.8 FL (ref 9.2–12.9)
POCT GLUCOSE: 189 MG/DL (ref 70–110)
POCT GLUCOSE: 208 MG/DL (ref 70–110)
POCT GLUCOSE: 212 MG/DL (ref 70–110)
POIKILOCYTOSIS BLD QL SMEAR: SLIGHT
POLYCHROMASIA BLD QL SMEAR: ABNORMAL
POTASSIUM SERPL-SCNC: 4.5 MMOL/L (ref 3.5–5.1)
RBC # BLD AUTO: 4.21 M/UL (ref 4.6–6.2)
RICK SF IGG TITR SER IF: NORMAL {TITER}
RICK SF IGM TITR SER IF: NORMAL {TITER}
SODIUM SERPL-SCNC: 139 MMOL/L (ref 136–145)
TACROLIMUS BLD-MCNC: 9.1 NG/ML (ref 5–15)
WBC # BLD AUTO: 0.94 K/UL (ref 3.9–12.7)

## 2024-10-18 PROCEDURE — 63600175 PHARM REV CODE 636 W HCPCS: Mod: NTX

## 2024-10-18 PROCEDURE — 36415 COLL VENOUS BLD VENIPUNCTURE: CPT | Mod: NTX

## 2024-10-18 PROCEDURE — 85007 BL SMEAR W/DIFF WBC COUNT: CPT | Mod: NTX

## 2024-10-18 PROCEDURE — 63600175 PHARM REV CODE 636 W HCPCS: Mod: NTX | Performed by: INTERNAL MEDICINE

## 2024-10-18 PROCEDURE — 80069 RENAL FUNCTION PANEL: CPT | Mod: NTX

## 2024-10-18 PROCEDURE — 20600001 HC STEP DOWN PRIVATE ROOM: Mod: NTX

## 2024-10-18 PROCEDURE — 99232 SBSQ HOSP IP/OBS MODERATE 35: CPT | Mod: NTX,,, | Performed by: PHYSICIAN ASSISTANT

## 2024-10-18 PROCEDURE — 85027 COMPLETE CBC AUTOMATED: CPT | Mod: NTX

## 2024-10-18 PROCEDURE — 80197 ASSAY OF TACROLIMUS: CPT | Mod: NTX

## 2024-10-18 PROCEDURE — 25000003 PHARM REV CODE 250: Mod: NTX

## 2024-10-18 PROCEDURE — 99233 SBSQ HOSP IP/OBS HIGH 50: CPT | Mod: NTX,,, | Performed by: PHYSICIAN ASSISTANT

## 2024-10-18 PROCEDURE — 25000003 PHARM REV CODE 250: Mod: NTX | Performed by: PHYSICIAN ASSISTANT

## 2024-10-18 PROCEDURE — 63600175 PHARM REV CODE 636 W HCPCS: Mod: JZ,JB,JG,NTX | Performed by: PHYSICIAN ASSISTANT

## 2024-10-18 PROCEDURE — 25000003 PHARM REV CODE 250: Mod: NTX | Performed by: CLINICAL NURSE SPECIALIST

## 2024-10-18 PROCEDURE — 99233 SBSQ HOSP IP/OBS HIGH 50: CPT | Mod: NTX,,, | Performed by: INTERNAL MEDICINE

## 2024-10-18 PROCEDURE — 83735 ASSAY OF MAGNESIUM: CPT | Mod: NTX

## 2024-10-18 RX ORDER — TRAMADOL HYDROCHLORIDE 50 MG/1
50 TABLET ORAL EVERY 6 HOURS PRN
Status: DISCONTINUED | OUTPATIENT
Start: 2024-10-18 | End: 2024-10-20 | Stop reason: HOSPADM

## 2024-10-18 RX ORDER — LANOLIN ALCOHOL/MO/W.PET/CERES
800 CREAM (GRAM) TOPICAL 2 TIMES DAILY
Status: DISCONTINUED | OUTPATIENT
Start: 2024-10-18 | End: 2024-10-20 | Stop reason: HOSPADM

## 2024-10-18 RX ORDER — METHOCARBAMOL 500 MG/1
500 TABLET, FILM COATED ORAL 4 TIMES DAILY PRN
Status: DISCONTINUED | OUTPATIENT
Start: 2024-10-18 | End: 2024-10-20 | Stop reason: HOSPADM

## 2024-10-18 RX ORDER — TACROLIMUS 1 MG/1
2 CAPSULE ORAL 2 TIMES DAILY
Status: DISCONTINUED | OUTPATIENT
Start: 2024-10-18 | End: 2024-10-20 | Stop reason: HOSPADM

## 2024-10-18 RX ORDER — CETIRIZINE HYDROCHLORIDE 10 MG/1
10 TABLET ORAL DAILY
Status: DISCONTINUED | OUTPATIENT
Start: 2024-10-18 | End: 2024-10-20 | Stop reason: HOSPADM

## 2024-10-18 RX ORDER — ERGOCALCIFEROL 1.25 MG/1
50000 CAPSULE ORAL
Status: DISCONTINUED | OUTPATIENT
Start: 2024-10-18 | End: 2024-10-20 | Stop reason: HOSPADM

## 2024-10-18 RX ORDER — MAGNESIUM SULFATE HEPTAHYDRATE 40 MG/ML
2 INJECTION, SOLUTION INTRAVENOUS ONCE
Status: COMPLETED | OUTPATIENT
Start: 2024-10-18 | End: 2024-10-18

## 2024-10-18 RX ORDER — DOXYCYCLINE HYCLATE 100 MG
100 TABLET ORAL EVERY 12 HOURS
Status: DISCONTINUED | OUTPATIENT
Start: 2024-10-18 | End: 2024-10-20 | Stop reason: HOSPADM

## 2024-10-18 RX ADMIN — INSULIN GLARGINE 43 UNITS: 100 INJECTION, SOLUTION SUBCUTANEOUS at 08:10

## 2024-10-18 RX ADMIN — INSULIN ASPART 4 UNITS: 100 INJECTION, SOLUTION INTRAVENOUS; SUBCUTANEOUS at 09:10

## 2024-10-18 RX ADMIN — INSULIN ASPART 2 UNITS: 100 INJECTION, SOLUTION INTRAVENOUS; SUBCUTANEOUS at 06:10

## 2024-10-18 RX ADMIN — TRAMADOL HYDROCHLORIDE 50 MG: 50 TABLET, COATED ORAL at 06:10

## 2024-10-18 RX ADMIN — MAGNESIUM SULFATE HEPTAHYDRATE 2 G: 40 INJECTION, SOLUTION INTRAVENOUS at 01:10

## 2024-10-18 RX ADMIN — CALCITRIOL 0.5 MCG: 0.5 CAPSULE, LIQUID FILLED ORAL at 08:10

## 2024-10-18 RX ADMIN — FILGRASTIM-SNDZ 480 MCG: 480 INJECTION, SOLUTION INTRAVENOUS; SUBCUTANEOUS at 06:10

## 2024-10-18 RX ADMIN — INSULIN ASPART 4 UNITS: 100 INJECTION, SOLUTION INTRAVENOUS; SUBCUTANEOUS at 01:10

## 2024-10-18 RX ADMIN — HEPARIN SODIUM 5000 UNITS: 5000 INJECTION INTRAVENOUS; SUBCUTANEOUS at 05:10

## 2024-10-18 RX ADMIN — INSULIN ASPART 15 UNITS: 100 INJECTION, SOLUTION INTRAVENOUS; SUBCUTANEOUS at 06:10

## 2024-10-18 RX ADMIN — TACROLIMUS 3 MG: 1 CAPSULE ORAL at 08:10

## 2024-10-18 RX ADMIN — HEPARIN SODIUM 5000 UNITS: 5000 INJECTION INTRAVENOUS; SUBCUTANEOUS at 02:10

## 2024-10-18 RX ADMIN — TACROLIMUS 2 MG: 1 CAPSULE ORAL at 05:10

## 2024-10-18 RX ADMIN — ATORVASTATIN CALCIUM 40 MG: 40 TABLET, FILM COATED ORAL at 10:10

## 2024-10-18 RX ADMIN — CEFEPIME 2 G: 2 INJECTION, POWDER, FOR SOLUTION INTRAVENOUS at 08:10

## 2024-10-18 RX ADMIN — CEFEPIME 2 G: 2 INJECTION, POWDER, FOR SOLUTION INTRAVENOUS at 05:10

## 2024-10-18 RX ADMIN — Medication 800 MG: at 08:10

## 2024-10-18 RX ADMIN — DOXYCYCLINE HYCLATE 100 MG: 100 TABLET, COATED ORAL at 08:10

## 2024-10-18 RX ADMIN — HEPARIN SODIUM 5000 UNITS: 5000 INJECTION INTRAVENOUS; SUBCUTANEOUS at 08:10

## 2024-10-18 RX ADMIN — INSULIN ASPART 15 UNITS: 100 INJECTION, SOLUTION INTRAVENOUS; SUBCUTANEOUS at 08:10

## 2024-10-18 RX ADMIN — CETIRIZINE HYDROCHLORIDE 10 MG: 10 TABLET, FILM COATED ORAL at 06:10

## 2024-10-18 RX ADMIN — Medication 2 TABLET: at 02:10

## 2024-10-18 RX ADMIN — Medication 2 TABLET: at 08:10

## 2024-10-18 RX ADMIN — ACETAMINOPHEN 650 MG: 325 TABLET ORAL at 05:10

## 2024-10-18 RX ADMIN — INSULIN ASPART 15 UNITS: 100 INJECTION, SOLUTION INTRAVENOUS; SUBCUTANEOUS at 01:10

## 2024-10-18 RX ADMIN — ERGOCALCIFEROL 50000 UNITS: 1.25 CAPSULE ORAL at 10:10

## 2024-10-18 RX ADMIN — SODIUM PHOSPHATE, MONOBASIC, MONOHYDRATE AND SODIUM PHOSPHATE, DIBASIC, ANHYDROUS 39.99 MMOL: 142; 276 INJECTION, SOLUTION INTRAVENOUS at 09:10

## 2024-10-18 RX ADMIN — CEFEPIME 2 G: 2 INJECTION, POWDER, FOR SOLUTION INTRAVENOUS at 01:10

## 2024-10-18 RX ADMIN — ACETAMINOPHEN 650 MG: 325 TABLET ORAL at 09:10

## 2024-10-18 RX ADMIN — PANTOPRAZOLE SODIUM 40 MG: 40 TABLET, DELAYED RELEASE ORAL at 08:10

## 2024-10-18 RX ADMIN — PREDNISONE 5 MG: 5 TABLET ORAL at 10:10

## 2024-10-18 NOTE — SUBJECTIVE & OBJECTIVE
Interval History: complains of bone pain today, denies diarrhea, afebrile    Review of Systems   All other systems reviewed and are negative.    Objective:     Vital Signs (Most Recent):  Temp: 98.5 °F (36.9 °C) (10/18/24 1220)  Pulse: 82 (10/18/24 1220)  Resp: 18 (10/18/24 1220)  BP: 139/83 (10/18/24 1220)  SpO2: (!) 91 % (10/18/24 1220) Vital Signs (24h Range):  Temp:  [98.2 °F (36.8 °C)-98.5 °F (36.9 °C)] 98.5 °F (36.9 °C)  Pulse:  [82-93] 82  Resp:  [18] 18  SpO2:  [90 %-97 %] 91 %  BP: (126-165)/(65-89) 139/83     Weight: 95.1 kg (209 lb 10.5 oz)  Body mass index is 26.92 kg/m².    Estimated Creatinine Clearance: 92.3 mL/min (based on SCr of 1.2 mg/dL).     Physical Exam  Constitutional:       General: He is not in acute distress.     Appearance: Normal appearance. He is not ill-appearing.   HENT:      Head: Normocephalic and atraumatic.      Mouth/Throat:      Mouth: Mucous membranes are moist.   Eyes:      Extraocular Movements: Extraocular movements intact.      Conjunctiva/sclera: Conjunctivae normal.   Cardiovascular:      Rate and Rhythm: Normal rate and regular rhythm.      Heart sounds: Normal heart sounds.   Pulmonary:      Effort: Pulmonary effort is normal. No respiratory distress.      Breath sounds: Normal breath sounds.   Abdominal:      General: Abdomen is flat. Bowel sounds are normal. There is no distension.      Palpations: Abdomen is soft.      Tenderness: There is no abdominal tenderness. There is no guarding.   Musculoskeletal:         General: No swelling.      Right lower leg: No edema.      Left lower leg: No edema.   Skin:     General: Skin is warm.   Neurological:      General: No focal deficit present.      Mental Status: He is alert and oriented to person, place, and time.   Psychiatric:         Mood and Affect: Mood normal.         Behavior: Behavior normal.          Significant Labs: All pertinent labs within the past 24 hours have been reviewed.    Significant Imaging: I have  reviewed all pertinent imaging results/findings within the past 24 hours.

## 2024-10-18 NOTE — ASSESSMENT & PLAN NOTE
BG goal 140-180    Type 1 diabetes.  History of kidney transplant.  BG improving on adjusted regimen.  Will continue to monitor    Plan:  -continue Lantus to 43 units daily    -Continue Novolog 15 units TID with meals   -Continue Moderate Dose Correction Scale  -BG monitoring ac/hs       ** Please call Endocrine for any BG related issues **    Lab Results   Component Value Date    HGBA1C 8.5 (H) 10/17/2024

## 2024-10-18 NOTE — PROGRESS NOTES
[facility] - Transplant Stepdown  Infectious Disease  Progress Note    Patient Name: Dejuan Diaz Jr.  MRN: [id number]  Admission Date: 10/14/2024  Length of Stay: 4 days  Attending Physician: Al Martell MD  Primary Care Provider: No, Primary Doctor    Isolation Status: No active isolations  Assessment/Plan:      Oncology  * Neutropenic fever  43M s/p kidney transplant (06/2024, on tacro/MMF/prednisone), T2DM, and HTN who initially presented with sore throat, fever, myalgias, and nasal congestion, along with extreme fatigue and lethargy. Patient stated that 2 days ago he noted severe sore throat, causing difficulty swallowing, he also noted some right sided anterior cervical tederness. Tmax was 101. He was taking prophylaxis with bactrim and recentely stopped taking acylclovir.  AF and VSS. Labs notable for WBC of 0.85, ANC 40. UA unremarkable, RIP was negative. COVID/flu negative. CxR negative. EBV and CMV negative (noted D-/R-). Throat swabs negative. Blood cx negative. Had an acute episode of diarrhea, likely 2/2 abx use but will need to r/o infectious etiology. He additionally had recent travel to [place], will workup serologies listed below.     Recommendations:  - stool studies - negative  - follow up serologies (Lyme, Ehrlichia, and rickettsia Abs, and Parvovirus)  - follow up karius  - continue cefepime and doxy today  - bone pain likely related to recent neupogen    Anticipated Disposition: TBD    Thank you for your consult. I will follow-up with patient. Please contact us if you have any additional questions.    Zulema Valero DO  Transplant Infectious Disease    Time: 50 minutes   50% of time spent on face-to-face counseling and coordination of care. Counseling included review of test results, diagnosis, and treatment plan with patient and/or family.        Subjective:     Principal Problem:Neutropenic fever    HPI: Mr. Dejuan Diaz is a 42 yo male with a PMHX of kidney transplant (06/2024, on  tacro/MMF/prednisone), T2DM, and HTN who initially presented with sore throat, fever, myalgias, and nasal congestion, along with extreme fatigue and lethargy. Patient stated that 2 days ago he noted severe sore throat, causing difficulty swallowing, he also noted some right sided anterior cervical tederness. Tmax was 101. He was taking prophylaxis with bactrim and recentely stopped taking acylclovir. Recently sustained a minor cut from barbwire. He is uptodate on vaccinations, pending 2nd shingles and flu shot. He reports sore throat has improved and has a dry cough. He works as a , but no other recent outdoor hobbies. Denies any recent sick contact, HA, N/V, SOB, chest pain, abdominal pain, constipation, diarrhea, or dysuria, or worsening skin changes.      He is AF and VSS. Labs notable for WBC of 0.85, ANC 40. UA unremarkable, RIP was negative. COVID/flu negative. CxR negative.   Interval History: complains of bone pain today, denies diarrhea, afebrile    Review of Systems   All other systems reviewed and are negative.    Objective:     Vital Signs (Most Recent):  Temp: 98.5 °F (36.9 °C) (10/18/24 1220)  Pulse: 82 (10/18/24 1220)  Resp: 18 (10/18/24 1220)  BP: 139/83 (10/18/24 1220)  SpO2: (!) 91 % (10/18/24 1220) Vital Signs (24h Range):  Temp:  [98.2 °F (36.8 °C)-98.5 °F (36.9 °C)] 98.5 °F (36.9 °C)  Pulse:  [82-93] 82  Resp:  [18] 18  SpO2:  [90 %-97 %] 91 %  BP: (126-165)/(65-89) 139/83     Weight: 95.1 kg (209 lb 10.5 oz)  Body mass index is 26.92 kg/m².    Estimated Creatinine Clearance: 92.3 mL/min (based on SCr of 1.2 mg/dL).     Physical Exam  Constitutional:       General: He is not in acute distress.     Appearance: Normal appearance. He is not ill-appearing.   HENT:      Head: Normocephalic and atraumatic.      Mouth/Throat:      Mouth: Mucous membranes are moist.   Eyes:      Extraocular Movements: Extraocular movements intact.      Conjunctiva/sclera: Conjunctivae normal.    Cardiovascular:      Rate and Rhythm: Normal rate and regular rhythm.      Heart sounds: Normal heart sounds.   Pulmonary:      Effort: Pulmonary effort is normal. No respiratory distress.      Breath sounds: Normal breath sounds.   Abdominal:      General: Abdomen is flat. Bowel sounds are normal. There is no distension.      Palpations: Abdomen is soft.      Tenderness: There is no abdominal tenderness. There is no guarding.   Musculoskeletal:         General: No swelling.      Right lower leg: No edema.      Left lower leg: No edema.   Skin:     General: Skin is warm.   Neurological:      General: No focal deficit present.      Mental Status: He is alert and oriented to person, place, and time.   Psychiatric:         Mood and Affect: Mood normal.         Behavior: Behavior normal.          Significant Labs: All pertinent labs within the past 24 hours have been reviewed.    Significant Imaging: I have reviewed all pertinent imaging results/findings within the past 24 hours.

## 2024-10-18 NOTE — PLAN OF CARE
pt. AAOx4, VSS, spo2> 94% on room air.   Pt. Ambulating independently  PRN tylenol given   Pt complaining of lower back and hip pain--team aware, tramadol and cetirizine administered  Antibiotics given per treatment plan--pt tolerating well  Bed in low locked position, call light within reach, pt instructed to call for assistance needed, pt verbalized understanding, remains free from falls this shift. WCTM.

## 2024-10-18 NOTE — PROGRESS NOTES
[facility] - Transplant Stepdown  Kidney Transplant  Progress Note      Reason for Follow-up: Reassessment of Pancreas Transplant Waitlist - Qualified: 2/19/2024 - Inactive recipient and management of immunosuppression.    ORGAN: LEFT KIDNEY      Donor Type: Living      Donor CMV Status:    Donor HBcAB:   Donor HCV Status:   Donor HBV KAVITA:   Donor HCV KAVITA:       Subjective:   History of Present Illness:  Mr. Diaz is a 43 yr male who received a LURT 7/16/24 for T2DM and HTN. No intra-op issues noted. Cr trended down, good UOP, dc'd without issue. Has insulin pump for DM2 management. Cr elvie to 1.6, had biopsy 8/14 which did not show rejection or ATI. On 10/14 patient presented to [facility] ER for c/o fever, sore throat, cough, myalgias, mild nasal congestion. Tmax 101, first fever on 10/12. He also had routine lab work this morning that revealed severe neutropenia at 0.73, ANC 58. Denies CP, SOB, NVD, dysuria, changes in UOP. Denies any sick contacts. Labs in ED notable for negative COVID, flu A/B, strepA. Labs this morning with Cr at BL 1.5. UA likely without infection, CXR without acute abnormality. BC from OSH pending. Patient transferred to [facility] for neutropenic fever. Plan for CMV PCR, RIP, throat culture, transplant ID consult, IV antibiotics. Will hold MMF and bactrim. Neupogen per protocol. D/w Dr. Martell.    Mr. Diaz is a 43 y.o. year old male who is status post Pancreas Transplant Waitlist - Qualified: 2/19/2024 - Inactive.  His maintenance immunosuppression consists of:   Immunosuppressants (From admission, onward)      Start     Stop Route Frequency Ordered    10/18/24 1800  tacrolimus capsule 2 mg         -- Oral 2 times daily 10/18/24 1133            Hospital Course:  Mr. Diaz was transferred from OSH ED 10/14 for neutropenic fever with associated sore throat and myalgias.    Severe neutropenia/neutropenic fever: ID and heme following. Cellcept and Bactrim held on admit. B12 and folate wnl. Copper  pending. Received Zarxio ([id number]) for ANC 0 10/15-10/17.  10/18, but held off on additional Zarxio due to patient's severe bone/back pain from prior injections.  Negative Infectious workup: Flu, COVID, strep negative, RIP, UA, blood cx, CMV, EBV, throat cx, stool no neutrophils seen, rotavirus, giardia. CT maxillofacial/neck unremarkable.   Pending Infectious workup: Stool cx, stool e. Coli antigen, parvovirus, [place], ehrlichia antibody panel, adenovirus PCR, spotted fever group antibodies, lyme disease, karius  Abx: Cefepime/Vanc started on admit. Vanc discontinued 10/16. Empiric Doxycycline added 10/18.    Interval History: NAEON. Patient remains afebrile on Cefepime. Empiric Doxy added today. See above for pending infectious workup. Patient reports sore throat improving (& erythema improved), but c/o night sweats. Adding TSH, T4, cortisol, CRP, and hepatic function panel to AM labs. Replacing phos IV and resume oral phos as stools now formed. Also add Ergo weekly. Patient c/o severe back pain due to Zarxio injections. Zyrtec, Tylenol, Tramadol, and Robaxin available for pain control.  today, but holding off on Zarxio to give patient a break. Heme following, appreciate assistance. [id number].    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 He is scheduled for cytoscopy on 10/21. Will need to reschedule.        Past Medical, Surgical, Family, and Social History:   Unchanged from H&P.    Scheduled Meds:   atorvastatin  40 mg Oral Daily    calcitRIOL  0.5 mcg Oral Daily    ceFEPime IV (PEDS and ADULTS)  2 g Intravenous Q8H    cetirizine  10 mg Oral Daily    doxycycline  100 mg Oral Q12H    ergocalciferol  50,000 Units Oral Q7 Days    heparin (porcine)  5,000 Units Subcutaneous Q8H    insulin aspart U-100  15 Units Subcutaneous TIDWM     insulin glargine U-100  43 Units Subcutaneous Daily    k phos di & mono-sod phos mono  500 mg Oral TID    magnesium sulfate IVPB  2 g Intravenous Once    pantoprazole  40 mg Oral Daily    predniSONE  5 mg Oral Daily    tacrolimus  2 mg Oral BID     Continuous Infusions:  PRN Meds:  Current Facility-Administered Medications:     acetaminophen, 650 mg, Oral, Q4H PRN    dextromethorphan-guaiFENesin  mg/5 ml, 5 mL, Oral, Q4H PRN    dextrose 10%, 12.5 g, Intravenous, PRN    dextrose 10%, 25 g, Intravenous, PRN    glucagon (human recombinant), 1 mg, Intramuscular, PRN    glucose, 16 g, Oral, PRN    glucose, 24 g, Oral, PRN    insulin aspart U-100, 0-10 Units, Subcutaneous, QID (AC + HS) PRN    melatonin, 6 mg, Oral, Nightly PRN    methocarbamoL, 500 mg, Oral, QID PRN    ondansetron, 8 mg, Oral, Q6H PRN    prochlorperazine, 5 mg, Intravenous, Q6H PRN    sodium chloride 0.9%, 10 mL, Intravenous, PRN    traMADoL, 50 mg, Oral, Q6H PRN    Intake/Output - Last 3 Shifts         10/16 0700  10/17 0659 10/17 0700  10/18 0659 10/18 0700  10/19 0659    P.O. 1500      IV Piggyback       Total Intake(mL/kg) 1500 (15.8)      Urine (mL/kg/hr) 2500 (1.1) 1500 (0.7) 500 (0.8)    Stool 0  0    Total Output 2500 1500 500    Net -1000 -1500 -500           Urine Occurrence  4 x     Stool Occurrence 3 x 3 x 1 x    Emesis Occurrence  0 x              Review of Systems   Constitutional:  Positive for appetite change and fatigue. Negative for fever.        +night sweats   HENT:  Positive for congestion, postnasal drip, rhinorrhea, sinus pressure, sinus pain and sore throat.    Eyes:  Negative for photophobia and redness.   Respiratory:  Positive for cough. Negative for shortness of breath, wheezing and stridor.    Cardiovascular:  Negative for chest pain, palpitations and leg swelling.   Gastrointestinal:  Negative for abdominal distention, abdominal pain, constipation, nausea and vomiting.        Loose stools improved   Genitourinary:   "Negative for decreased urine volume, difficulty urinating and dysuria.   Musculoskeletal:  Positive for myalgias.   Skin:  Negative for rash and wound.   Allergic/Immunologic: Positive for immunocompromised state.   Neurological:  Positive for headaches. Negative for dizziness and light-headedness.   Psychiatric/Behavioral:  Negative for agitation, behavioral problems, confusion, decreased concentration and hallucinations.       Objective:     Vital Signs (Most Recent):  Temp: 98.5 °F (36.9 °C) (10/18/24 1220)  Pulse: 82 (10/18/24 1220)  Resp: 18 (10/18/24 1220)  BP: 139/83 (10/18/24 1220)  SpO2: (!) 91 % (10/18/24 1220) Vital Signs (24h Range):  Temp:  [98.2 °F (36.8 °C)-98.5 °F (36.9 °C)] 98.5 °F (36.9 °C)  Pulse:  [82-93] 82  Resp:  [18] 18  SpO2:  [90 %-97 %] 91 %  BP: (126-165)/(65-89) 139/83     Weight: 95.1 kg (209 lb 10.5 oz)  Height: 6' 2" (188 cm)  Body mass index is 26.92 kg/m².     Physical Exam  Vitals and nursing note reviewed.   Constitutional:       General: He is awake. He is not in acute distress.     Appearance: Normal appearance.   HENT:      Head: Normocephalic and atraumatic.      Nose:      Right Sinus: Maxillary sinus tenderness and frontal sinus tenderness present.      Left Sinus: Maxillary sinus tenderness and frontal sinus tenderness present.      Mouth/Throat:      Pharynx: Posterior oropharyngeal erythema (improving) present.   Eyes:      General: No scleral icterus.        Right eye: No discharge.         Left eye: No discharge.   Cardiovascular:      Rate and Rhythm: Normal rate and regular rhythm.      Pulses: Normal pulses.      Heart sounds: No murmur heard.  Pulmonary:      Effort: Pulmonary effort is normal. No respiratory distress.      Breath sounds: No decreased breath sounds, wheezing or rales.   Abdominal:      General: A surgical scar is present. Bowel sounds are normal. There is no distension.      Palpations: Abdomen is soft. There is no mass.      Tenderness: There is no " abdominal tenderness. There is no guarding or rebound.   Musculoskeletal:         General: No tenderness. Normal range of motion.      Cervical back: Normal range of motion. No rigidity.      Right lower leg: No edema.      Left lower leg: No edema.   Lymphadenopathy:      Cervical: Cervical adenopathy (right on admit, improved by 10/17) present.      Upper Body:      Right upper body: No supraclavicular or axillary adenopathy.      Left upper body: No supraclavicular or axillary adenopathy.      Lower Body: No right inguinal adenopathy. No left inguinal adenopathy.   Skin:     General: Skin is warm and dry.      Capillary Refill: Capillary refill takes less than 2 seconds.      Coloration: Skin is not pale.      Findings: No erythema or rash.   Neurological:      General: No focal deficit present.      Mental Status: He is alert and oriented to person, place, and time.   Psychiatric:         Mood and Affect: Mood normal.         Speech: Speech normal.         Behavior: Behavior normal. Behavior is cooperative.         Thought Content: Thought content normal.         Judgment: Judgment normal.          Laboratory:  CBC:   Recent Labs   Lab 10/16/24  1031 10/17/24  0623 10/18/24  0641   WBC 0.67* 0.67* 0.94*   RBC 4.41* 4.34* 4.21*   HGB 13.2* 13.0* 12.4*   HCT 40.0 39.0* 39.0*    258 237   MCV 91 90 93   MCH 29.9 30.0 29.5   MCHC 33.0 33.3 31.8*     CMP:   Recent Labs   Lab 10/15/24  0520 10/16/24  0613 10/17/24  0623 10/18/24  0641   * 249* 189* 198*   CALCIUM 9.8 9.8 9.5 9.6   ALBUMIN 3.5  3.5 3.2* 3.1* 3.1*   PROT 7.2  --   --   --     138 137 139   K 4.5 4.1 3.8 4.5   CO2 21* 21* 25 24    103 103 105   BUN 14 15 14 17   CREATININE 1.2 1.2 1.1 1.2   ALKPHOS 77  --   --   --    ALT 18  --   --   --    AST 13  --   --   --        Diagnostic Results:  Reviewed  Assessment/Plan:     * Neutropenic fever  - Suspect drug induced vs infection    Severe neutropenia/neutropenic fever: ID and heme  "following. Cellcept and Bactrim held on admit. B12 and folate wnl. Copper pending. Received Zarxio (Filgrastim-sndz) for ANC 0 10/15-10/17.  10/18, but held off on additional Zarxio due to patient's severe bone/back pain from prior injections.  Negative Infectious workup: Flu, COVID, strep negative, RIP, UA, blood cx, CMV, EBV, throat cx, stool no neutrophils seen, rotavirus, giardia. CT maxillofacial/neck unremarkable.   Pending Infectious workup: Stool cx, stool e. Coli antigen, parvovirus, [place], ehrlichia antibody panel, adenovirus PCR, spotted fever group antibodies, lyme disease, karius  Abx: Cefepime/Vanc started on admit. Vanc discontinued 10/16. Empiric Doxycycline added 10/18.    - c/o night swears, adding TSH, T4, cortisol, CRP, and hepatic function panel to AM labs. TB quant neg in September.  - c/o severe back pain due to Zarxio injections. Zyrtec, Tylenol, Tramadol, and Robaxin available for pain control. , but holding off on Zarxio 10/18 to give patient a break.   - Heme following, appreciate assistance.       Leukopenia  - See "neutropenic fever."      Type 1 diabetes mellitus with hyperglycemia  - Endocrine following, hyperglycemia this admit slowly improving.    Hypophosphatemia  - PO phos held 10/16 for diarrhea (was on 500 mg KPN TID)  - IV replacement 30 mmol Naphos 10/15 and 40 mmol Naphos 10/16 + 10/17  - Cont to replete IV PRN + resume oral supplements 10/18 and add Ergo weekly.  - Check levels daily.      Hypomagnesemia  - Hold oral Mag 10/16 for diarrhea  - Replace IV PRN  - Check levels daily      Insulin pump status  - See "Type 1 DM."      Anemia of chronic disease  - H/H stable.  - Monitor with daily cbc.    Prophylactic immunotherapy  - Continue Prograf and Pred.  - MMF held for neutropenia and fever.  - Monitor Prograf levels for toxicity and therapeutic efforts.      Long-term use of immunosuppressant medication  - See "prophylactic immunotherapy."      At risk for " opportunistic infections  - Bactrim held for neutropenia & Atovaquone started 10/15  - Atovaquone held 10/16 for diarrhea  - Resume when/if appropriate for PJP ppx       Status post kidney transplant  - Cr at BL   - Monitor with daily labs.    Secondary renal hyperparathyroidism  - Continue Calcitriol + add Ergo weekly 10/18.      Hyperlipidemia  - Continue statin.          Discharge Planning:  Not today.    Medical decision making for this encounter includes review of pertinent labs and diagnostic studies, assessment and planning, discussions with consulting providers, discussion with patient/family, and participation in multidisciplinary rounds. Time spent caring for patient: 90 minutes    Claire Damon PA-C  Kidney Transplant  [facility] - Transplant Stepdown

## 2024-10-18 NOTE — SUBJECTIVE & OBJECTIVE
Subjective:   History of Present Illness:  Mr. Diaz is a 43 yr male who received a LURT 7/16/24 for T2DM and HTN. No intra-op issues noted. Cr trended down, good UOP, dc'd without issue. Has insulin pump for DM2 management. Cr elvie to 1.6, had biopsy 8/14 which did not show rejection or ATI. On 10/14 patient presented to [facility] ER for c/o fever, sore throat, cough, myalgias, mild nasal congestion. Tmax 101, first fever on 10/12. He also had routine lab work this morning that revealed severe neutropenia at 0.73, ANC 58. Denies CP, SOB, NVD, dysuria, changes in UOP. Denies any sick contacts. Labs in ED notable for negative COVID, flu A/B, strepA. Labs this morning with Cr at BL 1.5. UA likely without infection, CXR without acute abnormality. BC from OSH pending. Patient transferred to [facility] for neutropenic fever. Plan for CMV PCR, RIP, throat culture, transplant ID consult, IV antibiotics. Will hold MMF and bactrim. Neupogen per protocol. D/w Dr. Martell.    Mr. Diaz is a 43 y.o. year old male who is status post Pancreas Transplant Waitlist - Qualified: 2/19/2024 - Inactive.  His maintenance immunosuppression consists of:   Immunosuppressants (From admission, onward)      Start     Stop Route Frequency Ordered    10/18/24 1800  tacrolimus capsule 2 mg         -- Oral 2 times daily 10/18/24 1133            Hospital Course:  Mr. Diaz was transferred from OSH ED 10/14 for neutropenic fever with associated sore throat and myalgias.    Severe neutropenia/neutropenic fever: ID and heme following. Cellcept and Bactrim held on admit. B12 and folate wnl. Copper pending. Received Zarxio (Filgrastim-sndz) for ANC 0 10/15-10/17.  10/18, but held off on additional Zarxio due to patient's severe bone/back pain from prior injections.  Negative Infectious workup: Flu, COVID, strep negative, RIP, UA, blood cx, CMV, EBV, throat cx, stool no neutrophils seen, rotavirus, giardia. CT maxillofacial/neck unremarkable.   Pending  Infectious workup: Stool cx, stool e. Coli antigen, parvovirus, [place], ehrlichia antibody panel, adenovirus PCR, spotted fever group antibodies, lyme disease, karius  Abx: Cefepime/Vanc started on admit. Vanc discontinued 10/16. Empiric Doxycycline added 10/18.    Interval History: NAEON. Patient remains afebrile on Cefepime. Empiric Doxy added today. See above for pending infectious workup. Patient reports sore throat improving (& erythema improved), but c/o night sweats. Adding TSH, T4, cortisol, CRP, and hepatic function panel to AM labs. Replacing phos IV and resume oral phos as stools now formed. Also add Ergo weekly. Patient c/o severe back pain due to Zarxio injections. Zyrtec, Tylenol, Tramadol, and Robaxin available for pain control.  today, but holding off on Zarxio to give patient a break. Heme following, appreciate assistance. WCTM.    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 He is scheduled for cytoscopy on 10/21. Will need to reschedule.        Past Medical, Surgical, Family, and Social History:   Unchanged from H&P.    Scheduled Meds:   atorvastatin  40 mg Oral Daily    calcitRIOL  0.5 mcg Oral Daily    ceFEPime IV (PEDS and ADULTS)  2 g Intravenous Q8H    cetirizine  10 mg Oral Daily    doxycycline  100 mg Oral Q12H    ergocalciferol  50,000 Units Oral Q7 Days    heparin (porcine)  5,000 Units Subcutaneous Q8H    insulin aspart U-100  15 Units Subcutaneous TIDWM    insulin glargine U-100  43 Units Subcutaneous Daily    k phos di & mono-sod phos mono  500 mg Oral TID    magnesium sulfate IVPB  2 g Intravenous Once    pantoprazole  40 mg Oral Daily    predniSONE  5 mg Oral Daily    tacrolimus  2 mg Oral BID     Continuous Infusions:  PRN Meds:  Current Facility-Administered Medications:     acetaminophen, 650 mg, Oral, Q4H  PRN    dextromethorphan-guaiFENesin  mg/5 ml, 5 mL, Oral, Q4H PRN    dextrose 10%, 12.5 g, Intravenous, PRN    dextrose 10%, 25 g, Intravenous, PRN    glucagon (human recombinant), 1 mg, Intramuscular, PRN    glucose, 16 g, Oral, PRN    glucose, 24 g, Oral, PRN    insulin aspart U-100, 0-10 Units, Subcutaneous, QID (AC + HS) PRN    melatonin, 6 mg, Oral, Nightly PRN    methocarbamoL, 500 mg, Oral, QID PRN    ondansetron, 8 mg, Oral, Q6H PRN    prochlorperazine, 5 mg, Intravenous, Q6H PRN    sodium chloride 0.9%, 10 mL, Intravenous, PRN    traMADoL, 50 mg, Oral, Q6H PRN    Intake/Output - Last 3 Shifts         10/16 0700  10/17 0659 10/17 0700  10/18 0659 10/18 0700  10/19 0659    P.O. 1500      IV Piggyback       Total Intake(mL/kg) 1500 (15.8)      Urine (mL/kg/hr) 2500 (1.1) 1500 (0.7) 500 (0.8)    Stool 0  0    Total Output 2500 1500 500    Net -1000 -1500 -500           Urine Occurrence  4 x     Stool Occurrence 3 x 3 x 1 x    Emesis Occurrence  0 x              Review of Systems   Constitutional:  Positive for appetite change and fatigue. Negative for fever.        +night sweats   HENT:  Positive for congestion, postnasal drip, rhinorrhea, sinus pressure, sinus pain and sore throat.    Eyes:  Negative for photophobia and redness.   Respiratory:  Positive for cough. Negative for shortness of breath, wheezing and stridor.    Cardiovascular:  Negative for chest pain, palpitations and leg swelling.   Gastrointestinal:  Negative for abdominal distention, abdominal pain, constipation, nausea and vomiting.        Loose stools improved   Genitourinary:  Negative for decreased urine volume, difficulty urinating and dysuria.   Musculoskeletal:  Positive for myalgias.   Skin:  Negative for rash and wound.   Allergic/Immunologic: Positive for immunocompromised state.   Neurological:  Positive for headaches. Negative for dizziness and light-headedness.   Psychiatric/Behavioral:  Negative for agitation, behavioral  "problems, confusion, decreased concentration and hallucinations.       Objective:     Vital Signs (Most Recent):  Temp: 98.5 °F (36.9 °C) (10/18/24 1220)  Pulse: 82 (10/18/24 1220)  Resp: 18 (10/18/24 1220)  BP: 139/83 (10/18/24 1220)  SpO2: (!) 91 % (10/18/24 1220) Vital Signs (24h Range):  Temp:  [98.2 °F (36.8 °C)-98.5 °F (36.9 °C)] 98.5 °F (36.9 °C)  Pulse:  [82-93] 82  Resp:  [18] 18  SpO2:  [90 %-97 %] 91 %  BP: (126-165)/(65-89) 139/83     Weight: 95.1 kg (209 lb 10.5 oz)  Height: 6' 2" (188 cm)  Body mass index is 26.92 kg/m².     Physical Exam  Vitals and nursing note reviewed.   Constitutional:       General: He is awake. He is not in acute distress.     Appearance: Normal appearance.   HENT:      Head: Normocephalic and atraumatic.      Nose:      Right Sinus: Maxillary sinus tenderness and frontal sinus tenderness present.      Left Sinus: Maxillary sinus tenderness and frontal sinus tenderness present.      Mouth/Throat:      Pharynx: Posterior oropharyngeal erythema (improving) present.   Eyes:      General: No scleral icterus.        Right eye: No discharge.         Left eye: No discharge.   Cardiovascular:      Rate and Rhythm: Normal rate and regular rhythm.      Pulses: Normal pulses.      Heart sounds: No murmur heard.  Pulmonary:      Effort: Pulmonary effort is normal. No respiratory distress.      Breath sounds: No decreased breath sounds, wheezing or rales.   Abdominal:      General: A surgical scar is present. Bowel sounds are normal. There is no distension.      Palpations: Abdomen is soft. There is no mass.      Tenderness: There is no abdominal tenderness. There is no guarding or rebound.   Musculoskeletal:         General: No tenderness. Normal range of motion.      Cervical back: Normal range of motion. No rigidity.      Right lower leg: No edema.      Left lower leg: No edema.   Lymphadenopathy:      Cervical: Cervical adenopathy (right on admit, improved by 10/17) present.      Upper " Body:      Right upper body: No supraclavicular or axillary adenopathy.      Left upper body: No supraclavicular or axillary adenopathy.      Lower Body: No right inguinal adenopathy. No left inguinal adenopathy.   Skin:     General: Skin is warm and dry.      Capillary Refill: Capillary refill takes less than 2 seconds.      Coloration: Skin is not pale.      Findings: No erythema or rash.   Neurological:      General: No focal deficit present.      Mental Status: He is alert and oriented to person, place, and time.   Psychiatric:         Mood and Affect: Mood normal.         Speech: Speech normal.         Behavior: Behavior normal. Behavior is cooperative.         Thought Content: Thought content normal.         Judgment: Judgment normal.          Laboratory:  CBC:   Recent Labs   Lab 10/16/24  1031 10/17/24  0623 10/18/24  0641   WBC 0.67* 0.67* 0.94*   RBC 4.41* 4.34* 4.21*   HGB 13.2* 13.0* 12.4*   HCT 40.0 39.0* 39.0*    258 237   MCV 91 90 93   MCH 29.9 30.0 29.5   MCHC 33.0 33.3 31.8*     CMP:   Recent Labs   Lab 10/15/24  0520 10/16/24  0613 10/17/24  0623 10/18/24  0641   * 249* 189* 198*   CALCIUM 9.8 9.8 9.5 9.6   ALBUMIN 3.5  3.5 3.2* 3.1* 3.1*   PROT 7.2  --   --   --     138 137 139   K 4.5 4.1 3.8 4.5   CO2 21* 21* 25 24    103 103 105   BUN 14 15 14 17   CREATININE 1.2 1.2 1.1 1.2   ALKPHOS 77  --   --   --    ALT 18  --   --   --    AST 13  --   --   --        Diagnostic Results:  Reviewed

## 2024-10-18 NOTE — PLAN OF CARE
Pt is Aox4 and ambulatory.  Received all scheduled medication  Pt is afebrile.  Received IV magnesium 2g and IV sodium phosphate.  IV cefepime continued per orders  Doxycycline added to treatment regimen  Pt educated to safety protocols  Call light is within reach

## 2024-10-18 NOTE — SUBJECTIVE & OBJECTIVE
"Interval HPI:   No acute events overnight. Patient in room 79266/37016 A. Blood glucose stable. BG at goal on current insulin regimen (SSI ). Steroid use- None .      Renal function- Normal   Vasopressors-  None     Diet diabetic 2000 Calories (up to 75 gm per meal); Standard Tray     Eatin%  Nausea: No  Hypoglycemia and intervention: No  Fever: No  TPN and/or TF: No       BP (!) 142/89 (BP Location: Right arm, Patient Position: Sitting)   Pulse 86   Temp 98.3 °F (36.8 °C) (Oral)   Resp 18   Ht 6' 2" (1.88 m)   Wt 95.1 kg (209 lb 10.5 oz)   SpO2 (!) 93%   BMI 26.92 kg/m²     Labs Reviewed and Include    Recent Labs   Lab 10/18/24  0641   *   CALCIUM 9.6   ALBUMIN 3.1*      K 4.5   CO2 24      BUN 17   CREATININE 1.2     Lab Results   Component Value Date    WBC 0.67 (LL) 10/17/2024    HGB 13.0 (L) 10/17/2024    HCT 39.0 (L) 10/17/2024    MCV 90 10/17/2024     10/17/2024     No results for input(s): "TSH", "FREET4" in the last 168 hours.  Lab Results   Component Value Date    HGBA1C 8.5 (H) 10/17/2024       Nutritional status:   Body mass index is 26.92 kg/m².  Lab Results   Component Value Date    ALBUMIN 3.1 (L) 10/18/2024    ALBUMIN 3.1 (L) 10/17/2024    ALBUMIN 3.2 (L) 10/16/2024     No results found for: "PREALBUMIN"    Estimated Creatinine Clearance: 92.3 mL/min (based on SCr of 1.2 mg/dL).    Accu-Checks  Recent Labs     10/16/24  0921 10/16/24  1333 10/16/24  1742 10/16/24  2126 10/17/24  0835 10/17/24  1356 10/17/24  1826 10/17/24  2147 10/17/24  2243 10/18/24  0900   POCTGLUCOSE 253* 321* 206* 267* 203* 208* 208* 165* 129* 208*       Current Medications and/or Treatments Impacting Glycemic Control  Immunotherapy:    Immunosuppressants           Stop Route Frequency     tacrolimus capsule 3 mg         -- Oral 2 times daily          Steroids:   Hormones (From admission, onward)      Start     Stop Route Frequency Ordered    10/15/24 0900  predniSONE tablet 5 mg         " -- Oral Daily 10/14/24 2304    10/14/24 2304  melatonin tablet 6 mg         -- Oral Nightly PRN 10/14/24 2304          Pressors:    Autonomic Drugs (From admission, onward)      None          Hyperglycemia/Diabetes Medications:   Antihyperglycemics (From admission, onward)      Start     Stop Route Frequency Ordered    10/18/24 0900  insulin glargine U-100 (Lantus) pen 43 Units         -- SubQ Daily 10/17/24 0842    10/16/24 1200  insulin aspart U-100 pen 15 Units         -- SubQ 3 times daily with meals 10/16/24 1147    10/15/24 2132  insulin aspart U-100 pen 0-10 Units         -- SubQ Before meals & nightly PRN 10/15/24 2032

## 2024-10-18 NOTE — PROGRESS NOTES
"[facility] - Transplant Stepdown  Endocrinology  Progress Note    Admit Date: 10/14/2024     Admit Date: 10/14/24     Reason for Consult: Management of T1DM, Hyperglycemia      Surgical Procedure and Date:  Status post kidney transplant 2024     Diabetes diagnosis year:  Over 10 years ago     Home Diabetes Medications:    Recently taken off of Omnipod 5 by primary endocrinologist in [place] and placed on the following  Toujeo 28 units once daily  Novolog ICR 1:15 (1 unit per 15g of carbs) TID with meals      How often checking glucose at home?  Dexcom   BG readings on regimen: mostly 250-300s  Hypoglycemia on the regimen?  No  Missed doses on regimen?  No     Diabetes Complications include:     Hyperglycemia and Diabetic nephropathy       Complicating diabetes co morbidities:   ESRD        HPI:   Patient is a 43 y.o. male with T1DM, CKD s/p living donor kidney txp  on 24 for DM/HTN . He has an insulin pump. He presented to OSH ER on 10/14 with complaints of fever, sore throat, cough, myalgias, and fevers. He was transferred to [facility] for neutropenic fever. Plan for CMV PCR, RIP, throat culture, transplant ID consult, IV antibiotics. Endocrinology consulted for management of T1DM.        Interval HPI:   No acute events overnight. Patient in room 54175/12289 A. Blood glucose stable. BG at goal on current insulin regimen (SSI ). Steroid use- None .      Renal function- Normal   Vasopressors-  None     Diet diabetic 2000 Calories (up to 75 gm per meal); Standard Tray     Eatin%  Nausea: No  Hypoglycemia and intervention: No  Fever: No  TPN and/or TF: No       BP (!) 142/89 (BP Location: Right arm, Patient Position: Sitting)   Pulse 86   Temp 98.3 °F (36.8 °C) (Oral)   Resp 18   Ht 6' 2" (1.88 m)   Wt 95.1 kg (209 lb 10.5 oz)   SpO2 (!) 93%   BMI 26.92 kg/m²     Labs Reviewed and Include    Recent Labs   Lab 10/18/24  0641   *   CALCIUM 9.6   ALBUMIN 3.1*      K 4.5   CO2 24    " "  BUN 17   CREATININE 1.2     Lab Results   Component Value Date    WBC 0.67 (LL) 10/17/2024    HGB 13.0 (L) 10/17/2024    HCT 39.0 (L) 10/17/2024    MCV 90 10/17/2024     10/17/2024     No results for input(s): "TSH", "FREET4" in the last 168 hours.  Lab Results   Component Value Date    HGBA1C 8.5 (H) 10/17/2024       Nutritional status:   Body mass index is 26.92 kg/m².  Lab Results   Component Value Date    ALBUMIN 3.1 (L) 10/18/2024    ALBUMIN 3.1 (L) 10/17/2024    ALBUMIN 3.2 (L) 10/16/2024     No results found for: "PREALBUMIN"    Estimated Creatinine Clearance: 92.3 mL/min (based on SCr of 1.2 mg/dL).    Accu-Checks  Recent Labs     10/16/24  0921 10/16/24  1333 10/16/24  1742 10/16/24  2126 10/17/24  0835 10/17/24  1356 10/17/24  1826 10/17/24  2147 10/17/24  2243 10/18/24  0900   POCTGLUCOSE 253* 321* 206* 267* 203* 208* 208* 165* 129* 208*       Current Medications and/or Treatments Impacting Glycemic Control  Immunotherapy:    Immunosuppressants           Stop Route Frequency     tacrolimus capsule 3 mg         -- Oral 2 times daily          Steroids:   Hormones (From admission, onward)      Start     Stop Route Frequency Ordered    10/15/24 0900  predniSONE tablet 5 mg         -- Oral Daily 10/14/24 2304    10/14/24 2304  melatonin tablet 6 mg         -- Oral Nightly PRN 10/14/24 2304          Pressors:    Autonomic Drugs (From admission, onward)      None          Hyperglycemia/Diabetes Medications:   Antihyperglycemics (From admission, onward)      Start     Stop Route Frequency Ordered    10/18/24 0900  insulin glargine U-100 (Lantus) pen 43 Units         -- SubQ Daily 10/17/24 0842    10/16/24 1200  insulin aspart U-100 pen 15 Units         -- SubQ 3 times daily with meals 10/16/24 1147    10/15/24 2132  insulin aspart U-100 pen 0-10 Units         -- SubQ Before meals & nightly PRN 10/15/24 2032            ASSESSMENT and PLAN    Renal/  Status post kidney transplant  Managed per primary " team  Optimize BG control      Endocrine  Type 1 diabetes mellitus with hyperglycemia  BG goal 140-180    Type 1 diabetes.  History of kidney transplant.  BG improving on adjusted regimen.  Will continue to monitor    Plan:  -continue Lantus to 43 units daily    -Continue Novolog 15 units TID with meals   -Continue Moderate Dose Correction Scale  -BG monitoring ac/hs       ** Please call Endocrine for any BG related issues **    Lab Results   Component Value Date    HGBA1C 8.5 (H) 10/17/2024                   Mac Roberto PA-C  Endocrinology  [facility] - Trans[facility]

## 2024-10-18 NOTE — ASSESSMENT & PLAN NOTE
- PO phos held 10/16 for diarrhea (was on 500 mg KPN TID)  - IV replacement 30 mmol Naphos 10/15 and 40 mmol Naphos 10/16 + 10/17  - Cont to replete IV PRN + resume oral supplements 10/18 and add Ergo weekly.  - Check levels daily.

## 2024-10-18 NOTE — ASSESSMENT & PLAN NOTE
- Suspect drug induced vs infection    Severe neutropenia/neutropenic fever: ID and heme following. Cellcept and Bactrim held on admit. B12 and folate wnl. Copper pending. Received Zarxio ([id number]) for ANC 0 10/15-10/17.  10/18, but held off on additional Zarxio due to patient's severe bone/back pain from prior injections.  Negative Infectious workup: Flu, COVID, strep negative, RIP, UA, blood cx, CMV, EBV, throat cx, stool no neutrophils seen, rotavirus, giardia. CT maxillofacial/neck unremarkable.   Pending Infectious workup: Stool cx, stool e. Coli antigen, parvovirus, [place], ehrlichia antibody panel, adenovirus PCR, spotted fever group antibodies, lyme disease, karius  Abx: Cefepime/Vanc started on admit. Vanc discontinued 10/16. Empiric Doxycycline added 10/18.    - c/o night swears, adding TSH, T4, cortisol, CRP, and hepatic function panel to AM labs. TB quant neg in September.  - c/o severe back pain due to Zarxio injections. Zyrtec, Tylenol, Tramadol, and Robaxin available for pain control. , but holding off on Zarxio 10/18 to give patient a break.   - Heme following, appreciate assistance.

## 2024-10-18 NOTE — ASSESSMENT & PLAN NOTE
43M s/p kidney transplant (06/2024, on tacro/MMF/prednisone), T2DM, and HTN who initially presented with sore throat, fever, myalgias, and nasal congestion, along with extreme fatigue and lethargy. Patient stated that 2 days ago he noted severe sore throat, causing difficulty swallowing, he also noted some right sided anterior cervical tederness. Tmax was 101. He was taking prophylaxis with bactrim and recentely stopped taking acylclovir.  AF and VSS. Labs notable for WBC of 0.85, ANC 40. UA unremarkable, RIP was negative. COVID/flu negative. CxR negative. EBV and CMV negative (noted D-/R-). Throat swabs negative. Blood cx negative. Had an acute episode of diarrhea, likely 2/2 abx use but will need to r/o infectious etiology. He additionally had recent travel to [place], will workup serologies listed below.     Recommendations:  - stool studies - negative  - follow up serologies (Lyme, Ehrlichia, and rickettsia Abs, and Parvovirus)  - follow up oliva  - continue cefepime and doxy today  - bone pain likely related to recent neupogen

## 2024-10-19 LAB
ALBUMIN SERPL BCP-MCNC: 3.4 G/DL (ref 3.5–5.2)
ALBUMIN SERPL BCP-MCNC: 3.4 G/DL (ref 3.5–5.2)
ALP SERPL-CCNC: 85 U/L (ref 40–150)
ALT SERPL W/O P-5'-P-CCNC: 28 U/L (ref 10–44)
ANION GAP SERPL CALC-SCNC: 12 MMOL/L (ref 8–16)
ANISOCYTOSIS BLD QL SMEAR: SLIGHT
AST SERPL-CCNC: 36 U/L (ref 10–40)
BACTERIA BLD CULT: NORMAL
BACTERIA BLD CULT: NORMAL
BASOPHILS # BLD AUTO: ABNORMAL K/UL (ref 0–0.2)
BASOPHILS NFR BLD: 3 % (ref 0–1.9)
BILIRUB DIRECT SERPL-MCNC: 0.5 MG/DL (ref 0.1–0.3)
BILIRUB SERPL-MCNC: 1.6 MG/DL (ref 0.1–1)
BUN SERPL-MCNC: 13 MG/DL (ref 6–20)
CALCIUM SERPL-MCNC: 9.8 MG/DL (ref 8.7–10.5)
CHLORIDE SERPL-SCNC: 106 MMOL/L (ref 95–110)
CO2 SERPL-SCNC: 22 MMOL/L (ref 23–29)
CORTIS SERPL-MCNC: 8.2 UG/DL (ref 4.3–22.4)
CREAT SERPL-MCNC: 1.2 MG/DL (ref 0.5–1.4)
CRP SERPL-MCNC: 45.9 MG/L (ref 0–8.2)
DIFFERENTIAL METHOD BLD: ABNORMAL
DOHLE BOD BLD QL SMEAR: PRESENT
EOSINOPHIL # BLD AUTO: ABNORMAL K/UL (ref 0–0.5)
EOSINOPHIL NFR BLD: 2 % (ref 0–8)
ERYTHROCYTE [DISTWIDTH] IN BLOOD BY AUTOMATED COUNT: 13.1 % (ref 11.5–14.5)
EST. GFR  (NO RACE VARIABLE): >60 ML/MIN/1.73 M^2
GLUCOSE SERPL-MCNC: 134 MG/DL (ref 70–110)
HCT VFR BLD AUTO: 42.9 % (ref 40–54)
HGB BLD-MCNC: 13.6 G/DL (ref 14–18)
IMM GRANULOCYTES # BLD AUTO: ABNORMAL K/UL (ref 0–0.04)
IMM GRANULOCYTES NFR BLD AUTO: ABNORMAL % (ref 0–0.5)
LYMPHOCYTES # BLD AUTO: ABNORMAL K/UL (ref 1–4.8)
LYMPHOCYTES NFR BLD: 22 % (ref 18–48)
MAGNESIUM SERPL-MCNC: 1.7 MG/DL (ref 1.6–2.6)
MCH RBC QN AUTO: 29.2 PG (ref 27–31)
MCHC RBC AUTO-ENTMCNC: 31.7 G/DL (ref 32–36)
MCV RBC AUTO: 92 FL (ref 82–98)
METAMYELOCYTES NFR BLD MANUAL: 3 %
MONOCYTES # BLD AUTO: ABNORMAL K/UL (ref 0.3–1)
MONOCYTES NFR BLD: 22 % (ref 4–15)
MYELOCYTES NFR BLD MANUAL: 4 %
NEUTROPHILS NFR BLD: 30 % (ref 38–73)
NEUTS BAND NFR BLD MANUAL: 12 %
NRBC BLD-RTO: 4 /100 WBC
OVALOCYTES BLD QL SMEAR: ABNORMAL
PHOSPHATE SERPL-MCNC: 2.1 MG/DL (ref 2.7–4.5)
PLATELET # BLD AUTO: 257 K/UL (ref 150–450)
PLATELET BLD QL SMEAR: ABNORMAL
PMV BLD AUTO: 10.4 FL (ref 9.2–12.9)
POCT GLUCOSE: 113 MG/DL (ref 70–110)
POCT GLUCOSE: 129 MG/DL (ref 70–110)
POCT GLUCOSE: 150 MG/DL (ref 70–110)
POCT GLUCOSE: 176 MG/DL (ref 70–110)
POCT GLUCOSE: 180 MG/DL (ref 70–110)
POIKILOCYTOSIS BLD QL SMEAR: SLIGHT
POLYCHROMASIA BLD QL SMEAR: ABNORMAL
POTASSIUM SERPL-SCNC: 4.1 MMOL/L (ref 3.5–5.1)
PROMYELOCYTES NFR BLD MANUAL: 2 %
PROT SERPL-MCNC: 7 G/DL (ref 6–8.4)
RBC # BLD AUTO: 4.66 M/UL (ref 4.6–6.2)
SODIUM SERPL-SCNC: 140 MMOL/L (ref 136–145)
T4 FREE SERPL-MCNC: 0.97 NG/DL (ref 0.71–1.51)
TACROLIMUS BLD-MCNC: 7.6 NG/ML (ref 5–15)
TOXIC GRANULES BLD QL SMEAR: PRESENT
TSH SERPL DL<=0.005 MIU/L-ACNC: 0.97 UIU/ML (ref 0.4–4)
WBC # BLD AUTO: 2.49 K/UL (ref 3.9–12.7)

## 2024-10-19 PROCEDURE — 85007 BL SMEAR W/DIFF WBC COUNT: CPT | Mod: NTX

## 2024-10-19 PROCEDURE — 99232 SBSQ HOSP IP/OBS MODERATE 35: CPT | Mod: NTX,,, | Performed by: PHYSICIAN ASSISTANT

## 2024-10-19 PROCEDURE — 84439 ASSAY OF FREE THYROXINE: CPT | Mod: NTX | Performed by: PHYSICIAN ASSISTANT

## 2024-10-19 PROCEDURE — 83735 ASSAY OF MAGNESIUM: CPT | Mod: NTX

## 2024-10-19 PROCEDURE — 82533 TOTAL CORTISOL: CPT | Mod: NTX | Performed by: PHYSICIAN ASSISTANT

## 2024-10-19 PROCEDURE — 20600001 HC STEP DOWN PRIVATE ROOM: Mod: NTX

## 2024-10-19 PROCEDURE — 99232 SBSQ HOSP IP/OBS MODERATE 35: CPT | Mod: NTX,,, | Performed by: INTERNAL MEDICINE

## 2024-10-19 PROCEDURE — 84443 ASSAY THYROID STIM HORMONE: CPT | Mod: NTX | Performed by: PHYSICIAN ASSISTANT

## 2024-10-19 PROCEDURE — 99233 SBSQ HOSP IP/OBS HIGH 50: CPT | Mod: NTX,,, | Performed by: INTERNAL MEDICINE

## 2024-10-19 PROCEDURE — 36415 COLL VENOUS BLD VENIPUNCTURE: CPT | Mod: NTX | Performed by: PHYSICIAN ASSISTANT

## 2024-10-19 PROCEDURE — 63600175 PHARM REV CODE 636 W HCPCS: Mod: NTX | Performed by: INTERNAL MEDICINE

## 2024-10-19 PROCEDURE — 25000003 PHARM REV CODE 250: Mod: NTX | Performed by: CLINICAL NURSE SPECIALIST

## 2024-10-19 PROCEDURE — 25000003 PHARM REV CODE 250: Mod: NTX

## 2024-10-19 PROCEDURE — 36415 COLL VENOUS BLD VENIPUNCTURE: CPT | Mod: NTX

## 2024-10-19 PROCEDURE — 80076 HEPATIC FUNCTION PANEL: CPT | Mod: NTX | Performed by: PHYSICIAN ASSISTANT

## 2024-10-19 PROCEDURE — 86140 C-REACTIVE PROTEIN: CPT | Mod: NTX | Performed by: PHYSICIAN ASSISTANT

## 2024-10-19 PROCEDURE — 25000003 PHARM REV CODE 250: Mod: NTX | Performed by: PHYSICIAN ASSISTANT

## 2024-10-19 PROCEDURE — 63600175 PHARM REV CODE 636 W HCPCS: Mod: NTX | Performed by: PHYSICIAN ASSISTANT

## 2024-10-19 PROCEDURE — 63600175 PHARM REV CODE 636 W HCPCS: Mod: NTX

## 2024-10-19 PROCEDURE — 80197 ASSAY OF TACROLIMUS: CPT | Mod: NTX

## 2024-10-19 PROCEDURE — 85027 COMPLETE CBC AUTOMATED: CPT | Mod: NTX

## 2024-10-19 PROCEDURE — 80069 RENAL FUNCTION PANEL: CPT | Mod: NTX

## 2024-10-19 RX ADMIN — CEFEPIME 2 G: 2 INJECTION, POWDER, FOR SOLUTION INTRAVENOUS at 01:10

## 2024-10-19 RX ADMIN — HEPARIN SODIUM 5000 UNITS: 5000 INJECTION INTRAVENOUS; SUBCUTANEOUS at 09:10

## 2024-10-19 RX ADMIN — TACROLIMUS 2 MG: 1 CAPSULE ORAL at 08:10

## 2024-10-19 RX ADMIN — INSULIN GLARGINE 43 UNITS: 100 INJECTION, SOLUTION SUBCUTANEOUS at 08:10

## 2024-10-19 RX ADMIN — ACETAMINOPHEN 650 MG: 325 TABLET ORAL at 04:10

## 2024-10-19 RX ADMIN — CETIRIZINE HYDROCHLORIDE 10 MG: 10 TABLET, FILM COATED ORAL at 08:10

## 2024-10-19 RX ADMIN — CEFEPIME 2 G: 2 INJECTION, POWDER, FOR SOLUTION INTRAVENOUS at 04:10

## 2024-10-19 RX ADMIN — Medication 2 TABLET: at 04:10

## 2024-10-19 RX ADMIN — CALCITRIOL 0.5 MCG: 0.5 CAPSULE, LIQUID FILLED ORAL at 08:10

## 2024-10-19 RX ADMIN — INSULIN ASPART 1 UNITS: 100 INJECTION, SOLUTION INTRAVENOUS; SUBCUTANEOUS at 09:10

## 2024-10-19 RX ADMIN — Medication 2 TABLET: at 09:10

## 2024-10-19 RX ADMIN — DOXYCYCLINE HYCLATE 100 MG: 100 TABLET, COATED ORAL at 09:10

## 2024-10-19 RX ADMIN — TRAMADOL HYDROCHLORIDE 50 MG: 50 TABLET, COATED ORAL at 04:10

## 2024-10-19 RX ADMIN — PREDNISONE 5 MG: 5 TABLET ORAL at 08:10

## 2024-10-19 RX ADMIN — TACROLIMUS 2 MG: 1 CAPSULE ORAL at 05:10

## 2024-10-19 RX ADMIN — Medication 800 MG: at 08:10

## 2024-10-19 RX ADMIN — INSULIN ASPART 15 UNITS: 100 INJECTION, SOLUTION INTRAVENOUS; SUBCUTANEOUS at 11:10

## 2024-10-19 RX ADMIN — PANTOPRAZOLE SODIUM 40 MG: 40 TABLET, DELAYED RELEASE ORAL at 08:10

## 2024-10-19 RX ADMIN — INSULIN ASPART 15 UNITS: 100 INJECTION, SOLUTION INTRAVENOUS; SUBCUTANEOUS at 04:10

## 2024-10-19 RX ADMIN — HEPARIN SODIUM 5000 UNITS: 5000 INJECTION INTRAVENOUS; SUBCUTANEOUS at 06:10

## 2024-10-19 RX ADMIN — INSULIN ASPART 15 UNITS: 100 INJECTION, SOLUTION INTRAVENOUS; SUBCUTANEOUS at 08:10

## 2024-10-19 RX ADMIN — ATORVASTATIN CALCIUM 40 MG: 40 TABLET, FILM COATED ORAL at 08:10

## 2024-10-19 RX ADMIN — DOXYCYCLINE HYCLATE 100 MG: 100 TABLET, COATED ORAL at 08:10

## 2024-10-19 RX ADMIN — Medication 800 MG: at 09:10

## 2024-10-19 RX ADMIN — CEFEPIME 2 G: 2 INJECTION, POWDER, FOR SOLUTION INTRAVENOUS at 08:10

## 2024-10-19 RX ADMIN — Medication 2 TABLET: at 08:10

## 2024-10-19 RX ADMIN — INSULIN ASPART 2 UNITS: 100 INJECTION, SOLUTION INTRAVENOUS; SUBCUTANEOUS at 08:10

## 2024-10-19 NOTE — ASSESSMENT & PLAN NOTE
43M s/p kidney transplant (06/2024, on tacro/MMF/prednisone), T2DM, and HTN who initially presented with sore throat, fever, myalgias, and nasal congestion, along with extreme fatigue and lethargy. Patient stated that 2 days ago he noted severe sore throat, causing difficulty swallowing, he also noted some right sided anterior cervical tederness. Tmax was 101. He was taking prophylaxis with bactrim and recentely stopped taking acylclovir.  AF and VSS. Labs notable for WBC of 0.85, ANC 40. UA unremarkable, RIP was negative. COVID/flu negative. CxR negative. EBV and CMV negative (noted D-/R-). Throat swabs negative. Blood cx negative. Had an acute episode of diarrhea, likely 2/2 abx use but will need to r/o infectious etiology. He additionally had recent travel to [place], will workup serologies listed below.     Recommendations:  - stool studies - negative  - follow up serologies: Lyme, Ehrlichia (neg), and rickettsia Abs (neg), and adenovirus PCR, parvovirus  - follow up karius (neg)  - continue cefepime and doxy today  - trend WBC

## 2024-10-19 NOTE — PLAN OF CARE
Pt aaox4 and no c/o pain. Pt ambulates independently. Bed in low position and callbell within reach. Accu ck at [id number]. Cefepime continued. Doxy given on 10/18 and Neupogen given today. Pt afebrile. Wbc trending up.

## 2024-10-19 NOTE — PROGRESS NOTES
[facility] - Transplant Stepdown  Infectious Disease  Progress Note    Patient Name: Dejuan Diaz Jr.  MRN: [id number]  Admission Date: 10/14/2024  Length of Stay: 5 days  Attending Physician: Al Martell MD  Primary Care Provider: No, Primary Doctor    Isolation Status: No active isolations  Assessment/Plan:      Oncology  * Neutropenic fever  43M s/p kidney transplant (06/2024, on tacro/MMF/prednisone), T2DM, and HTN who initially presented with sore throat, fever, myalgias, and nasal congestion, along with extreme fatigue and lethargy. Patient stated that 2 days ago he noted severe sore throat, causing difficulty swallowing, he also noted some right sided anterior cervical tederness. Tmax was 101. He was taking prophylaxis with bactrim and recentely stopped taking acylclovir.  AF and VSS. Labs notable for WBC of 0.85, ANC 40. UA unremarkable, RIP was negative. COVID/flu negative. CxR negative. EBV and CMV negative (noted D-/R-). Throat swabs negative. Blood cx negative. Had an acute episode of diarrhea, likely 2/2 abx use but will need to r/o infectious etiology. He additionally had recent travel to [place], will workup serologies listed below.     Recommendations:  - stool studies - negative  - follow up serologies: Lyme, Ehrlichia (neg), and rickettsia Abs (neg), and adenovirus PCR, parvovirus  - follow up karius (neg)  - continue cefepime and doxy today  - trend WBC      Anticipated Disposition: TBD    Thank you for your consult. I will follow-up with patient. Please contact us if you have any additional questions.    Zulema Valero DO  Transplant Infectious Disease    Time: 50 minutes   50% of time spent on face-to-face counseling and coordination of care. Counseling included review of test results, diagnosis, and treatment plan with patient and/or family.        Subjective:     Principal Problem:Neutropenic fever    HPI: Mr. Dejuan Diaz is a 44 yo male with a PMHX of kidney transplant (06/2024, on  tacro/MMF/prednisone), T2DM, and HTN who initially presented with sore throat, fever, myalgias, and nasal congestion, along with extreme fatigue and lethargy. Patient stated that 2 days ago he noted severe sore throat, causing difficulty swallowing, he also noted some right sided anterior cervical tederness. Tmax was 101. He was taking prophylaxis with bactrim and recentely stopped taking acylclovir. Recently sustained a minor cut from barbwire. He is uptodate on vaccinations, pending 2nd shingles and flu shot. He reports sore throat has improved and has a dry cough. He works as a , but no other recent outdoor hobbies. Denies any recent sick contact, HA, N/V, SOB, chest pain, abdominal pain, constipation, diarrhea, or dysuria, or worsening skin changes.      He is AF and VSS. Labs notable for WBC of 0.85, ANC 40. UA unremarkable, RIP was negative. COVID/flu negative. CxR negative.   Interval History: looks better today, notes some diarrhea overnight, WBC inc 2.49    Review of Systems   All other systems reviewed and are negative.    Objective:     Vital Signs (Most Recent):  Temp: 97.9 °F (36.6 °C) (10/19/24 1150)  Pulse: 96 (10/19/24 1150)  Resp: 20 (10/19/24 1150)  BP: 131/73 (10/19/24 1150)  SpO2: (!) 93 % (10/19/24 1150) Vital Signs (24h Range):  Temp:  [97.6 °F (36.4 °C)-98.5 °F (36.9 °C)] 97.9 °F (36.6 °C)  Pulse:  [77-96] 96  Resp:  [16-20] 20  SpO2:  [92 %-97 %] 93 %  BP: (113-155)/(68-84) 131/73     Weight: 95.1 kg (209 lb 10.5 oz)  Body mass index is 26.92 kg/m².    Estimated Creatinine Clearance: 92.3 mL/min (based on SCr of 1.2 mg/dL).     Physical Exam  Constitutional:       General: He is not in acute distress.     Appearance: Normal appearance. He is not ill-appearing.   HENT:      Head: Normocephalic and atraumatic.      Mouth/Throat:      Mouth: Mucous membranes are moist.   Eyes:      Extraocular Movements: Extraocular movements intact.      Conjunctiva/sclera: Conjunctivae normal.    Cardiovascular:      Rate and Rhythm: Normal rate and regular rhythm.      Heart sounds: Normal heart sounds.   Pulmonary:      Effort: Pulmonary effort is normal. No respiratory distress.      Breath sounds: Normal breath sounds.   Abdominal:      General: Abdomen is flat. Bowel sounds are normal. There is no distension.      Palpations: Abdomen is soft.      Tenderness: There is no abdominal tenderness. There is no guarding.   Musculoskeletal:         General: No swelling.      Right lower leg: No edema.      Left lower leg: No edema.   Skin:     General: Skin is warm.   Neurological:      General: No focal deficit present.      Mental Status: He is alert and oriented to person, place, and time.   Psychiatric:         Mood and Affect: Mood normal.         Behavior: Behavior normal.          Significant Labs: All pertinent labs within the past 24 hours have been reviewed.    Significant Imaging: I have reviewed all pertinent imaging results/findings within the past 24 hours.

## 2024-10-19 NOTE — SUBJECTIVE & OBJECTIVE
Interval History: looks better today, notes some diarrhea overnight, WBC inc 2.49    Review of Systems   All other systems reviewed and are negative.    Objective:     Vital Signs (Most Recent):  Temp: 97.9 °F (36.6 °C) (10/19/24 1150)  Pulse: 96 (10/19/24 1150)  Resp: 20 (10/19/24 1150)  BP: 131/73 (10/19/24 1150)  SpO2: (!) 93 % (10/19/24 1150) Vital Signs (24h Range):  Temp:  [97.6 °F (36.4 °C)-98.5 °F (36.9 °C)] 97.9 °F (36.6 °C)  Pulse:  [77-96] 96  Resp:  [16-20] 20  SpO2:  [92 %-97 %] 93 %  BP: (113-155)/(68-84) 131/73     Weight: 95.1 kg (209 lb 10.5 oz)  Body mass index is 26.92 kg/m².    Estimated Creatinine Clearance: 92.3 mL/min (based on SCr of 1.2 mg/dL).     Physical Exam  Constitutional:       General: He is not in acute distress.     Appearance: Normal appearance. He is not ill-appearing.   HENT:      Head: Normocephalic and atraumatic.      Mouth/Throat:      Mouth: Mucous membranes are moist.   Eyes:      Extraocular Movements: Extraocular movements intact.      Conjunctiva/sclera: Conjunctivae normal.   Cardiovascular:      Rate and Rhythm: Normal rate and regular rhythm.      Heart sounds: Normal heart sounds.   Pulmonary:      Effort: Pulmonary effort is normal. No respiratory distress.      Breath sounds: Normal breath sounds.   Abdominal:      General: Abdomen is flat. Bowel sounds are normal. There is no distension.      Palpations: Abdomen is soft.      Tenderness: There is no abdominal tenderness. There is no guarding.   Musculoskeletal:         General: No swelling.      Right lower leg: No edema.      Left lower leg: No edema.   Skin:     General: Skin is warm.   Neurological:      General: No focal deficit present.      Mental Status: He is alert and oriented to person, place, and time.   Psychiatric:         Mood and Affect: Mood normal.         Behavior: Behavior normal.          Significant Labs: All pertinent labs within the past 24 hours have been reviewed.    Significant Imaging:  I have reviewed all pertinent imaging results/findings within the past 24 hours.

## 2024-10-19 NOTE — PROGRESS NOTES
"KIDNEY TRANSPLANT MEDICINE PROGRESS NOTE    Please refer to detailed progress note from 10/18 for complete details of this admission.    Interval history: he still has some mild bone pain, nausea yesterday 1 episode of diarrhea otherwise feels fine.    Past medical, surgical, family, social history reviewed & unchanged since admission.     I/O last 3 completed shifts:  In: 1370 [P.O.:1320; I.V.:50]  Out: 500 [Urine:500]    VITALS:  height is 6' 2" (1.88 m) and weight is 95.1 kg (209 lb 10.5 oz). His oral temperature is 98.4 °F (36.9 °C). His blood pressure is 155/77 (abnormal) and his pulse is 89. His respiration is 18 and oxygen saturation is 93% (abnormal).       A&O x3, NAD.  Lungs CTA, unlabored.  Heart regular, no rub.  Abdomen soft, nontender, no masses.  Allograft nontender.  Edema: none.    Recent Labs   Lab 10/17/24  0623 10/18/24  0641 10/19/24  0542   WBC 0.67* 0.94* 2.49*   HGB 13.0* 12.4* 13.6*   HCT 39.0* 39.0* 42.9    237 257       Recent Labs   Lab 10/17/24  0623 10/18/24  0641 10/19/24  0542    139 140   K 3.8 4.5 4.1    105 106   CO2 25 24 22*   BUN 14 17 13   CREATININE 1.1 1.2 1.2   CALCIUM 9.5 9.6 9.8   PHOS 1.8* 1.6* 2.1*     Recent Labs   Lab 10/16/24  0612 10/17/24  0623 10/18/24  0641   Tacrolimus Lvl 8.2 8.5 9.1       ASSESSMENT/PLAN:    Problems/plans as noted in the referenced progress note. Additional pertinent findings:  No fever for almost 4 days  WBC improved  High risk medications reviewed   Prograf level pending  Will need phos replacement. Other electrolytes acceptable  ANC pending to decide Neupogen or not   DM per endocrine  Negative Infectious workup: Flu, COVID, strep negative, RIP, UA, blood cx, CMV, EBV, throat cx, stool no neutrophils seen, rotavirus, giardia. CT maxillofacial/neck unremarkable.  Stool cultures  Abx: Cefepime/Vanc started on admit. Vanc discontinued 10/16. Empiric Doxycycline added 10/18.   Cont to replete IV PRN + resume oral supplements " "10/18 and add Ergo weekly.   nsulin pump status  - See "Type 1 DM."        Anemia of chronic disease  - H/H stable.  - Monitor with daily cbc.     Prophylactic immunotherapy  - Continue Prograf and Pred.  - MMF held for neutropenia and fever.  - Monitor Prograf levels for toxicity and therapeutic efforts.        Long-term use of immunosuppressant medication  - See "prophylactic immunotherapy."        At risk for opportunistic infections  - Bactrim held for neutropenia & Atovaquone started 10/15  - Atovaquone held 10/16 for diarrhea  - Resume when/if appropriate for PJP ppx         Status post kidney transplant  - Cr at BL   - Monitor with daily labs.     Secondary renal hyperparathyroidism  - Continue Calcitriol + add Ergo weekly 10/18.        Hyperlipidemia  - Continue statin.              Discharge Planning:  Not today.     Medical decision making for this encounter includes review of pertinent labs and diagnostic studies, assessment and planning, discussions with consulting providers, discussion with patient/family, and participation in multidisciplinary rounds. Time spent caring for patient: 90 minutes    CKD post kidney transplant  -Follow with strict I/Os, daily weights, BP (goal <140/90), daily labs.    -Avoid nephrotoxic agents when feasible (NSAIDs, IV contrast dye, Aminoglycoside-containing antibiotics)  -Renally dose all appropriate medications, including antibiotics      Encounter for Monitoring Immunosuppression post Transplant  -Continue to trend immunosuppression levels.   -Monitor for med-related side effects or drug toxicity given immunosuppressant med with narrow therapeutic window.     "

## 2024-10-19 NOTE — SUBJECTIVE & OBJECTIVE
"Interval HPI:   No acute events overnight. Patient in room 17264/01356 A. Blood glucose stable. BG at goal on current insulin regimen (basal, prandial, sliding scale). Steroid use- prednisone  Renal function- Normal   Vasopressors-  None      Diet diabetic 2000 Calories (up to 75 gm per meal); Standard Tray      Eatin%  Nausea: No  Hypoglycemia and intervention: No  Fever: No  TPN and/or TF: No    BP (!) 155/77 (BP Location: Right arm, Patient Position: Sitting)   Pulse 89   Temp 98.4 °F (36.9 °C) (Oral)   Resp 18   Ht 6' 2" (1.88 m)   Wt 95.1 kg (209 lb 10.5 oz)   SpO2 (!) 93%   BMI 26.92 kg/m²     Labs Reviewed and Include    Recent Labs   Lab 10/19/24  0542   *   CALCIUM 9.8   ALBUMIN 3.4*  3.4*   PROT 7.0      K 4.1   CO2 22*      BUN 13   CREATININE 1.2   ALKPHOS 85   ALT 28   AST 36   BILITOT 1.6*     Lab Results   Component Value Date    WBC 2.49 (L) 10/19/2024    HGB 13.6 (L) 10/19/2024    HCT 42.9 10/19/2024    MCV 92 10/19/2024     10/19/2024     Recent Labs   Lab 10/19/24  0542   TSH 0.967   FREET4 0.97     Lab Results   Component Value Date    HGBA1C 8.5 (H) 10/17/2024       Nutritional status:   Body mass index is 26.92 kg/m².  Lab Results   Component Value Date    ALBUMIN 3.4 (L) 10/19/2024    ALBUMIN 3.4 (L) 10/19/2024    ALBUMIN 3.1 (L) 10/18/2024     No results found for: "PREALBUMIN"    Estimated Creatinine Clearance: 92.3 mL/min (based on SCr of 1.2 mg/dL).    Accu-Checks  Recent Labs     10/16/24  2126 10/17/24  0835 10/17/24  1356 10/17/24  1826 10/17/24  2147 10/17/24  2243 10/18/24  0900 10/18/24  1308 10/18/24  1803 10/18/24  203   POCTGLUCOSE 267* 203* 208* 208* 165* 129* 208* 212* 189* 129*       Current Medications and/or Treatments Impacting Glycemic Control  Immunotherapy:    Immunosuppressants           Stop Route Frequency     tacrolimus capsule 2 mg         -- Oral 2 times daily          Steroids:   Hormones (From admission, onward)      Start "     Stop Route Frequency Ordered    10/15/24 0900  predniSONE tablet 5 mg         -- Oral Daily 10/14/24 2304    10/14/24 2304  melatonin tablet 6 mg         -- Oral Nightly PRN 10/14/24 2304          Pressors:    Autonomic Drugs (From admission, onward)      None          Hyperglycemia/Diabetes Medications:   Antihyperglycemics (From admission, onward)      Start     Stop Route Frequency Ordered    10/18/24 0900  insulin glargine U-100 (Lantus) pen 43 Units         -- SubQ Daily 10/17/24 0842    10/16/24 1200  insulin aspart U-100 pen 15 Units         -- SubQ 3 times daily with meals 10/16/24 1147    10/15/24 2132  insulin aspart U-100 pen 0-10 Units         -- SubQ Before meals & nightly PRN 10/15/24 2032

## 2024-10-20 VITALS
DIASTOLIC BLOOD PRESSURE: 92 MMHG | WEIGHT: 209.69 LBS | TEMPERATURE: 99 F | RESPIRATION RATE: 20 BRPM | SYSTOLIC BLOOD PRESSURE: 156 MMHG | HEIGHT: 74 IN | HEART RATE: 84 BPM | OXYGEN SATURATION: 95 % | BODY MASS INDEX: 26.91 KG/M2

## 2024-10-20 LAB
ALBUMIN SERPL BCP-MCNC: 3.1 G/DL (ref 3.5–5.2)
ANION GAP SERPL CALC-SCNC: 10 MMOL/L (ref 8–16)
ANISOCYTOSIS BLD QL SMEAR: SLIGHT
B BURGDOR AB SER IA-ACNC: 0.13 INDEX VALUE
BASOPHILS NFR BLD: 3 % (ref 0–1.9)
BUN SERPL-MCNC: 15 MG/DL (ref 6–20)
CALCIUM SERPL-MCNC: 9.2 MG/DL (ref 8.7–10.5)
CHLORIDE SERPL-SCNC: 107 MMOL/L (ref 95–110)
CO2 SERPL-SCNC: 25 MMOL/L (ref 23–29)
CREAT SERPL-MCNC: 1.2 MG/DL (ref 0.5–1.4)
DIFFERENTIAL METHOD BLD: ABNORMAL
DOHLE BOD BLD QL SMEAR: PRESENT
E COLI SXT1 STL QL IA: NEGATIVE
E COLI SXT2 STL QL IA: NEGATIVE
EOSINOPHIL NFR BLD: 1 % (ref 0–8)
ERYTHROCYTE [DISTWIDTH] IN BLOOD BY AUTOMATED COUNT: 13.2 % (ref 11.5–14.5)
EST. GFR  (NO RACE VARIABLE): >60 ML/MIN/1.73 M^2
GLUCOSE SERPL-MCNC: 141 MG/DL (ref 70–110)
HCT VFR BLD AUTO: 39.5 % (ref 40–54)
HGB BLD-MCNC: 12.7 G/DL (ref 14–18)
IMM GRANULOCYTES # BLD AUTO: ABNORMAL K/UL (ref 0–0.04)
IMM GRANULOCYTES NFR BLD AUTO: ABNORMAL % (ref 0–0.5)
LYMPHOCYTES NFR BLD: 11 % (ref 18–48)
MAGNESIUM SERPL-MCNC: 1.7 MG/DL (ref 1.6–2.6)
MCH RBC QN AUTO: 30 PG (ref 27–31)
MCHC RBC AUTO-ENTMCNC: 32.2 G/DL (ref 32–36)
MCV RBC AUTO: 93 FL (ref 82–98)
METAMYELOCYTES NFR BLD MANUAL: 5 %
MONOCYTES NFR BLD: 7 % (ref 4–15)
MYELOCYTES NFR BLD MANUAL: 3 %
NEUTROPHILS NFR BLD: 65 % (ref 38–73)
NEUTS BAND NFR BLD MANUAL: 4 %
NRBC BLD-RTO: 2 /100 WBC
PHOSPHATE SERPL-MCNC: 2.4 MG/DL (ref 2.7–4.5)
PLATELET # BLD AUTO: 267 K/UL (ref 150–450)
PLATELET BLD QL SMEAR: ABNORMAL
PMV BLD AUTO: 10.3 FL (ref 9.2–12.9)
POCT GLUCOSE: 156 MG/DL (ref 70–110)
POLYCHROMASIA BLD QL SMEAR: ABNORMAL
POTASSIUM SERPL-SCNC: 4.3 MMOL/L (ref 3.5–5.1)
PROMYELOCYTES NFR BLD MANUAL: 1 %
RBC # BLD AUTO: 4.24 M/UL (ref 4.6–6.2)
SODIUM SERPL-SCNC: 142 MMOL/L (ref 136–145)
TACROLIMUS BLD-MCNC: 8.6 NG/ML (ref 5–15)
TOXIC GRANULES BLD QL SMEAR: PRESENT
WBC # BLD AUTO: 6.08 K/UL (ref 3.9–12.7)

## 2024-10-20 PROCEDURE — 85007 BL SMEAR W/DIFF WBC COUNT: CPT | Mod: NTX

## 2024-10-20 PROCEDURE — 63600175 PHARM REV CODE 636 W HCPCS: Mod: NTX | Performed by: NURSE PRACTITIONER

## 2024-10-20 PROCEDURE — 99239 HOSP IP/OBS DSCHRG MGMT >30: CPT | Mod: NTX,,, | Performed by: NURSE PRACTITIONER

## 2024-10-20 PROCEDURE — 80069 RENAL FUNCTION PANEL: CPT | Mod: NTX

## 2024-10-20 PROCEDURE — 83735 ASSAY OF MAGNESIUM: CPT | Mod: NTX

## 2024-10-20 PROCEDURE — 99232 SBSQ HOSP IP/OBS MODERATE 35: CPT | Mod: NTX,,, | Performed by: PHYSICIAN ASSISTANT

## 2024-10-20 PROCEDURE — 63600175 PHARM REV CODE 636 W HCPCS: Mod: NTX | Performed by: INTERNAL MEDICINE

## 2024-10-20 PROCEDURE — 85027 COMPLETE CBC AUTOMATED: CPT | Mod: NTX

## 2024-10-20 PROCEDURE — 63600175 PHARM REV CODE 636 W HCPCS: Mod: NTX | Performed by: PHYSICIAN ASSISTANT

## 2024-10-20 PROCEDURE — 25000003 PHARM REV CODE 250: Mod: NTX | Performed by: CLINICAL NURSE SPECIALIST

## 2024-10-20 PROCEDURE — 25000003 PHARM REV CODE 250: Mod: NTX

## 2024-10-20 PROCEDURE — 80197 ASSAY OF TACROLIMUS: CPT | Mod: NTX

## 2024-10-20 PROCEDURE — 36415 COLL VENOUS BLD VENIPUNCTURE: CPT | Mod: NTX

## 2024-10-20 PROCEDURE — 25000003 PHARM REV CODE 250: Mod: NTX | Performed by: PHYSICIAN ASSISTANT

## 2024-10-20 PROCEDURE — 63600175 PHARM REV CODE 636 W HCPCS: Mod: NTX

## 2024-10-20 RX ORDER — DOXYCYCLINE HYCLATE 100 MG
100 TABLET ORAL EVERY 12 HOURS
Qty: 9 TABLET | Refills: 0 | Status: SHIPPED | OUTPATIENT
Start: 2024-10-20 | End: 2024-10-25

## 2024-10-20 RX ORDER — ATOVAQUONE 750 MG/5ML
1500 SUSPENSION ORAL DAILY
Qty: 300 ML | Refills: 2 | Status: SHIPPED | OUTPATIENT
Start: 2024-10-20 | End: 2024-10-20

## 2024-10-20 RX ORDER — METHOCARBAMOL 500 MG/1
500 TABLET, FILM COATED ORAL 4 TIMES DAILY PRN
Qty: 10 TABLET | Refills: 0 | Status: SHIPPED | OUTPATIENT
Start: 2024-10-20 | End: 2024-10-20 | Stop reason: HOSPADM

## 2024-10-20 RX ORDER — TACROLIMUS 1 MG/1
2 CAPSULE ORAL EVERY 12 HOURS
Qty: 120 CAPSULE | Refills: 11 | Status: SHIPPED | OUTPATIENT
Start: 2024-10-20 | End: 2024-10-25

## 2024-10-20 RX ORDER — ATOVAQUONE 750 MG/5ML
1500 SUSPENSION ORAL DAILY
Qty: 300 ML | Refills: 2 | Status: SHIPPED | OUTPATIENT
Start: 2024-10-20 | End: 2025-01-12

## 2024-10-20 RX ORDER — ERGOCALCIFEROL 1.25 MG/1
50000 CAPSULE ORAL
Qty: 12 CAPSULE | Refills: 0 | Status: SHIPPED | OUTPATIENT
Start: 2024-10-25 | End: 2024-10-20

## 2024-10-20 RX ORDER — ERGOCALCIFEROL 1.25 MG/1
50000 CAPSULE ORAL
Qty: 12 CAPSULE | Refills: 0 | Status: SHIPPED | OUTPATIENT
Start: 2024-10-25

## 2024-10-20 RX ORDER — METHOCARBAMOL 500 MG/1
500 TABLET, FILM COATED ORAL 4 TIMES DAILY PRN
Qty: 10 TABLET | Refills: 0 | Status: SHIPPED | OUTPATIENT
Start: 2024-10-20 | End: 2024-10-20

## 2024-10-20 RX ORDER — DOXYCYCLINE HYCLATE 100 MG
100 TABLET ORAL EVERY 12 HOURS
Qty: 9 TABLET | Refills: 0 | Status: SHIPPED | OUTPATIENT
Start: 2024-10-20 | End: 2024-10-20

## 2024-10-20 RX ORDER — TACROLIMUS 1 MG/1
2 CAPSULE ORAL EVERY 12 HOURS
Qty: 120 CAPSULE | Refills: 11 | Status: SHIPPED | OUTPATIENT
Start: 2024-10-20 | End: 2024-10-20

## 2024-10-20 RX ADMIN — INSULIN ASPART 15 UNITS: 100 INJECTION, SOLUTION INTRAVENOUS; SUBCUTANEOUS at 08:10

## 2024-10-20 RX ADMIN — ACETAMINOPHEN 650 MG: 325 TABLET ORAL at 01:10

## 2024-10-20 RX ADMIN — CEFEPIME 2 G: 2 INJECTION, POWDER, FOR SOLUTION INTRAVENOUS at 12:10

## 2024-10-20 RX ADMIN — HEPARIN SODIUM 5000 UNITS: 5000 INJECTION INTRAVENOUS; SUBCUTANEOUS at 06:10

## 2024-10-20 RX ADMIN — INSULIN ASPART 15 UNITS: 100 INJECTION, SOLUTION INTRAVENOUS; SUBCUTANEOUS at 12:10

## 2024-10-20 RX ADMIN — CETIRIZINE HYDROCHLORIDE 10 MG: 10 TABLET, FILM COATED ORAL at 08:10

## 2024-10-20 RX ADMIN — DOXYCYCLINE HYCLATE 100 MG: 100 TABLET, COATED ORAL at 08:10

## 2024-10-20 RX ADMIN — PANTOPRAZOLE SODIUM 40 MG: 40 TABLET, DELAYED RELEASE ORAL at 08:10

## 2024-10-20 RX ADMIN — Medication 800 MG: at 08:10

## 2024-10-20 RX ADMIN — INSULIN GLARGINE 43 UNITS: 100 INJECTION, SOLUTION SUBCUTANEOUS at 08:10

## 2024-10-20 RX ADMIN — ATORVASTATIN CALCIUM 40 MG: 40 TABLET, FILM COATED ORAL at 08:10

## 2024-10-20 RX ADMIN — PREDNISONE 5 MG: 5 TABLET ORAL at 08:10

## 2024-10-20 RX ADMIN — TACROLIMUS 2 MG: 1 CAPSULE ORAL at 08:10

## 2024-10-20 RX ADMIN — CEFEPIME 2 G: 2 INJECTION, POWDER, FOR SOLUTION INTRAVENOUS at 08:10

## 2024-10-20 RX ADMIN — CALCITRIOL 0.5 MCG: 0.5 CAPSULE, LIQUID FILLED ORAL at 08:10

## 2024-10-20 RX ADMIN — Medication 2 TABLET: at 08:10

## 2024-10-20 NOTE — PROGRESS NOTES
"KIDNEY TRANSPLANT MEDICINE PROGRESS NOTE    Please refer to detailed progress note from 10/18 for complete details of this admission.    Interval history: feels very well. No concerns today. Very pleased with the normalization of the WBC    Past medical, surgical, family, social history reviewed & unchanged since admission.     I/O last 3 completed shifts:  In: 360 [P.O.:360]  Out: 400 [Urine:400]    VITALS:  height is 6' 2" (1.88 m) and weight is 95.1 kg (209 lb 10.5 oz). His temperature is 97.5 °F (36.4 °C). His blood pressure is 132/78 and his pulse is 91. His respiration is 20 and oxygen saturation is 92% (abnormal).       A&O x3, NAD.  Lungs CTA, unlabored.  Heart regular, no rub.  Abdomen soft, nontender, no masses.  Allograft nontender.  Edema: none.    Recent Labs   Lab 10/18/24  0641 10/19/24  0542 10/20/24  0517   WBC 0.94* 2.49* 6.08   HGB 12.4* 13.6* 12.7*   HCT 39.0* 42.9 39.5*    257 267       Recent Labs   Lab 10/18/24  0641 10/19/24  0542 10/20/24  0517    140 142   K 4.5 4.1 4.3    106 107   CO2 24 22* 25   BUN 17 13 15   CREATININE 1.2 1.2 1.2   CALCIUM 9.6 9.8 9.2   PHOS 1.6* 2.1* 2.4*     Recent Labs   Lab 10/17/24  0623 10/18/24  0641 10/19/24  0542   Tacrolimus Lvl 8.5 9.1 7.6       ASSESSMENT/PLAN:    Problems/plans as noted in the referenced progress note. Additional pertinent findings:  Neutropenia resolved'  no fever for 5 days  OFF MMF  So far ID work up negative  Neutropenia likely to medications  Initial fever possible virus versus other non identified pathogen  ID to decide duration of doxy and Cefepime and disposition for discharge  I spoke with patient, all information given to him'  all questions answered'  Education provided  '         CKD post kidney transplant  -Follow with strict I/Os, daily weights, BP (goal <140/90), daily labs.    -Avoid nephrotoxic agents when feasible (NSAIDs, IV contrast dye, Aminoglycoside-containing antibiotics)  -Renally dose all " appropriate medications, including antibiotics      Encounter for Monitoring Immunosuppression post Transplant  -Continue to trend immunosuppression levels.   -Monitor for med-related side effects or drug toxicity given immunosuppressant med with narrow therapeutic window.

## 2024-10-20 NOTE — DISCHARGE SUMMARY
[facility] - Transplant Stepdown  Kidney Transplant  Discharge Summary    Patient Name: Dejuan Diaz Jr.  MRN: [id number]  Admission Date: 10/14/2024  Hospital Length of Stay: 6 days  Discharge Date and Time:  10/20/2024 11:13 AM  Attending Physician: Al Martell MD   Discharging Provider: IVON Pradhan  Primary Care Provider: Val, Primary Doctor    HPI:   No notes on file  * No surgery found *     Hospital Course:    S/p LURT 7/16/24 for T2DM and HTN. Baseline Cr ~1.6 (but has been 1.1-1.2 this admit) biopsy 8/14 negative for rejection. Presented to OSH ER 10/14 with fever, sore throat, cough, myalgias. ANC 0 on admit. -Rec'd  x3 days (10/17,10/18/10/19 ) zarxio. Hematology following. Infectious work up negative to date, ID ordered more tests due to patient being a  and  recent visit to the [facility]. In-pt was on Cefepime and  Doxy 10/18   10/20 - WBC 6.08 at discharge - will d/c home on doxy oral for 7 days.   Discharge instructions reviewed by pharmacist, nursing and transplant coordinator.  Patient  verbalized understanding and are in agreement with discharge from hospital today.  Patient is medically stable for discharge. Patient will f/u with labs per transplant coordinator.      Needs labs in 1 week and clinic visit in 2 weeks        HOLDING Cellcept - monitor when to restart   Changed Bactrim to Atovaquone    Multiple lab testing in process    Length of Stay was longer than expected due to: Discharged as expected    Goals of Care Treatment Preferences:  Code Status: Full Code      Final Active Diagnoses:    Diagnosis Date Noted POA    PRINCIPAL PROBLEM:  Neutropenic fever [D70.9, R50.81] 10/14/2024 Yes    Type 1 diabetes mellitus with hyperosmolarity without coma [E10.69, E10.65, E87.0] 10/18/2024 Unknown    Type 1 diabetes mellitus with hyperglycemia [E10.65] 10/16/2024 Yes    Leukopenia [D72.819] 10/16/2024 Yes    Hypomagnesemia [E83.42] 10/15/2024 Yes    Hypophosphatemia  [E83.39] 10/15/2024 Yes    Insulin pump status [Z96.41] 07/18/2024 Not Applicable    Anemia of chronic disease [D63.8] 07/17/2024 Yes    At risk for opportunistic infections [Z91.89] 07/17/2024 Yes    Long-term use of immunosuppressant medication [Z79.60] 07/17/2024 Not Applicable    Prophylactic immunotherapy [Z29.89] 07/17/2024 Not Applicable    Status post kidney transplant [Z94.0] 07/17/2024 Not Applicable    Secondary renal hyperparathyroidism [N25.81] 06/13/2024 Yes    Hyperlipidemia [E78.5] 11/29/2023 Yes      Problems Resolved During this Admission:       Consults (From admission, onward)          Status Ordering Provider     Inpatient consult to Hematology/Oncology  Once        Provider:  (Not yet assigned)    Completed MILADYS EMERSON     Inpatient consult to Endocrinology  Once        Provider:  (Not yet assigned)    MILADYS Hicks     Inpatient consult to Infectious Diseases  Once        Provider:  (Not yet assigned)    MILADYS Hicks            Pending Diagnostic Studies:       Procedure Component Value Units Date/Time    Copper, serum [[id number]] Collected: 10/17/24 0623    Order Status: Sent Lab Status: In process Updated: 10/17/24 0645    Specimen: Blood     Parvovirus B19 antibody, IgG and IgM [[id number]] Collected: 10/17/24 0623    Order Status: Sent Lab Status: In process Updated: 10/17/24 0645    Specimen: Blood     West Nile antibodies, IgG and IgM [[id number]] Collected: 10/17/24 0623    Order Status: Sent Lab Status: In process Updated: 10/17/24 0645    Specimen: Blood           Significant Diagnostic Studies: Labs: BMP:   Recent Labs   Lab 10/19/24  0542 10/20/24  0517   * 141*    142   K 4.1 4.3    107   CO2 22* 25   BUN 13 15   CREATININE 1.2 1.2   CALCIUM 9.8 9.2   MG 1.7 1.7   , CMP   Recent Labs   Lab 10/19/24  0542 10/20/24  0517    142   K 4.1 4.3    107   CO2 22* 25   * 141*   BUN 13 15   CREATININE 1.2  1.2   CALCIUM 9.8 9.2   PROT 7.0  --    ALBUMIN 3.4*  3.4* 3.1*   BILITOT 1.6*  --    ALKPHOS 85  --    AST 36  --    ALT 28  --    ANIONGAP 12 10   , CBC   Recent Labs   Lab 10/19/24  0542 10/20/24  0517   WBC 2.49* 6.08   HGB 13.6* 12.7*   HCT 42.9 39.5*    267   , and All labs within the past 24 hours have been reviewed    Discharged Condition: stable    Disposition: Home or Self Care    Follow Up:    Patient Instructions:      Diet diabetic     Notify your health care provider if you experience any of the following:     Notify your health care provider if you experience any of the following:  increased confusion or weakness     Notify your health care provider if you experience any of the following:  persistent dizziness, light-headedness, or visual disturbances     Notify your health care provider if you experience any of the following:  worsening rash     Notify your health care provider if you experience any of the following:  severe persistent headache     Notify your health care provider if you experience any of the following:  difficulty breathing or increased cough     Notify your health care provider if you experience any of the following:  redness, tenderness, or signs of infection (pain, swelling, redness, odor or green/yellow discharge around incision site)     Notify your health care provider if you experience any of the following:  severe uncontrolled pain     Notify your health care provider if you experience any of the following:  persistent nausea and vomiting or diarrhea     Notify your health care provider if you experience any of the following:  temperature >100.4     Activity as tolerated     Medications:  Reconciled Home Medications:      Medication List        START taking these medications      atovaquone 750 mg/5 mL Susp oral liquid  Commonly known as: MEPRON  Take 10 mLs (1,500 mg total) by mouth once daily. STOP 1/12/25     doxycycline 100 MG tablet  Commonly known as:  VIBRA-TABS  Take 1 tablet (100 mg total) by mouth every 12 (twelve) hours. for 9 doses     ergocalciferol 50,000 unit Cap  Commonly known as: ERGOCALCIFEROL  Take 1 capsule (50,000 Units total) by mouth every 7 days.  Start taking on: 2024     methocarbamoL 500 MG Tab  Commonly known as: ROBAXIN  Take 1 tablet (500 mg total) by mouth 4 (four) times daily as needed (muscle spasms).            CHANGE how you take these medications      tacrolimus 1 MG Cap  Commonly known as: PROGRAF  Take 2 capsules (2 mg total) by mouth every 12 (twelve) hours.  What changed: how much to take            CONTINUE taking these medications      atorvastatin 40 MG tablet  Commonly known as: LIPITOR  Take 1 tablet (40 mg total) by mouth once daily.     calcitRIOL 0.5 MCG Cap  Commonly known as: ROCALTROL  Take 1 capsule (0.5 mcg total) by mouth once daily.     DEXCOM G6  MISC  by Misc.(Non-Drug; Combo Route) route.     DEXCOM G6 SENSOR Adelina  Generic drug: blood-glucose sensor  SMARTSI Each Topical Every 10 Days     DEXCOM G6 TRANSMITTER MISC  Dexcom G6 transmitter, See Instructions, uad with dexcom G6 sensor; change transmitter q 90 days, # 1 EA, 3 Refill(s), Pharmacy: [phone], 187, cm, 23 14:55:00 CDT, Height/Length Measured, 105.1, kg, 23 14:55:00 CDT, Weight Dosing     HumaLOG U-100 Insulin 100 unit/mL injection  Generic drug: insulin lispro  See Instructions, for use in insulin pump; max daily dose 100 units, # 90 mL, 1 Refill(s), Maintenance, Pharmacy: [facility], 187, cm, 23 11:24:00 CST, Height/Length Measured, 102.5, kg, 23 11:24:00 CST, Weight Dosing     magnesium oxide 400 mg (241.3 mg magnesium) tablet  Commonly known as: MAG-OX  Take 2 tablets (800 mg total) by mouth 2 (two) times daily.     multivitamin Tab  Take 1 tablet by mouth once daily.     OMNIPOD 5 G6 INTRO KIT (GEN 5) SUBQ  Omnipod 5 G6 Intro Kit (Gen 5), See Instructions, use as directed, # 1 EA,  0 Refill(s), Pharmacy: [phone], 187, cm, 12/30/22 9:14:00 CST, Height/Length Measured, 102.3, kg, 12/30/22 9:14:00 CST, Weight Dosing     ondansetron 4 MG Tbdl  Commonly known as: ZOFRAN-ODT  Take 1 tablet (4 mg total) by mouth every 8 (eight) hours as needed (nausea).     pantoprazole 40 MG tablet  Commonly known as: PROTONIX  Take 1 tablet (40 mg total) by mouth once daily.     PHOSPHA 250 NEUTRAL 250 mg Tab  Generic drug: k phos di & mono-sod phos mono  Take 2 tablets by mouth 3 (three) times daily.     predniSONE 5 MG tablet  Commonly known as: DELTASONE  Take by mouth daily: 20mg 7/19-7/25, 15mg 7/26-8/1, 10mg 8/2-8/8, 5mg 8/9-            STOP taking these medications      diazePAM 5 MG tablet  Commonly known as: VALIUM     mycophenolate 250 mg Cap  Commonly known as: CELLCEPT     sulfamethoxazole-trimethoprim 400-80mg 400-80 mg per tablet  Commonly known as: BACTRIM,SEPTRA            Time spent caring for patient (Greater than 1/2 spent in direct face-to-face contact): > 30 minutes    IVON Pradhan  Kidney Transplant  [facility] - Transplant Stepdown

## 2024-10-20 NOTE — PROGRESS NOTES
"[facility] - Transplant Stepdown  Endocrinology  Progress Note    Admit Date: 10/14/2024     Admit Date: 10/14/24     Reason for Consult: Management of T1DM, Hyperglycemia      Surgical Procedure and Date:  Status post kidney transplant 2024     Diabetes diagnosis year:  Over 10 years ago     Home Diabetes Medications:    Recently taken off of Omnipod 5 by primary endocrinologist in [place] and placed on the following  Toujeo 28 units once daily  Novolog ICR 1:15 (1 unit per 15g of carbs) TID with meals      How often checking glucose at home?  Dexcom   BG readings on regimen: mostly 250-300s  Hypoglycemia on the regimen?  No  Missed doses on regimen?  No     Diabetes Complications include:     Hyperglycemia and Diabetic nephropathy       Complicating diabetes co morbidities:   ESRD        HPI:   Patient is a 43 y.o. male with T1DM, CKD s/p living donor kidney txp  on 24 for DM/HTN . He has an insulin pump. He presented to OSH ER on 10/14 with complaints of fever, sore throat, cough, myalgias, and fevers. He was transferred to [facility] for neutropenic fever. Plan for CMV PCR, RIP, throat culture, transplant ID consult, IV antibiotics. Endocrinology consulted for management of T1DM.        Interval HPI:   No acute events overnight. Patient in room 94776/84843 A. Blood glucose stable. BG at goal on current insulin regimen (basal, prandial, sliding scale). Steroid use- prednisone  Renal function- Normal   Vasopressors-  None      Diet diabetic 2000 Calories (up to 75 gm per meal); Standard Tray      Eatin%  Nausea: No  Hypoglycemia and intervention: No  Fever: No  TPN and/or TF: No    /78   Pulse 91   Temp 97.5 °F (36.4 °C)   Resp 20   Ht 6' 2" (1.88 m)   Wt 95.1 kg (209 lb 10.5 oz)   SpO2 (!) 92%   BMI 26.92 kg/m²     Labs Reviewed and Include    Recent Labs   Lab 10/20/24  0517   *   CALCIUM 9.2   ALBUMIN 3.1*      K 4.3   CO2 25      BUN 15   CREATININE 1.2     Lab Results " "  Component Value Date    WBC 6.08 10/20/2024    HGB 12.7 (L) 10/20/2024    HCT 39.5 (L) 10/20/2024    MCV 93 10/20/2024     10/20/2024     Recent Labs   Lab 10/19/24  0542   TSH 0.967   FREET4 0.97     Lab Results   Component Value Date    HGBA1C 8.5 (H) 10/17/2024       Nutritional status:   Body mass index is 26.92 kg/m².  Lab Results   Component Value Date    ALBUMIN 3.1 (L) 10/20/2024    ALBUMIN 3.4 (L) 10/19/2024    ALBUMIN 3.4 (L) 10/19/2024     No results found for: "PREALBUMIN"    Estimated Creatinine Clearance: 92.3 mL/min (based on SCr of 1.2 mg/dL).    Accu-Checks  Recent Labs     10/17/24  2243 10/18/24  0900 10/18/24  1308 10/18/24  1803 10/18/24  2037 10/19/24  0855 10/19/24  1146 10/19/24  1621 10/19/24  2106 10/20/24  0845   POCTGLUCOSE 129* 208* 212* 189* 129* 176* 150* 113* 180* 156*       Current Medications and/or Treatments Impacting Glycemic Control  Immunotherapy:    Immunosuppressants           Stop Route Frequency     tacrolimus capsule 2 mg         -- Oral 2 times daily          Steroids:   Hormones (From admission, onward)      Start     Stop Route Frequency Ordered    10/15/24 0900  predniSONE tablet 5 mg         -- Oral Daily 10/14/24 2304    10/14/24 2304  melatonin tablet 6 mg         -- Oral Nightly PRN 10/14/24 2304          Pressors:    Autonomic Drugs (From admission, onward)      None          Hyperglycemia/Diabetes Medications:   Antihyperglycemics (From admission, onward)      Start     Stop Route Frequency Ordered    10/18/24 0900  insulin glargine U-100 (Lantus) pen 43 Units         -- SubQ Daily 10/17/24 0842    10/16/24 1200  insulin aspart U-100 pen 15 Units         -- SubQ 3 times daily with meals 10/16/24 1147    10/15/24 2132  insulin aspart U-100 pen 0-10 Units         -- SubQ Before meals & nightly PRN 10/15/24 2032            ASSESSMENT and PLAN    Renal/  Status post kidney transplant  Managed per primary team  Optimize BG control      Oncology  * " Neutropenic fever  Managed per primary team          Endocrine  Type 1 diabetes mellitus with hyperglycemia  BG goal 140-180    Type 1 diabetes.  History of kidney transplant.  BG improving on adjusted regimen.  Will continue to monitor    Plan:  -continue Lantus to 43 units daily    -Continue Novolog 15 units TID with meals   -Continue Moderate Dose Correction Scale  -BG monitoring ac/hs       ** Please call Endocrine for any BG related issues **    Lab Results   Component Value Date    HGBA1C 8.5 (H) 10/17/2024       Notified of discharge today.  Discussed with patient.  He plans to return on Omnipod and under care of his local endo provider.            Mac Roberto PA-C  Endocrinology  [facility] - Transplant Stepdown

## 2024-10-20 NOTE — HOSPITAL COURSE
S/p LURT 7/16/24 for T2DM and HTN. Baseline Cr ~1.6 (but has been 1.1-1.2 this admit) biopsy 8/14 negative for rejection. Presented to OSH ER 10/14 with fever, sore throat, cough, myalgias. ANC 0 on admit. -Rec'd  x3 days (10/17,10/18/10/19 ) zarxio. Hematology following. Infectious work up negative to date, ID ordered more tests due to patient being a  and  recent visit to the [place]. In-pt was on Cefepime and  Doxy 10/18   10/20 - WBC 6.08 at discharge - will d/c home on doxy oral for 7 days.   Discharge instructions reviewed by pharmacist, nursing and transplant coordinator.  Patient  verbalized understanding and are in agreement with discharge from hospital today.  Patient is medically stable for discharge. Patient will f/u with labs per transplant coordinator.      Needs labs in 1 week and clinic visit in 2 weeks        HOLDING Cellcept - monitor when to restart   Changed Bactrim to Atovaquone    Multiple lab testing in process

## 2024-10-20 NOTE — PLAN OF CARE
-Patient AAOX4  -Patient is afebrile.  -Patient has no c/o pain. Pt ambulates independently.   -Patient received Cefepime overnight   -Bedtime  received 1 unit   -Patient received PRN tylenol for mild head ache

## 2024-10-20 NOTE — SUBJECTIVE & OBJECTIVE
"Interval HPI:   No acute events overnight. Patient in room 82203/33012 A. Blood glucose stable. BG at goal on current insulin regimen (basal, prandial, sliding scale). Steroid use- prednisone  Renal function- Normal   Vasopressors-  None      Diet diabetic 2000 Calories (up to 75 gm per meal); Standard Tray      Eatin%  Nausea: No  Hypoglycemia and intervention: No  Fever: No  TPN and/or TF: No    /78   Pulse 91   Temp 97.5 °F (36.4 °C)   Resp 20   Ht 6' 2" (1.88 m)   Wt 95.1 kg (209 lb 10.5 oz)   SpO2 (!) 92%   BMI 26.92 kg/m²     Labs Reviewed and Include    Recent Labs   Lab 10/20/24  0517   *   CALCIUM 9.2   ALBUMIN 3.1*      K 4.3   CO2 25      BUN 15   CREATININE 1.2     Lab Results   Component Value Date    WBC 6.08 10/20/2024    HGB 12.7 (L) 10/20/2024    HCT 39.5 (L) 10/20/2024    MCV 93 10/20/2024     10/20/2024     Recent Labs   Lab 10/19/24  0542   TSH 0.967   FREET4 0.97     Lab Results   Component Value Date    HGBA1C 8.5 (H) 10/17/2024       Nutritional status:   Body mass index is 26.92 kg/m².  Lab Results   Component Value Date    ALBUMIN 3.1 (L) 10/20/2024    ALBUMIN 3.4 (L) 10/19/2024    ALBUMIN 3.4 (L) 10/19/2024     No results found for: "PREALBUMIN"    Estimated Creatinine Clearance: 92.3 mL/min (based on SCr of 1.2 mg/dL).    Accu-Checks  Recent Labs     10/17/24  2243 10/18/24  0900 10/18/24  1308 10/18/24  1803 10/18/24  2037 10/19/24  0855 10/19/24  1146 10/19/24  1621 10/19/24  2106 10/20/24  0845   POCTGLUCOSE 129* 208* 212* 189* 129* 176* 150* 113* 180* 156*       Current Medications and/or Treatments Impacting Glycemic Control  Immunotherapy:    Immunosuppressants           Stop Route Frequency     tacrolimus capsule 2 mg         -- Oral 2 times daily          Steroids:   Hormones (From admission, onward)      Start     Stop Route Frequency Ordered    10/15/24 0900  predniSONE tablet 5 mg         -- Oral Daily 10/14/24 2304    10/14/24 2304  " melatonin tablet 6 mg         -- Oral Nightly PRN 10/14/24 2304          Pressors:    Autonomic Drugs (From admission, onward)      None          Hyperglycemia/Diabetes Medications:   Antihyperglycemics (From admission, onward)      Start     Stop Route Frequency Ordered    10/18/24 0900  insulin glargine U-100 (Lantus) pen 43 Units         -- SubQ Daily 10/17/24 0842    10/16/24 1200  insulin aspart U-100 pen 15 Units         -- SubQ 3 times daily with meals 10/16/24 1147    10/15/24 2132  insulin aspart U-100 pen 0-10 Units         -- SubQ Before meals & nightly PRN 10/15/24 2032

## 2024-10-20 NOTE — PROGRESS NOTES
Discharge Medication Note:    Hospital Course:  Admitted with fever and neutropenia.  Infectious w/u sent including follow up serologies: Lyme, Ehrlichia (neg), and rickettsia Abs (neg), and adenovirus PCR, parvovirus  - follow up karius (neg) given occupation and travel hx. Empiric cefepime and doxy started and will discharge on doxy for total 7 days.  WBC improved with Zarxio, MMF on hold and Bactrim changed to atova.     Met with Dejuan Diaz Jr. at discharge to review discharge medications and to update the blue medication card.           Medication List        START taking these medications      atovaquone 750 mg/5 mL Susp oral liquid  Commonly known as: MEPRON  Take 10 mLs (1,500 mg total) by mouth once daily. STOP 1/12/25     doxycycline 100 MG tablet  Commonly known as: VIBRA-TABS  Take 1 tablet (100 mg total) by mouth every 12 (twelve) hours. for 9 doses     ergocalciferol 50,000 unit Cap  Commonly known as: ERGOCALCIFEROL  Take 1 capsule (50,000 Units total) by mouth every 7 days.  Start taking on: October 25, 2024            CHANGE how you take these medications      tacrolimus 1 MG Cap  Commonly known as: PROGRAF  Take 2 capsules (2 mg total) by mouth every 12 (twelve) hours.  What changed: how much to take            CONTINUE taking these medications      atorvastatin 40 MG tablet  Commonly known as: LIPITOR  Take 1 tablet (40 mg total) by mouth once daily.     calcitRIOL 0.5 MCG Cap  Commonly known as: ROCALTROL  Take 1 capsule (0.5 mcg total) by mouth once daily.     DEXCOM G6  MISC     DEXCOM G6 SENSOR Adelina  Generic drug: blood-glucose sensor     DEXCOM G6 TRANSMITTER MISC     HumaLOG U-100 Insulin 100 unit/mL injection  Generic drug: insulin lispro     magnesium oxide 400 mg (241.3 mg magnesium) tablet  Commonly known as: MAG-OX  Take 2 tablets (800 mg total) by mouth 2 (two) times daily.     multivitamin Tab  Take 1 tablet by mouth once daily.     OMNIPOD 5 G6 INTRO KIT (GEN 5) SUBQ      ondansetron 4 MG Tbdl  Commonly known as: ZOFRAN-ODT  Take 1 tablet (4 mg total) by mouth every 8 (eight) hours as needed (nausea).     pantoprazole 40 MG tablet  Commonly known as: PROTONIX  Take 1 tablet (40 mg total) by mouth once daily.     PHOSPHA 250 NEUTRAL 250 mg Tab  Generic drug: k phos di & mono-sod phos mono  Take 2 tablets by mouth 3 (three) times daily.     predniSONE 5 MG tablet  Commonly known as: DELTASONE  Take by mouth daily: 20mg 7/19-7/25, 15mg 7/26-8/1, 10mg 8/2-8/8, 5mg 8/9-            STOP taking these medications      diazePAM 5 MG tablet  Commonly known as: VALIUM     mycophenolate 250 mg Cap  Commonly known as: CELLCEPT     sulfamethoxazole-trimethoprim 400-80mg 400-80 mg per tablet  Commonly known as: BACTRIM,SEPTRA               Where to Get Your Medications        These medications were sent to [facility] [address] [address]      Phone: [phone]   atovaquone 750 mg/5 mL Susp oral liquid  tacrolimus 1 MG Cap       These medications were sent to [facility] - [place], [place] - [address]  [address], [place] [address]      Phone: [phone]   doxycycline 100 MG tablet  ergocalciferol 50,000 unit Cap            The following medications have been placed on HOLD and should be restarted in the outpatient setting (when appropriate): MMF, Bactrim    Dejuan Diaz Jr. verbalized understanding and had the opportunity to ask questions.

## 2024-10-21 ENCOUNTER — PATIENT MESSAGE (OUTPATIENT)
Dept: TRANSPLANT | Facility: CLINIC | Age: 44
End: 2024-10-21
Payer: COMMERCIAL

## 2024-10-21 LAB
HADV DNA # SPEC NAA+PROBE: <1000 CPY/ML
HADV DNA SPEC NAA+PROBE-LOG#: <3 LOG CPY/ML
HADV DNA SPEC QL NAA+PROBE: NOT DETECTED
MISCELLANEOUS TEST NAME: NORMAL
PARVOVIRUS B19 ABS IGG & IGM: ABNORMAL
PARVOVIRUS B19 IGG ANTIBODY: POSITIVE
PARVOVIRUS B19 IGM ANTIBODY: NEGATIVE
POCT GLUCOSE: 156 MG/DL (ref 70–110)
REFERENCE LAB: NORMAL
SPECIMEN SOURCE: NORMAL
SPECIMEN TYPE: NORMAL
TEST RESULT: NORMAL
WEST NILE VIRUS INTERPRETATION: NORMAL
WNV IGG SER QL IA: NEGATIVE
WNV IGM SER QL IA: NEGATIVE

## 2024-10-22 LAB
B19V DNA # SPEC NAA+PROBE: NOT DETECTED COPIES/ML
COPPER SERPL-MCNC: 1236 UG/L (ref 665–1480)
PARVOVIRUS B19 DNA, QUANT: NOT DETECTED LOG CPS/ML
SPECIMEN SOURCE: NORMAL

## 2024-10-24 ENCOUNTER — TELEPHONE (OUTPATIENT)
Dept: TRANSPLANT | Facility: CLINIC | Age: 44
End: 2024-10-24
Payer: COMMERCIAL

## 2024-10-24 ENCOUNTER — LAB VISIT (OUTPATIENT)
Dept: LAB | Facility: HOSPITAL | Age: 44
End: 2024-10-24
Attending: INTERNAL MEDICINE
Payer: COMMERCIAL

## 2024-10-24 DIAGNOSIS — E83.42 HYPOMAGNESEMIA: ICD-10-CM

## 2024-10-24 DIAGNOSIS — Z94.0 KIDNEY REPLACED BY TRANSPLANT: ICD-10-CM

## 2024-10-24 DIAGNOSIS — Z94.0 KIDNEY REPLACED BY TRANSPLANT: Primary | ICD-10-CM

## 2024-10-24 LAB
ALBUMIN SERPL BCP-MCNC: 4 G/DL (ref 3.5–5.2)
ALBUMIN SERPL BCP-MCNC: 4 G/DL (ref 3.5–5.2)
ALP SERPL-CCNC: 96 U/L (ref 40–150)
ALT SERPL W/O P-5'-P-CCNC: 93 U/L (ref 10–44)
ANION GAP SERPL CALC-SCNC: 13 MMOL/L (ref 8–16)
AST SERPL-CCNC: 44 U/L (ref 10–40)
BASOPHILS NFR BLD: 0 % (ref 0–1.9)
BILIRUB DIRECT SERPL-MCNC: 0.2 MG/DL (ref 0.1–0.3)
BILIRUB SERPL-MCNC: 0.6 MG/DL (ref 0.1–1)
BUN SERPL-MCNC: 24 MG/DL (ref 6–20)
CALCIUM SERPL-MCNC: 9.8 MG/DL (ref 8.7–10.5)
CHLORIDE SERPL-SCNC: 102 MMOL/L (ref 95–110)
CO2 SERPL-SCNC: 23 MMOL/L (ref 23–29)
CREAT SERPL-MCNC: 1.5 MG/DL (ref 0.5–1.4)
DIFFERENTIAL METHOD BLD: ABNORMAL
EOSINOPHIL NFR BLD: 2 % (ref 0–8)
ERYTHROCYTE [DISTWIDTH] IN BLOOD BY AUTOMATED COUNT: 13.5 % (ref 11.5–14.5)
EST. GFR  (NO RACE VARIABLE): 58.9 ML/MIN/1.73 M^2
GLUCOSE SERPL-MCNC: 291 MG/DL (ref 70–110)
HCT VFR BLD AUTO: 42.8 % (ref 40–54)
HGB BLD-MCNC: 14.2 G/DL (ref 14–18)
IMM GRANULOCYTES # BLD AUTO: ABNORMAL K/UL (ref 0–0.04)
IMM GRANULOCYTES NFR BLD AUTO: ABNORMAL % (ref 0–0.5)
LYMPHOCYTES NFR BLD: 21 % (ref 18–48)
MAGNESIUM SERPL-MCNC: 1.9 MG/DL (ref 1.6–2.6)
MCH RBC QN AUTO: 29.4 PG (ref 27–31)
MCHC RBC AUTO-ENTMCNC: 33.2 G/DL (ref 32–36)
MCV RBC AUTO: 89 FL (ref 82–98)
METAMYELOCYTES NFR BLD MANUAL: 3 %
MONOCYTES NFR BLD: 3 % (ref 4–15)
NEUTROPHILS NFR BLD: 60 % (ref 38–73)
NEUTS BAND NFR BLD MANUAL: 11 %
NRBC BLD-RTO: 0 /100 WBC
PHOSPHATE SERPL-MCNC: 2.6 MG/DL (ref 2.7–4.5)
PLATELET # BLD AUTO: 226 K/UL (ref 150–450)
PLATELET BLD QL SMEAR: ABNORMAL
PMV BLD AUTO: 9.6 FL (ref 9.2–12.9)
POTASSIUM SERPL-SCNC: 4.8 MMOL/L (ref 3.5–5.1)
PROT SERPL-MCNC: 7.5 G/DL (ref 6–8.4)
RBC # BLD AUTO: 4.83 M/UL (ref 4.6–6.2)
SODIUM SERPL-SCNC: 138 MMOL/L (ref 136–145)
WBC # BLD AUTO: 5.42 K/UL (ref 3.9–12.7)

## 2024-10-24 PROCEDURE — 84460 ALANINE AMINO (ALT) (SGPT): CPT | Mod: TXP | Performed by: INTERNAL MEDICINE

## 2024-10-24 PROCEDURE — 36415 COLL VENOUS BLD VENIPUNCTURE: CPT | Mod: TXP | Performed by: INTERNAL MEDICINE

## 2024-10-24 PROCEDURE — 85027 COMPLETE CBC AUTOMATED: CPT | Mod: TXP | Performed by: INTERNAL MEDICINE

## 2024-10-24 PROCEDURE — 84450 TRANSFERASE (AST) (SGOT): CPT | Mod: NTX | Performed by: INTERNAL MEDICINE

## 2024-10-24 PROCEDURE — 80069 RENAL FUNCTION PANEL: CPT | Mod: NTX | Performed by: INTERNAL MEDICINE

## 2024-10-24 PROCEDURE — 85007 BL SMEAR W/DIFF WBC COUNT: CPT | Mod: TXP | Performed by: INTERNAL MEDICINE

## 2024-10-24 PROCEDURE — 87799 DETECT AGENT NOS DNA QUANT: CPT | Mod: TXP | Performed by: INTERNAL MEDICINE

## 2024-10-24 PROCEDURE — 80197 ASSAY OF TACROLIMUS: CPT | Mod: NTX | Performed by: INTERNAL MEDICINE

## 2024-10-24 PROCEDURE — 83735 ASSAY OF MAGNESIUM: CPT | Mod: TXP | Performed by: INTERNAL MEDICINE

## 2024-10-24 NOTE — TELEPHONE ENCOUNTER
----- Message from Marika Kahn sent at 10/24/2024 11:19 AM CDT -----    ----- Message -----  From: Bella Purdy DO  Sent: 10/24/2024  11:11 AM CDT  To: [facility] Post-Kidney Transplant Clinical    Cont to hold Cellcept at this time.  Graft function at higher end of his normal kidney function.  Recheck CBC with diff, LFT and UPC in 2 weeks  BK and FK pending

## 2024-10-25 DIAGNOSIS — Z76.82 AWAITING ORGAN TRANSPLANT: ICD-10-CM

## 2024-10-25 LAB — TACROLIMUS BLD-MCNC: 5.4 NG/ML (ref 5–15)

## 2024-10-25 RX ORDER — TACROLIMUS 1 MG/1
CAPSULE ORAL
Qty: 150 CAPSULE | Refills: 11 | Status: SHIPPED | OUTPATIENT
Start: 2024-10-25 | End: 2025-10-25

## 2024-10-25 NOTE — TELEPHONE ENCOUNTER
----- Message from Bella Purdy DO sent at 10/25/2024 12:02 PM CDT -----  Low FK. Increase FK to 3/2 from 2 BID

## 2024-10-29 ENCOUNTER — PATIENT MESSAGE (OUTPATIENT)
Dept: TRANSPLANT | Facility: CLINIC | Age: 44
End: 2024-10-29
Payer: COMMERCIAL

## 2024-11-01 ENCOUNTER — TELEPHONE (OUTPATIENT)
Dept: UROLOGY | Facility: CLINIC | Age: 44
End: 2024-11-01
Payer: COMMERCIAL

## 2024-11-04 ENCOUNTER — PATIENT MESSAGE (OUTPATIENT)
Dept: TRANSPLANT | Facility: CLINIC | Age: 44
End: 2024-11-04

## 2024-11-04 ENCOUNTER — OFFICE VISIT (OUTPATIENT)
Dept: TRANSPLANT | Facility: CLINIC | Age: 44
End: 2024-11-04
Payer: COMMERCIAL

## 2024-11-04 ENCOUNTER — PROCEDURE VISIT (OUTPATIENT)
Dept: UROLOGY | Facility: CLINIC | Age: 44
End: 2024-11-04
Payer: COMMERCIAL

## 2024-11-04 VITALS
TEMPERATURE: 99 F | DIASTOLIC BLOOD PRESSURE: 77 MMHG | RESPIRATION RATE: 18 BRPM | HEART RATE: 77 BPM | SYSTOLIC BLOOD PRESSURE: 142 MMHG

## 2024-11-04 VITALS
HEIGHT: 74 IN | RESPIRATION RATE: 18 BRPM | HEART RATE: 73 BPM | BODY MASS INDEX: 27.27 KG/M2 | TEMPERATURE: 97 F | SYSTOLIC BLOOD PRESSURE: 134 MMHG | WEIGHT: 212.5 LBS | OXYGEN SATURATION: 100 % | DIASTOLIC BLOOD PRESSURE: 76 MMHG

## 2024-11-04 DIAGNOSIS — Z94.0 KIDNEY REPLACED BY TRANSPLANT: ICD-10-CM

## 2024-11-04 DIAGNOSIS — Z94.0 STATUS POST KIDNEY TRANSPLANT: Primary | ICD-10-CM

## 2024-11-04 DIAGNOSIS — Z79.60 LONG-TERM USE OF IMMUNOSUPPRESSANT MEDICATION: ICD-10-CM

## 2024-11-04 PROCEDURE — 99215 OFFICE O/P EST HI 40 MIN: CPT | Mod: S$GLB,,, | Performed by: NURSE PRACTITIONER

## 2024-11-04 PROCEDURE — 99999 PR PBB SHADOW E&M-EST. PATIENT-LVL IV: CPT | Mod: PBBFAC,,, | Performed by: NURSE PRACTITIONER

## 2024-11-04 PROCEDURE — 52000 CYSTOURETHROSCOPY: CPT | Mod: NTX,S$GLB,, | Performed by: UROLOGY

## 2024-11-04 RX ORDER — LIDOCAINE HYDROCHLORIDE 20 MG/ML
JELLY TOPICAL
Status: COMPLETED | OUTPATIENT
Start: 2024-11-04 | End: 2024-11-04

## 2024-11-04 RX ADMIN — LIDOCAINE HYDROCHLORIDE 5 ML: 20 JELLY TOPICAL at 02:11

## 2024-11-04 NOTE — Clinical Note
Marika,  Mr. Diaz stated he send a copy of his insurance approval on [employer] and was wondering when he would be able to start the infusion. He also said that his Endocrinology is starting on Afrezza inhaler to help with his diabetes--awaiting insurance approval.

## 2024-11-04 NOTE — PATIENT INSTRUCTIONS
What to Expect After a Cystoscopy  For the next 24-48 hours, you may feel a mild burning when you urinate. This burning is normal and expected. Drink plenty of water to dilute the urine to help relieve the burning sensation. You may also see a small amount of blood in your urine after the procedure.    Unless you are already taking antibiotics, you may be given an antibiotic after the test to prevent infection.    Signs and Symptoms to Report  Call the [facility] at [phone] if you develop any of the following:  Fever of 101 degrees or higher  Chills or persistent bleeding  Inability to urinate

## 2024-11-04 NOTE — LETTER
November 4, 2024        Jose Mckinney  [address] [facility] [address]  [address]  [place] [address]  Phone: [phone]  Fax: [phone]             [facility] 1st Fl  [address]  [facility] [address]  Phone: [phone]   Patient: Dejuan Diaz Jr.   MR Number: [id number]   YOB: 1980   Date of Visit: 11/4/2024       Dear Dr. Jose Mckinney    Thank you for referring Dejuan Diaz to me for evaluation. Attached you will find relevant portions of my assessment and plan of care.    If you have questions, please do not hesitate to call me. I look forward to following Dejuan Diaz along with you.    Sincerely,    Angie Grace NP    Enclosure    If you would like to receive this communication electronically, please contact [email or web address] or [phone] to request [employer] access.    [employer] is a tool which provides read-only access to select patient information with whom you have a relationship. Its easy to use and provides real time access to review your patients record including encounter summaries, notes, results, and demographic information.    If you feel you have received this communication in error or would no longer like to receive these types of communications, please e-mail [email or web address]

## 2024-11-04 NOTE — PROCEDURES
Cystoscopy    Date/Time: 11/4/2024 1:30 PM    Performed by: Levy Tomas MD  Authorized by: Levy Tomas MD      Office Cystoscopy Procedure Note    Date of Procedure: 11/04/2024    Indication:   Status post renal transplant       Informed consent:  The risks, benefits, complications, treatment options, and expected outcomes were discussed with the patient. The patient concurred with the proposed plan and provided informed consent.     Anesthesia: Lidocaine jelly 2%     Antibiotic prophylaxis: None     Procedure:  The patient was placed in the lithotomy position, was prepped and draped in the usual manner using sterile technique, and 2% lidocaine jelly instilled into the urethra.  A procedural timeout was performed identifying the patient, all in attendance were in agreement, including the patient. A 17 F flexible cystoscope was then inserted into the urethra and the urethra and bladder carefully examined.  The following findings were noted:     Findings:   1. Normal anterior urethra without evidence of strictures or tumors   2. Prostatic urethra open without signs of obstruction  3. Bladder free of masses, tumors, lesions.  Ureteral orifices in orthotopic position bilaterally.  In the left lateral dome is the transplant ureteral orifice with the eversion of the mucosa and some periureteral edema, no evidence for urothelial carcinoma        Specimens: None   Complications: None; patient tolerated the procedure well          Disposition: Home after brief observation   Condition: Stable     Assessment:  43-year-old male status post renal transplant    Plan:  Follow up in 6 months with a repeat ultrasound    Attending Attestation:      I personally performed the procedure.     Levy Tomas  2:21 PM  11/04/2024

## 2024-11-07 ENCOUNTER — OFFICE VISIT (OUTPATIENT)
Dept: NEPHROLOGY | Facility: CLINIC | Age: 44
End: 2024-11-07
Payer: COMMERCIAL

## 2024-11-07 VITALS — DIASTOLIC BLOOD PRESSURE: 76 MMHG | SYSTOLIC BLOOD PRESSURE: 116 MMHG

## 2024-11-07 DIAGNOSIS — N25.81 SECONDARY RENAL HYPERPARATHYROIDISM: ICD-10-CM

## 2024-11-07 DIAGNOSIS — I10 PRIMARY HYPERTENSION: ICD-10-CM

## 2024-11-07 DIAGNOSIS — Z94.0 STATUS POST KIDNEY TRANSPLANT: Primary | ICD-10-CM

## 2024-11-07 DIAGNOSIS — E10.21 TYPE 1 DIABETES MELLITUS WITH NEPHROPATHY: ICD-10-CM

## 2024-11-07 PROCEDURE — 99999 PR PBB SHADOW E&M-EST. PATIENT-LVL III: CPT | Mod: PBBFAC,,, | Performed by: INTERNAL MEDICINE

## 2024-11-07 PROCEDURE — 99214 OFFICE O/P EST MOD 30 MIN: CPT | Mod: S$GLB,,, | Performed by: INTERNAL MEDICINE

## 2024-11-07 RX ORDER — INSULIN HUMAN 4-8-12(60)
KIT INHALATION
COMMUNITY
Start: 2024-10-16

## 2024-11-07 NOTE — PROGRESS NOTES
Subjective:       Patient ID: Dejuan Diaz Jr. is a 43 y.o. White male who presents for return patient evaluation for chronic renal failure.      He had COVID in April 2021.  He is s/p transplant.  He did have leucopenia and is now on a G7 with a pump.  He does report he gets headaches occasionally.      Review of Systems   Constitutional:  Positive for fatigue. Negative for appetite change, chills and fever.   Eyes:  Negative for visual disturbance.   Respiratory:  Positive for shortness of breath (mild with exertion). Negative for cough.    Cardiovascular:  Positive for chest pain (at times). Negative for leg swelling.   Gastrointestinal:  Positive for nausea. Negative for diarrhea and vomiting.   Genitourinary:  Negative for difficulty urinating, dysuria and hematuria.        ED   Musculoskeletal:  Positive for gait problem (occasional leg heaviness). Negative for myalgias.   Skin:  Negative for rash.   Neurological:  Positive for headaches.   Psychiatric/Behavioral:  Negative for sleep disturbance. The patient is nervous/anxious.        The past medical, family and social histories were reviewed for this encounter.    /76 (BP Location: Right arm, Patient Position: Sitting)     Objective:      Physical Exam  Vitals reviewed.   Constitutional:       General: He is not in acute distress.     Appearance: He is well-developed.   HENT:      Head: Normocephalic and atraumatic.   Eyes:      General: No scleral icterus.     Conjunctiva/sclera: Conjunctivae normal.   Neck:      Vascular: No JVD.   Cardiovascular:      Rate and Rhythm: Normal rate and regular rhythm.      Heart sounds: Normal heart sounds. No murmur heard.     No friction rub. No gallop.   Pulmonary:      Effort: Pulmonary effort is normal. No respiratory distress.      Breath sounds: Normal breath sounds. No wheezing.   Abdominal:      General: Bowel sounds are normal. There is no distension.      Palpations: Abdomen is soft.      Tenderness:  There is no abdominal tenderness.   Musculoskeletal:      Right lower leg: No edema.      Left lower leg: No edema.   Skin:     General: Skin is warm and dry.      Findings: No rash.   Neurological:      Mental Status: He is alert and oriented to person, place, and time.   Psychiatric:         Mood and Affect: Mood normal.         Behavior: Behavior normal.        Assessment:       1. Status post kidney transplant    2. Type 1 diabetes mellitus with nephropathy    3. Primary hypertension    4. Secondary renal hyperparathyroidism          Lab Results   Component Value Date    CREATININE 1.5 (H) 10/24/2024    BUN 24 (H) 10/24/2024     10/24/2024    K 4.8 10/24/2024     10/24/2024    CO2 23 10/24/2024     Lab Results   Component Value Date    .1 (H) 07/16/2024    CALCIUM 9.8 10/24/2024    PHOS 2.6 (L) 10/24/2024     Lab Results   Component Value Date    HCT 42.8 10/24/2024     Prot/Creat Ratio, Urine   Date Value Ref Range Status   10/24/2024 0.79 (H) 0.00 - 0.20 Final   09/16/2024 0.29 (H) 0.00 - 0.20 Final   08/12/2024 0.18 0.00 - 0.20 Final       Plan:   Return to clinic in 4 months.   Labs for next visit include transplant labs per standing orders.  Baseline creatinine is 1.4-1.6.  UPC is 0.8.  PTH is 342 with a calcium of 9.8.  Continue calcitriol.  Renal US shows R 11.4 cm, L 12.2 cm.  Please avoid or minimize all NSAIDS (ibuprofen, motrin, aleve, indocin, naprosyn) to minimize the risk to your kidneys.  Aspirin in a dose of 325 mg or less a day is likely the safest with regards to kidney function.  If you are able to take aspirin and do not have any bleeding problems or ulcers, this may be your best therapy.  Alternatively, acetaminophen (Tylenol) is the safer than NSAIDs with regards to kidney function.  I would ask you take this as directed on the bottle.  It is best to stay under 2 grams a day (4-500 mg tablets a day maximum).  It sounds like KTM is considering changing him to  belatacept.    KFRE 2-Year: 1.4% at 10/24/2024  8:37 AM  Calculated from:  Serum Creatinine: 1.5 mg/dL at 10/24/2024  8:37 AM  Urine Albumin Creatinine Ratio: 2,819.6 ug/mg at 2/23/2024  8:49 AM  Age: 43 years  Sex: Male at 10/24/2024  8:37 AM  Has CKD-3 to CKD-5: No    KFRE 5-Year: 4.4% at 10/24/2024  8:37 AM  Calculated from:  Serum Creatinine: 1.5 mg/dL at 10/24/2024  8:37 AM  Urine Albumin Creatinine Ratio: 2,819.6 ug/mg at 2/23/2024  8:49 AM  Age: 43 years  Sex: Male at 10/24/2024  8:37 AM  Has CKD-3 to CKD-5: No

## 2024-11-11 ENCOUNTER — LAB VISIT (OUTPATIENT)
Dept: LAB | Facility: HOSPITAL | Age: 44
End: 2024-11-11
Attending: INTERNAL MEDICINE
Payer: COMMERCIAL

## 2024-11-11 ENCOUNTER — PATIENT MESSAGE (OUTPATIENT)
Dept: TRANSPLANT | Facility: CLINIC | Age: 44
End: 2024-11-11
Payer: COMMERCIAL

## 2024-11-11 DIAGNOSIS — E83.42 HYPOMAGNESEMIA: ICD-10-CM

## 2024-11-11 DIAGNOSIS — Z94.0 KIDNEY REPLACED BY TRANSPLANT: Primary | ICD-10-CM

## 2024-11-11 DIAGNOSIS — Z94.0 KIDNEY REPLACED BY TRANSPLANT: ICD-10-CM

## 2024-11-11 LAB
ALBUMIN SERPL BCP-MCNC: 3.7 G/DL (ref 3.5–5.2)
ALBUMIN SERPL BCP-MCNC: 3.7 G/DL (ref 3.5–5.2)
ALP SERPL-CCNC: 106 U/L (ref 40–150)
ALT SERPL W/O P-5'-P-CCNC: 30 U/L (ref 10–44)
ANION GAP SERPL CALC-SCNC: 8 MMOL/L (ref 8–16)
AST SERPL-CCNC: 22 U/L (ref 10–40)
BASOPHILS # BLD AUTO: 0.09 K/UL (ref 0–0.2)
BASOPHILS NFR BLD: 1.8 % (ref 0–1.9)
BILIRUB DIRECT SERPL-MCNC: 0.2 MG/DL (ref 0.1–0.3)
BILIRUB SERPL-MCNC: 0.5 MG/DL (ref 0.1–1)
BUN SERPL-MCNC: 23 MG/DL (ref 6–20)
CALCIUM SERPL-MCNC: 9.7 MG/DL (ref 8.7–10.5)
CHLORIDE SERPL-SCNC: 108 MMOL/L (ref 95–110)
CO2 SERPL-SCNC: 23 MMOL/L (ref 23–29)
CREAT SERPL-MCNC: 1.4 MG/DL (ref 0.5–1.4)
DIFFERENTIAL METHOD BLD: ABNORMAL
EOSINOPHIL # BLD AUTO: 0.2 K/UL (ref 0–0.5)
EOSINOPHIL NFR BLD: 3.9 % (ref 0–8)
ERYTHROCYTE [DISTWIDTH] IN BLOOD BY AUTOMATED COUNT: 13.2 % (ref 11.5–14.5)
EST. GFR  (NO RACE VARIABLE): >60 ML/MIN/1.73 M^2
GLUCOSE SERPL-MCNC: 237 MG/DL (ref 70–110)
HCT VFR BLD AUTO: 43.6 % (ref 40–54)
HGB BLD-MCNC: 14.2 G/DL (ref 14–18)
IMM GRANULOCYTES # BLD AUTO: 0.02 K/UL (ref 0–0.04)
IMM GRANULOCYTES NFR BLD AUTO: 0.4 % (ref 0–0.5)
LYMPHOCYTES # BLD AUTO: 0.6 K/UL (ref 1–4.8)
LYMPHOCYTES NFR BLD: 12.7 % (ref 18–48)
MAGNESIUM SERPL-MCNC: 1.6 MG/DL (ref 1.6–2.6)
MCH RBC QN AUTO: 28.7 PG (ref 27–31)
MCHC RBC AUTO-ENTMCNC: 32.6 G/DL (ref 32–36)
MCV RBC AUTO: 88 FL (ref 82–98)
MONOCYTES # BLD AUTO: 0.5 K/UL (ref 0.3–1)
MONOCYTES NFR BLD: 10.2 % (ref 4–15)
NEUTROPHILS # BLD AUTO: 3.5 K/UL (ref 1.8–7.7)
NEUTROPHILS NFR BLD: 71 % (ref 38–73)
NRBC BLD-RTO: 0 /100 WBC
PHOSPHATE SERPL-MCNC: 2.1 MG/DL (ref 2.7–4.5)
PLATELET # BLD AUTO: 234 K/UL (ref 150–450)
PMV BLD AUTO: 10.1 FL (ref 9.2–12.9)
POTASSIUM SERPL-SCNC: 4.3 MMOL/L (ref 3.5–5.1)
PROT SERPL-MCNC: 6.9 G/DL (ref 6–8.4)
RBC # BLD AUTO: 4.94 M/UL (ref 4.6–6.2)
SODIUM SERPL-SCNC: 139 MMOL/L (ref 136–145)
WBC # BLD AUTO: 4.89 K/UL (ref 3.9–12.7)

## 2024-11-11 PROCEDURE — 80197 ASSAY OF TACROLIMUS: CPT | Mod: TXP | Performed by: INTERNAL MEDICINE

## 2024-11-11 PROCEDURE — 83735 ASSAY OF MAGNESIUM: CPT | Mod: TXP | Performed by: INTERNAL MEDICINE

## 2024-11-11 PROCEDURE — 80069 RENAL FUNCTION PANEL: CPT | Mod: TXP | Performed by: INTERNAL MEDICINE

## 2024-11-11 PROCEDURE — 36415 COLL VENOUS BLD VENIPUNCTURE: CPT | Mod: TXP | Performed by: INTERNAL MEDICINE

## 2024-11-11 PROCEDURE — 84155 ASSAY OF PROTEIN SERUM: CPT | Mod: TXP | Performed by: INTERNAL MEDICINE

## 2024-11-11 PROCEDURE — 85025 COMPLETE CBC W/AUTO DIFF WBC: CPT | Mod: TXP | Performed by: INTERNAL MEDICINE

## 2024-11-11 RX ORDER — MYCOPHENOLATE MOFETIL 250 MG/1
500 CAPSULE ORAL 2 TIMES DAILY
Qty: 120 CAPSULE | Refills: 11 | Status: SHIPPED | OUTPATIENT
Start: 2024-11-11 | End: 2025-11-11

## 2024-11-11 NOTE — TELEPHONE ENCOUNTER
Message sent to pt requesting BP log and scheduling him for labs+urine on 11/25  ----- Message from Bella Purdy DO sent at 11/11/2024  3:04 PM CST -----  Can we get a BP log for the past 5 days from him. Recheck UPC in 2 weeks

## 2024-11-11 NOTE — TELEPHONE ENCOUNTER
Message sent to pt instructing him to restart 500mg cellcept BID. Repeat CBC scheduled 11/18  ----- Message from Bella Purdy DO sent at 11/11/2024  3:03 PM CST -----  Improved ANC. Restart on Cellcept 500/Myfortic 360 mg BID and recheck CBC with diff next week  Near baseline graft function. Acceptable electrolytes   FK level pending    Tissue Cultured Epidermal Autograft Text: The defect edges were debeveled with a #15 scalpel blade.  Given the location of the defect, shape of the defect and the proximity to free margins a tissue cultured epidermal autograft was deemed most appropriate.  The graft was then trimmed to fit the size of the defect.  The graft was then placed in the primary defect and oriented appropriately.

## 2024-11-12 DIAGNOSIS — Z76.82 AWAITING ORGAN TRANSPLANT: ICD-10-CM

## 2024-11-12 LAB — TACROLIMUS BLD-MCNC: 5.4 NG/ML (ref 5–15)

## 2024-11-12 RX ORDER — TACROLIMUS 1 MG/1
3 CAPSULE ORAL EVERY 12 HOURS
Qty: 180 CAPSULE | Refills: 11 | Status: SHIPPED | OUTPATIENT
Start: 2024-11-12 | End: 2025-11-12

## 2024-11-12 NOTE — TELEPHONE ENCOUNTER
Message sent to pt instructing him to increase prograf dose to 3mg twice a day. Reminded pt to submit BP log for review.  ----- Message from Bella Purdy DO sent at 11/12/2024 11:58 AM CST -----  Increase FK to 3 mg BID from 3/2

## 2024-11-18 ENCOUNTER — PATIENT MESSAGE (OUTPATIENT)
Dept: TRANSPLANT | Facility: CLINIC | Age: 44
End: 2024-11-18
Payer: COMMERCIAL

## 2024-11-18 ENCOUNTER — LAB VISIT (OUTPATIENT)
Dept: LAB | Facility: HOSPITAL | Age: 44
End: 2024-11-18
Attending: INTERNAL MEDICINE
Payer: COMMERCIAL

## 2024-11-18 DIAGNOSIS — I10 BENIGN ESSENTIAL HTN: ICD-10-CM

## 2024-11-18 DIAGNOSIS — Z94.0 KIDNEY REPLACED BY TRANSPLANT: ICD-10-CM

## 2024-11-18 DIAGNOSIS — Z94.0 KIDNEY REPLACED BY TRANSPLANT: Primary | ICD-10-CM

## 2024-11-18 LAB
ALBUMIN SERPL BCP-MCNC: 3.9 G/DL (ref 3.5–5.2)
ANION GAP SERPL CALC-SCNC: 9 MMOL/L (ref 8–16)
BASOPHILS # BLD AUTO: 0.09 K/UL (ref 0–0.2)
BASOPHILS NFR BLD: 2.1 % (ref 0–1.9)
BUN SERPL-MCNC: 18 MG/DL (ref 6–20)
CALCIUM SERPL-MCNC: 9.4 MG/DL (ref 8.7–10.5)
CHLORIDE SERPL-SCNC: 111 MMOL/L (ref 95–110)
CO2 SERPL-SCNC: 20 MMOL/L (ref 23–29)
CREAT SERPL-MCNC: 1.3 MG/DL (ref 0.5–1.4)
DIFFERENTIAL METHOD BLD: ABNORMAL
EOSINOPHIL # BLD AUTO: 0.1 K/UL (ref 0–0.5)
EOSINOPHIL NFR BLD: 2.6 % (ref 0–8)
ERYTHROCYTE [DISTWIDTH] IN BLOOD BY AUTOMATED COUNT: 13.3 % (ref 11.5–14.5)
EST. GFR  (NO RACE VARIABLE): >60 ML/MIN/1.73 M^2
GLUCOSE SERPL-MCNC: 156 MG/DL (ref 70–110)
HCT VFR BLD AUTO: 43 % (ref 40–54)
HGB BLD-MCNC: 14.5 G/DL (ref 14–18)
IMM GRANULOCYTES # BLD AUTO: 0.01 K/UL (ref 0–0.04)
IMM GRANULOCYTES NFR BLD AUTO: 0.2 % (ref 0–0.5)
LYMPHOCYTES # BLD AUTO: 0.7 K/UL (ref 1–4.8)
LYMPHOCYTES NFR BLD: 16 % (ref 18–48)
MCH RBC QN AUTO: 29 PG (ref 27–31)
MCHC RBC AUTO-ENTMCNC: 33.7 G/DL (ref 32–36)
MCV RBC AUTO: 86 FL (ref 82–98)
MONOCYTES # BLD AUTO: 0.4 K/UL (ref 0.3–1)
MONOCYTES NFR BLD: 10.4 % (ref 4–15)
NEUTROPHILS # BLD AUTO: 2.9 K/UL (ref 1.8–7.7)
NEUTROPHILS NFR BLD: 68.7 % (ref 38–73)
NRBC BLD-RTO: 0 /100 WBC
PHOSPHATE SERPL-MCNC: 1.9 MG/DL (ref 2.7–4.5)
PLATELET # BLD AUTO: 204 K/UL (ref 150–450)
PMV BLD AUTO: 10 FL (ref 9.2–12.9)
POTASSIUM SERPL-SCNC: 4.3 MMOL/L (ref 3.5–5.1)
RBC # BLD AUTO: 5 M/UL (ref 4.6–6.2)
SODIUM SERPL-SCNC: 140 MMOL/L (ref 136–145)
WBC # BLD AUTO: 4.25 K/UL (ref 3.9–12.7)

## 2024-11-18 PROCEDURE — 80069 RENAL FUNCTION PANEL: CPT | Mod: TXP | Performed by: INTERNAL MEDICINE

## 2024-11-18 PROCEDURE — 85025 COMPLETE CBC W/AUTO DIFF WBC: CPT | Mod: TXP | Performed by: INTERNAL MEDICINE

## 2024-11-18 PROCEDURE — 80197 ASSAY OF TACROLIMUS: CPT | Mod: TXP | Performed by: INTERNAL MEDICINE

## 2024-11-18 PROCEDURE — 36415 COLL VENOUS BLD VENIPUNCTURE: CPT | Mod: TXP | Performed by: INTERNAL MEDICINE

## 2024-11-18 RX ORDER — METOPROLOL TARTRATE 25 MG/1
12.5 TABLET, FILM COATED ORAL 2 TIMES DAILY
Qty: 90 TABLET | Refills: 3 | Status: SHIPPED | OUTPATIENT
Start: 2024-11-18 | End: 2025-11-18

## 2024-11-19 DIAGNOSIS — Z76.82 AWAITING ORGAN TRANSPLANT: ICD-10-CM

## 2024-11-19 LAB — TACROLIMUS BLD-MCNC: 5.9 NG/ML (ref 5–15)

## 2024-11-19 RX ORDER — TACROLIMUS 1 MG/1
CAPSULE ORAL
Qty: 210 CAPSULE | Refills: 11 | Status: SHIPPED | OUTPATIENT
Start: 2024-11-19 | End: 2025-11-19

## 2024-11-19 NOTE — TELEPHONE ENCOUNTER
Message sent to pt instructing him to increase prograf dose to 4mg in AM and 3mg in PM. Repeat labs 12/2  ----- Message from Bella Purdy DO sent at 11/19/2024 12:46 PM CST -----  Low FK. Increase FK to 4/3 from 3 BID   Recheck in 2 weeks with next set of labs

## 2024-12-02 ENCOUNTER — LAB VISIT (OUTPATIENT)
Dept: LAB | Facility: HOSPITAL | Age: 44
End: 2024-12-02
Attending: INTERNAL MEDICINE
Payer: COMMERCIAL

## 2024-12-02 DIAGNOSIS — Z94.0 KIDNEY REPLACED BY TRANSPLANT: ICD-10-CM

## 2024-12-02 DIAGNOSIS — E83.42 HYPOMAGNESEMIA: ICD-10-CM

## 2024-12-02 DIAGNOSIS — Z94.0 KIDNEY REPLACED BY TRANSPLANT: Primary | ICD-10-CM

## 2024-12-02 LAB
ALBUMIN SERPL BCP-MCNC: 4 G/DL (ref 3.5–5.2)
ANION GAP SERPL CALC-SCNC: 9 MMOL/L (ref 8–16)
BASOPHILS # BLD AUTO: 0.06 K/UL (ref 0–0.2)
BASOPHILS NFR BLD: 1.6 % (ref 0–1.9)
BUN SERPL-MCNC: 28 MG/DL (ref 6–20)
CALCIUM SERPL-MCNC: 9.4 MG/DL (ref 8.7–10.5)
CHLORIDE SERPL-SCNC: 111 MMOL/L (ref 95–110)
CO2 SERPL-SCNC: 21 MMOL/L (ref 23–29)
CREAT SERPL-MCNC: 1.3 MG/DL (ref 0.5–1.4)
DIFFERENTIAL METHOD BLD: ABNORMAL
EOSINOPHIL # BLD AUTO: 0.1 K/UL (ref 0–0.5)
EOSINOPHIL NFR BLD: 1.8 % (ref 0–8)
ERYTHROCYTE [DISTWIDTH] IN BLOOD BY AUTOMATED COUNT: 13.2 % (ref 11.5–14.5)
EST. GFR  (NO RACE VARIABLE): >60 ML/MIN/1.73 M^2
GLUCOSE SERPL-MCNC: 138 MG/DL (ref 70–110)
HCT VFR BLD AUTO: 44.8 % (ref 40–54)
HGB BLD-MCNC: 14.5 G/DL (ref 14–18)
IMM GRANULOCYTES # BLD AUTO: 0.01 K/UL (ref 0–0.04)
IMM GRANULOCYTES NFR BLD AUTO: 0.3 % (ref 0–0.5)
LYMPHOCYTES # BLD AUTO: 0.7 K/UL (ref 1–4.8)
LYMPHOCYTES NFR BLD: 17.7 % (ref 18–48)
MAGNESIUM SERPL-MCNC: 1.8 MG/DL (ref 1.6–2.6)
MCH RBC QN AUTO: 27.7 PG (ref 27–31)
MCHC RBC AUTO-ENTMCNC: 32.4 G/DL (ref 32–36)
MCV RBC AUTO: 86 FL (ref 82–98)
MONOCYTES # BLD AUTO: 0.5 K/UL (ref 0.3–1)
MONOCYTES NFR BLD: 12 % (ref 4–15)
NEUTROPHILS # BLD AUTO: 2.6 K/UL (ref 1.8–7.7)
NEUTROPHILS NFR BLD: 66.6 % (ref 38–73)
NRBC BLD-RTO: 0 /100 WBC
PHOSPHATE SERPL-MCNC: 2.1 MG/DL (ref 2.7–4.5)
PLATELET # BLD AUTO: 275 K/UL (ref 150–450)
PMV BLD AUTO: 9.9 FL (ref 9.2–12.9)
POTASSIUM SERPL-SCNC: 4.4 MMOL/L (ref 3.5–5.1)
RBC # BLD AUTO: 5.23 M/UL (ref 4.6–6.2)
SODIUM SERPL-SCNC: 141 MMOL/L (ref 136–145)
WBC # BLD AUTO: 3.84 K/UL (ref 3.9–12.7)

## 2024-12-02 PROCEDURE — 36415 COLL VENOUS BLD VENIPUNCTURE: CPT | Mod: TXP | Performed by: INTERNAL MEDICINE

## 2024-12-02 PROCEDURE — 83735 ASSAY OF MAGNESIUM: CPT | Mod: TXP | Performed by: INTERNAL MEDICINE

## 2024-12-02 PROCEDURE — 80069 RENAL FUNCTION PANEL: CPT | Mod: TXP | Performed by: INTERNAL MEDICINE

## 2024-12-02 PROCEDURE — 85025 COMPLETE CBC W/AUTO DIFF WBC: CPT | Mod: TXP | Performed by: INTERNAL MEDICINE

## 2024-12-02 PROCEDURE — 80197 ASSAY OF TACROLIMUS: CPT | Mod: TXP | Performed by: INTERNAL MEDICINE

## 2024-12-02 RX ORDER — SULFAMETHOXAZOLE AND TRIMETHOPRIM 400; 80 MG/1; MG/1
1 TABLET ORAL DAILY
Qty: 41 TABLET | Refills: 0 | Status: SHIPPED | OUTPATIENT
Start: 2024-12-02 | End: 2025-01-12

## 2024-12-02 NOTE — TELEPHONE ENCOUNTER
Message sent to pt instructing him to stop Mepron and restart Bactrim daily instead  ----- Message from Bella Purdy DO sent at 12/2/2024  3:07 PM CST -----  Near baseline graft function. Acceptable electrolytes  Cont with phos supplements   Acceptable ANC. Cont on Cellcept 500 BID   If he wants he is safe to switch atovaquone back to Bactrim.

## 2024-12-03 LAB — TACROLIMUS BLD-MCNC: 7.7 NG/ML (ref 5–15)

## 2024-12-04 ENCOUNTER — PATIENT MESSAGE (OUTPATIENT)
Dept: TRANSPLANT | Facility: CLINIC | Age: 44
End: 2024-12-04
Payer: COMMERCIAL

## 2024-12-05 DIAGNOSIS — Z94.0 STATUS POST KIDNEY TRANSPLANT: ICD-10-CM

## 2024-12-05 RX ORDER — PREDNISONE 2.5 MG/1
2.5 TABLET ORAL 2 TIMES DAILY
Qty: 60 TABLET | Refills: 11 | Status: SHIPPED | OUTPATIENT
Start: 2024-12-05 | End: 2025-12-05

## 2024-12-06 NOTE — TELEPHONE ENCOUNTER
Confirmed with Charissa that she gave them our correct fax number.     Patient's  left hospital. Patient reminded about putting on call light/becoming SBA when family not present. Patient declined saying she's okay to get up on own. Refused bed alarm. Stated she would report to RN if any dizziness starts. Reeducated about seizure precautions but still declining.

## 2024-12-22 DIAGNOSIS — Z94.0 STATUS POST KIDNEY TRANSPLANT: ICD-10-CM

## 2024-12-27 ENCOUNTER — PATIENT MESSAGE (OUTPATIENT)
Dept: TRANSPLANT | Facility: CLINIC | Age: 44
End: 2024-12-27
Payer: COMMERCIAL

## 2024-12-31 ENCOUNTER — DOCUMENTATION ONLY (OUTPATIENT)
Dept: PHARMACY | Facility: HOSPITAL | Age: 44
End: 2024-12-31
Payer: COMMERCIAL

## 2024-12-31 NOTE — NURSING
Belatacept (Nulojix) Note:    Dejuan Diaz  is a 44 y.o. male  S/p LEFT KIDNEY     transplant on 2024 (Kidney) for Diabetes Mellitus - Type II.      Patient was evaluated for belatacept conversion and deemed to be a suitable candidate.      Current Outpatient Medications   Medication Sig Dispense Refill    atorvastatin (LIPITOR) 40 MG tablet Take 1 tablet (40 mg total) by mouth once daily. 90 tablet 3    blood-glucose meter,continuous (DEXCOM G6  MISC) by Misc.(Non-Drug; Combo Route) route.      blood-glucose transmitter (DEXCOM G6 TRANSMITTER MISC)   Dexcom G6 transmitter, See Instructions, uad with dexcom G6 sensor; change transmitter q 90 days, # 1 EA, 3 Refill(s), Pharmacy: [facility], 187, cm, 23 14:55:00 CDT, Height/Length Measured, 105.1, kg, 23 14:55:00 CDT, Weight Dosing      calcitRIOL (ROCALTROL) 0.5 MCG Cap Take 1 capsule (0.5 mcg total) by mouth once daily. 60 capsule 5    DEXCOM G6 SENSOR Adelina SMARTSI Each Topical Every 10 Days      ergocalciferol (ERGOCALCIFEROL) 50,000 unit Cap Take 1 capsule (50,000 Units total) by mouth every 7 days. 12 capsule 0    insulin lispro (HUMALOG U-100 INSULIN) 100 unit/mL injection   See Instructions, for use in insulin pump; max daily dose 100 units, # 90 mL, 1 Refill(s), Maintenance, Pharmacy: [facility], 187, cm, 23 11:24:00 CST, Height/Length Measured, 102.5, kg, 23 11:24:00 CST, Weight Dosing      insulin pump cart,auto,BT/cntr (OMNIPOD 5 G6 INTRO KIT, GEN 5, SUBQ)   Omnipod 5 G6 Intro Kit (Gen 5), See Instructions, use as directed, # 1 EA, 0 Refill(s), Pharmacy: [facility], 187, cm, 22 9:14:00 CST, Height/Length Measured, 102.3, kg, 22 9:14:00 CST, Weight Dosing      insulin regular human (AFREZZA) 4 unit/8 unit/ 12 unit (60) CtDv See Instructions, 8 units inhaled ac, tid and additional units according to sliding scale; max daily dose 96 units      cartridge must be room temp for 10  minutes before use    NDC:[address]-14, # 360 EA, 5 Refill(s), Maintenance      k phos di & mono-sod phos mono (K-PHOS-NEUTRAL) 250 mg Tab Take 2 tablets by mouth 3 (three) times daily. 180 tablet 11    magnesium oxide (MAG-OX) 400 mg (241.3 mg magnesium) tablet Take 2 tablets (800 mg total) by mouth 2 (two) times daily. 120 tablet 11    metoprolol tartrate (LOPRESSOR) 25 MG tablet Take 0.5 tablets (12.5 mg total) by mouth 2 (two) times daily. HOLD for SBP>130 90 tablet 3    multivitamin Tab Take 1 tablet by mouth once daily. 30 tablet 5    mycophenolate (CELLCEPT) 250 mg Cap Take 2 capsules (500 mg total) by mouth 2 (two) times daily. 120 capsule 11    ondansetron (ZOFRAN-ODT) 4 MG TbDL Take 1 tablet (4 mg total) by mouth every 8 (eight) hours as needed (nausea). 20 tablet 1    pantoprazole (PROTONIX) 40 MG tablet Take 1 tablet (40 mg total) by mouth once daily. 90 tablet 3    predniSONE (DELTASONE) 2.5 MG tablet Take 1 tablet (2.5 mg total) by mouth 2 (two) times daily. 60 tablet 11    sulfamethoxazole-trimethoprim 400-80mg (BACTRIM,SEPTRA) 400-80 mg per tablet Take 1 tablet by mouth once daily. STOP taking 1/12/2025 41 tablet 0    tacrolimus (PROGRAF) 1 MG Cap Take 4 capsules (4 mg total) by mouth every morning AND 3 capsules (3 mg total) every evening. 210 capsule 11     No current facility-administered medications for this visit.           Assessment/Plan:    1) Current Immunosuppression: Prograf 4/3, MMF 500mg BID, and Pred 2.5mg daily.   Reason for conversion: Allograft preservation       2) EBV IgG status: Positive      Quantiferon Gold Status: Negative (date: 9/16/24)     3) Benefits verification:   Copay Estimate: 0  Patient receives drug from [employer] Access Support? No, but patient approved for copay assistance     4) Immunosuppression Plan:     Belatacept conversion with CNI withdrawal at 1 year post-transplant    Location/Infusion Provider: Dr. Purdy    Day 1   (1/10/25)   belatacept 5 mg/kg, continue  tacrolimus and mycophenolate per protocol  Day 14 (1/27/25)       belatacept 5 mg/kg, continue mycophenolate per protocol, decrease tacrolimus by 50%  Day 28 (2/10/25)       belatacept 5 mg/kg, continue mycophenolate per protocol, decrease tacrolimus by an additional 50%  Day 42 (2/24/25)       belatacept 5 mg/kg, continue mycophenolate per protocol, continue tacrolimus 4-6 goal level  Day 56 (3/10/25)       belatacept 5 mg/kg, continue mycophenolate per protocol, continue tacrolimus 4-6 goal level until 7/1625 (1 year post transplant)  Day 84 (4/7/25)       belatacept 5 mg/kg EVERY 28 DAYS, continue mycophenolate per protocol    Plan has been discussed with Dejuan who verbalized understanding and had the opportunity to ask questions.

## 2025-01-06 ENCOUNTER — TELEPHONE (OUTPATIENT)
Dept: TRANSPLANT | Facility: CLINIC | Age: 45
End: 2025-01-06
Payer: COMMERCIAL

## 2025-01-06 ENCOUNTER — PATIENT MESSAGE (OUTPATIENT)
Dept: TRANSPLANT | Facility: CLINIC | Age: 45
End: 2025-01-06
Payer: COMMERCIAL

## 2025-01-06 ENCOUNTER — LAB VISIT (OUTPATIENT)
Dept: LAB | Facility: HOSPITAL | Age: 45
End: 2025-01-06
Attending: INTERNAL MEDICINE
Payer: COMMERCIAL

## 2025-01-06 DIAGNOSIS — E83.42 HYPOMAGNESEMIA: ICD-10-CM

## 2025-01-06 DIAGNOSIS — Z94.0 KIDNEY REPLACED BY TRANSPLANT: ICD-10-CM

## 2025-01-06 LAB
ALBUMIN SERPL BCP-MCNC: 4 G/DL (ref 3.5–5.2)
ANION GAP SERPL CALC-SCNC: 11 MMOL/L (ref 8–16)
BASOPHILS # BLD AUTO: 0.03 K/UL (ref 0–0.2)
BASOPHILS NFR BLD: 0.8 % (ref 0–1.9)
BUN SERPL-MCNC: 33 MG/DL (ref 6–20)
CALCIUM SERPL-MCNC: 9.8 MG/DL (ref 8.7–10.5)
CHLORIDE SERPL-SCNC: 106 MMOL/L (ref 95–110)
CO2 SERPL-SCNC: 20 MMOL/L (ref 23–29)
CREAT SERPL-MCNC: 1.5 MG/DL (ref 0.5–1.4)
DIFFERENTIAL METHOD BLD: ABNORMAL
EOSINOPHIL # BLD AUTO: 0.1 K/UL (ref 0–0.5)
EOSINOPHIL NFR BLD: 1.9 % (ref 0–8)
ERYTHROCYTE [DISTWIDTH] IN BLOOD BY AUTOMATED COUNT: 14 % (ref 11.5–14.5)
EST. GFR  (NO RACE VARIABLE): 58.5 ML/MIN/1.73 M^2
GLUCOSE SERPL-MCNC: 291 MG/DL (ref 70–110)
HCT VFR BLD AUTO: 47.9 % (ref 40–54)
HGB BLD-MCNC: 15.6 G/DL (ref 14–18)
IMM GRANULOCYTES # BLD AUTO: 0.01 K/UL (ref 0–0.04)
IMM GRANULOCYTES NFR BLD AUTO: 0.3 % (ref 0–0.5)
LYMPHOCYTES # BLD AUTO: 0.5 K/UL (ref 1–4.8)
LYMPHOCYTES NFR BLD: 12.5 % (ref 18–48)
MAGNESIUM SERPL-MCNC: 1.7 MG/DL (ref 1.6–2.6)
MCH RBC QN AUTO: 26.8 PG (ref 27–31)
MCHC RBC AUTO-ENTMCNC: 32.6 G/DL (ref 32–36)
MCV RBC AUTO: 82 FL (ref 82–98)
MONOCYTES # BLD AUTO: 0.4 K/UL (ref 0.3–1)
MONOCYTES NFR BLD: 11.4 % (ref 4–15)
NEUTROPHILS # BLD AUTO: 2.8 K/UL (ref 1.8–7.7)
NEUTROPHILS NFR BLD: 73.1 % (ref 38–73)
NRBC BLD-RTO: 0 /100 WBC
PHOSPHATE SERPL-MCNC: 1.7 MG/DL (ref 2.7–4.5)
PLATELET # BLD AUTO: 271 K/UL (ref 150–450)
PMV BLD AUTO: 10.1 FL (ref 9.2–12.9)
POTASSIUM SERPL-SCNC: 4.5 MMOL/L (ref 3.5–5.1)
RBC # BLD AUTO: 5.83 M/UL (ref 4.6–6.2)
SODIUM SERPL-SCNC: 137 MMOL/L (ref 136–145)
WBC # BLD AUTO: 3.76 K/UL (ref 3.9–12.7)

## 2025-01-06 PROCEDURE — 80069 RENAL FUNCTION PANEL: CPT | Mod: TXP | Performed by: INTERNAL MEDICINE

## 2025-01-06 PROCEDURE — 85025 COMPLETE CBC W/AUTO DIFF WBC: CPT | Mod: TXP | Performed by: INTERNAL MEDICINE

## 2025-01-06 PROCEDURE — 83735 ASSAY OF MAGNESIUM: CPT | Mod: TXP | Performed by: INTERNAL MEDICINE

## 2025-01-06 PROCEDURE — 87799 DETECT AGENT NOS DNA QUANT: CPT | Mod: TXP | Performed by: INTERNAL MEDICINE

## 2025-01-06 PROCEDURE — 80197 ASSAY OF TACROLIMUS: CPT | Mod: TXP | Performed by: INTERNAL MEDICINE

## 2025-01-06 NOTE — TELEPHONE ENCOUNTER
Message sent to pt assessing for sick s/s or urinary issues.  ----- Message from Bella Purdy DO sent at 1/6/2025  1:37 PM CST -----  Is patient feeling well? Graft function at higher end of normal with UA showing 1+ ketones.   Acceptable ANC. Would continue on Cellcept 500 BID  BK and FK pending

## 2025-01-07 DIAGNOSIS — Z94.0 STATUS POST KIDNEY TRANSPLANT: Primary | ICD-10-CM

## 2025-01-07 LAB — TACROLIMUS BLD-MCNC: 7.3 NG/ML (ref 5–15)

## 2025-01-07 NOTE — PROGRESS NOTES
Kidney Post-Transplant Assessment    Referring Physician: Jose Mckinney  Current Nephrologist: Jose Mckinney    ORGAN: LEFT KIDNEY  Donor Type: living  PHS Increased Risk: no  Cold Ischemia: 32 mins  Induction Medications: Thymo     Subjective:     CC:  Reassessment of renal allograft function and management of chronic immunosuppression.    HPI:  Mr. Diaz is a 44 y.o. year old White male who received a living kidney transplant on 7/16/24.     History  ANC 0 on admit. -Rec'd x3 days (10/17,10/18/10/19 ) zarxio.     Since the last visit  has been restarted on MMF. His loose stool restarted again since restarting MMF. Mild increase in Cr. He is interested in switching MMF to MYF.   Denies fevers, chills, body aches. Using a mask today.    Patient otherwise denies any fever, runny nose, watery eyes. Does admit to HARP for which he takes Tylenol which helps. Denies any nausea, vomiting, heart burn, SOB or CP. He reports increase size in abdomin. No fluid noted on exam.. Denies any dysuria or frequency    SBP at home less than 130 usually. HR however runs in 's--- He is taking MTP about 3 times a week.     He is working with his endocrinologist with his blood sugars. He uses insulin pump. He is also on Afrezza. He reports some improvement in blood sugars since  his prednisone dose to 2.5mg bid.       Review of Systems   Constitutional:  Negative for activity change, appetite change, chills and fever.   HENT:  Negative for congestion, sneezing and sore throat.    Eyes:  Negative for pain and itching.   Respiratory:  Negative for apnea, cough, shortness of breath and wheezing.    Cardiovascular:  Negative for chest pain.   Gastrointestinal:  Negative for abdominal pain, constipation, diarrhea, nausea and vomiting.   Genitourinary:  Negative for difficulty urinating, dysuria and frequency.   Musculoskeletal:  Negative for back pain, joint swelling and neck pain.   Skin:  Negative for color change.  "  Neurological:  Negative for dizziness, light-headedness and headaches.   Psychiatric/Behavioral:  Negative for behavioral problems and confusion. The patient is not nervous/anxious.        Objective:   Blood pressure (!) 158/89, pulse 74, temperature 97.3 °F (36.3 °C), temperature source Temporal, resp. rate 18, height 6' 2" (1.88 m), weight 102.5 kg (225 lb 15.5 oz), SpO2 97%.body mass index is 29.01 kg/m².    Physical Exam  Vitals reviewed.   Constitutional:       General: He is not in acute distress.     Appearance: Normal appearance. He is not ill-appearing or toxic-appearing.      Comments: Appears stated age   HENT:      Head: Normocephalic and atraumatic.      Right Ear: External ear normal.      Left Ear: External ear normal.      Nose: Nose normal.   Eyes:      General: No scleral icterus.     Conjunctiva/sclera: Conjunctivae normal.   Cardiovascular:      Rate and Rhythm: Normal rate and regular rhythm.      Pulses: Normal pulses.      Heart sounds: Normal heart sounds. No murmur heard.     No friction rub.   Pulmonary:      Effort: Pulmonary effort is normal. No respiratory distress.      Breath sounds: Normal breath sounds. No wheezing or rhonchi.   Abdominal:      General: Bowel sounds are normal. There is no distension.      Palpations: Abdomen is soft.      Tenderness: There is no abdominal tenderness. There is no guarding.   Musculoskeletal:         General: No swelling or deformity. Normal range of motion.      Cervical back: Normal range of motion and neck supple. No tenderness.      Right lower leg: No edema.      Left lower leg: No edema.   Skin:     General: Skin is warm and dry.      Coloration: Skin is not jaundiced.      Findings: No erythema or rash.   Neurological:      General: No focal deficit present.      Mental Status: He is alert and oriented to person, place, and time.      Motor: No weakness.   Psychiatric:         Mood and Affect: Mood normal.         Behavior: Behavior normal. " "        Labs:  Lab Results   Component Value Date    WBC 3.76 (L) 01/06/2025    HGB 15.6 01/06/2025    HCT 47.9 01/06/2025     01/06/2025    K 4.5 01/06/2025     01/06/2025    CO2 20 (L) 01/06/2025    BUN 33 (H) 01/06/2025    CREATININE 1.5 (H) 01/06/2025    EGFRNORACEVR 58.5 (A) 01/06/2025    CALCIUM 9.8 01/06/2025    PHOS 1.7 (L) 01/06/2025    MG 1.7 01/06/2025    ALBUMIN 4.0 01/06/2025    AST 22 11/11/2024    ALT 30 11/11/2024    UTPCR 0.24 (H) 01/06/2025    .1 (H) 07/16/2024    TACROLIMUS 7.3 01/06/2025       No results found for: "EXTANC", "EXTWBC", "EXTSEGS", "EXTPLATELETS", "EXTHEMOGLOBI", "EXTHEMATOCRI", "EXTCREATININ", "EXTSODIUM", "EXTPOTASSIUM", "EXTBUN", "EXTCO2", "EXTCALCIUM", "EXTPHOSPHORU", "EXTGLUCOSE", "EXTALBUMIN", "EXTAST", "EXTALT", "EXTBILITOTAL", "EXTLIPASE", "EXTAMYLASE"    No results found for: "EXTCYCLOSLVL", "EXTSIROLIMUS", "EXTTACROLVL", "EXTPROTCRE", "EXTPTHINTACT", "EXTPROTEINUA", "EXTWBCUA", "EXTRBCUA"    Labs were reviewed with the patient    Assessment and Plan   43 yr old with CKD stage V secondary to type II DM s/p a living unrelated kidney transplant on 7/16/24. 37% PRA. Crossmatch negative but noted initially to have a DQ7 that was felt to be a false positive reactivity    1) JONATHAN of living unrelated kidney transplant:   - unremarkable kidney transplant biopsy   - Cr now back up to 1.5, ? If related to loose stool from MMF.  - Had cystoscopy given US kidney transplant on 8/5/24 that showed lateral bladder wall lesion after a complicated ureteral stent removal   - FK level 7.3. Cont on FK 4/3  - Back on Cellcept 500mg Bid since 11/11/24. Was held due to neutropenia. No with recurrent loose stool since restarting of MMF. Interested in switching the medication.  MYF 360mg BID due to soft WBCs   - starting on Azul, taper Tac as per protocol   - cont on prednisone 5 mg daily  - completed Acyclovir    - cont on Mepron until 1/12/25   - BK not detected   - urine p/c " ratio 0.24   - will continue to monitor for adverse effects of immunosuppression therapy  2) HTN:   - MTP 12.5mg BID   - can take Norvasc 5 mg as needed for SBP> 150  3) type II DM:    - followed by his endocrinologist with setting on insulin pump adjusted   - currently listed inactive for a pancreas transplant   4) Hypomagnesemia:   - cont on MgOx 800 mg BID   5) Hypophosphatemia:   - cont on Kphos 500 mg BID   - increase foods rich in phos  6) HLD:   - on Lipitor  7) Secondary hyperparathyroidism   - cont on calcitriol.   8) Neutropenic   -received Neupogen   -held MMF but improved now back on MMF--- changed to MYF for loose stool in setting of increasing Cr.     Labs and RTC per protocol    Angie Grace NP   Transplant Nephrology

## 2025-01-08 ENCOUNTER — TELEPHONE (OUTPATIENT)
Dept: INFECTIOUS DISEASES | Facility: CLINIC | Age: 45
End: 2025-01-08
Payer: COMMERCIAL

## 2025-01-08 NOTE — TELEPHONE ENCOUNTER
----- Message from Marika Kahn sent at 1/8/2025  3:13 PM CST -----  Regarding: Shingrix Injection  Hello!  I have a kidney transplant patient that had gotten his first shingles vaccine in the ID Clinic. He is reaching out to see if he can get scheduled to get his second shingles vaccine done in the ID Clinic here at [facility] on Friday 1/10 in the AM.  Please assist, thank you  Marika

## 2025-01-09 ENCOUNTER — OFFICE VISIT (OUTPATIENT)
Dept: CARDIOLOGY | Facility: CLINIC | Age: 45
End: 2025-01-09
Payer: COMMERCIAL

## 2025-01-09 VITALS
WEIGHT: 223.56 LBS | DIASTOLIC BLOOD PRESSURE: 97 MMHG | HEART RATE: 70 BPM | HEIGHT: 74 IN | SYSTOLIC BLOOD PRESSURE: 160 MMHG | BODY MASS INDEX: 28.69 KG/M2

## 2025-01-09 DIAGNOSIS — Z79.60 LONG-TERM USE OF IMMUNOSUPPRESSANT MEDICATION: ICD-10-CM

## 2025-01-09 DIAGNOSIS — E78.2 MIXED HYPERLIPIDEMIA: ICD-10-CM

## 2025-01-09 DIAGNOSIS — Z29.89 PROPHYLACTIC IMMUNOTHERAPY: ICD-10-CM

## 2025-01-09 DIAGNOSIS — E78.49 OTHER HYPERLIPIDEMIA: Primary | ICD-10-CM

## 2025-01-09 DIAGNOSIS — D63.8 ANEMIA OF CHRONIC DISEASE: ICD-10-CM

## 2025-01-09 DIAGNOSIS — D72.819 LEUKOPENIA, UNSPECIFIED TYPE: ICD-10-CM

## 2025-01-09 PROCEDURE — 99999 PR PBB SHADOW E&M-EST. PATIENT-LVL IV: CPT | Mod: PBBFAC,TXP,, | Performed by: INTERNAL MEDICINE

## 2025-01-09 NOTE — PROGRESS NOTES
Subjective:    Patient ID:  Dejuan Diaz Jr. is a 44 y.o. male patient here for evaluation Follow-up (6 month)      History of Present Illness:  Visit.  Preop evaluation for kidney transplant with unremarkable nuclear study and echo 08/2023.  Calcium score 98.    Kidney transplant performed July 20, 2024.  Hematologic issues with immunosuppressants, being regulated.  Labile blood pressure, blood sugars.    No angina dyspnea.  No arrhythmia.             Review of patient's allergies indicates:   Allergen Reactions    Codeine Hallucinations    Vancomycin analogues Other (See Comments)     Burning and  itchig of skin       Past Medical History:   Diagnosis Date    Anemia     Diabetes mellitus type II     Disorder of kidney and ureter     GERD (gastroesophageal reflux disease)     Hypertension     Proteinuria      Past Surgical History:   Procedure Laterality Date    INNER EAR SURGERY      for tinnitus    KIDNEY TRANSPLANT Left 7/16/2024    Procedure: TRANSPLANT, KIDNEY;  Surgeon: Erma Gómez MD;  Location: [facility] OR [address] [place] [address];  Service: Transplant;  Laterality: Left;     Social History     Tobacco Use    Smoking status: Never    Smokeless tobacco: Never   Substance Use Topics    Alcohol use: No    Drug use: No        Review of Systems:    As noted in HPI in addition      REVIEW OF SYSTEMS  Review of Systems   Constitutional: Negative for decreased appetite, diaphoresis, night sweats, weight gain and weight loss.   HENT:  Negative for nosebleeds and odynophagia.    Eyes:  Negative for double vision and photophobia.   Cardiovascular:  Negative for chest pain, claudication, cyanosis, dyspnea on exertion, irregular heartbeat, leg swelling, near-syncope, orthopnea, palpitations, paroxysmal nocturnal dyspnea and syncope.   Respiratory:  Negative for cough, hemoptysis, shortness of breath and wheezing.    Hematologic/Lymphatic: Negative for adenopathy.   Skin:  Negative for flushing, skin cancer and suspicious lesions.    Musculoskeletal:  Negative for gout, myalgias and neck pain.   Gastrointestinal:  Negative for abdominal pain, heartburn, hematemesis and hematochezia.   Genitourinary:  Negative for bladder incontinence, hesitancy and nocturia.   Neurological:  Negative for focal weakness, headaches, light-headedness and paresthesias.   Psychiatric/Behavioral:  Negative for memory loss and substance abuse.               Objective:        Vitals:    01/09/25 1027   BP: (!) 160/97   Pulse: 70       Lab Results   Component Value Date    WBC 3.76 (L) 01/06/2025    HGB 15.6 01/06/2025    HCT 47.9 01/06/2025     01/06/2025    CHOL 86 (L) 07/02/2024    TRIG 143 07/02/2024    HDL 22 (L) 07/02/2024    ALT 30 11/11/2024    AST 22 11/11/2024     01/06/2025    K 4.5 01/06/2025     01/06/2025    CREATININE 1.5 (H) 01/06/2025    BUN 33 (H) 01/06/2025    CO2 20 (L) 01/06/2025    TSH 0.967 10/19/2024    PSA 0.60 11/29/2023    INR 0.9 08/14/2024    HGBA1C 8.5 (H) 10/17/2024        ECHOCARDIOGRAM RESULTS  Results for orders placed in visit on 08/30/23    Echo    Interpretation Summary    Left Ventricle: The left ventricle is normal in size. There is concentric remodeling. Normal wall motion. There is normal systolic function with a visually estimated ejection fraction of 60 - 65%. There is normal diastolic function.    Right Ventricle: Normal right ventricular cavity size. Wall thickness is normal. Right ventricle wall motion  is normal. Systolic function is normal.    Aortic Valve: The aortic valve is a trileaflet valve.    Pulmonary Artery: The estimated pulmonary artery systolic pressure is 34 mmHg.    IVC/SVC: Normal venous pressure at 3 mmHg.    No results found for this or any previous visit.          CURRENT/PREVIOUS VISIT EKG  Results for orders placed or performed during the hospital encounter of 07/02/24   EKG 12-lead    Collection Time: 07/02/24 11:59 AM   Result Value Ref Range    QRS Duration 94 ms    OHS QTC  Calculation 414 ms    Narrative    Test Reason : Z76.82,    Vent. Rate : 051 BPM     Atrial Rate : 051 BPM     P-R Int : 158 ms          QRS Dur : 094 ms      QT Int : 450 ms       P-R-T Axes : 067 033 050 degrees     QTc Int : 414 ms    Sinus bradycardia  Otherwise normal ECG  When compared with ECG of 14-JUN-2024 16:55,  No significant change was found  Confirmed by Lauren Palmer MD ([id number]) on 7/2/2024 3:24:41 PM    Referred By: RUBENS IQBAL JR           Confirmed By:Lauren Palmer MD     No valid procedures specified.   Results for orders placed during the hospital encounter of 08/30/23    Nuclear Stress - Cardiology Interpreted    Interpretation Summary    Normal myocardial perfusion scan. There is no evidence of myocardial ischemia or infarction.    There is a mild intensity fixed perfusion abnormality in the anteroseptal wall of the left ventricle, secondary to soft tissue attenuation.    The visually estimated ejection fraction is normal at rest.    There is normal wall motion at rest.    The ECG portion of the study is negative for ischemia.    The patient exercised for 7 minutes 0 seconds on a high ramp protocol, corresponding to a functional capacity of 11.0 METS, achieving a peak heart rate of 151 bpm, which is 85 % of the age predicted maximum heart rate. Their exercise capacity was average.    During stress, rare PVCs are noted.    The exercise capacity was average.    There are no prior studies for comparison.    No valid procedures specified.    PHYSICAL EXAM  GENERAL: well built, well nourished, well-developed in no apparent distress alert and oriented.   HEENT: Normocephalic. Pupils normal and conjunctivae normal.  Mucous membranes normal, no cyanosis or icterus, trachea central,no pallor or icterus is noted..   NECK: No JVD. No bruit..   THYROID: Thyroid not enlarged. No nodules present..   CARDIAC:  Normal S1-S2.  No murmur rub click or gallop.  PMI nondisplaced.    LUNGS: Clear to auscultation.  No wheezing or rhonchi..   ABDOMEN: Soft no masses or organomegaly.  No abdomen pulsations or bruits.  Normal bowel sounds. No pulsations and no masses felt, No guarding or rebound.   URINARY: No mcclain catheter   EXTREMITIES: No cyanosis, clubbing or edema noted at this time., no calf tenderness bilaterally.   PERIPHERAL VASCULAR SYSTEM: Good palpable distal pulses.  2+ femoral, popliteal and pedal pulses.  No bruits    CENTRAL NERVOUS SYSTEM: No focal motor or sensory deficits noted.   SKIN: Skin without lesions, moist, well perfused.   MUSCLE STRENGTH & TONE: No noteable weakness, atrophy or abnormal movement    I HAVE REVIEWED :    The vital signs, nurses notes, and all the pertinent radiology and labs.         Current Outpatient Medications   Medication Instructions    atorvastatin (LIPITOR) 40 mg, Oral, Daily    BELatacept 250 mg SolR 5 mg/kg IV every 2 weeks for 5 doses then every 28 days thereafter (+/- 3 days)    blood-glucose meter,continuous (DEXCOM G6  MISC) by Misc.(Non-Drug; Combo Route) route.    blood-glucose transmitter (DEXCOM G6 TRANSMITTER MISC)   Dexcom G6 transmitter, See Instructions, uad with dexcom G6 sensor; change transmitter q 90 days, # 1 EA, 3 Refill(s), Pharmacy: [facility], 187, cm, 23 14:55:00 CDT, Height/Length Measured, 105.1, kg, 23 14:55:00 CDT, Weight Dosing    calcitRIOL (ROCALTROL) 0.5 mcg, Oral, Daily    DEXCOM G6 SENSOR Adelina SMARTSI Each Topical Every 10 Days    ergocalciferol (ERGOCALCIFEROL) 50,000 Units, Oral, Every 7 days    insulin lispro (HUMALOG U-100 INSULIN) 100 unit/mL injection   See Instructions, for use in insulin pump; max daily dose 100 units, # 90 mL, 1 Refill(s), Maintenance, Pharmacy: [facility], 187, cm, 23 11:24:00 CST, Height/Length Measured, 102.5, kg, 23 11:24:00 CST, Weight Dosing    insulin pump cart,auto,BT/cntr (OMNIPOD 5 G6 INTRO KIT, GEN 5, SUBQ)   Omnipod 5 G6 Intro Kit (Gen 5), See  Instructions, use as directed, # 1 EA, 0 Refill(s), Pharmacy: [facility], 187, cm, 12/30/22 9:14:00 CST, Height/Length Measured, 102.3, kg, 12/30/22 9:14:00 CST, Weight Dosing    insulin regular human (AFREZZA) 4 unit/8 unit/ 12 unit (60) CtDv See Instructions, 8 units inhaled ac, tid and additional units according to sliding scale; max daily dose 96 units      cartridge must be room temp for 10 minutes before use    NDC:[address]-59, # 360 EA, 5 Refill(s), Maintenance    k phos di & mono-sod phos mono (K-PHOS-NEUTRAL) 250 mg Tab 2 tablets, Oral, 3 times daily    magnesium oxide (MAG-OX) 800 mg, Oral, 2 times daily    metoprolol tartrate (LOPRESSOR) 12.5 mg, Oral, 2 times daily, HOLD for SBP>130    multivitamin Tab 1 tablet, Oral, Daily    mycophenolate (CELLCEPT) 500 mg, Oral, 2 times daily    ondansetron (ZOFRAN-ODT) 4 mg, Oral, Every 8 hours PRN    pantoprazole (PROTONIX) 40 mg, Oral, Daily    predniSONE (DELTASONE) 2.5 mg, Oral, 2 times daily    sulfamethoxazole-trimethoprim 400-80mg (BACTRIM,SEPTRA) 400-80 mg per tablet 1 tablet, Oral, Daily, STOP taking 1/12/2025    tacrolimus (PROGRAF) 1 MG Cap Take 4 capsules (4 mg total) by mouth every morning AND 3 capsules (3 mg total) every evening.          Assessment:   Status post kidney transplant 07/2024.  Hematologic issues with immunosuppressant agents, changes in progress.  Labile blood pressure, blood sugars.    Cardiac exam review systems otherwise stable.  Pre transplant clearance negative for ischemic structural heart issues.        Plan:   Plan per transplant team, Nephrology.  Six-month with Cardiology          No follow-ups on file.

## 2025-01-10 ENCOUNTER — OFFICE VISIT (OUTPATIENT)
Dept: TRANSPLANT | Facility: CLINIC | Age: 45
End: 2025-01-10
Payer: COMMERCIAL

## 2025-01-10 ENCOUNTER — CLINICAL SUPPORT (OUTPATIENT)
Dept: INFECTIOUS DISEASES | Facility: CLINIC | Age: 45
End: 2025-01-10
Payer: COMMERCIAL

## 2025-01-10 VITALS
HEIGHT: 74 IN | DIASTOLIC BLOOD PRESSURE: 89 MMHG | WEIGHT: 226 LBS | BODY MASS INDEX: 29 KG/M2 | OXYGEN SATURATION: 97 % | HEART RATE: 74 BPM | TEMPERATURE: 97 F | RESPIRATION RATE: 18 BRPM | SYSTOLIC BLOOD PRESSURE: 158 MMHG

## 2025-01-10 DIAGNOSIS — Z01.818 PRE-TRANSPLANT EVALUATION FOR KIDNEY TRANSPLANT: ICD-10-CM

## 2025-01-10 DIAGNOSIS — Z79.60 LONG-TERM USE OF IMMUNOSUPPRESSANT MEDICATION: ICD-10-CM

## 2025-01-10 DIAGNOSIS — Z94.0 STATUS POST KIDNEY TRANSPLANT: Primary | ICD-10-CM

## 2025-01-10 DIAGNOSIS — Z79.60 LONG-TERM USE OF IMMUNOSUPPRESSANT MEDICATION: Primary | ICD-10-CM

## 2025-01-10 DIAGNOSIS — Z79.4 LONG TERM CURRENT USE OF INSULIN: ICD-10-CM

## 2025-01-10 PROCEDURE — 99999 PR PBB SHADOW E&M-EST. PATIENT-LVL IV: CPT | Mod: PBBFAC,TXP,, | Performed by: NURSE PRACTITIONER

## 2025-01-10 PROCEDURE — 99999 PR PBB SHADOW E&M-EST. PATIENT-LVL I: CPT | Mod: PBBFAC,TXP,,

## 2025-01-10 RX ORDER — MYCOPHENOLIC ACID 180 MG/1
360 TABLET, DELAYED RELEASE ORAL 2 TIMES DAILY
Qty: 120 TABLET | Refills: 11 | Status: SHIPPED | OUTPATIENT
Start: 2025-01-10 | End: 2026-01-10

## 2025-01-10 NOTE — PROGRESS NOTES
Patient received 2 vaccines IM to the right deltoid, Shingrix #2 anterior, and HD flu posterior.  Tolerated well and left in NAD

## 2025-01-10 NOTE — LETTER
January 10, 2025        Jose Mckinney  [address]  [address]  [facility] [place] [facility] [address]  Phone: [phone]  Fax: [phone]             [facility]st [facility]  [address]  [facility] [address]  Phone: [phone]   Patient: Dejuan Diaz Jr.   MR Number: [id number]   YOB: 1980   Date of Visit: 1/10/2025       Dear Dr. Jose Mckinney    Thank you for referring Dejuan Diaz to me for evaluation. Attached you will find relevant portions of my assessment and plan of care.    If you have questions, please do not hesitate to call me. I look forward to following Dejuan Diaz along with you.    Sincerely,    Angie Grace, NP    Enclosure    If you would like to receive this communication electronically, please contact [email or web address] or [phone] to request [employer] access.    [employer] is a tool which provides read-only access to select patient information with whom you have a relationship. Its easy to use and provides real time access to review your patients record including encounter summaries, notes, results, and demographic information.    If you feel you have received this communication in error or would no longer like to receive these types of communications, please e-mail [email or web address]

## 2025-01-23 ENCOUNTER — LAB VISIT (OUTPATIENT)
Dept: LAB | Facility: HOSPITAL | Age: 45
End: 2025-01-23
Attending: INTERNAL MEDICINE
Payer: COMMERCIAL

## 2025-01-23 DIAGNOSIS — D50.9 IRON DEFICIENCY ANEMIA, UNSPECIFIED IRON DEFICIENCY ANEMIA TYPE: ICD-10-CM

## 2025-01-23 DIAGNOSIS — Z94.0 KIDNEY REPLACED BY TRANSPLANT: ICD-10-CM

## 2025-01-23 DIAGNOSIS — E83.42 HYPOMAGNESEMIA: ICD-10-CM

## 2025-01-23 LAB
ALBUMIN SERPL BCP-MCNC: 4.2 G/DL (ref 3.5–5.2)
ANION GAP SERPL CALC-SCNC: 8 MMOL/L (ref 8–16)
BASOPHILS # BLD AUTO: 0.05 K/UL (ref 0–0.2)
BASOPHILS NFR BLD: 0.9 % (ref 0–1.9)
BILIRUB UR QL STRIP: NEGATIVE
BUN SERPL-MCNC: 22 MG/DL (ref 6–20)
CALCIUM SERPL-MCNC: 9.7 MG/DL (ref 8.7–10.5)
CHLORIDE SERPL-SCNC: 107 MMOL/L (ref 95–110)
CLARITY UR: CLEAR
CO2 SERPL-SCNC: 25 MMOL/L (ref 23–29)
COLOR UR: YELLOW
CREAT SERPL-MCNC: 1.4 MG/DL (ref 0.5–1.4)
CREAT UR-MCNC: 42.1 MG/DL (ref 23–375)
DIFFERENTIAL METHOD BLD: ABNORMAL
EOSINOPHIL # BLD AUTO: 0 K/UL (ref 0–0.5)
EOSINOPHIL NFR BLD: 0.2 % (ref 0–8)
ERYTHROCYTE [DISTWIDTH] IN BLOOD BY AUTOMATED COUNT: 14.3 % (ref 11.5–14.5)
EST. GFR  (NO RACE VARIABLE): >60 ML/MIN/1.73 M^2
FERRITIN SERPL-MCNC: 28 NG/ML (ref 20–300)
GLUCOSE SERPL-MCNC: 162 MG/DL (ref 70–110)
GLUCOSE UR QL STRIP: ABNORMAL
HCT VFR BLD AUTO: 47.3 % (ref 40–54)
HGB BLD-MCNC: 15.7 G/DL (ref 14–18)
HGB UR QL STRIP: NEGATIVE
IMM GRANULOCYTES # BLD AUTO: 0.03 K/UL (ref 0–0.04)
IMM GRANULOCYTES NFR BLD AUTO: 0.6 % (ref 0–0.5)
IRON SERPL-MCNC: 72 UG/DL (ref 45–160)
KETONES UR QL STRIP: NEGATIVE
LEUKOCYTE ESTERASE UR QL STRIP: NEGATIVE
LYMPHOCYTES # BLD AUTO: 0.6 K/UL (ref 1–4.8)
LYMPHOCYTES NFR BLD: 11.9 % (ref 18–48)
MAGNESIUM SERPL-MCNC: 1.6 MG/DL (ref 1.6–2.6)
MCH RBC QN AUTO: 26.6 PG (ref 27–31)
MCHC RBC AUTO-ENTMCNC: 33.2 G/DL (ref 32–36)
MCV RBC AUTO: 80 FL (ref 82–98)
MONOCYTES # BLD AUTO: 0.4 K/UL (ref 0.3–1)
MONOCYTES NFR BLD: 8 % (ref 4–15)
NEUTROPHILS # BLD AUTO: 4.2 K/UL (ref 1.8–7.7)
NEUTROPHILS NFR BLD: 78.4 % (ref 38–73)
NITRITE UR QL STRIP: NEGATIVE
NRBC BLD-RTO: 0 /100 WBC
PH UR STRIP: 6 [PH] (ref 5–8)
PHOSPHATE SERPL-MCNC: 2.2 MG/DL (ref 2.7–4.5)
PLATELET # BLD AUTO: 254 K/UL (ref 150–450)
PMV BLD AUTO: 9.8 FL (ref 9.2–12.9)
POTASSIUM SERPL-SCNC: 4 MMOL/L (ref 3.5–5.1)
PROT UR QL STRIP: NEGATIVE
PROT UR-MCNC: 13 MG/DL (ref 0–15)
PROT/CREAT UR: 0.31 MG/G{CREAT} (ref 0–0.2)
RBC # BLD AUTO: 5.9 M/UL (ref 4.6–6.2)
SATURATED IRON: 18 % (ref 20–50)
SODIUM SERPL-SCNC: 140 MMOL/L (ref 136–145)
SP GR UR STRIP: 1.01 (ref 1–1.03)
TOTAL IRON BINDING CAPACITY: 410 UG/DL (ref 250–450)
TRANSFERRIN SERPL-MCNC: 277 MG/DL (ref 200–375)
URN SPEC COLLECT METH UR: ABNORMAL
UROBILINOGEN UR STRIP-ACNC: NEGATIVE EU/DL
WBC # BLD AUTO: 5.4 K/UL (ref 3.9–12.7)

## 2025-01-23 PROCEDURE — 85025 COMPLETE CBC W/AUTO DIFF WBC: CPT | Mod: TXP | Performed by: INTERNAL MEDICINE

## 2025-01-23 PROCEDURE — 82728 ASSAY OF FERRITIN: CPT | Mod: TXP | Performed by: INTERNAL MEDICINE

## 2025-01-23 PROCEDURE — 82570 ASSAY OF URINE CREATININE: CPT | Mod: TXP | Performed by: INTERNAL MEDICINE

## 2025-01-23 PROCEDURE — 36415 COLL VENOUS BLD VENIPUNCTURE: CPT | Mod: TXP | Performed by: INTERNAL MEDICINE

## 2025-01-23 PROCEDURE — 87799 DETECT AGENT NOS DNA QUANT: CPT | Mod: TXP | Performed by: INTERNAL MEDICINE

## 2025-01-23 PROCEDURE — 80069 RENAL FUNCTION PANEL: CPT | Mod: TXP | Performed by: INTERNAL MEDICINE

## 2025-01-23 PROCEDURE — 81003 URINALYSIS AUTO W/O SCOPE: CPT | Mod: TXP | Performed by: INTERNAL MEDICINE

## 2025-01-23 PROCEDURE — 83735 ASSAY OF MAGNESIUM: CPT | Mod: TXP | Performed by: INTERNAL MEDICINE

## 2025-01-23 PROCEDURE — 84466 ASSAY OF TRANSFERRIN: CPT | Mod: TXP | Performed by: INTERNAL MEDICINE

## 2025-01-23 PROCEDURE — 80197 ASSAY OF TACROLIMUS: CPT | Mod: TXP | Performed by: INTERNAL MEDICINE

## 2025-01-24 ENCOUNTER — INFUSION (OUTPATIENT)
Dept: INFUSION THERAPY | Facility: HOSPITAL | Age: 45
End: 2025-01-24
Attending: INTERNAL MEDICINE
Payer: COMMERCIAL

## 2025-01-24 VITALS
DIASTOLIC BLOOD PRESSURE: 83 MMHG | RESPIRATION RATE: 16 BRPM | SYSTOLIC BLOOD PRESSURE: 138 MMHG | HEIGHT: 74 IN | WEIGHT: 226 LBS | BODY MASS INDEX: 29 KG/M2 | HEART RATE: 82 BPM | TEMPERATURE: 98 F | OXYGEN SATURATION: 98 %

## 2025-01-24 DIAGNOSIS — Z94.0 STATUS POST KIDNEY TRANSPLANT: Primary | ICD-10-CM

## 2025-01-24 LAB
CMV DNA SPEC QL NAA+PROBE: NORMAL
CYTOMEGALOVIRUS PCR, QUANT: NOT DETECTED IU/ML
TACROLIMUS BLD-MCNC: 10.9 NG/ML (ref 5–15)

## 2025-01-24 PROCEDURE — 25000003 PHARM REV CODE 250: Performed by: INTERNAL MEDICINE

## 2025-01-24 PROCEDURE — 96365 THER/PROPH/DIAG IV INF INIT: CPT

## 2025-01-24 PROCEDURE — 63600175 PHARM REV CODE 636 W HCPCS: Mod: JZ,TB | Performed by: INTERNAL MEDICINE

## 2025-01-24 RX ORDER — DIPHENHYDRAMINE HYDROCHLORIDE 50 MG/ML
50 INJECTION INTRAMUSCULAR; INTRAVENOUS ONCE AS NEEDED
Status: DISCONTINUED | OUTPATIENT
Start: 2025-01-24 | End: 2025-01-24 | Stop reason: HOSPADM

## 2025-01-24 RX ORDER — EPINEPHRINE 0.3 MG/.3ML
0.3 INJECTION SUBCUTANEOUS ONCE AS NEEDED
Status: DISCONTINUED | OUTPATIENT
Start: 2025-01-24 | End: 2025-01-24 | Stop reason: HOSPADM

## 2025-01-24 RX ORDER — EPINEPHRINE 0.3 MG/.3ML
0.3 INJECTION SUBCUTANEOUS ONCE AS NEEDED
OUTPATIENT
Start: 2025-02-07

## 2025-01-24 RX ORDER — DIPHENHYDRAMINE HYDROCHLORIDE 50 MG/ML
50 INJECTION INTRAMUSCULAR; INTRAVENOUS ONCE AS NEEDED
OUTPATIENT
Start: 2025-02-07

## 2025-01-24 RX ORDER — HEPARIN 100 UNIT/ML
500 SYRINGE INTRAVENOUS
OUTPATIENT
Start: 2025-02-07

## 2025-01-24 RX ORDER — ERGOCALCIFEROL 1.25 MG/1
50000 CAPSULE ORAL
Qty: 51 CAPSULE | Refills: 0 | Status: SHIPPED | OUTPATIENT
Start: 2025-01-24 | End: 2026-01-24

## 2025-01-24 RX ADMIN — DEXTROSE MONOHYDRATE 500 MG: 50 INJECTION, SOLUTION INTRAVENOUS at 03:01

## 2025-01-24 NOTE — PLAN OF CARE
Problem: Fatigue  Goal: Improved Activity Tolerance  Outcome: Progressing  Intervention: Promote Improved Energy  Flowsheets (Taken 1/24/2025 1530)  Fatigue Management: frequent rest breaks encouraged  Environmental Support: rest periods encouraged

## 2025-02-03 ENCOUNTER — LAB VISIT (OUTPATIENT)
Dept: LAB | Facility: HOSPITAL | Age: 45
End: 2025-02-03
Attending: INTERNAL MEDICINE
Payer: COMMERCIAL

## 2025-02-03 DIAGNOSIS — E83.42 HYPOMAGNESEMIA: ICD-10-CM

## 2025-02-03 DIAGNOSIS — Z94.0 KIDNEY REPLACED BY TRANSPLANT: ICD-10-CM

## 2025-02-03 LAB
ALBUMIN SERPL BCP-MCNC: 3.8 G/DL (ref 3.5–5.2)
ANION GAP SERPL CALC-SCNC: 12 MMOL/L (ref 8–16)
BASOPHILS # BLD AUTO: 0.03 K/UL (ref 0–0.2)
BASOPHILS NFR BLD: 0.7 % (ref 0–1.9)
BILIRUB UR QL STRIP: NEGATIVE
BUN SERPL-MCNC: 28 MG/DL (ref 6–20)
CALCIUM SERPL-MCNC: 9.2 MG/DL (ref 8.7–10.5)
CHLORIDE SERPL-SCNC: 109 MMOL/L (ref 95–110)
CLARITY UR: CLEAR
CO2 SERPL-SCNC: 19 MMOL/L (ref 23–29)
COLOR UR: YELLOW
CREAT SERPL-MCNC: 1.3 MG/DL (ref 0.5–1.4)
CREAT UR-MCNC: 62.8 MG/DL (ref 23–375)
DIFFERENTIAL METHOD BLD: ABNORMAL
EOSINOPHIL # BLD AUTO: 0 K/UL (ref 0–0.5)
EOSINOPHIL NFR BLD: 1 % (ref 0–8)
ERYTHROCYTE [DISTWIDTH] IN BLOOD BY AUTOMATED COUNT: 14.7 % (ref 11.5–14.5)
EST. GFR  (NO RACE VARIABLE): >60 ML/MIN/1.73 M^2
GLUCOSE SERPL-MCNC: 210 MG/DL (ref 70–110)
GLUCOSE UR QL STRIP: ABNORMAL
HCT VFR BLD AUTO: 44.8 % (ref 40–54)
HGB BLD-MCNC: 14.5 G/DL (ref 14–18)
HGB UR QL STRIP: NEGATIVE
IMM GRANULOCYTES # BLD AUTO: 0.01 K/UL (ref 0–0.04)
IMM GRANULOCYTES NFR BLD AUTO: 0.2 % (ref 0–0.5)
KETONES UR QL STRIP: ABNORMAL
LEUKOCYTE ESTERASE UR QL STRIP: NEGATIVE
LYMPHOCYTES # BLD AUTO: 0.5 K/UL (ref 1–4.8)
LYMPHOCYTES NFR BLD: 12.5 % (ref 18–48)
MAGNESIUM SERPL-MCNC: 1.7 MG/DL (ref 1.6–2.6)
MCH RBC QN AUTO: 26.3 PG (ref 27–31)
MCHC RBC AUTO-ENTMCNC: 32.4 G/DL (ref 32–36)
MCV RBC AUTO: 81 FL (ref 82–98)
MONOCYTES # BLD AUTO: 0.5 K/UL (ref 0.3–1)
MONOCYTES NFR BLD: 12.3 % (ref 4–15)
NEUTROPHILS # BLD AUTO: 3.1 K/UL (ref 1.8–7.7)
NEUTROPHILS NFR BLD: 73.3 % (ref 38–73)
NITRITE UR QL STRIP: NEGATIVE
NRBC BLD-RTO: 0 /100 WBC
PH UR STRIP: 6 [PH] (ref 5–8)
PHOSPHATE SERPL-MCNC: 1.8 MG/DL (ref 2.7–4.5)
PLATELET # BLD AUTO: 229 K/UL (ref 150–450)
PMV BLD AUTO: 10.1 FL (ref 9.2–12.9)
POTASSIUM SERPL-SCNC: 4.4 MMOL/L (ref 3.5–5.1)
PROT UR QL STRIP: NEGATIVE
PROT UR-MCNC: 12 MG/DL (ref 0–15)
PROT/CREAT UR: 0.19 MG/G{CREAT} (ref 0–0.2)
RBC # BLD AUTO: 5.51 M/UL (ref 4.6–6.2)
SODIUM SERPL-SCNC: 140 MMOL/L (ref 136–145)
SP GR UR STRIP: 1.01 (ref 1–1.03)
URN SPEC COLLECT METH UR: ABNORMAL
UROBILINOGEN UR STRIP-ACNC: NEGATIVE EU/DL
WBC # BLD AUTO: 4.16 K/UL (ref 3.9–12.7)

## 2025-02-03 PROCEDURE — 85025 COMPLETE CBC W/AUTO DIFF WBC: CPT | Mod: TXP | Performed by: INTERNAL MEDICINE

## 2025-02-03 PROCEDURE — 36415 COLL VENOUS BLD VENIPUNCTURE: CPT | Mod: NTX | Performed by: INTERNAL MEDICINE

## 2025-02-03 PROCEDURE — 80197 ASSAY OF TACROLIMUS: CPT | Mod: TXP | Performed by: INTERNAL MEDICINE

## 2025-02-03 PROCEDURE — 82570 ASSAY OF URINE CREATININE: CPT | Mod: TXP | Performed by: INTERNAL MEDICINE

## 2025-02-03 PROCEDURE — 80069 RENAL FUNCTION PANEL: CPT | Mod: TXP | Performed by: INTERNAL MEDICINE

## 2025-02-03 PROCEDURE — 81003 URINALYSIS AUTO W/O SCOPE: CPT | Mod: TXP | Performed by: INTERNAL MEDICINE

## 2025-02-03 PROCEDURE — 83735 ASSAY OF MAGNESIUM: CPT | Mod: TXP | Performed by: INTERNAL MEDICINE

## 2025-02-04 DIAGNOSIS — Z76.82 AWAITING ORGAN TRANSPLANT: ICD-10-CM

## 2025-02-04 LAB — TACROLIMUS BLD-MCNC: 7.7 NG/ML (ref 5–15)

## 2025-02-04 RX ORDER — TACROLIMUS 1 MG/1
2 CAPSULE ORAL EVERY 12 HOURS
Qty: 120 CAPSULE | Refills: 11 | Status: SHIPPED | OUTPATIENT
Start: 2025-02-04 | End: 2025-02-18

## 2025-02-04 NOTE — TELEPHONE ENCOUNTER
Pt has started IV Azul infusions. Per tac taper, instructed pt to decrease prograf dose to 2mg twice a day.  ----- Message from Mitch Simmons MD sent at 2/4/2025  1:05 PM CST -----  These results are OK. No need to make any changes.

## 2025-02-07 ENCOUNTER — INFUSION (OUTPATIENT)
Dept: INFUSION THERAPY | Facility: HOSPITAL | Age: 45
End: 2025-02-07
Attending: INTERNAL MEDICINE
Payer: COMMERCIAL

## 2025-02-07 VITALS
RESPIRATION RATE: 18 BRPM | TEMPERATURE: 98 F | BODY MASS INDEX: 28.03 KG/M2 | HEIGHT: 74 IN | WEIGHT: 218.38 LBS | DIASTOLIC BLOOD PRESSURE: 87 MMHG | HEART RATE: 89 BPM | SYSTOLIC BLOOD PRESSURE: 128 MMHG | OXYGEN SATURATION: 98 %

## 2025-02-07 DIAGNOSIS — Z94.0 STATUS POST KIDNEY TRANSPLANT: Primary | ICD-10-CM

## 2025-02-07 PROCEDURE — 96365 THER/PROPH/DIAG IV INF INIT: CPT

## 2025-02-07 PROCEDURE — 25000003 PHARM REV CODE 250: Performed by: INTERNAL MEDICINE

## 2025-02-07 PROCEDURE — 63600175 PHARM REV CODE 636 W HCPCS: Mod: JZ,TB | Performed by: INTERNAL MEDICINE

## 2025-02-07 RX ORDER — HEPARIN 100 UNIT/ML
500 SYRINGE INTRAVENOUS
Status: CANCELLED | OUTPATIENT
Start: 2025-02-21

## 2025-02-07 RX ORDER — EPINEPHRINE 0.3 MG/.3ML
0.3 INJECTION SUBCUTANEOUS ONCE AS NEEDED
Status: CANCELLED | OUTPATIENT
Start: 2025-02-21

## 2025-02-07 RX ORDER — DIPHENHYDRAMINE HYDROCHLORIDE 50 MG/ML
50 INJECTION INTRAMUSCULAR; INTRAVENOUS ONCE AS NEEDED
Status: DISCONTINUED | OUTPATIENT
Start: 2025-02-07 | End: 2025-02-07 | Stop reason: HOSPADM

## 2025-02-07 RX ORDER — EPINEPHRINE 0.3 MG/.3ML
0.3 INJECTION SUBCUTANEOUS ONCE AS NEEDED
Status: DISCONTINUED | OUTPATIENT
Start: 2025-02-07 | End: 2025-02-07 | Stop reason: HOSPADM

## 2025-02-07 RX ORDER — DIPHENHYDRAMINE HYDROCHLORIDE 50 MG/ML
50 INJECTION INTRAMUSCULAR; INTRAVENOUS ONCE AS NEEDED
Status: CANCELLED | OUTPATIENT
Start: 2025-02-21

## 2025-02-07 RX ADMIN — DEXTROSE MONOHYDRATE 500 MG: 50 INJECTION, SOLUTION INTRAVENOUS at 03:02

## 2025-02-07 NOTE — PLAN OF CARE
Problem: Fatigue  Goal: Improved Activity Tolerance  Outcome: Progressing  Intervention: Promote Improved Energy  Flowsheets (Taken 2/7/2025 7459)  Fatigue Management: frequent rest breaks encouraged  Sleep/Rest Enhancement: regular sleep/rest pattern promoted  Activity Management:   Ambulated -L4   Up in chair - L3  Environmental Support:   calm environment promoted   rest periods encouraged

## 2025-02-17 ENCOUNTER — LAB VISIT (OUTPATIENT)
Dept: LAB | Facility: HOSPITAL | Age: 45
End: 2025-02-17
Attending: INTERNAL MEDICINE
Payer: COMMERCIAL

## 2025-02-17 DIAGNOSIS — Z94.0 KIDNEY REPLACED BY TRANSPLANT: ICD-10-CM

## 2025-02-17 DIAGNOSIS — E83.42 HYPOMAGNESEMIA: ICD-10-CM

## 2025-02-17 LAB
ALBUMIN SERPL BCP-MCNC: 3.8 G/DL (ref 3.5–5.2)
ANION GAP SERPL CALC-SCNC: 7 MMOL/L (ref 8–16)
BASOPHILS # BLD AUTO: 0.02 K/UL (ref 0–0.2)
BASOPHILS NFR BLD: 0.4 % (ref 0–1.9)
BUN SERPL-MCNC: 27 MG/DL (ref 6–20)
CALCIUM SERPL-MCNC: 9.4 MG/DL (ref 8.7–10.5)
CHLORIDE SERPL-SCNC: 107 MMOL/L (ref 95–110)
CO2 SERPL-SCNC: 24 MMOL/L (ref 23–29)
CREAT SERPL-MCNC: 1.3 MG/DL (ref 0.5–1.4)
DIFFERENTIAL METHOD BLD: ABNORMAL
EOSINOPHIL # BLD AUTO: 0 K/UL (ref 0–0.5)
EOSINOPHIL NFR BLD: 0.7 % (ref 0–8)
ERYTHROCYTE [DISTWIDTH] IN BLOOD BY AUTOMATED COUNT: 15.2 % (ref 11.5–14.5)
EST. GFR  (NO RACE VARIABLE): >60 ML/MIN/1.73 M^2
GLUCOSE SERPL-MCNC: 302 MG/DL (ref 70–110)
HCT VFR BLD AUTO: 46.7 % (ref 40–54)
HGB BLD-MCNC: 15.1 G/DL (ref 14–18)
IMM GRANULOCYTES # BLD AUTO: 0 K/UL (ref 0–0.04)
IMM GRANULOCYTES NFR BLD AUTO: 0 % (ref 0–0.5)
LYMPHOCYTES # BLD AUTO: 0.6 K/UL (ref 1–4.8)
LYMPHOCYTES NFR BLD: 13.7 % (ref 18–48)
MAGNESIUM SERPL-MCNC: 1.7 MG/DL (ref 1.6–2.6)
MCH RBC QN AUTO: 26.2 PG (ref 27–31)
MCHC RBC AUTO-ENTMCNC: 32.3 G/DL (ref 32–36)
MCV RBC AUTO: 81 FL (ref 82–98)
MONOCYTES # BLD AUTO: 0.4 K/UL (ref 0.3–1)
MONOCYTES NFR BLD: 8.8 % (ref 4–15)
NEUTROPHILS # BLD AUTO: 3.5 K/UL (ref 1.8–7.7)
NEUTROPHILS NFR BLD: 76.4 % (ref 38–73)
NRBC BLD-RTO: 0 /100 WBC
PHOSPHATE SERPL-MCNC: 2.3 MG/DL (ref 2.7–4.5)
PLATELET # BLD AUTO: 254 K/UL (ref 150–450)
PMV BLD AUTO: 10 FL (ref 9.2–12.9)
POTASSIUM SERPL-SCNC: 5 MMOL/L (ref 3.5–5.1)
RBC # BLD AUTO: 5.77 M/UL (ref 4.6–6.2)
SODIUM SERPL-SCNC: 138 MMOL/L (ref 136–145)
WBC # BLD AUTO: 4.53 K/UL (ref 3.9–12.7)

## 2025-02-17 PROCEDURE — 85025 COMPLETE CBC W/AUTO DIFF WBC: CPT | Mod: TXP | Performed by: INTERNAL MEDICINE

## 2025-02-17 PROCEDURE — 83735 ASSAY OF MAGNESIUM: CPT | Mod: TXP | Performed by: INTERNAL MEDICINE

## 2025-02-17 PROCEDURE — 80069 RENAL FUNCTION PANEL: CPT | Mod: TXP | Performed by: INTERNAL MEDICINE

## 2025-02-17 PROCEDURE — 36415 COLL VENOUS BLD VENIPUNCTURE: CPT | Mod: TXP | Performed by: INTERNAL MEDICINE

## 2025-02-17 PROCEDURE — 80197 ASSAY OF TACROLIMUS: CPT | Mod: TXP | Performed by: INTERNAL MEDICINE

## 2025-02-18 ENCOUNTER — RESULTS FOLLOW-UP (OUTPATIENT)
Dept: TRANSPLANT | Facility: CLINIC | Age: 45
End: 2025-02-18
Payer: COMMERCIAL

## 2025-02-18 DIAGNOSIS — Z76.82 AWAITING ORGAN TRANSPLANT: ICD-10-CM

## 2025-02-18 LAB — TACROLIMUS BLD-MCNC: 7.9 NG/ML (ref 5–15)

## 2025-02-18 RX ORDER — TACROLIMUS 1 MG/1
1 CAPSULE ORAL EVERY 12 HOURS
Qty: 60 CAPSULE | Refills: 11 | Status: SHIPPED | OUTPATIENT
Start: 2025-02-18 | End: 2026-02-18

## 2025-02-18 NOTE — TELEPHONE ENCOUNTER
Per tac taper, instructed pt to decrease prograf dose by 50% (decrease to 1mg BID)  ----- Message from Al Martell MD sent at 2/18/2025  9:06 AM CST -----  Labs and diagnostic tests were reviewed. No action/changes indicated.  ----- Message -----  From: [facility] Interface  Sent: 2/17/2025  10:10 AM CST  To: Bella Purdy DO

## 2025-02-21 ENCOUNTER — INFUSION (OUTPATIENT)
Dept: INFUSION THERAPY | Facility: HOSPITAL | Age: 45
End: 2025-02-21
Attending: INTERNAL MEDICINE
Payer: COMMERCIAL

## 2025-02-21 VITALS
HEIGHT: 74 IN | OXYGEN SATURATION: 97 % | HEART RATE: 74 BPM | TEMPERATURE: 98 F | WEIGHT: 222.38 LBS | RESPIRATION RATE: 15 BRPM | SYSTOLIC BLOOD PRESSURE: 130 MMHG | BODY MASS INDEX: 28.54 KG/M2 | DIASTOLIC BLOOD PRESSURE: 83 MMHG

## 2025-02-21 DIAGNOSIS — Z94.0 STATUS POST KIDNEY TRANSPLANT: Primary | ICD-10-CM

## 2025-02-21 PROCEDURE — 96365 THER/PROPH/DIAG IV INF INIT: CPT

## 2025-02-21 PROCEDURE — 25000003 PHARM REV CODE 250: Performed by: INTERNAL MEDICINE

## 2025-02-21 PROCEDURE — 63600175 PHARM REV CODE 636 W HCPCS: Mod: JZ,TB | Performed by: INTERNAL MEDICINE

## 2025-02-21 RX ORDER — EPINEPHRINE 0.3 MG/.3ML
0.3 INJECTION SUBCUTANEOUS ONCE AS NEEDED
OUTPATIENT
Start: 2025-03-07

## 2025-02-21 RX ORDER — DIPHENHYDRAMINE HYDROCHLORIDE 50 MG/ML
50 INJECTION INTRAMUSCULAR; INTRAVENOUS ONCE AS NEEDED
Status: DISCONTINUED | OUTPATIENT
Start: 2025-02-21 | End: 2025-02-21 | Stop reason: HOSPADM

## 2025-02-21 RX ORDER — DIPHENHYDRAMINE HYDROCHLORIDE 50 MG/ML
50 INJECTION INTRAMUSCULAR; INTRAVENOUS ONCE AS NEEDED
OUTPATIENT
Start: 2025-03-07

## 2025-02-21 RX ORDER — HEPARIN 100 UNIT/ML
500 SYRINGE INTRAVENOUS
OUTPATIENT
Start: 2025-03-07

## 2025-02-21 RX ORDER — EPINEPHRINE 0.3 MG/.3ML
0.3 INJECTION SUBCUTANEOUS ONCE AS NEEDED
Status: DISCONTINUED | OUTPATIENT
Start: 2025-02-21 | End: 2025-02-21 | Stop reason: HOSPADM

## 2025-02-21 RX ADMIN — DEXTROSE MONOHYDRATE 500 MG: 50 INJECTION, SOLUTION INTRAVENOUS at 02:02

## 2025-02-21 NOTE — PLAN OF CARE
Problem: Fatigue  Goal: Improved Activity Tolerance  Outcome: Progressing  Intervention: Promote Improved Energy  Flowsheets (Taken 2/21/2025 1403)  Fatigue Management:   fatigue-related activity identified   frequent rest breaks encouraged   paced activity encouraged  Sleep/Rest Enhancement:   regular sleep/rest pattern promoted   relaxation techniques promoted  Activity Management: Ambulated -L4  Environmental Support: rest periods encouraged

## 2025-03-03 ENCOUNTER — LAB VISIT (OUTPATIENT)
Dept: LAB | Facility: HOSPITAL | Age: 45
End: 2025-03-03
Attending: INTERNAL MEDICINE
Payer: COMMERCIAL

## 2025-03-03 DIAGNOSIS — E83.42 HYPOMAGNESEMIA: ICD-10-CM

## 2025-03-03 DIAGNOSIS — Z94.0 KIDNEY REPLACED BY TRANSPLANT: ICD-10-CM

## 2025-03-03 LAB
ALBUMIN SERPL BCP-MCNC: 4 G/DL (ref 3.5–5.2)
ANION GAP SERPL CALC-SCNC: 13 MMOL/L (ref 8–16)
BASOPHILS # BLD AUTO: 0.05 K/UL (ref 0–0.2)
BASOPHILS NFR BLD: 1.1 % (ref 0–1.9)
BUN SERPL-MCNC: 27 MG/DL (ref 6–20)
CALCIUM SERPL-MCNC: 9.6 MG/DL (ref 8.7–10.5)
CHLORIDE SERPL-SCNC: 107 MMOL/L (ref 95–110)
CO2 SERPL-SCNC: 19 MMOL/L (ref 23–29)
CREAT SERPL-MCNC: 1.4 MG/DL (ref 0.5–1.4)
DIFFERENTIAL METHOD BLD: ABNORMAL
EOSINOPHIL # BLD AUTO: 0.1 K/UL (ref 0–0.5)
EOSINOPHIL NFR BLD: 1.1 % (ref 0–8)
ERYTHROCYTE [DISTWIDTH] IN BLOOD BY AUTOMATED COUNT: 15.6 % (ref 11.5–14.5)
EST. GFR  (NO RACE VARIABLE): >60 ML/MIN/1.73 M^2
GLUCOSE SERPL-MCNC: 197 MG/DL (ref 70–110)
HCT VFR BLD AUTO: 47.3 % (ref 40–54)
HGB BLD-MCNC: 15.2 G/DL (ref 14–18)
IMM GRANULOCYTES # BLD AUTO: 0.01 K/UL (ref 0–0.04)
IMM GRANULOCYTES NFR BLD AUTO: 0.2 % (ref 0–0.5)
LYMPHOCYTES # BLD AUTO: 0.6 K/UL (ref 1–4.8)
LYMPHOCYTES NFR BLD: 14.1 % (ref 18–48)
MAGNESIUM SERPL-MCNC: 1.8 MG/DL (ref 1.6–2.6)
MCH RBC QN AUTO: 26.3 PG (ref 27–31)
MCHC RBC AUTO-ENTMCNC: 32.1 G/DL (ref 32–36)
MCV RBC AUTO: 82 FL (ref 82–98)
MONOCYTES # BLD AUTO: 0.6 K/UL (ref 0.3–1)
MONOCYTES NFR BLD: 12.5 % (ref 4–15)
NEUTROPHILS # BLD AUTO: 3.2 K/UL (ref 1.8–7.7)
NEUTROPHILS NFR BLD: 71 % (ref 38–73)
NRBC BLD-RTO: 0 /100 WBC
PHOSPHATE SERPL-MCNC: 2 MG/DL (ref 2.7–4.5)
PLATELET # BLD AUTO: 219 K/UL (ref 150–450)
PMV BLD AUTO: 10.1 FL (ref 9.2–12.9)
POTASSIUM SERPL-SCNC: 4.4 MMOL/L (ref 3.5–5.1)
RBC # BLD AUTO: 5.79 M/UL (ref 4.6–6.2)
SODIUM SERPL-SCNC: 139 MMOL/L (ref 136–145)
WBC # BLD AUTO: 4.47 K/UL (ref 3.9–12.7)

## 2025-03-03 PROCEDURE — 83735 ASSAY OF MAGNESIUM: CPT | Mod: TXP | Performed by: INTERNAL MEDICINE

## 2025-03-03 PROCEDURE — 85025 COMPLETE CBC W/AUTO DIFF WBC: CPT | Mod: TXP | Performed by: INTERNAL MEDICINE

## 2025-03-03 PROCEDURE — 36415 COLL VENOUS BLD VENIPUNCTURE: CPT | Mod: TXP | Performed by: INTERNAL MEDICINE

## 2025-03-03 PROCEDURE — 80069 RENAL FUNCTION PANEL: CPT | Mod: TXP | Performed by: INTERNAL MEDICINE

## 2025-03-03 PROCEDURE — 80197 ASSAY OF TACROLIMUS: CPT | Mod: TXP | Performed by: INTERNAL MEDICINE

## 2025-03-04 LAB — TACROLIMUS BLD-MCNC: 2.8 NG/ML (ref 5–15)

## 2025-03-05 NOTE — TELEPHONE ENCOUNTER
Message sent to pt verifying true 12 hour level and correct dose taken. Informed pt of need for repeat level prior to dose change.  ----- Message from Maritza Infante MD sent at 3/4/2025  3:59 PM CST -----  Please verify this level is accurate, meds have not changed [ new ones added or removed], and repeat a 12 hr trough ASAP.   ----- Message -----  From: [facility] Interface  Sent: 3/3/2025   9:07 AM CST  To: Bella Purdy DO

## 2025-03-07 ENCOUNTER — INFUSION (OUTPATIENT)
Dept: INFUSION THERAPY | Facility: HOSPITAL | Age: 45
End: 2025-03-07
Attending: INTERNAL MEDICINE
Payer: COMMERCIAL

## 2025-03-07 ENCOUNTER — LAB VISIT (OUTPATIENT)
Dept: LAB | Facility: HOSPITAL | Age: 45
End: 2025-03-07
Attending: INTERNAL MEDICINE
Payer: COMMERCIAL

## 2025-03-07 VITALS
BODY MASS INDEX: 28.89 KG/M2 | HEIGHT: 74 IN | TEMPERATURE: 98 F | WEIGHT: 225.13 LBS | DIASTOLIC BLOOD PRESSURE: 90 MMHG | RESPIRATION RATE: 18 BRPM | SYSTOLIC BLOOD PRESSURE: 152 MMHG | OXYGEN SATURATION: 97 % | HEART RATE: 80 BPM

## 2025-03-07 DIAGNOSIS — Z94.0 KIDNEY REPLACED BY TRANSPLANT: ICD-10-CM

## 2025-03-07 DIAGNOSIS — Z94.0 STATUS POST KIDNEY TRANSPLANT: Primary | ICD-10-CM

## 2025-03-07 PROCEDURE — 63600175 PHARM REV CODE 636 W HCPCS: Mod: JZ,TB | Performed by: INTERNAL MEDICINE

## 2025-03-07 PROCEDURE — 96365 THER/PROPH/DIAG IV INF INIT: CPT

## 2025-03-07 PROCEDURE — 36415 COLL VENOUS BLD VENIPUNCTURE: CPT | Mod: TXP | Performed by: INTERNAL MEDICINE

## 2025-03-07 PROCEDURE — 25000003 PHARM REV CODE 250: Performed by: INTERNAL MEDICINE

## 2025-03-07 PROCEDURE — 80197 ASSAY OF TACROLIMUS: CPT | Mod: TXP | Performed by: INTERNAL MEDICINE

## 2025-03-07 RX ORDER — HEPARIN 100 UNIT/ML
500 SYRINGE INTRAVENOUS
OUTPATIENT
Start: 2025-03-21

## 2025-03-07 RX ORDER — EPINEPHRINE 0.3 MG/.3ML
0.3 INJECTION SUBCUTANEOUS ONCE AS NEEDED
Status: DISCONTINUED | OUTPATIENT
Start: 2025-03-07 | End: 2025-03-07 | Stop reason: HOSPADM

## 2025-03-07 RX ORDER — DIPHENHYDRAMINE HYDROCHLORIDE 50 MG/ML
50 INJECTION INTRAMUSCULAR; INTRAVENOUS ONCE AS NEEDED
OUTPATIENT
Start: 2025-03-21

## 2025-03-07 RX ORDER — DIPHENHYDRAMINE HYDROCHLORIDE 50 MG/ML
50 INJECTION INTRAMUSCULAR; INTRAVENOUS ONCE AS NEEDED
Status: DISCONTINUED | OUTPATIENT
Start: 2025-03-07 | End: 2025-03-07 | Stop reason: HOSPADM

## 2025-03-07 RX ORDER — EPINEPHRINE 0.3 MG/.3ML
0.3 INJECTION SUBCUTANEOUS ONCE AS NEEDED
OUTPATIENT
Start: 2025-03-21

## 2025-03-07 RX ADMIN — DEXTROSE MONOHYDRATE 500 MG: 50 INJECTION, SOLUTION INTRAVENOUS at 03:03

## 2025-03-08 LAB — TACROLIMUS BLD-MCNC: 3.3 NG/ML (ref 5–15)

## 2025-03-10 ENCOUNTER — TELEPHONE (OUTPATIENT)
Dept: TRANSPLANT | Facility: CLINIC | Age: 45
End: 2025-03-10
Payer: COMMERCIAL

## 2025-03-10 NOTE — TELEPHONE ENCOUNTER
----- Message from Al Martell MD sent at 3/10/2025  2:05 PM CDT -----  Keep same dose  ----- Message -----  From: Marika Kahn RN  Sent: 3/10/2025   1:21 PM CDT  To: Al Martell MD    This patient started micah, goal was to keep tac level 4-6 until we d/c tac at 1y post (July) Do we want to increase dose or continue current?

## 2025-03-17 ENCOUNTER — LAB VISIT (OUTPATIENT)
Dept: LAB | Facility: HOSPITAL | Age: 45
End: 2025-03-17
Attending: INTERNAL MEDICINE
Payer: COMMERCIAL

## 2025-03-17 DIAGNOSIS — E83.42 HYPOMAGNESEMIA: ICD-10-CM

## 2025-03-17 DIAGNOSIS — Z94.0 KIDNEY REPLACED BY TRANSPLANT: ICD-10-CM

## 2025-03-17 LAB
ALBUMIN SERPL BCP-MCNC: 3.9 G/DL (ref 3.5–5.2)
ANION GAP SERPL CALC-SCNC: 9 MMOL/L (ref 8–16)
BASOPHILS # BLD AUTO: 0.06 K/UL (ref 0–0.2)
BASOPHILS NFR BLD: 1.3 % (ref 0–1.9)
BUN SERPL-MCNC: 30 MG/DL (ref 6–20)
CALCIUM SERPL-MCNC: 9.5 MG/DL (ref 8.7–10.5)
CHLORIDE SERPL-SCNC: 107 MMOL/L (ref 95–110)
CO2 SERPL-SCNC: 23 MMOL/L (ref 23–29)
CREAT SERPL-MCNC: 1.3 MG/DL (ref 0.5–1.4)
DIFFERENTIAL METHOD BLD: ABNORMAL
EOSINOPHIL # BLD AUTO: 0.1 K/UL (ref 0–0.5)
EOSINOPHIL NFR BLD: 1.3 % (ref 0–8)
ERYTHROCYTE [DISTWIDTH] IN BLOOD BY AUTOMATED COUNT: 15.5 % (ref 11.5–14.5)
EST. GFR  (NO RACE VARIABLE): >60 ML/MIN/1.73 M^2
GLUCOSE SERPL-MCNC: 202 MG/DL (ref 70–110)
HCT VFR BLD AUTO: 45.2 % (ref 40–54)
HGB BLD-MCNC: 15 G/DL (ref 14–18)
IMM GRANULOCYTES # BLD AUTO: 0.01 K/UL (ref 0–0.04)
IMM GRANULOCYTES NFR BLD AUTO: 0.2 % (ref 0–0.5)
LYMPHOCYTES # BLD AUTO: 0.5 K/UL (ref 1–4.8)
LYMPHOCYTES NFR BLD: 11.4 % (ref 18–48)
MAGNESIUM SERPL-MCNC: 1.8 MG/DL (ref 1.6–2.6)
MCH RBC QN AUTO: 26.6 PG (ref 27–31)
MCHC RBC AUTO-ENTMCNC: 33.2 G/DL (ref 32–36)
MCV RBC AUTO: 80 FL (ref 82–98)
MONOCYTES # BLD AUTO: 0.5 K/UL (ref 0.3–1)
MONOCYTES NFR BLD: 10.6 % (ref 4–15)
NEUTROPHILS # BLD AUTO: 3.6 K/UL (ref 1.8–7.7)
NEUTROPHILS NFR BLD: 75.2 % (ref 38–73)
NRBC BLD-RTO: 0 /100 WBC
PHOSPHATE SERPL-MCNC: 2.1 MG/DL (ref 2.7–4.5)
PLATELET # BLD AUTO: 214 K/UL (ref 150–450)
PMV BLD AUTO: 10.8 FL (ref 9.2–12.9)
POTASSIUM SERPL-SCNC: 4.6 MMOL/L (ref 3.5–5.1)
RBC # BLD AUTO: 5.64 M/UL (ref 4.6–6.2)
SODIUM SERPL-SCNC: 139 MMOL/L (ref 136–145)
WBC # BLD AUTO: 4.73 K/UL (ref 3.9–12.7)

## 2025-03-17 PROCEDURE — 85025 COMPLETE CBC W/AUTO DIFF WBC: CPT | Mod: TXP | Performed by: INTERNAL MEDICINE

## 2025-03-17 PROCEDURE — 36415 COLL VENOUS BLD VENIPUNCTURE: CPT | Mod: TXP | Performed by: INTERNAL MEDICINE

## 2025-03-17 PROCEDURE — 80197 ASSAY OF TACROLIMUS: CPT | Mod: TXP | Performed by: INTERNAL MEDICINE

## 2025-03-17 PROCEDURE — 83735 ASSAY OF MAGNESIUM: CPT | Mod: TXP | Performed by: INTERNAL MEDICINE

## 2025-03-17 PROCEDURE — 80069 RENAL FUNCTION PANEL: CPT | Mod: TXP | Performed by: INTERNAL MEDICINE

## 2025-03-18 DIAGNOSIS — Z94.0 KIDNEY REPLACED BY TRANSPLANT: Primary | ICD-10-CM

## 2025-03-18 LAB — TACROLIMUS BLD-MCNC: 3 NG/ML (ref 5–15)

## 2025-03-21 ENCOUNTER — INFUSION (OUTPATIENT)
Dept: INFUSION THERAPY | Facility: HOSPITAL | Age: 45
End: 2025-03-21
Attending: INTERNAL MEDICINE
Payer: COMMERCIAL

## 2025-03-21 VITALS
OXYGEN SATURATION: 99 % | SYSTOLIC BLOOD PRESSURE: 172 MMHG | HEIGHT: 74 IN | DIASTOLIC BLOOD PRESSURE: 95 MMHG | RESPIRATION RATE: 18 BRPM | TEMPERATURE: 99 F | HEART RATE: 80 BPM | BODY MASS INDEX: 29.26 KG/M2 | WEIGHT: 228 LBS

## 2025-03-21 DIAGNOSIS — Z94.0 STATUS POST KIDNEY TRANSPLANT: Primary | ICD-10-CM

## 2025-03-21 PROCEDURE — 25000003 PHARM REV CODE 250: Performed by: INTERNAL MEDICINE

## 2025-03-21 PROCEDURE — 96365 THER/PROPH/DIAG IV INF INIT: CPT

## 2025-03-21 PROCEDURE — 63600175 PHARM REV CODE 636 W HCPCS: Mod: TB | Performed by: INTERNAL MEDICINE

## 2025-03-21 RX ORDER — HEPARIN 100 UNIT/ML
500 SYRINGE INTRAVENOUS
OUTPATIENT
Start: 2025-04-18

## 2025-03-21 RX ORDER — HEPARIN 100 UNIT/ML
500 SYRINGE INTRAVENOUS
Status: DISCONTINUED | OUTPATIENT
Start: 2025-03-21 | End: 2025-03-21 | Stop reason: HOSPADM

## 2025-03-21 RX ORDER — EPINEPHRINE 0.3 MG/.3ML
0.3 INJECTION SUBCUTANEOUS ONCE AS NEEDED
OUTPATIENT
Start: 2025-04-18

## 2025-03-21 RX ORDER — DIPHENHYDRAMINE HYDROCHLORIDE 50 MG/ML
50 INJECTION, SOLUTION INTRAMUSCULAR; INTRAVENOUS ONCE AS NEEDED
Status: DISCONTINUED | OUTPATIENT
Start: 2025-03-21 | End: 2025-03-21 | Stop reason: HOSPADM

## 2025-03-21 RX ORDER — DIPHENHYDRAMINE HYDROCHLORIDE 50 MG/ML
50 INJECTION, SOLUTION INTRAMUSCULAR; INTRAVENOUS ONCE AS NEEDED
OUTPATIENT
Start: 2025-04-18

## 2025-03-21 RX ORDER — EPINEPHRINE 0.3 MG/.3ML
0.3 INJECTION SUBCUTANEOUS ONCE AS NEEDED
Status: DISCONTINUED | OUTPATIENT
Start: 2025-03-21 | End: 2025-03-21 | Stop reason: HOSPADM

## 2025-03-21 RX ADMIN — DEXTROSE MONOHYDRATE 512.5 MG: 50 INJECTION, SOLUTION INTRAVENOUS at 02:03

## 2025-03-21 NOTE — PLAN OF CARE
Problem: Fatigue  Goal: Improved Activity Tolerance  Outcome: Progressing  Intervention: Promote Improved Energy  Flowsheets (Taken 3/21/2025 6929)  Fatigue Management: paced activity encouraged  Sleep/Rest Enhancement: regular sleep/rest pattern promoted  Activity Management: Ambulated -L4  Environmental Support:   calm environment promoted   environmental consistency promoted

## 2025-03-25 ENCOUNTER — LAB VISIT (OUTPATIENT)
Dept: LAB | Facility: HOSPITAL | Age: 45
End: 2025-03-25
Attending: INTERNAL MEDICINE
Payer: COMMERCIAL

## 2025-03-25 DIAGNOSIS — Z94.0 KIDNEY REPLACED BY TRANSPLANT: ICD-10-CM

## 2025-03-25 LAB
ABSOLUTE EOSINOPHIL (OHS): 0.07 K/UL
ABSOLUTE MONOCYTE (OHS): 0.45 K/UL (ref 0.3–1)
ABSOLUTE NEUTROPHIL COUNT (OHS): 3.4 K/UL (ref 1.8–7.7)
ALBUMIN SERPL BCP-MCNC: 4.2 G/DL (ref 3.5–5.2)
ANION GAP (OHS): 10 MMOL/L (ref 8–16)
BASOPHILS # BLD AUTO: 0.05 K/UL
BASOPHILS NFR BLD AUTO: 1.1 %
BILIRUB UR QL STRIP.AUTO: NEGATIVE
BUN SERPL-MCNC: 21 MG/DL (ref 6–20)
CALCIUM SERPL-MCNC: 9.6 MG/DL (ref 8.7–10.5)
CHLORIDE SERPL-SCNC: 107 MMOL/L (ref 95–110)
CLARITY UR: CLEAR
CO2 SERPL-SCNC: 24 MMOL/L (ref 23–29)
COLOR UR AUTO: YELLOW
CREAT SERPL-MCNC: 1.3 MG/DL (ref 0.5–1.4)
CREAT UR-MCNC: 53.2 MG/DL (ref 23–375)
ERYTHROCYTE [DISTWIDTH] IN BLOOD BY AUTOMATED COUNT: 15.9 % (ref 11.5–14.5)
GFR SERPLBLD CREATININE-BSD FMLA CKD-EPI: >60 ML/MIN/1.73/M2
GLUCOSE SERPL-MCNC: 160 MG/DL (ref 70–110)
GLUCOSE UR QL STRIP: NEGATIVE
HCT VFR BLD AUTO: 48.2 % (ref 40–54)
HGB BLD-MCNC: 15.6 GM/DL (ref 14–18)
HGB UR QL STRIP: NEGATIVE
IMM GRANULOCYTES # BLD AUTO: 0.02 K/UL (ref 0–0.04)
IMM GRANULOCYTES NFR BLD AUTO: 0.4 % (ref 0–0.5)
KETONES UR QL STRIP: NEGATIVE
LEUKOCYTE ESTERASE UR QL STRIP: NEGATIVE
LYMPHOCYTES # BLD AUTO: 0.61 K/UL (ref 1–4.8)
MAGNESIUM SERPL-MCNC: 1.9 MG/DL (ref 1.6–2.6)
MCH RBC QN AUTO: 26.5 PG (ref 27–50)
MCHC RBC AUTO-ENTMCNC: 32.4 G/DL (ref 32–36)
MCV RBC AUTO: 82 FL (ref 82–98)
NITRITE UR QL STRIP: NEGATIVE
NUCLEATED RBC (/100WBC) (OHS): 0 /100 WBC
PH UR STRIP: 6 [PH]
PHOSPHATE SERPL-MCNC: 2.4 MG/DL (ref 2.7–4.5)
PLATELET # BLD AUTO: 247 K/UL (ref 150–450)
PMV BLD AUTO: 11 FL (ref 9.2–12.9)
POTASSIUM SERPL-SCNC: 3.9 MMOL/L (ref 3.5–5.1)
PROT UR QL STRIP: NEGATIVE
PROT UR-MCNC: 17 MG/DL
PROT/CREAT UR: 0.32 MG/G{CREAT}
RBC # BLD AUTO: 5.88 M/UL (ref 4.6–6.2)
RELATIVE EOSINOPHIL (OHS): 1.5 %
RELATIVE LYMPHOCYTE (OHS): 13.3 % (ref 18–48)
RELATIVE MONOCYTE (OHS): 9.8 % (ref 4–15)
RELATIVE NEUTROPHIL (OHS): 73.9 % (ref 38–73)
SODIUM SERPL-SCNC: 141 MMOL/L (ref 136–145)
SP GR UR STRIP: 1.01
UROBILINOGEN UR STRIP-ACNC: NEGATIVE EU/DL
WBC # BLD AUTO: 4.6 K/UL (ref 3.9–12.7)

## 2025-03-25 PROCEDURE — 81003 URINALYSIS AUTO W/O SCOPE: CPT | Mod: TXP

## 2025-03-25 PROCEDURE — 82570 ASSAY OF URINE CREATININE: CPT | Mod: TXP

## 2025-03-25 PROCEDURE — 83735 ASSAY OF MAGNESIUM: CPT | Mod: TXP

## 2025-03-25 PROCEDURE — 36415 COLL VENOUS BLD VENIPUNCTURE: CPT | Mod: NTX

## 2025-03-25 PROCEDURE — 85025 COMPLETE CBC W/AUTO DIFF WBC: CPT | Mod: TXP

## 2025-03-25 PROCEDURE — 80197 ASSAY OF TACROLIMUS: CPT | Mod: TXP

## 2025-03-25 PROCEDURE — 80069 RENAL FUNCTION PANEL: CPT | Mod: TXP

## 2025-03-25 PROCEDURE — 87799 DETECT AGENT NOS DNA QUANT: CPT | Mod: TXP

## 2025-03-26 ENCOUNTER — TELEPHONE (OUTPATIENT)
Dept: TRANSPLANT | Facility: CLINIC | Age: 45
End: 2025-03-26
Payer: COMMERCIAL

## 2025-03-26 ENCOUNTER — RESULTS FOLLOW-UP (OUTPATIENT)
Dept: TRANSPLANT | Facility: CLINIC | Age: 45
End: 2025-03-26

## 2025-03-26 DIAGNOSIS — Z76.82 AWAITING ORGAN TRANSPLANT: ICD-10-CM

## 2025-03-26 LAB
CYTOMEGALOVIRUS DNA, QUAL (OHS): NOT DETECTED
TACROLIMUS BLD-MCNC: 3.1 NG/ML (ref 5–15)

## 2025-03-26 RX ORDER — TACROLIMUS 1 MG/1
2 CAPSULE ORAL EVERY 12 HOURS
Qty: 120 CAPSULE | Refills: 11 | Status: SHIPPED | OUTPATIENT
Start: 2025-03-26 | End: 2026-03-26

## 2025-03-26 NOTE — TELEPHONE ENCOUNTER
Message sent to pt instructing him to increase prograf dose to 2mg twice a day. Repeat level 4/1  ----- Message from Maritza Infante MD sent at 3/26/2025  1:25 PM CDT -----  Increase tacro to 2 mg bid  ----- Message -----  From: Marika Kahn RN  Sent: 3/26/2025   1:14 PM CDT  To: Al Martell MD; Bella Purdy, DO    The prograf level was the only lab we actually wanted to draw - all other labs were drawn incorrectly d/t issues with [facility]. Pt is currently taking 2/1 mg prograf with tac goal of 4-6 until 1y post txp (IV Azul conversion)

## 2025-03-31 ENCOUNTER — OFFICE VISIT (OUTPATIENT)
Dept: NEPHROLOGY | Facility: CLINIC | Age: 45
End: 2025-03-31
Payer: COMMERCIAL

## 2025-03-31 DIAGNOSIS — Z79.899 IMMUNOSUPPRESSION DUE TO DRUG THERAPY: ICD-10-CM

## 2025-03-31 DIAGNOSIS — E10.21 TYPE 1 DIABETES MELLITUS WITH NEPHROPATHY: ICD-10-CM

## 2025-03-31 DIAGNOSIS — Z94.0 STATUS POST KIDNEY TRANSPLANT: Primary | ICD-10-CM

## 2025-03-31 DIAGNOSIS — I10 PRIMARY HYPERTENSION: ICD-10-CM

## 2025-03-31 DIAGNOSIS — N18.2 CKD (CHRONIC KIDNEY DISEASE) STAGE 2, GFR 60-89 ML/MIN: ICD-10-CM

## 2025-03-31 DIAGNOSIS — D84.821 IMMUNOSUPPRESSION DUE TO DRUG THERAPY: ICD-10-CM

## 2025-03-31 DIAGNOSIS — N25.81 SECONDARY RENAL HYPERPARATHYROIDISM: ICD-10-CM

## 2025-03-31 PROCEDURE — 98006 SYNCH AUDIO-VIDEO EST MOD 30: CPT | Mod: 95,,, | Performed by: INTERNAL MEDICINE

## 2025-03-31 NOTE — PROGRESS NOTES
Subjective:       Patient ID: Dejuan Diaz Jr. is a 44 y.o. White male who presents for return patient evaluation for chronic renal failure.    The patient location is:  Patient Home   The chief complaint leading to consultation is: CKD  Visit type: Virtual visit with synchronous audio and video  Total time spent with patient: 12 minutes  Each patient to whom he or she provides medical services by telemedicine is:  (1) informed of the relationship between the physician and patient and the respective role of any other health care provider with respect to management of the patient; and (2) notified that he or she may decline to receive medical services by telemedicine and may withdraw from such care at any time.        He had COVID in April 2021.  He is s/p transplant.  He is doing well and is on belatacept.  He is on a carnivore diet.  He has not been taking calcitriol.      Review of Systems   Constitutional:  Negative for appetite change, chills, fatigue and fever.   Eyes:  Negative for visual disturbance.   Respiratory:  Negative for cough and shortness of breath.    Cardiovascular:  Negative for chest pain and leg swelling.   Gastrointestinal:  Positive for nausea (occasional). Negative for diarrhea and vomiting.   Genitourinary:  Negative for difficulty urinating, dysuria and hematuria.        ED   Musculoskeletal:  Positive for gait problem (occasional leg heaviness). Negative for myalgias.   Skin:  Negative for rash.   Neurological:  Negative for headaches.   Psychiatric/Behavioral:  Negative for sleep disturbance.        The past medical, family and social histories were reviewed for this encounter.    There were no vitals taken for this visit.    Objective:      Physical Exam  Vitals reviewed.   Constitutional:       General: He is not in acute distress.     Appearance: He is well-developed.   HENT:      Head: Normocephalic and atraumatic.   Eyes:      General: No scleral icterus.  Pulmonary:       Effort: Pulmonary effort is normal. No respiratory distress.   Neurological:      Mental Status: He is alert and oriented to person, place, and time.   Psychiatric:         Mood and Affect: Mood normal.         Behavior: Behavior normal.        Assessment:       1. Status post kidney transplant    2. Type 1 diabetes mellitus with nephropathy    3. Primary hypertension    4. Secondary renal hyperparathyroidism    5. CKD (chronic kidney disease) stage 2, GFR 60-89 ml/min    6. Immunosuppression due to drug therapy          Lab Results   Component Value Date    CREATININE 1.3 03/25/2025    BUN 21 (H) 03/25/2025     03/25/2025    K 3.9 03/25/2025     03/25/2025    CO2 24 03/25/2025     Lab Results   Component Value Date    .1 (H) 07/16/2024    CALCIUM 9.6 03/25/2025    PHOS 2.4 (L) 03/25/2025     Lab Results   Component Value Date    HCT 48.2 03/25/2025     Urine Protein/Creatinine Ratio   Date Value Ref Range Status   03/25/2025 0.32 (H) <=0.20 Final     Prot/Creat Ratio, Urine   Date Value Ref Range Status   02/03/2025 0.19 0.00 - 0.20 Final   01/23/2025 0.31 (H) 0.00 - 0.20 Final   01/06/2025 0.24 (H) 0.00 - 0.20 Final       Plan:   Return to clinic in 4 months.   Labs for next visit include transplant labs per standing orders.  Baseline creatinine is 1.4-1.6.  UPC is 0.19.  PTH is 342 with a calcium of 9.8.  Continue calcitriol.  Check PTH tomorrow.  Renal US shows R 11.4 cm, L 12.2 cm.  Please avoid or minimize all NSAIDS (ibuprofen, motrin, aleve, indocin, naprosyn) to minimize the risk to your kidneys.  Aspirin in a dose of 325 mg or less a day is likely the safest with regards to kidney function.  If you are able to take aspirin and do not have any bleeding problems or ulcers, this may be your best therapy.  Alternatively, acetaminophen (Tylenol) is the safer than NSAIDs with regards to kidney function.  I would ask you take this as directed on the bottle.  It is best to stay under 2 grams a  day (4-500 mg tablets a day maximum).  He is moving to belatacept.    Computed KFRE 2-Year unavailable. One or more values for this score either were not found within the given timeframe or did not fit some other criterion.    Computed KFRE 5-Year unavailable. One or more values for this score either were not found within the given timeframe or did not fit some other criterion.

## 2025-04-01 ENCOUNTER — LAB VISIT (OUTPATIENT)
Dept: LAB | Facility: HOSPITAL | Age: 45
End: 2025-04-01
Attending: INTERNAL MEDICINE
Payer: COMMERCIAL

## 2025-04-01 ENCOUNTER — RESULTS FOLLOW-UP (OUTPATIENT)
Dept: TRANSPLANT | Facility: CLINIC | Age: 45
End: 2025-04-01

## 2025-04-01 DIAGNOSIS — Z94.0 KIDNEY REPLACED BY TRANSPLANT: ICD-10-CM

## 2025-04-01 DIAGNOSIS — Z94.0 STATUS POST KIDNEY TRANSPLANT: ICD-10-CM

## 2025-04-01 LAB
ALBUMIN SERPL BCP-MCNC: 3.9 G/DL (ref 3.5–5.2)
ANION GAP (OHS): 9 MMOL/L (ref 8–16)
BUN SERPL-MCNC: 22 MG/DL (ref 6–20)
CALCIUM SERPL-MCNC: 9.1 MG/DL (ref 8.7–10.5)
CHLORIDE SERPL-SCNC: 109 MMOL/L (ref 95–110)
CO2 SERPL-SCNC: 22 MMOL/L (ref 23–29)
CREAT SERPL-MCNC: 1.3 MG/DL (ref 0.5–1.4)
CREAT UR-MCNC: 79.7 MG/DL (ref 23–375)
GFR SERPLBLD CREATININE-BSD FMLA CKD-EPI: >60 ML/MIN/1.73/M2
GLUCOSE SERPL-MCNC: 133 MG/DL (ref 70–110)
PHOSPHATE SERPL-MCNC: 2.4 MG/DL (ref 2.7–4.5)
POTASSIUM SERPL-SCNC: 4.2 MMOL/L (ref 3.5–5.1)
PROT UR-MCNC: 15 MG/DL
PROT/CREAT UR: 0.19 MG/G{CREAT}
PTH-INTACT SERPL-MCNC: 114.4 PG/ML (ref 9–77)
SODIUM SERPL-SCNC: 140 MMOL/L (ref 136–145)

## 2025-04-01 PROCEDURE — 82374 ASSAY BLOOD CARBON DIOXIDE: CPT | Mod: TXP

## 2025-04-01 PROCEDURE — 80197 ASSAY OF TACROLIMUS: CPT | Mod: TXP

## 2025-04-01 PROCEDURE — 36415 COLL VENOUS BLD VENIPUNCTURE: CPT | Mod: TXP

## 2025-04-01 PROCEDURE — 80051 ELECTROLYTE PANEL: CPT | Mod: TXP

## 2025-04-01 PROCEDURE — 83970 ASSAY OF PARATHORMONE: CPT | Mod: TXP

## 2025-04-01 PROCEDURE — 84156 ASSAY OF PROTEIN URINE: CPT | Mod: TXP

## 2025-04-01 PROCEDURE — 82570 ASSAY OF URINE CREATININE: CPT | Mod: TXP

## 2025-04-02 LAB — TACROLIMUS BLD-MCNC: 3.7 NG/ML (ref 5–15)

## 2025-04-14 ENCOUNTER — LAB VISIT (OUTPATIENT)
Dept: LAB | Facility: HOSPITAL | Age: 45
End: 2025-04-14
Attending: INTERNAL MEDICINE
Payer: COMMERCIAL

## 2025-04-14 DIAGNOSIS — Z94.0 KIDNEY REPLACED BY TRANSPLANT: ICD-10-CM

## 2025-04-14 LAB
ABSOLUTE EOSINOPHIL (OHS): 0.03 K/UL
ABSOLUTE MONOCYTE (OHS): 0.44 K/UL (ref 0.3–1)
ABSOLUTE NEUTROPHIL COUNT (OHS): 2.51 K/UL (ref 1.8–7.7)
ALBUMIN SERPL BCP-MCNC: 4.2 G/DL (ref 3.5–5.2)
ANION GAP (OHS): 14 MMOL/L (ref 8–16)
BASOPHILS # BLD AUTO: 0.04 K/UL
BASOPHILS NFR BLD AUTO: 1.1 %
BUN SERPL-MCNC: 22 MG/DL (ref 6–20)
CALCIUM SERPL-MCNC: 9.7 MG/DL (ref 8.7–10.5)
CHLORIDE SERPL-SCNC: 104 MMOL/L (ref 95–110)
CO2 SERPL-SCNC: 20 MMOL/L (ref 23–29)
CREAT SERPL-MCNC: 1.5 MG/DL (ref 0.5–1.4)
CREAT UR-MCNC: 84.9 MG/DL (ref 23–375)
ERYTHROCYTE [DISTWIDTH] IN BLOOD BY AUTOMATED COUNT: 15.8 % (ref 11.5–14.5)
GFR SERPLBLD CREATININE-BSD FMLA CKD-EPI: 59 ML/MIN/1.73/M2
GLUCOSE SERPL-MCNC: 166 MG/DL (ref 70–110)
HCT VFR BLD AUTO: 48.6 % (ref 40–54)
HGB BLD-MCNC: 16.4 GM/DL (ref 14–18)
IMM GRANULOCYTES # BLD AUTO: 0.03 K/UL (ref 0–0.04)
IMM GRANULOCYTES NFR BLD AUTO: 0.8 % (ref 0–0.5)
LYMPHOCYTES # BLD AUTO: 0.6 K/UL (ref 1–4.8)
MAGNESIUM SERPL-MCNC: 2 MG/DL (ref 1.6–2.6)
MCH RBC QN AUTO: 27.5 PG (ref 27–31)
MCHC RBC AUTO-ENTMCNC: 33.7 G/DL (ref 32–36)
MCV RBC AUTO: 81 FL (ref 82–98)
NUCLEATED RBC (/100WBC) (OHS): 0 /100 WBC
PHOSPHATE SERPL-MCNC: 2.1 MG/DL (ref 2.7–4.5)
PLATELET # BLD AUTO: 227 K/UL (ref 150–450)
PMV BLD AUTO: 11.1 FL (ref 9.2–12.9)
POTASSIUM SERPL-SCNC: 3.8 MMOL/L (ref 3.5–5.1)
PROT UR-MCNC: 36 MG/DL
PROT/CREAT UR: 0.42 MG/G{CREAT}
RBC # BLD AUTO: 5.97 M/UL (ref 4.6–6.2)
RELATIVE EOSINOPHIL (OHS): 0.8 %
RELATIVE LYMPHOCYTE (OHS): 16.4 % (ref 18–48)
RELATIVE MONOCYTE (OHS): 12.1 % (ref 4–15)
RELATIVE NEUTROPHIL (OHS): 68.8 % (ref 38–73)
SODIUM SERPL-SCNC: 138 MMOL/L (ref 136–145)
WBC # BLD AUTO: 3.65 K/UL (ref 3.9–12.7)

## 2025-04-14 PROCEDURE — 82570 ASSAY OF URINE CREATININE: CPT | Mod: TXP

## 2025-04-14 PROCEDURE — 82040 ASSAY OF SERUM ALBUMIN: CPT | Mod: TXP

## 2025-04-14 PROCEDURE — 83735 ASSAY OF MAGNESIUM: CPT | Mod: TXP

## 2025-04-14 PROCEDURE — 87799 DETECT AGENT NOS DNA QUANT: CPT | Mod: TXP

## 2025-04-14 PROCEDURE — 80197 ASSAY OF TACROLIMUS: CPT | Mod: TXP

## 2025-04-14 PROCEDURE — 36415 COLL VENOUS BLD VENIPUNCTURE: CPT | Mod: TXP

## 2025-04-14 PROCEDURE — 85025 COMPLETE CBC W/AUTO DIFF WBC: CPT | Mod: TXP

## 2025-04-15 LAB — TACROLIMUS BLD-MCNC: 4.2 NG/ML (ref 5–15)

## 2025-04-17 ENCOUNTER — RESULTS FOLLOW-UP (OUTPATIENT)
Dept: TRANSPLANT | Facility: CLINIC | Age: 45
End: 2025-04-17

## 2025-04-17 ENCOUNTER — INFUSION (OUTPATIENT)
Dept: INFUSION THERAPY | Facility: HOSPITAL | Age: 45
End: 2025-04-17
Attending: INTERNAL MEDICINE
Payer: COMMERCIAL

## 2025-04-17 VITALS
WEIGHT: 223.63 LBS | OXYGEN SATURATION: 97 % | RESPIRATION RATE: 18 BRPM | HEART RATE: 72 BPM | TEMPERATURE: 98 F | SYSTOLIC BLOOD PRESSURE: 157 MMHG | DIASTOLIC BLOOD PRESSURE: 90 MMHG | HEIGHT: 74 IN | BODY MASS INDEX: 28.7 KG/M2

## 2025-04-17 DIAGNOSIS — Z94.0 STATUS POST KIDNEY TRANSPLANT: Primary | ICD-10-CM

## 2025-04-17 LAB
W BK VIRUS DNA, QUALITATIVE, PLASMA: NOT DETECTED
W BK VIRUS DNA, QUANTITATIVE, PLASMA: <125 COPIES/ML
W LOG BK VIRUS DNA, PLASMA: <2.1 LOG (10) COPIES/ML

## 2025-04-17 PROCEDURE — 96365 THER/PROPH/DIAG IV INF INIT: CPT

## 2025-04-17 PROCEDURE — 63600175 PHARM REV CODE 636 W HCPCS: Mod: JW,TB | Performed by: INTERNAL MEDICINE

## 2025-04-17 PROCEDURE — 25000003 PHARM REV CODE 250: Performed by: INTERNAL MEDICINE

## 2025-04-17 RX ORDER — HEPARIN 100 UNIT/ML
500 SYRINGE INTRAVENOUS
OUTPATIENT
Start: 2025-04-18

## 2025-04-17 RX ORDER — EPINEPHRINE 0.3 MG/.3ML
0.3 INJECTION SUBCUTANEOUS ONCE AS NEEDED
Status: DISCONTINUED | OUTPATIENT
Start: 2025-04-17 | End: 2025-04-17 | Stop reason: HOSPADM

## 2025-04-17 RX ORDER — DIPHENHYDRAMINE HYDROCHLORIDE 50 MG/ML
50 INJECTION, SOLUTION INTRAMUSCULAR; INTRAVENOUS ONCE AS NEEDED
OUTPATIENT
Start: 2025-04-18

## 2025-04-17 RX ORDER — EPINEPHRINE 0.3 MG/.3ML
0.3 INJECTION SUBCUTANEOUS ONCE AS NEEDED
OUTPATIENT
Start: 2025-04-18

## 2025-04-17 RX ORDER — DIPHENHYDRAMINE HYDROCHLORIDE 50 MG/ML
50 INJECTION, SOLUTION INTRAMUSCULAR; INTRAVENOUS ONCE AS NEEDED
Status: DISCONTINUED | OUTPATIENT
Start: 2025-04-17 | End: 2025-04-17 | Stop reason: HOSPADM

## 2025-04-17 RX ADMIN — DEXTROSE MONOHYDRATE 512.5 MG: 50 INJECTION, SOLUTION INTRAVENOUS at 03:04

## 2025-04-17 NOTE — PROGRESS NOTES
Kidney Post-Transplant Assessment    Referring Physician: Jose Mckinney  Current Nephrologist: Jose Mckinney    ORGAN: LEFT KIDNEY  Donor Type: living  PHS Increased Risk: no  Cold Ischemia: 32 mins  Induction Medications: Thymo     Subjective:     CC:  Reassessment of renal allograft function and management of chronic immunosuppression.    HPI:  Mr. Diaz is a 44 y.o. year old White male who received a living kidney transplant on 7/16/24.     History  ANC 0 on admit. -Rec'd x3 days (10/17,10/18/10/19 ) zarxio.     Mild increase in Cr. Reports has been outside plenty with increase in sweating. He is drinking 9 bottles of water a day. He recently changed his diet to low carb diet to assist with his blood sugars. He has been eating pork skins for snack.  Denies fevers, chills, body aches.     Patient otherwise denies any fever, runny nose, watery eyes. Denies any nausea, vomiting, heart burn, SOB or CP. Denies any dysuria or frequency    SBP has increased to 150s-170s/90s    He is working with his endocrinologist with his blood sugars. He uses insulin pump. He is also on Afrezza.       Review of Systems   Constitutional:  Negative for activity change, appetite change, chills and fever.   HENT:  Negative for congestion, sneezing and sore throat.    Eyes:  Negative for pain and itching.   Respiratory:  Negative for apnea, cough, shortness of breath and wheezing.    Cardiovascular:  Negative for chest pain.   Gastrointestinal:  Negative for abdominal pain, constipation, diarrhea, nausea and vomiting.   Genitourinary:  Negative for difficulty urinating, dysuria and frequency.   Musculoskeletal:  Negative for back pain, joint swelling and neck pain.   Skin:  Negative for color change.   Neurological:  Negative for dizziness, light-headedness and headaches.   Psychiatric/Behavioral:  Negative for behavioral problems and confusion. The patient is not nervous/anxious.        Objective:   There were no vitals taken  "for this visit.body mass index is unknown because there is no height or weight on file.    Physical Exam  Constitutional:       General: He is not in acute distress.     Appearance: He is well-developed. He is not diaphoretic.   Pulmonary:      Breath sounds: Normal breath sounds.   Musculoskeletal:         General: No tenderness. Normal range of motion.   Skin:     General: Skin is warm and dry.      Findings: No rash.      Nails: There is no clubbing.   Neurological:      Mental Status: He is alert and oriented to person, place, and time.   Psychiatric:         Behavior: Behavior normal.         Labs:  Lab Results   Component Value Date    WBC 3.65 (L) 04/14/2025    HGB 16.4 04/14/2025    HCT 48.6 04/14/2025     04/14/2025    K 3.8 04/14/2025     04/14/2025    CO2 20 (L) 04/14/2025    BUN 22 (H) 04/14/2025    CREATININE 1.5 (H) 04/14/2025    EGFRNORACEVR 59 (L) 04/14/2025    GLUCOSE 166 (H) 04/14/2025    CALCIUM 9.7 04/14/2025    PHOS 2.1 (L) 04/14/2025    MG 2.0 04/14/2025    ALBUMIN 4.2 04/14/2025    AST 22 11/11/2024    ALT 30 11/11/2024    UTPCR 0.42 (H) 04/14/2025    .4 (H) 04/01/2025    TACROLIMUS 4.2 (L) 04/14/2025       No results found for: "EXTANC", "EXTWBC", "EXTSEGS", "EXTPLATELETS", "EXTHEMOGLOBI", "EXTHEMATOCRI", "EXTCREATININ", "EXTSODIUM", "EXTPOTASSIUM", "EXTBUN", "EXTCO2", "EXTCALCIUM", "EXTPHOSPHORU", "EXTGLUCOSE", "EXTALBUMIN", "EXTAST", "EXTALT", "EXTBILITOTAL", "EXTLIPASE", "EXTAMYLASE"    No results found for: "EXTCYCLOSLVL", "EXTSIROLIMUS", "EXTTACROLVL", "EXTPROTCRE", "EXTPTHINTACT", "EXTPROTEINUA", "EXTWBCUA", "EXTRBCUA"    Labs were reviewed with the patient    Assessment and Plan   44 yr old with CKD stage V secondary to type II DM s/p a living unrelated kidney transplant on 7/16/24. 37% PRA. 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 If related from recent sweating  - Had cystoscopy given US kidney transplant on 8/5/24 that showed lateral bladder wall lesion after a complicated ureteral stent removal--- following with Urology   - FK level 4.2. Cont on FK 2/2  - MYF 360mg BID due to soft WBCs   - Azul, taper Tac as per protocol (7/16/25) D/C tacro   - cont on prednisone 2.5 mg BID  - completed Acyclovir    - completed on Mepron until 1/12/25   - BK not detected   - urine p/c ratio 0.42   - will continue to monitor for adverse effects of immunosuppression therapy  2) HTN:   - MTP 12.5mg BID   - worsening HTN; norvasc was previously d/c. Resumed today at 5mg daily   - patient to notify transplant of BP log  3) type II DM:    - followed by his endocrinologist with setting on insulin pump adjusted   - currently listed inactive for a pancreas transplant---patient not ready to resume active status for panc transplant due to family/social issues   4) Hypomagnesemia:   - cont on MgOx 800 mg BID   5) Hypophosphatemia:   - cont on Kphos 500 mg BID   - increase foods rich in phos  6) HLD:   - on Lipitor   - was started on vascepa by other provider  7) Secondary hyperparathyroidism   - cont on calcitriol.   8) Neutropenic   -history of received Neupogen   -held MMF but improved now back on MMF--- changed to MYF for loose stool in setting of increasing Cr.    CMV PCR 3/25/25 not detected     Additional orders  Repeat renal labs and CBC in 2 weeks    Angie Grace NP   Transplant Nephrology      I spent a total of 43 minutes on the day of the visit.This includes face to face time and non-face to face time preparing to see the patient (eg, review of tests), obtaining and/or reviewing separately obtained history, documenting clinical information in the electronic or other health record, independently interpreting results and communicating results to the patient/family/caregiver, or care coordinator.

## 2025-04-17 NOTE — PLAN OF CARE
Problem: Fatigue  Goal: Improved Activity Tolerance  Outcome: Progressing  Intervention: Promote Improved Energy  Flowsheets (Taken 4/17/2025 1510)  Fatigue Management: fatigue-related activity identified  Sleep/Rest Enhancement: noise level reduced  Activity Management: Up in chair - L3  Environmental Support: environmental consistency promoted

## 2025-04-21 ENCOUNTER — OFFICE VISIT (OUTPATIENT)
Dept: TRANSPLANT | Facility: CLINIC | Age: 45
End: 2025-04-21
Payer: COMMERCIAL

## 2025-04-21 VITALS
RESPIRATION RATE: 18 BRPM | BODY MASS INDEX: 29.77 KG/M2 | HEIGHT: 73 IN | DIASTOLIC BLOOD PRESSURE: 93 MMHG | HEART RATE: 68 BPM | OXYGEN SATURATION: 100 % | TEMPERATURE: 97 F | SYSTOLIC BLOOD PRESSURE: 170 MMHG | WEIGHT: 224.63 LBS

## 2025-04-21 DIAGNOSIS — Z94.0 STATUS POST KIDNEY TRANSPLANT: Primary | ICD-10-CM

## 2025-04-21 DIAGNOSIS — I10 PRIMARY HYPERTENSION: ICD-10-CM

## 2025-04-21 DIAGNOSIS — N18.2 CKD (CHRONIC KIDNEY DISEASE) STAGE 2, GFR 60-89 ML/MIN: ICD-10-CM

## 2025-04-21 DIAGNOSIS — Z79.4 LONG TERM CURRENT USE OF INSULIN: ICD-10-CM

## 2025-04-21 DIAGNOSIS — Z79.899 IMMUNOSUPPRESSION DUE TO DRUG THERAPY: ICD-10-CM

## 2025-04-21 DIAGNOSIS — D84.821 IMMUNOSUPPRESSION DUE TO DRUG THERAPY: ICD-10-CM

## 2025-04-21 PROCEDURE — 3080F DIAST BP >= 90 MM HG: CPT | Mod: CPTII,NTX,S$GLB, | Performed by: NURSE PRACTITIONER

## 2025-04-21 PROCEDURE — 99999 PR PBB SHADOW E&M-EST. PATIENT-LVL IV: CPT | Mod: PBBFAC,TXP,, | Performed by: NURSE PRACTITIONER

## 2025-04-21 PROCEDURE — 99215 OFFICE O/P EST HI 40 MIN: CPT | Mod: NTX,S$GLB,, | Performed by: NURSE PRACTITIONER

## 2025-04-21 PROCEDURE — 1159F MED LIST DOCD IN RCRD: CPT | Mod: CPTII,NTX,S$GLB, | Performed by: NURSE PRACTITIONER

## 2025-04-21 PROCEDURE — 3066F NEPHROPATHY DOC TX: CPT | Mod: CPTII,NTX,S$GLB, | Performed by: NURSE PRACTITIONER

## 2025-04-21 PROCEDURE — 3077F SYST BP >= 140 MM HG: CPT | Mod: CPTII,NTX,S$GLB, | Performed by: NURSE PRACTITIONER

## 2025-04-21 PROCEDURE — 3008F BODY MASS INDEX DOCD: CPT | Mod: CPTII,NTX,S$GLB, | Performed by: NURSE PRACTITIONER

## 2025-04-21 RX ORDER — AMLODIPINE BESYLATE 5 MG/1
5 TABLET ORAL DAILY
Qty: 90 TABLET | Refills: 3 | Status: SHIPPED | OUTPATIENT
Start: 2025-04-21 | End: 2026-04-21

## 2025-04-21 NOTE — LETTER
April 21, 2025        Jose Mckinney  [address] [address]  [address]  [facility] [address]  Phone: [phone]  Fax: [phone]             [facility]- Transplant 1st Fl  [address]  [facility] [address]  Phone: [phone]   Patient: Dejuan Diaz Jr.   MR Number: [id number]   YOB: 1980   Date of Visit: 4/21/2025       Dear Dr. Jose Mckinney    Thank you for referring Dejuan Diaz to me for evaluation. Attached you will find relevant portions of my assessment and plan of care.    If you have questions, please do not hesitate to call me. I look forward to following Dejuan Diaz along with you.    Sincerely,    Angie Grace, NP    Enclosure    If you would like to receive this communication electronically, please contact [email or web address] or [phone] to request [employer] Link access.    [employer] Link is a tool which provides read-only access to select patient information with whom you have a relationship. Its easy to use and provides real time access to review your patients record including encounter summaries, notes, results, and demographic information.    If you feel you have received this communication in error or would no longer like to receive these types of communications, please e-mail [email or web address]

## 2025-05-12 ENCOUNTER — HOSPITAL ENCOUNTER (OUTPATIENT)
Dept: RADIOLOGY | Facility: HOSPITAL | Age: 45
Discharge: HOME | End: 2025-05-12
Attending: UROLOGY
Payer: COMMERCIAL

## 2025-05-12 ENCOUNTER — LAB VISIT (OUTPATIENT)
Dept: LAB | Facility: HOSPITAL | Age: 45
End: 2025-05-12
Attending: INTERNAL MEDICINE
Payer: COMMERCIAL

## 2025-05-12 ENCOUNTER — RESULTS FOLLOW-UP (OUTPATIENT)
Dept: TRANSPLANT | Facility: CLINIC | Age: 45
End: 2025-05-12

## 2025-05-12 DIAGNOSIS — Z94.0 KIDNEY REPLACED BY TRANSPLANT: ICD-10-CM

## 2025-05-12 LAB
ABSOLUTE EOSINOPHIL (OHS): 0.06 K/UL
ABSOLUTE MONOCYTE (OHS): 0.59 K/UL (ref 0.3–1)
ABSOLUTE NEUTROPHIL COUNT (OHS): 3.48 K/UL (ref 1.8–7.7)
ALBUMIN SERPL BCP-MCNC: 3.9 G/DL (ref 3.5–5.2)
ANION GAP (OHS): 8 MMOL/L (ref 8–16)
BASOPHILS # BLD AUTO: 0.03 K/UL
BASOPHILS NFR BLD AUTO: 0.6 %
BUN SERPL-MCNC: 23 MG/DL (ref 6–20)
CALCIUM SERPL-MCNC: 9.5 MG/DL (ref 8.7–10.5)
CHLORIDE SERPL-SCNC: 105 MMOL/L (ref 95–110)
CO2 SERPL-SCNC: 22 MMOL/L (ref 23–29)
CREAT SERPL-MCNC: 1.3 MG/DL (ref 0.5–1.4)
ERYTHROCYTE [DISTWIDTH] IN BLOOD BY AUTOMATED COUNT: 15.6 % (ref 11.5–14.5)
GFR SERPLBLD CREATININE-BSD FMLA CKD-EPI: >60 ML/MIN/1.73/M2
GLUCOSE SERPL-MCNC: 193 MG/DL (ref 70–110)
HCT VFR BLD AUTO: 46.5 % (ref 40–54)
HGB BLD-MCNC: 15.2 GM/DL (ref 14–18)
IMM GRANULOCYTES # BLD AUTO: 0.02 K/UL (ref 0–0.04)
IMM GRANULOCYTES NFR BLD AUTO: 0.4 % (ref 0–0.5)
LYMPHOCYTES # BLD AUTO: 0.65 K/UL (ref 1–4.8)
MAGNESIUM SERPL-MCNC: 1.7 MG/DL (ref 1.6–2.6)
MCH RBC QN AUTO: 27.6 PG (ref 27–31)
MCHC RBC AUTO-ENTMCNC: 32.7 G/DL (ref 32–36)
MCV RBC AUTO: 84 FL (ref 82–98)
NUCLEATED RBC (/100WBC) (OHS): 0 /100 WBC
PHOSPHATE SERPL-MCNC: 2.8 MG/DL (ref 2.7–4.5)
PLATELET # BLD AUTO: 202 K/UL (ref 150–450)
PMV BLD AUTO: 10.7 FL (ref 9.2–12.9)
POTASSIUM SERPL-SCNC: 4.3 MMOL/L (ref 3.5–5.1)
RBC # BLD AUTO: 5.51 M/UL (ref 4.6–6.2)
RELATIVE EOSINOPHIL (OHS): 1.2 %
RELATIVE LYMPHOCYTE (OHS): 13.5 % (ref 18–48)
RELATIVE MONOCYTE (OHS): 12.2 % (ref 4–15)
RELATIVE NEUTROPHIL (OHS): 72.1 % (ref 38–73)
SODIUM SERPL-SCNC: 135 MMOL/L (ref 136–145)
WBC # BLD AUTO: 4.83 K/UL (ref 3.9–12.7)

## 2025-05-12 PROCEDURE — 76770 US EXAM ABDO BACK WALL COMP: CPT | Mod: TC,PO

## 2025-05-12 PROCEDURE — 80069 RENAL FUNCTION PANEL: CPT | Mod: TXP

## 2025-05-12 PROCEDURE — 85025 COMPLETE CBC W/AUTO DIFF WBC: CPT | Mod: TXP

## 2025-05-12 PROCEDURE — 36415 COLL VENOUS BLD VENIPUNCTURE: CPT | Mod: TXP

## 2025-05-12 PROCEDURE — 76770 US EXAM ABDO BACK WALL COMP: CPT | Mod: 26,,, | Performed by: RADIOLOGY

## 2025-05-12 PROCEDURE — 80197 ASSAY OF TACROLIMUS: CPT | Mod: TXP

## 2025-05-12 PROCEDURE — 83735 ASSAY OF MAGNESIUM: CPT | Mod: TXP

## 2025-05-13 LAB — TACROLIMUS BLD-MCNC: 4.1 NG/ML (ref 5–15)

## 2025-05-16 ENCOUNTER — INFUSION (OUTPATIENT)
Dept: INFUSION THERAPY | Facility: HOSPITAL | Age: 45
End: 2025-05-16
Attending: INTERNAL MEDICINE
Payer: COMMERCIAL

## 2025-05-16 VITALS
HEART RATE: 65 BPM | OXYGEN SATURATION: 97 % | SYSTOLIC BLOOD PRESSURE: 152 MMHG | WEIGHT: 225.69 LBS | HEIGHT: 73 IN | RESPIRATION RATE: 18 BRPM | TEMPERATURE: 98 F | DIASTOLIC BLOOD PRESSURE: 84 MMHG | BODY MASS INDEX: 29.91 KG/M2

## 2025-05-16 DIAGNOSIS — Z94.0 STATUS POST KIDNEY TRANSPLANT: Primary | ICD-10-CM

## 2025-05-16 PROCEDURE — 63600175 PHARM REV CODE 636 W HCPCS: Mod: TB | Performed by: INTERNAL MEDICINE

## 2025-05-16 PROCEDURE — 25000003 PHARM REV CODE 250: Performed by: INTERNAL MEDICINE

## 2025-05-16 PROCEDURE — 96523 IRRIG DRUG DELIVERY DEVICE: CPT

## 2025-05-16 RX ORDER — HEPARIN 100 UNIT/ML
500 SYRINGE INTRAVENOUS
OUTPATIENT
Start: 2025-06-13

## 2025-05-16 RX ORDER — EPINEPHRINE 0.3 MG/.3ML
0.3 INJECTION SUBCUTANEOUS ONCE AS NEEDED
OUTPATIENT
Start: 2025-06-13

## 2025-05-16 RX ORDER — DIPHENHYDRAMINE HYDROCHLORIDE 50 MG/ML
50 INJECTION, SOLUTION INTRAMUSCULAR; INTRAVENOUS ONCE AS NEEDED
Status: DISCONTINUED | OUTPATIENT
Start: 2025-05-16 | End: 2025-05-16 | Stop reason: HOSPADM

## 2025-05-16 RX ORDER — EPINEPHRINE 0.3 MG/.3ML
0.3 INJECTION SUBCUTANEOUS ONCE AS NEEDED
Status: DISCONTINUED | OUTPATIENT
Start: 2025-05-16 | End: 2025-05-16 | Stop reason: HOSPADM

## 2025-05-16 RX ORDER — DIPHENHYDRAMINE HYDROCHLORIDE 50 MG/ML
50 INJECTION, SOLUTION INTRAMUSCULAR; INTRAVENOUS ONCE AS NEEDED
OUTPATIENT
Start: 2025-06-13

## 2025-05-16 RX ADMIN — DEXTROSE MONOHYDRATE 512.5 MG: 50 INJECTION, SOLUTION INTRAVENOUS at 03:05

## 2025-05-16 NOTE — PLAN OF CARE
Problem: Fatigue  Goal: Improved Activity Tolerance  Outcome: Progressing  Intervention: Promote Improved Energy  Flowsheets (Taken 5/16/2025 1505)  Fatigue Management: paced activity encouraged  Sleep/Rest Enhancement: regular sleep/rest pattern promoted  Activity Management: Ambulated -L4  Environmental Support: environmental consistency promoted

## 2025-05-16 NOTE — PLAN OF CARE
Problem: Fatigue  Goal: Improved Activity Tolerance  5/16/2025 1524 by Judi Rubio RN  Outcome: Progressing  5/16/2025 1502 by Judi Rubio RN  Outcome: Progressing  Intervention: Promote Improved Energy  5/16/2025 1524 by Judi Rubio, RN  Flowsheets (Taken 5/16/2025 1524)  Fatigue Management: paced activity encouraged  Sleep/Rest Enhancement: regular sleep/rest pattern promoted  Activity Management: Ambulated -L4  Environmental Support: environmental consistency promoted  5/16/2025 1502 by Judi Rubio RN  Flowsheets (Taken 5/16/2025 1502)  Fatigue Management: paced activity encouraged  Sleep/Rest Enhancement: regular sleep/rest pattern promoted  Activity Management: Ambulated -L4  Environmental Support: environmental consistency promoted

## 2025-05-19 ENCOUNTER — OFFICE VISIT (OUTPATIENT)
Dept: UROLOGY | Facility: CLINIC | Age: 45
End: 2025-05-19
Payer: COMMERCIAL

## 2025-05-19 VITALS
DIASTOLIC BLOOD PRESSURE: 96 MMHG | WEIGHT: 226.88 LBS | SYSTOLIC BLOOD PRESSURE: 174 MMHG | RESPIRATION RATE: 16 BRPM | HEART RATE: 75 BPM | BODY MASS INDEX: 29.93 KG/M2

## 2025-05-19 DIAGNOSIS — Z94.0 KIDNEY REPLACED BY TRANSPLANT: Primary | ICD-10-CM

## 2025-05-19 DIAGNOSIS — N32.89 BLADDER WALL THICKENING: ICD-10-CM

## 2025-05-19 PROCEDURE — 99999 PR PBB SHADOW E&M-EST. PATIENT-LVL III: CPT | Mod: PBBFAC,TXP,, | Performed by: UROLOGY

## 2025-05-19 PROCEDURE — 99213 OFFICE O/P EST LOW 20 MIN: CPT | Mod: NTX,S$GLB,, | Performed by: UROLOGY

## 2025-05-19 RX ORDER — FEXOFENADINE HCL 60 MG/1
60 TABLET, FILM COATED ORAL DAILY
COMMUNITY

## 2025-05-19 NOTE — PROGRESS NOTES
[facility]  Urologic Oncology      Date of Service: 05/19/2025    Urologic Oncology Problem List:  Status post renal transplant then cystoscopic stent removal on 08/05/2024.  Confirmed 1 ureteral stent and stent was removed intact without fragmentation  Posttransplant bladder mass identified on ultrasound but appears to be corresponding to the ureteral orifice.  CT urogram performed thereafter she was no enhancing masses or lesions within the urinary bladder  Negative cystoscopy 11/4/2024  Retroperitoneal ultrasound 5/12/2025 showed no evidence of bladder mass. Anterior wall thickening mentioned.    History of Present Illness:   History of Present Illness            Patient is a very pleasant 43-year-old male who is status post renal transplant to the left iliac fossa.  I removed his ureteral stent personally and confirmed at the time that there was 1 ureteral stent utilize with no fragmentation.  He then had a bladder ultrasound during a renal transplant ultrasound, and a bladder mass was noted on the lateral bladder wall, I have reviewed the images of this myself and personally interpreted this, I believe this is likely to correspond to the sofya ureteral orifice with some edema status post stent placement.  We obtained a CT urogram and I have reviewed these images personally and in detail as well, there was no enhancing mass or bladder wall tumor.  We also performed a cystoscopy which was negative.     He returns today with a repeat ultrasound which showed no evidence of a bladder mass. He denies issues voiding or hematuria. He is doing well from a transplant standpoint.    Allergies:  Review of patient's allergies indicates:   Allergen Reactions    Codeine Hallucinations    Vancomycin analogues Other (See Comments)     Burning and  itchig of skin        Medications per EMR:  (Not in a hospital admission)      Past Medical History:  Past Medical History:   Diagnosis Date    Anemia     Diabetes mellitus  type II     Disorder of kidney and ureter     GERD (gastroesophageal reflux disease)     Hypertension     Proteinuria         Past Surgical History:  Past Surgical History:   Procedure Laterality Date    INNER EAR SURGERY      for tinnitus    KIDNEY TRANSPLANT Left 7/16/2024    Procedure: TRANSPLANT, KIDNEY;  Surgeon: Erma Gómez MD;  Location: [address];  Service: Transplant;  Laterality: Left;        Family History:  Family History   Problem Relation Name Age of Onset    Kidney disease Mother      Heart disease Mother      Diabetes Mother      Cancer Father      Heart disease Sister          Social History:  Social History     Tobacco Use    Smoking status: Never    Smokeless tobacco: Never   Substance Use Topics    Alcohol use: No          OBJECTIVE:     Vitals:    05/19/25 1032   BP: (!) 174/96   Patient Position: Sitting   Pulse: 75   Resp: 16   Weight: 102.9 kg (226 lb 13.7 oz)        Physical Exam  Constitutional:       General: He is not in acute distress.     Appearance: Normal appearance. He is not ill-appearing.   HENT:      Head: Normocephalic and atraumatic.      Mouth/Throat:      Mouth: Mucous membranes are moist.   Eyes:      Extraocular Movements: Extraocular movements intact.   Cardiovascular:      Rate and Rhythm: Normal rate.   Pulmonary:      Effort: Pulmonary effort is normal.   Abdominal:      Palpations: Abdomen is soft.   Skin:     General: Skin is warm and dry.   Neurological:      Mental Status: He is alert and oriented to person, place, and time.                LABS:    CBC:  Lab Results   Component Value Date    WBC 4.83 05/12/2025    HGB 15.2 05/12/2025    HCT 46.5 05/12/2025    MCV 84 05/12/2025     05/12/2025         BMP:  Lab Results   Component Value Date     (L) 05/12/2025    K 4.3 05/12/2025     05/12/2025    CO2 22 (L) 05/12/2025    BUN 23 (H) 05/12/2025    CREATININE 1.3 05/12/2025    CALCIUM 9.5 05/12/2025    ANIONGAP 8 05/12/2025    EGFRNORACEVR  >60 05/12/2025         ASSESSMENT/PLAN:     Assessment & Plan            Assessment: 43-year-old male status post renal transplant and stent removal, with a bladder mass on renal ultrasound not identified on CTU or cystoscopy.     Plan:   Discussed he has had no evidence of a bladder mass or bladder cancer in the absence of a visible tumor, negative CTU and no hematuria. Recommend he continue to follow up regularly with transplant medicine. Follow up with urology PRN.    Abisai Samuel MD  Urology PGY-3  [facility]

## 2025-05-22 ENCOUNTER — PATIENT MESSAGE (OUTPATIENT)
Dept: OBSTETRICS AND GYNECOLOGY | Facility: CLINIC | Age: 45
End: 2025-05-22
Payer: COMMERCIAL

## 2025-06-09 ENCOUNTER — RESULTS FOLLOW-UP (OUTPATIENT)
Dept: TRANSPLANT | Facility: CLINIC | Age: 45
End: 2025-06-09

## 2025-06-09 ENCOUNTER — LAB VISIT (OUTPATIENT)
Dept: LAB | Facility: HOSPITAL | Age: 45
End: 2025-06-09
Attending: INTERNAL MEDICINE
Payer: COMMERCIAL

## 2025-06-09 DIAGNOSIS — Z94.0 KIDNEY REPLACED BY TRANSPLANT: ICD-10-CM

## 2025-06-09 LAB
ABSOLUTE EOSINOPHIL (OHS): 0.12 K/UL
ABSOLUTE MONOCYTE (OHS): 0.82 K/UL (ref 0.3–1)
ABSOLUTE NEUTROPHIL COUNT (OHS): 3.41 K/UL (ref 1.8–7.7)
ALBUMIN SERPL BCP-MCNC: 3.9 G/DL (ref 3.5–5.2)
ANION GAP (OHS): 10 MMOL/L (ref 8–16)
BASOPHILS # BLD AUTO: 0.04 K/UL
BASOPHILS NFR BLD AUTO: 0.8 %
BUN SERPL-MCNC: 25 MG/DL (ref 6–20)
CALCIUM SERPL-MCNC: 9.6 MG/DL (ref 8.7–10.5)
CHLORIDE SERPL-SCNC: 106 MMOL/L (ref 95–110)
CO2 SERPL-SCNC: 22 MMOL/L (ref 23–29)
CREAT SERPL-MCNC: 1.3 MG/DL (ref 0.5–1.4)
ERYTHROCYTE [DISTWIDTH] IN BLOOD BY AUTOMATED COUNT: 14.7 % (ref 11.5–14.5)
GFR SERPLBLD CREATININE-BSD FMLA CKD-EPI: >60 ML/MIN/1.73/M2
GLUCOSE SERPL-MCNC: 219 MG/DL (ref 70–110)
HCT VFR BLD AUTO: 46.8 % (ref 40–54)
HGB BLD-MCNC: 15.5 GM/DL (ref 14–18)
IMM GRANULOCYTES # BLD AUTO: 0.01 K/UL (ref 0–0.04)
IMM GRANULOCYTES NFR BLD AUTO: 0.2 % (ref 0–0.5)
LYMPHOCYTES # BLD AUTO: 0.5 K/UL (ref 1–4.8)
MAGNESIUM SERPL-MCNC: 1.9 MG/DL (ref 1.6–2.6)
MCH RBC QN AUTO: 28.7 PG (ref 27–31)
MCHC RBC AUTO-ENTMCNC: 33.1 G/DL (ref 32–36)
MCV RBC AUTO: 87 FL (ref 82–98)
NUCLEATED RBC (/100WBC) (OHS): 0 /100 WBC
PHOSPHATE SERPL-MCNC: 2.3 MG/DL (ref 2.7–4.5)
PLATELET # BLD AUTO: 201 K/UL (ref 150–450)
PMV BLD AUTO: 10.5 FL (ref 9.2–12.9)
POTASSIUM SERPL-SCNC: 4.7 MMOL/L (ref 3.5–5.1)
RBC # BLD AUTO: 5.4 M/UL (ref 4.6–6.2)
RELATIVE EOSINOPHIL (OHS): 2.4 %
RELATIVE LYMPHOCYTE (OHS): 10.2 % (ref 18–48)
RELATIVE MONOCYTE (OHS): 16.7 % (ref 4–15)
RELATIVE NEUTROPHIL (OHS): 69.7 % (ref 38–73)
SODIUM SERPL-SCNC: 138 MMOL/L (ref 136–145)
WBC # BLD AUTO: 4.9 K/UL (ref 3.9–12.7)

## 2025-06-09 PROCEDURE — 36415 COLL VENOUS BLD VENIPUNCTURE: CPT | Mod: TXP

## 2025-06-09 PROCEDURE — 85025 COMPLETE CBC W/AUTO DIFF WBC: CPT | Mod: TXP

## 2025-06-09 PROCEDURE — 82310 ASSAY OF CALCIUM: CPT | Mod: TXP

## 2025-06-09 PROCEDURE — 80197 ASSAY OF TACROLIMUS: CPT | Mod: TXP

## 2025-06-09 PROCEDURE — 83735 ASSAY OF MAGNESIUM: CPT | Mod: TXP

## 2025-06-10 LAB — TACROLIMUS BLD-MCNC: 3.5 NG/ML (ref 5–15)

## 2025-06-13 ENCOUNTER — INFUSION (OUTPATIENT)
Dept: INFUSION THERAPY | Facility: HOSPITAL | Age: 45
End: 2025-06-13
Attending: INTERNAL MEDICINE
Payer: COMMERCIAL

## 2025-06-13 VITALS
WEIGHT: 225.19 LBS | RESPIRATION RATE: 16 BRPM | DIASTOLIC BLOOD PRESSURE: 70 MMHG | SYSTOLIC BLOOD PRESSURE: 113 MMHG | HEIGHT: 73 IN | TEMPERATURE: 98 F | BODY MASS INDEX: 29.84 KG/M2 | OXYGEN SATURATION: 97 % | HEART RATE: 62 BPM

## 2025-06-13 DIAGNOSIS — Z94.0 STATUS POST KIDNEY TRANSPLANT: Primary | ICD-10-CM

## 2025-06-13 PROCEDURE — 96365 THER/PROPH/DIAG IV INF INIT: CPT

## 2025-06-13 PROCEDURE — 25000003 PHARM REV CODE 250: Performed by: INTERNAL MEDICINE

## 2025-06-13 PROCEDURE — 63600175 PHARM REV CODE 636 W HCPCS: Mod: JW,TB | Performed by: INTERNAL MEDICINE

## 2025-06-13 RX ORDER — EPINEPHRINE 0.3 MG/.3ML
0.3 INJECTION SUBCUTANEOUS ONCE AS NEEDED
OUTPATIENT
Start: 2025-07-11

## 2025-06-13 RX ORDER — DIPHENHYDRAMINE HYDROCHLORIDE 50 MG/ML
50 INJECTION, SOLUTION INTRAMUSCULAR; INTRAVENOUS ONCE AS NEEDED
OUTPATIENT
Start: 2025-07-11

## 2025-06-13 RX ORDER — DIPHENHYDRAMINE HYDROCHLORIDE 50 MG/ML
50 INJECTION, SOLUTION INTRAMUSCULAR; INTRAVENOUS ONCE AS NEEDED
Status: DISCONTINUED | OUTPATIENT
Start: 2025-06-13 | End: 2025-06-13 | Stop reason: HOSPADM

## 2025-06-13 RX ORDER — EPINEPHRINE 0.3 MG/.3ML
0.3 INJECTION SUBCUTANEOUS ONCE AS NEEDED
Status: DISCONTINUED | OUTPATIENT
Start: 2025-06-13 | End: 2025-06-13 | Stop reason: HOSPADM

## 2025-06-13 RX ORDER — HEPARIN 100 UNIT/ML
500 SYRINGE INTRAVENOUS
OUTPATIENT
Start: 2025-07-11

## 2025-06-13 RX ADMIN — DEXTROSE MONOHYDRATE 512.5 MG: 50 INJECTION, SOLUTION INTRAVENOUS at 03:06

## 2025-06-13 NOTE — PLAN OF CARE
Problem: Fatigue  Goal: Improved Activity Tolerance  Outcome: Progressing  Intervention: Promote Improved Energy  Flowsheets (Taken 6/13/2025 1500)  Fatigue Management: frequent rest breaks encouraged  Sleep/Rest Enhancement: regular sleep/rest pattern promoted  Activity Management: Ambulated -L4  Environmental Support: calm environment promoted

## 2025-07-07 ENCOUNTER — RESULTS FOLLOW-UP (OUTPATIENT)
Dept: TRANSPLANT | Facility: CLINIC | Age: 45
End: 2025-07-07

## 2025-07-07 ENCOUNTER — LAB VISIT (OUTPATIENT)
Dept: LAB | Facility: HOSPITAL | Age: 45
End: 2025-07-07
Attending: INTERNAL MEDICINE
Payer: COMMERCIAL

## 2025-07-07 DIAGNOSIS — Z94.0 KIDNEY REPLACED BY TRANSPLANT: ICD-10-CM

## 2025-07-07 LAB
BILIRUB UR QL STRIP.AUTO: NEGATIVE
CLARITY UR: CLEAR
COLOR UR AUTO: YELLOW
CREAT UR-MCNC: 62.8 MG/DL (ref 23–375)
GLUCOSE UR QL STRIP: ABNORMAL
HGB UR QL STRIP: NEGATIVE
KETONES UR QL STRIP: NEGATIVE
LEUKOCYTE ESTERASE UR QL STRIP: NEGATIVE
NITRITE UR QL STRIP: NEGATIVE
PH UR STRIP: 6 [PH]
PROT UR QL STRIP: NEGATIVE
PROT UR-MCNC: 13 MG/DL
PROT/CREAT UR: 0.21 MG/G{CREAT}
SP GR UR STRIP: 1.01
UROBILINOGEN UR STRIP-ACNC: NEGATIVE EU/DL

## 2025-07-07 PROCEDURE — 84156 ASSAY OF PROTEIN URINE: CPT | Mod: TXP

## 2025-07-07 PROCEDURE — 81003 URINALYSIS AUTO W/O SCOPE: CPT | Mod: TXP

## 2025-07-11 ENCOUNTER — INFUSION (OUTPATIENT)
Dept: INFUSION THERAPY | Facility: HOSPITAL | Age: 45
End: 2025-07-11
Attending: INTERNAL MEDICINE
Payer: COMMERCIAL

## 2025-07-11 VITALS
HEIGHT: 73 IN | RESPIRATION RATE: 17 BRPM | DIASTOLIC BLOOD PRESSURE: 95 MMHG | WEIGHT: 225 LBS | BODY MASS INDEX: 29.82 KG/M2 | OXYGEN SATURATION: 98 % | HEART RATE: 70 BPM | TEMPERATURE: 98 F | SYSTOLIC BLOOD PRESSURE: 145 MMHG

## 2025-07-11 DIAGNOSIS — Z94.0 STATUS POST KIDNEY TRANSPLANT: Primary | ICD-10-CM

## 2025-07-11 PROCEDURE — 96365 THER/PROPH/DIAG IV INF INIT: CPT

## 2025-07-11 PROCEDURE — 63600175 PHARM REV CODE 636 W HCPCS: Mod: JW,TB | Performed by: INTERNAL MEDICINE

## 2025-07-11 PROCEDURE — 25000003 PHARM REV CODE 250: Performed by: INTERNAL MEDICINE

## 2025-07-11 RX ORDER — DIPHENHYDRAMINE HYDROCHLORIDE 50 MG/ML
50 INJECTION, SOLUTION INTRAMUSCULAR; INTRAVENOUS ONCE AS NEEDED
OUTPATIENT
Start: 2025-08-08

## 2025-07-11 RX ORDER — HEPARIN 100 UNIT/ML
500 SYRINGE INTRAVENOUS
OUTPATIENT
Start: 2025-08-08

## 2025-07-11 RX ORDER — EPINEPHRINE 0.3 MG/.3ML
0.3 INJECTION SUBCUTANEOUS ONCE AS NEEDED
OUTPATIENT
Start: 2025-08-08

## 2025-07-11 RX ADMIN — DEXTROSE MONOHYDRATE 512.5 MG: 50 INJECTION, SOLUTION INTRAVENOUS at 03:07

## 2025-07-11 NOTE — PLAN OF CARE
Problem: Fatigue  Goal: Improved Activity Tolerance  Outcome: Progressing  Intervention: Promote Improved Energy  Flowsheets (Taken 7/11/2025 6822)  Sleep/Rest Enhancement: relaxation techniques promoted  Activity Management: Ambulated -L4  Environmental Support: rest periods encouraged

## 2025-07-14 ENCOUNTER — PATIENT MESSAGE (OUTPATIENT)
Dept: TRANSPLANT | Facility: CLINIC | Age: 45
End: 2025-07-14
Payer: COMMERCIAL

## 2025-07-14 DIAGNOSIS — Z94.0 KIDNEY REPLACED BY TRANSPLANT: Primary | ICD-10-CM

## 2025-07-21 ENCOUNTER — OFFICE VISIT (OUTPATIENT)
Dept: CARDIOLOGY | Facility: CLINIC | Age: 45
End: 2025-07-21
Payer: COMMERCIAL

## 2025-07-21 ENCOUNTER — CLINICAL SUPPORT (OUTPATIENT)
Dept: TRANSPLANT | Facility: CLINIC | Age: 45
End: 2025-07-21
Payer: COMMERCIAL

## 2025-07-21 ENCOUNTER — OFFICE VISIT (OUTPATIENT)
Dept: TRANSPLANT | Facility: CLINIC | Age: 45
End: 2025-07-21
Payer: COMMERCIAL

## 2025-07-21 VITALS
SYSTOLIC BLOOD PRESSURE: 132 MMHG | DIASTOLIC BLOOD PRESSURE: 83 MMHG | BODY MASS INDEX: 29.19 KG/M2 | HEART RATE: 60 BPM | HEIGHT: 73 IN | WEIGHT: 220.25 LBS

## 2025-07-21 VITALS
HEIGHT: 73 IN | HEART RATE: 70 BPM | OXYGEN SATURATION: 99 % | SYSTOLIC BLOOD PRESSURE: 157 MMHG | TEMPERATURE: 98 F | BODY MASS INDEX: 29.4 KG/M2 | DIASTOLIC BLOOD PRESSURE: 91 MMHG | RESPIRATION RATE: 18 BRPM | WEIGHT: 221.81 LBS

## 2025-07-21 DIAGNOSIS — E10.65 TYPE 1 DIABETES MELLITUS WITH HYPERGLYCEMIA: ICD-10-CM

## 2025-07-21 DIAGNOSIS — N18.2 CKD (CHRONIC KIDNEY DISEASE) STAGE 2, GFR 60-89 ML/MIN: ICD-10-CM

## 2025-07-21 DIAGNOSIS — Z94.0 STATUS POST KIDNEY TRANSPLANT: Primary | ICD-10-CM

## 2025-07-21 DIAGNOSIS — E78.49 OTHER HYPERLIPIDEMIA: Primary | ICD-10-CM

## 2025-07-21 DIAGNOSIS — Z79.4 LONG TERM CURRENT USE OF INSULIN: ICD-10-CM

## 2025-07-21 DIAGNOSIS — E78.2 MIXED HYPERLIPIDEMIA: ICD-10-CM

## 2025-07-21 DIAGNOSIS — I10 PRIMARY HYPERTENSION: ICD-10-CM

## 2025-07-21 DIAGNOSIS — Z94.0 KIDNEY REPLACED BY TRANSPLANT: ICD-10-CM

## 2025-07-21 DIAGNOSIS — Z79.899 IMMUNOSUPPRESSION DUE TO DRUG THERAPY: ICD-10-CM

## 2025-07-21 DIAGNOSIS — D84.821 IMMUNOSUPPRESSION DUE TO DRUG THERAPY: ICD-10-CM

## 2025-07-21 DIAGNOSIS — Z79.60 LONG-TERM USE OF IMMUNOSUPPRESSANT MEDICATION: ICD-10-CM

## 2025-07-21 PROCEDURE — 99214 OFFICE O/P EST MOD 30 MIN: CPT | Mod: NTX,S$GLB,, | Performed by: INTERNAL MEDICINE

## 2025-07-21 PROCEDURE — 99999 PR PBB SHADOW E&M-EST. PATIENT-LVL IV: CPT | Mod: PBBFAC,TXP,, | Performed by: NURSE PRACTITIONER

## 2025-07-21 PROCEDURE — 99999 PR PBB SHADOW E&M-EST. PATIENT-LVL IV: CPT | Mod: PBBFAC,TXP,, | Performed by: INTERNAL MEDICINE

## 2025-07-21 PROCEDURE — 99999 PR PBB SHADOW E&M-EST. PATIENT-LVL I: CPT | Mod: PBBFAC,TXP,,

## 2025-07-21 PROCEDURE — 99215 OFFICE O/P EST HI 40 MIN: CPT | Mod: NTX,S$GLB,, | Performed by: NURSE PRACTITIONER

## 2025-07-21 NOTE — LETTER
July 21, 2025        Jose Mckinney  [address]  [address]  [facility] [address]  Phone: [phone]  Fax: [phone]             Angel Pham- Transplant 1st Fl  [address]  [facility] [address]  Phone: [phone]   Patient: Dejuan Diaz Jr.   MR Number: [id number]   YOB: 1980   Date of Visit: 7/21/2025       Dear Dr. Jose Mckinney    Thank you for referring Dejuan Diaz to me for evaluation. Attached you will find relevant portions of my assessment and plan of care.    If you have questions, please do not hesitate to call me. I look forward to following Dejuan Diaz along with you.    Sincerely,    Angie Grace, NP    Enclosure    If you would like to receive this communication electronically, please contact [email or web address] or [phone] to request [employer] Link access.    [employer] is a tool which provides read-only access to select patient information with whom you have a relationship. Its easy to use and provides real time access to review your patients record including encounter summaries, notes, results, and demographic information.    If you feel you have received this communication in error or would no longer like to receive these types of communications, please e-mail [email or web address]

## 2025-07-21 NOTE — PROGRESS NOTES
Kidney Post-Transplant Assessment    Referring Physician: Jose Mckinney  Current Nephrologist: Jose Mckinney    ORGAN: LEFT KIDNEY  Donor Type: living  PHS Increased Risk: no  Cold Ischemia: 32 mins  Induction Medications: Thymo     Subjective:     CC:  Reassessment of renal allograft function and management of chronic immunosuppression.    HPI:  Mr. Diaz is a 44 y.o. year old White male who received a living kidney transplant on 7/16/24.     History  ANC 0 on admit. -Rec'd x3 days (10/17,10/18/10/19 ) zarxio.    Kidney Biopsy 8/14/24 negative for rejection; possible CNI toxicity     Cr normalized.  He is drinking 9 bottles of water a day. He recently changed his diet to low carb diet to assist with his blood sugars  Denies fevers, chills, body aches.     Patient otherwise denies any fever, runny nose, watery eyes. Denies any nausea, vomiting, heart burn, SOB or CP. Denies any dysuria or frequency    SBP has increased to 140s/90s    Patient discussed ED. Asked about medications.    He is working with his endocrinologist with his blood sugars. He uses insulin pump. He is also on Afrezza.         BP Readings from Last 10 Encounters:   07/21/25 (!) 157/91   07/21/25 132/83   07/11/25 (!) 145/95   06/13/25 113/70   05/19/25 (!) 174/96   05/16/25 (!) 152/84   04/21/25 (!) 170/93   04/17/25 (!) 157/90   03/21/25 (!) 172/95   03/07/25 (!) 152/90   ]    Review of Systems   Constitutional:  Negative for activity change, appetite change, chills and fever.   HENT:  Negative for congestion, sneezing and sore throat.    Eyes:  Negative for pain and itching.   Respiratory:  Negative for apnea, cough, shortness of breath and wheezing.    Cardiovascular:  Negative for chest pain.   Gastrointestinal:  Negative for abdominal pain, constipation, diarrhea, nausea and vomiting.   Genitourinary:  Negative for difficulty urinating, dysuria and frequency.   Musculoskeletal:  Negative for back pain, joint swelling and neck pain.  "  Skin:  Negative for color change.   Neurological:  Negative for dizziness, light-headedness and headaches.   Psychiatric/Behavioral:  Negative for behavioral problems and confusion. The patient is not nervous/anxious.        Objective:   Blood pressure (!) 157/91, pulse 70, temperature 97.7 °F (36.5 °C), temperature source Temporal, resp. rate 18, height 6' 1" (1.854 m), weight 100.6 kg (221 lb 12.5 oz), SpO2 99%.body mass index is 29.26 kg/m².    Physical Exam  Constitutional:       General: He is not in acute distress.     Appearance: He is well-developed. He is not diaphoretic.   Pulmonary:      Breath sounds: Normal breath sounds.   Musculoskeletal:         General: No tenderness. Normal range of motion.   Skin:     General: Skin is warm and dry.      Findings: No rash.      Nails: There is no clubbing.   Neurological:      Mental Status: He is alert and oriented to person, place, and time.   Psychiatric:         Behavior: Behavior normal.         Labs:  Lab Results   Component Value Date    WBC 4.62 07/07/2025    HGB 15.9 07/07/2025    HCT 47.0 07/07/2025     07/07/2025    K 4.2 07/07/2025     07/07/2025    CO2 23 07/07/2025    BUN 22 (H) 07/07/2025    CREATININE 1.3 07/07/2025    EGFRNORACEVR >60 07/07/2025    CALCIUM 9.7 07/07/2025    PHOS 2.5 (L) 07/07/2025    MG 1.8 07/07/2025    ALBUMIN 3.9 07/07/2025    AST 22 11/11/2024    ALT 30 11/11/2024    UTPCR 0.21 (H) 07/07/2025    .4 (H) 04/01/2025    TACROLIMUS 4.8 (L) 07/07/2025       No results found for: "EXTANC", "EXTWBC", "EXTSEGS", "EXTPLATELETS", "EXTHEMOGLOBI", "EXTHEMATOCRI", "EXTCREATININ", "EXTSODIUM", "EXTPOTASSIUM", "EXTBUN", "EXTCO2", "EXTCALCIUM", "EXTPHOSPHORU", "EXTGLUCOSE", "EXTALBUMIN", "EXTAST", "EXTALT", "EXTBILITOTAL", "EXTLIPASE", "EXTAMYLASE"    No results found for: "EXTCYCLOSLVL", "EXTSIROLIMUS", "EXTTACROLVL", "EXTPROTCRE", "EXTPTHINTACT", "EXTPROTEINUA", "EXTWBCUA", "EXTRBCUA"    Labs were reviewed with the " patient    Assessment and Plan   44 yr old with CKD stage V secondary to type II DM s/p a living unrelated kidney transplant on 7/16/24. 37% PRA. 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 Cont on FK 2/2  - MYF 360mg BID due to soft WBCs   - Azul, taper Tac as per protocol (7/16/25)--no longer taking TAC  - cont on prednisone 2.5 mg BID  - completed Acyclovir    - completed on Mepron until 1/12/25   - BK not detected   - urine p/c ratio 0.21   - will continue to monitor for adverse effects of immunosuppression therapy  2) HTN:   - MTP 12.5mg BID, norvasc 5mg daily   - mild improvement in HTN; norvasc was previously d/c   - patient to notify transplant of BP log  3) type II DM:    - followed by his endocrinologist with setting on insulin pump adjusted   - currently listed inactive for a pancreas transplant---patient not ready to resume active status for panc transplant due to family/social issues   4) Hypomagnesemia:   - cont on MgOx 800 mg BID   5) Hypophosphatemia:   - cont on Kphos 500 mg BID   - increase foods rich in phos  6) HLD:   - on Lipitor   - was started on vascepa by other provider  7) Secondary hyperparathyroidism   - cont on calcitriol.   8) Neutropenic   -history of received Neupogen   -held MMF but improved now back on MMF--- changed to MYF for loose stool in setting of increasing Cr.    CMV PCR 3/25/25 not detected     Additional orders  Routine follow-up    Angie Grace, NP   Transplant Nephrology

## 2025-07-21 NOTE — PROGRESS NOTES
Subjective:    Patient ID:  Dejuan Diaz Jr. is a 44 y.o. male patient here for evaluation Follow-up and Other hyperlipidemia      History of Present Illness:         Kidney transplant performed July 20, 2024.  Hematologic issues with immunosuppressants, being regulated.  Labile blood pressure, blood sugars.     No angina dyspnea.  No arrhythmia.       Recent nephrology follow-up stable.  No CV issues.  Denies chest pain/dyspnea/arrhythmia.    Diabetes mellitus, hypertension, dyslipidemia.    Review of patient's allergies indicates:   Allergen Reactions    Codeine Hallucinations    Vancomycin analogues Other (See Comments)     Burning and  itchig of skin       Past Medical History:   Diagnosis Date    Anemia     Diabetes mellitus type II     Disorder of kidney and ureter     GERD (gastroesophageal reflux disease)     Hypertension     Proteinuria      Past Surgical History:   Procedure Laterality Date    INNER EAR SURGERY      for tinnitus    KIDNEY TRANSPLANT Left 7/16/2024    Procedure: TRANSPLANT, KIDNEY;  Surgeon: Erma Gómez MD;  Location: [facility] OR [address] [place] [address];  Service: Transplant;  Laterality: Left;     Social History[1]     Review of Systems:    As noted in HPI in addition      REVIEW OF SYSTEMS  Review of Systems   Constitutional: Negative for decreased appetite, diaphoresis, night sweats, weight gain and weight loss.   HENT:  Negative for nosebleeds and odynophagia.    Eyes:  Negative for double vision and photophobia.   Cardiovascular:  Negative for chest pain, claudication, cyanosis, dyspnea on exertion, irregular heartbeat, leg swelling, near-syncope, orthopnea, palpitations, paroxysmal nocturnal dyspnea and syncope.   Respiratory:  Negative for cough, hemoptysis, shortness of breath and wheezing.    Hematologic/Lymphatic: Negative for adenopathy.   Skin:  Negative for flushing, skin cancer and suspicious lesions.   Musculoskeletal:  Negative for gout, myalgias and neck pain.   Gastrointestinal:   Negative for abdominal pain, heartburn, hematemesis and hematochezia.   Genitourinary:  Negative for bladder incontinence, hesitancy and nocturia.   Neurological:  Negative for focal weakness, headaches, light-headedness and paresthesias.   Psychiatric/Behavioral:  Negative for memory loss and substance abuse.               Objective:        Vitals:    07/21/25 1020   BP: 132/83   Pulse: 60       Lab Results   Component Value Date    WBC 4.62 07/07/2025    HGB 15.9 07/07/2025    HCT 47.0 07/07/2025     07/07/2025    CHOL 86 (L) 07/02/2024    TRIG 143 07/02/2024    HDL 22 (L) 07/02/2024    ALT 30 11/11/2024    AST 22 11/11/2024     07/07/2025    K 4.2 07/07/2025     07/07/2025    CREATININE 1.3 07/07/2025    BUN 22 (H) 07/07/2025    CO2 23 07/07/2025    TSH 0.967 10/19/2024    PSA 0.60 11/29/2023    INR 0.9 08/14/2024    HGBA1C 8.5 (H) 10/17/2024        ECHOCARDIOGRAM RESULTS  Results for orders placed in visit on 08/30/23    Echo    Interpretation Summary    Left Ventricle: The left ventricle is normal in size. There is concentric remodeling. Normal wall motion. There is normal systolic function with a visually estimated ejection fraction of 60 - 65%. There is normal diastolic function.    Right Ventricle: Normal right ventricular cavity size. Wall thickness is normal. Right ventricle wall motion  is normal. Systolic function is normal.    Aortic Valve: The aortic valve is a trileaflet valve.    Pulmonary Artery: The estimated pulmonary artery systolic pressure is 34 mmHg.    IVC/SVC: Normal venous pressure at 3 mmHg.    No results found for this or any previous visit.          CURRENT/PREVIOUS VISIT EKG  Results for orders placed or performed during the hospital encounter of 07/02/24   EKG 12-lead    Collection Time: 07/02/24 11:59 AM   Result Value Ref Range    QRS Duration 94 ms    OHS QTC Calculation 414 ms    Narrative    Test Reason : Z76.82,    Vent. Rate : 051 BPM     Atrial Rate : 051  BPM     P-R Int : 158 ms          QRS Dur : 094 ms      QT Int : 450 ms       P-R-T Axes : 067 033 050 degrees     QTc Int : 414 ms    Sinus bradycardia  Otherwise normal ECG  When compared with ECG of 14-JUN-2024 16:55,  No significant change was found  Confirmed by Lauren Palmer MD ([id number]) on 7/2/2024 3:24:41 PM    Referred By: RUBENS IQBAL JR           Confirmed By:Lauren Palmer MD     No valid procedures specified.   Results for orders placed during the hospital encounter of 08/30/23    Nuclear Stress - Cardiology Interpreted    Interpretation Summary    Normal myocardial perfusion scan. There is no evidence of myocardial ischemia or infarction.    There is a mild intensity fixed perfusion abnormality in the anteroseptal wall of the left ventricle, secondary to soft tissue attenuation.    The visually estimated ejection fraction is normal at rest.    There is normal wall motion at rest.    The ECG portion of the study is negative for ischemia.    The patient exercised for 7 minutes 0 seconds on a high ramp protocol, corresponding to a functional capacity of 11.0 METS, achieving a peak heart rate of 151 bpm, which is 85 % of the age predicted maximum heart rate. Their exercise capacity was average.    During stress, rare PVCs are noted.    The exercise capacity was average.    There are no prior studies for comparison.    No valid procedures specified.    PHYSICAL EXAM  GENERAL: well built, well nourished, well-developed in no apparent distress alert and oriented.   HEENT: Normocephalic. Pupils normal and conjunctivae normal.  Mucous membranes normal, no cyanosis or icterus, trachea central,no pallor or icterus is noted..   NECK: No JVD. No bruit..   THYROID: Thyroid not enlarged. No nodules present..   CARDIAC:  Normal S1-S2.  No murmur rub click or gallop.  PMI nondisplaced.    LUNGS: Clear to auscultation. No wheezing or rhonchi..   ABDOMEN: Soft no masses or organomegaly.  No abdomen pulsations or bruits.   Normal bowel sounds. No pulsations and no masses felt, No guarding or rebound.   URINARY: No mcclain catheter   EXTREMITIES: No cyanosis, clubbing or edema noted at this time., no calf tenderness bilaterally.   PERIPHERAL VASCULAR SYSTEM: Good palpable distal pulses.  2+ femoral, popliteal and pedal pulses.  No bruits    CENTRAL NERVOUS SYSTEM: No focal motor or sensory deficits noted.   SKIN: Skin without lesions, moist, well perfused.   MUSCLE STRENGTH & TONE: No noteable weakness, atrophy or abnormal movement    I HAVE REVIEWED :    The vital signs, nurses notes, and all the pertinent radiology and labs.         Current Outpatient Medications   Medication Instructions    amLODIPine (NORVASC) 5 mg, Oral, Daily    atorvastatin (LIPITOR) 40 mg, Oral, Daily    BELatacept 250 mg SolR 5 mg/kg IV every 2 weeks for 5 doses then every 28 days thereafter (+/- 3 days)    blood-glucose meter,continuous (DEXCOM G6  MISC) by Misc.(Non-Drug; Combo Route) route.    blood-glucose transmitter (DEXCOM G6 TRANSMITTER MISC)   Dexcom G6 transmitter, See Instructions, uad with dexcom G6 sensor; change transmitter q 90 days, # 1 EA, 3 Refill(s), Pharmacy: [facility], 187, cm, 23 14:55:00 CDT, Height/Length Measured, 105.1, kg, 23 14:55:00 CDT, Weight Dosing    calcitRIOL (ROCALTROL) 0.5 mcg, Oral, Daily    DEXCOM G6 SENSOR Adelina SMARTSI Each Topical Every 10 Days    ergocalciferol (ERGOCALCIFEROL) 50,000 Units, Oral, Every 7 days    fexofenadine (ALLEGRA) 60 mg, Daily    insulin lispro (HUMALOG U-100 INSULIN) 100 unit/mL injection   See Instructions, for use in insulin pump; max daily dose 100 units, # 90 mL, 1 Refill(s), Maintenance, Pharmacy: [facility], 187, cm, 23 11:24:00 CST, Height/Length Measured, 102.5, kg, 23 11:24:00 CST, Weight Dosing    insulin pump cart,auto,BT/cntr (OMNIPOD 5 G6 INTRO KIT, GEN 5, SUBQ)   Omnipod 5 G6 Intro Kit (Gen 5), See Instructions, use as directed,  # 1 EA, 0 Refill(s), Pharmacy: [facility], 187, cm, 12/30/22 9:14:00 CST, Height/Length Measured, 102.3, kg, 12/30/22 9:14:00 CST, Weight Dosing    insulin regular human (AFREZZA) 4 unit/8 unit/ 12 unit (60) CtDv     k phos di & mono-sod phos mono (K-PHOS-NEUTRAL) 250 mg Tab 2 tablets, Oral, 3 times daily    magnesium oxide (MAG-OX) 800 mg, Oral, 2 times daily    metoprolol tartrate (LOPRESSOR) 12.5 mg, Oral, 2 times daily, HOLD for SBP>130    multivitamin Tab 1 tablet, Oral, Daily    mycophenolate sodium (MYFORTIC) 360 mg, Oral, 2 times daily    ondansetron (ZOFRAN-ODT) 4 mg, Oral, Every 8 hours PRN    pantoprazole (PROTONIX) 40 mg, Oral, Daily    predniSONE (DELTASONE) 2.5 mg, Oral, 2 times daily    tacrolimus (PROGRAF) 2 mg, Oral, Every 12 hours          Assessment:   Status post kidney transplant, 07/2024.  Doing.  Immunosuppressant being titrated  Blood pressures stable.  Blood sugar levels labile.    Stable noninvasive cardiac assessment.    Plan:     6M follow-up cardiology.  Continue Lopressor 12.5 b.i.d. amlodipine 5 mg PRN,   statin agent.  Labs follow-up primary care and transplant team.        No follow-ups on file.            [1]   Social History  Tobacco Use    Smoking status: Never    Smokeless tobacco: Never   Substance Use Topics    Alcohol use: No    Drug use: No

## 2025-08-01 ENCOUNTER — LAB VISIT (OUTPATIENT)
Dept: LAB | Facility: HOSPITAL | Age: 45
End: 2025-08-01
Attending: INTERNAL MEDICINE
Payer: COMMERCIAL

## 2025-08-01 DIAGNOSIS — Z94.0 STATUS POST KIDNEY TRANSPLANT: ICD-10-CM

## 2025-08-01 LAB
ALBUMIN SERPL BCP-MCNC: 4.1 G/DL (ref 3.5–5.2)
ANION GAP (OHS): 7 MMOL/L (ref 8–16)
BUN SERPL-MCNC: 26 MG/DL (ref 6–20)
CALCIUM SERPL-MCNC: 9.2 MG/DL (ref 8.7–10.5)
CHLORIDE SERPL-SCNC: 105 MMOL/L (ref 95–110)
CO2 SERPL-SCNC: 26 MMOL/L (ref 23–29)
CREAT SERPL-MCNC: 1.2 MG/DL (ref 0.5–1.4)
CREAT UR-MCNC: 13 MG/DL (ref 23–375)
GFR SERPLBLD CREATININE-BSD FMLA CKD-EPI: >60 ML/MIN/1.73/M2
GLUCOSE SERPL-MCNC: 219 MG/DL (ref 70–110)
PHOSPHATE SERPL-MCNC: 3.1 MG/DL (ref 2.7–4.5)
POTASSIUM SERPL-SCNC: 4.4 MMOL/L (ref 3.5–5.1)
PROT UR-MCNC: <7 MG/DL
PROT/CREAT UR: ABNORMAL MG/G{CREAT}
SODIUM SERPL-SCNC: 138 MMOL/L (ref 136–145)

## 2025-08-01 PROCEDURE — 84156 ASSAY OF PROTEIN URINE: CPT | Mod: TXP

## 2025-08-01 PROCEDURE — 80069 RENAL FUNCTION PANEL: CPT | Mod: TXP

## 2025-08-01 PROCEDURE — 36415 COLL VENOUS BLD VENIPUNCTURE: CPT | Mod: NTX

## 2025-08-01 PROCEDURE — 83970 ASSAY OF PARATHORMONE: CPT | Mod: TXP

## 2025-08-02 LAB — PTH-INTACT SERPL-MCNC: 80.6 PG/ML (ref 9–77)

## 2025-08-04 ENCOUNTER — TELEPHONE (OUTPATIENT)
Dept: NEPHROLOGY | Facility: CLINIC | Age: 45
End: 2025-08-04
Payer: COMMERCIAL

## 2025-08-04 NOTE — TELEPHONE ENCOUNTER
Copied from CRM #[id number]. Topic: Appointments - Appointment Rescheduling  >> Aug 4, 2025  8:16 AM Cathi wrote:  Pt would like to change his 3 p in person visit to vv at 3 p for 8/05 please call to advise Telephone Information:  [employer]          [phone]

## 2025-08-04 NOTE — TELEPHONE ENCOUNTER
Attempted to call patient. Unable to reach them at this time. Sent message via [employer].

## 2025-08-04 NOTE — TELEPHONE ENCOUNTER
Copied from CRM #[id number]. Topic: Appointments - Appointment Access  >> Aug 4, 2025  9:47 AM Staci wrote:  Type: Needs Medical Advice  Who Called:  Pt     Best Call Back Number: [phone]  Additional Information: Pt asking to change appt tomorrow to VV. Pls send message on [facility]

## 2025-08-05 ENCOUNTER — OFFICE VISIT (OUTPATIENT)
Dept: NEPHROLOGY | Facility: CLINIC | Age: 45
End: 2025-08-05
Payer: COMMERCIAL

## 2025-08-05 ENCOUNTER — TELEPHONE (OUTPATIENT)
Dept: TRANSPLANT | Facility: CLINIC | Age: 45
End: 2025-08-05
Payer: COMMERCIAL

## 2025-08-05 ENCOUNTER — PATIENT MESSAGE (OUTPATIENT)
Dept: TRANSPLANT | Facility: CLINIC | Age: 45
End: 2025-08-05
Payer: COMMERCIAL

## 2025-08-05 DIAGNOSIS — Z94.0 STATUS POST KIDNEY TRANSPLANT: Primary | ICD-10-CM

## 2025-08-05 DIAGNOSIS — D84.821 IMMUNOSUPPRESSION DUE TO DRUG THERAPY: ICD-10-CM

## 2025-08-05 DIAGNOSIS — E10.21 TYPE 1 DIABETES MELLITUS WITH NEPHROPATHY: ICD-10-CM

## 2025-08-05 DIAGNOSIS — I10 PRIMARY HYPERTENSION: ICD-10-CM

## 2025-08-05 DIAGNOSIS — Z79.899 IMMUNOSUPPRESSION DUE TO DRUG THERAPY: ICD-10-CM

## 2025-08-05 DIAGNOSIS — N18.2 CKD (CHRONIC KIDNEY DISEASE) STAGE 2, GFR 60-89 ML/MIN: ICD-10-CM

## 2025-08-05 DIAGNOSIS — Z94.0 KIDNEY REPLACED BY TRANSPLANT: Primary | ICD-10-CM

## 2025-08-05 DIAGNOSIS — N25.81 SECONDARY RENAL HYPERPARATHYROIDISM: ICD-10-CM

## 2025-08-05 PROCEDURE — 98006 SYNCH AUDIO-VIDEO EST MOD 30: CPT | Mod: 95,,, | Performed by: INTERNAL MEDICINE

## 2025-08-05 RX ORDER — TADALAFIL 10 MG/1
10 TABLET ORAL DAILY PRN
Qty: 30 TABLET | Refills: 1 | Status: SHIPPED | OUTPATIENT
Start: 2025-08-05 | End: 2026-08-05

## 2025-08-05 RX ORDER — MYCOPHENOLIC ACID 180 MG/1
360 TABLET, DELAYED RELEASE ORAL 2 TIMES DAILY
Qty: 120 TABLET | Refills: 11 | Status: SHIPPED | OUTPATIENT
Start: 2025-08-05 | End: 2026-08-05

## 2025-08-05 NOTE — TELEPHONE ENCOUNTER
----- Message from Jose Mckinney MD sent at 8/5/2025  3:11 PM CDT -----  He seems to be having problems with getting his mycophenolate.  Can your team help with this?

## 2025-08-05 NOTE — PROGRESS NOTES
Subjective:       Patient ID: Dejuan Diaz Jr. is a 44 y.o. White male who presents for return patient evaluation for chronic renal failure.    The patient location is:  Patient Home   The chief complaint leading to consultation is: CKD  Visit type: Virtual visit with synchronous audio and video  Total time spent with patient: 9 minutes  Each patient to whom he or she provides medical services by telemedicine is:  (1) informed of the relationship between the physician and patient and the respective role of any other health care provider with respect to management of the patient; and (2) notified that he or she may decline to receive medical services by telemedicine and may withdraw from such care at any time.        He had COVID in April 2021.  He is s/p transplant.  He is doing well and is on belatacept.  He is on a carnivore diet.  He has not been taking calcitriol.  He is now on belatacept now as pf July 16th.  He states his blood glucose is getting better off of prograf.      Review of Systems   Constitutional:  Negative for appetite change, chills, fatigue and fever.   Eyes:  Negative for visual disturbance.   Respiratory:  Negative for cough and shortness of breath.    Cardiovascular:  Negative for chest pain and leg swelling.   Gastrointestinal:  Negative for diarrhea, nausea and vomiting.   Genitourinary:  Negative for difficulty urinating, dysuria and hematuria.        ED   Musculoskeletal:  Positive for gait problem (occasional leg heaviness). Negative for myalgias.   Skin:  Negative for rash.   Neurological:  Negative for headaches.   Psychiatric/Behavioral:  Negative for sleep disturbance.        The past medical, family and social histories were reviewed for this encounter.    There were no vitals taken for this visit.    Objective:      Physical Exam  Vitals reviewed.   Constitutional:       General: He is not in acute distress.     Appearance: He is well-developed.   HENT:      Head: Normocephalic  and atraumatic.   Eyes:      General: No scleral icterus.  Pulmonary:      Effort: Pulmonary effort is normal. No respiratory distress.   Neurological:      Mental Status: He is alert and oriented to person, place, and time.   Psychiatric:         Mood and Affect: Mood normal.         Behavior: Behavior normal.        Assessment:       1. Status post kidney transplant    2. Type 1 diabetes mellitus with nephropathy    3. Primary hypertension    4. Secondary renal hyperparathyroidism    5. CKD (chronic kidney disease) stage 2, GFR 60-89 ml/min    6. Immunosuppression due to drug therapy        Lab Results   Component Value Date    CREATININE 1.2 08/01/2025    BUN 26 (H) 08/01/2025     08/01/2025    K 4.4 08/01/2025     08/01/2025    CO2 26 08/01/2025     Lab Results   Component Value Date    PTH 80.6 (H) 08/01/2025    CALCIUM 9.2 08/01/2025    PHOS 3.1 08/01/2025     Lab Results   Component Value Date    HCT 47.0 07/07/2025     Urine Protein/Creatinine Ratio   Date Value Ref Range Status   08/01/2025   Final     Comment:     UNABLE TO CALCULATE   07/07/2025 0.21 (H) <=0.20 Final   04/14/2025 0.42 (H) <=0.20 Final     Plan:   Return to clinic in 4 months.   Labs for next visit include transplant labs per standing orders.  Baseline creatinine is 1.4-1.6.  He is now down to 1.2.  UPC is negative.  PTH is 81 with a calcium of 9.2.  Continue calcitriol.   Renal US shows R 11.4 cm, L 12.2 cm.  Please avoid or minimize all NSAIDS (ibuprofen, motrin, aleve, indocin, naprosyn) to minimize the risk to your kidneys.  Aspirin in a dose of 325 mg or less a day is likely the safest with regards to kidney function.  If you are able to take aspirin and do not have any bleeding problems or ulcers, this may be your best therapy.  Alternatively, acetaminophen (Tylenol) is the safer than NSAIDs with regards to kidney function.  I would ask you take this as directed on the bottle.  It is best to stay under 2 grams a day  (4-500 mg tablets a day maximum).  He is now on belatacept.    Computed KFRE 2-Year unavailable. One or more values for this score either were not found within the given timeframe or did not fit some other criterion.    Computed KFRE 5-Year unavailable. One or more values for this score either were not found within the given timeframe or did not fit some other criterion.

## 2025-08-08 ENCOUNTER — INFUSION (OUTPATIENT)
Dept: INFUSION THERAPY | Facility: HOSPITAL | Age: 45
End: 2025-08-08
Attending: INTERNAL MEDICINE
Payer: COMMERCIAL

## 2025-08-08 VITALS
RESPIRATION RATE: 18 BRPM | DIASTOLIC BLOOD PRESSURE: 90 MMHG | HEART RATE: 64 BPM | TEMPERATURE: 98 F | HEIGHT: 73 IN | BODY MASS INDEX: 30.02 KG/M2 | SYSTOLIC BLOOD PRESSURE: 161 MMHG | OXYGEN SATURATION: 98 % | WEIGHT: 226.5 LBS

## 2025-08-08 DIAGNOSIS — Z94.0 STATUS POST KIDNEY TRANSPLANT: Primary | ICD-10-CM

## 2025-08-08 PROCEDURE — 96365 THER/PROPH/DIAG IV INF INIT: CPT

## 2025-08-08 PROCEDURE — 25000003 PHARM REV CODE 250: Performed by: INTERNAL MEDICINE

## 2025-08-08 PROCEDURE — 63600175 PHARM REV CODE 636 W HCPCS: Mod: JW,TB | Performed by: INTERNAL MEDICINE

## 2025-08-08 RX ORDER — DIPHENHYDRAMINE HYDROCHLORIDE 50 MG/ML
50 INJECTION, SOLUTION INTRAMUSCULAR; INTRAVENOUS ONCE AS NEEDED
OUTPATIENT
Start: 2025-09-05

## 2025-08-08 RX ORDER — EPINEPHRINE 0.3 MG/.3ML
0.3 INJECTION SUBCUTANEOUS ONCE AS NEEDED
Status: DISCONTINUED | OUTPATIENT
Start: 2025-08-08 | End: 2025-08-08 | Stop reason: HOSPADM

## 2025-08-08 RX ORDER — DIPHENHYDRAMINE HYDROCHLORIDE 50 MG/ML
50 INJECTION, SOLUTION INTRAMUSCULAR; INTRAVENOUS ONCE AS NEEDED
Status: DISCONTINUED | OUTPATIENT
Start: 2025-08-08 | End: 2025-08-08 | Stop reason: HOSPADM

## 2025-08-08 RX ORDER — HEPARIN 100 UNIT/ML
500 SYRINGE INTRAVENOUS
OUTPATIENT
Start: 2025-09-05

## 2025-08-08 RX ORDER — EPINEPHRINE 0.3 MG/.3ML
0.3 INJECTION SUBCUTANEOUS ONCE AS NEEDED
OUTPATIENT
Start: 2025-09-05

## 2025-08-08 RX ADMIN — DEXTROSE MONOHYDRATE 512.5 MG: 50 INJECTION, SOLUTION INTRAVENOUS at 11:08

## 2025-08-08 NOTE — PLAN OF CARE
Problem: Fatigue  Goal: Improved Activity Tolerance  Outcome: Progressing  Intervention: Promote Improved Energy  Flowsheets (Taken 8/8/2025 1130)  Fatigue Management: paced activity encouraged  Sleep/Rest Enhancement: regular sleep/rest pattern promoted  Activity Management: Ambulated -L4  Environmental Support: environmental consistency promoted

## 2025-08-12 DIAGNOSIS — Z94.0 KIDNEY REPLACED BY TRANSPLANT: ICD-10-CM

## 2025-08-12 RX ORDER — MYCOPHENOLIC ACID 180 MG/1
360 TABLET, DELAYED RELEASE ORAL 2 TIMES DAILY
Qty: 120 TABLET | Refills: 11 | Status: SHIPPED | OUTPATIENT
Start: 2025-08-12 | End: 2026-08-12

## 2025-08-19 ENCOUNTER — TELEPHONE (OUTPATIENT)
Dept: TRANSPLANT | Facility: CLINIC | Age: 45
End: 2025-08-19
Payer: COMMERCIAL

## 2025-08-19 ENCOUNTER — PATIENT MESSAGE (OUTPATIENT)
Dept: TRANSPLANT | Facility: CLINIC | Age: 45
End: 2025-08-19
Payer: COMMERCIAL

## 2025-08-19 DIAGNOSIS — Z94.0 STATUS POST KIDNEY TRANSPLANT: ICD-10-CM

## 2025-08-19 DIAGNOSIS — Z94.0 STATUS POST LIVING-DONOR KIDNEY TRANSPLANTATION: ICD-10-CM

## 2025-08-19 RX ORDER — LANOLIN ALCOHOL/MO/W.PET/CERES
800 CREAM (GRAM) TOPICAL 2 TIMES DAILY
Qty: 120 TABLET | Refills: 11 | Status: SHIPPED | OUTPATIENT
Start: 2025-08-19 | End: 2026-08-19

## 2025-08-19 RX ORDER — PREDNISONE 2.5 MG/1
2.5 TABLET ORAL 2 TIMES DAILY
Qty: 60 TABLET | Refills: 11 | Status: SHIPPED | OUTPATIENT
Start: 2025-08-19 | End: 2026-08-19

## 2025-08-22 ENCOUNTER — COMMITTEE REVIEW (OUTPATIENT)
Dept: TRANSPLANT | Facility: CLINIC | Age: 45
End: 2025-08-22
Payer: COMMERCIAL

## 2025-08-27 DIAGNOSIS — Z94.0 STATUS POST LIVING-DONOR KIDNEY TRANSPLANTATION: ICD-10-CM

## 2025-08-29 DIAGNOSIS — Z94.0 KIDNEY REPLACED BY TRANSPLANT: Primary | ICD-10-CM

## 2025-09-05 ENCOUNTER — INFUSION (OUTPATIENT)
Dept: INFUSION THERAPY | Facility: HOSPITAL | Age: 45
End: 2025-09-05
Attending: INTERNAL MEDICINE
Payer: COMMERCIAL

## 2025-09-05 VITALS
BODY MASS INDEX: 29.75 KG/M2 | OXYGEN SATURATION: 99 % | HEIGHT: 73 IN | HEART RATE: 72 BPM | DIASTOLIC BLOOD PRESSURE: 96 MMHG | SYSTOLIC BLOOD PRESSURE: 175 MMHG | WEIGHT: 224.5 LBS | TEMPERATURE: 98 F | RESPIRATION RATE: 18 BRPM

## 2025-09-05 DIAGNOSIS — Z94.0 STATUS POST KIDNEY TRANSPLANT: Primary | ICD-10-CM

## 2025-09-05 PROCEDURE — 63600175 PHARM REV CODE 636 W HCPCS: Mod: JW,TB | Performed by: INTERNAL MEDICINE

## 2025-09-05 PROCEDURE — 96365 THER/PROPH/DIAG IV INF INIT: CPT

## 2025-09-05 PROCEDURE — 25000003 PHARM REV CODE 250: Performed by: INTERNAL MEDICINE

## 2025-09-05 RX ORDER — DIPHENHYDRAMINE HYDROCHLORIDE 50 MG/ML
50 INJECTION, SOLUTION INTRAMUSCULAR; INTRAVENOUS ONCE AS NEEDED
OUTPATIENT
Start: 2025-09-05 | End: 2037-02-01

## 2025-09-05 RX ORDER — EPINEPHRINE 0.3 MG/.3ML
0.3 INJECTION SUBCUTANEOUS ONCE AS NEEDED
OUTPATIENT
Start: 2025-09-05 | End: 2037-02-01

## 2025-09-05 RX ORDER — HEPARIN 100 UNIT/ML
500 SYRINGE INTRAVENOUS
OUTPATIENT
Start: 2025-09-05

## 2025-09-05 RX ORDER — EPINEPHRINE 0.3 MG/.3ML
0.3 INJECTION SUBCUTANEOUS ONCE AS NEEDED
Status: DISCONTINUED | OUTPATIENT
Start: 2025-09-05 | End: 2025-09-05 | Stop reason: HOSPADM

## 2025-09-05 RX ADMIN — DEXTROSE MONOHYDRATE 512.5 MG: 50 INJECTION, SOLUTION INTRAVENOUS at 03:09

## (undated) DEVICE — GAUZE DRAIN N WVN 6PLY 4X4IN

## (undated) DEVICE — SET IRR URLGY 2LINE UNIV SPIKE

## (undated) DEVICE — SUT SILK 3-0 STRANDS 30IN

## (undated) DEVICE — DRAPE SLUSH WARMER WITH DISC

## (undated) DEVICE — COVER CLAMP FABRIC RADIOPAQUE

## (undated) DEVICE — CLIPPER BLADE MOD 4406 (CAREF)

## (undated) DEVICE — SUT SILK 2-0 STRANDS 30IN

## (undated) DEVICE — DRESSING ABSRBNT ISLAND 3.6X8

## (undated) DEVICE — SUT ETHILON 3-0 PS2 18 BLK

## (undated) DEVICE — HEMOSTAT SURGICEL NU-KNIT 6X9

## (undated) DEVICE — SUT SILK 3-0 SH 18IN BLACK

## (undated) DEVICE — INSERTS STEALTH FIBRA SIZE 1.

## (undated) DEVICE — PLUG CATHETER STERILE FOLEY

## (undated) DEVICE — SUT 7/0 24IN PROLENE BL MO

## (undated) DEVICE — SUT 1 48IN PDS II VIO MONO

## (undated) DEVICE — STAPLER SKIN PROXIMATE WIDE

## (undated) DEVICE — DRAPE BAG ISOLATION 20 X 20

## (undated) DEVICE — SOL NS 1000CC

## (undated) DEVICE — DRAPE HALF SURGICAL 40X58IN

## (undated) DEVICE — Device

## (undated) DEVICE — KIT SAHARA DRAPE DRAW/LIFT

## (undated) DEVICE — ELECTRODE REM PLYHSV RETURN 9

## (undated) DEVICE — SYR 30CC LUER LOCK

## (undated) DEVICE — DECANTER FLUID TRNSF WHITE 9IN

## (undated) DEVICE — INSERTS STEALTH FIBRA SIDEBRIT

## (undated) DEVICE — SUT 2-0 12-18IN SILK

## (undated) DEVICE — SUT PROLENE 5-0 36IN C-1

## (undated) DEVICE — TOWEL OR DISP STRL BLUE 4/PK

## (undated) DEVICE — ADHESIVE DERMABOND ADVANCED

## (undated) DEVICE — SUT PROLENE 6-0 BV-1 30IN

## (undated) DEVICE — SYR SLIP TIP 1CC

## (undated) DEVICE — NDL BOX COUNTER

## (undated) DEVICE — PAD K-THERMIA 24IN X 60IN

## (undated) DEVICE — BOOT SUTURE AID

## (undated) DEVICE — TOWEL OR XRAY WHITE 17X26IN

## (undated) DEVICE — DRESSING AQUACEL SACRAL 9 X 9

## (undated) DEVICE — SUT 1 36IN PDS II VIO MONO

## (undated) DEVICE — TRAY CATH 1-LYR URIMTR 16FR

## (undated) DEVICE — DRESSING ADH ISLAND 3.6 X 14

## (undated) DEVICE — PACK KIDNEY TRANSPLANT CUSTOM

## (undated) DEVICE — SUT ETHILON 3-0 FS-1 30

## (undated) DEVICE — SUT 3-0 12-18IN SILK

## (undated) DEVICE — TIP YANKAUERS BULB NO VENT

## (undated) DEVICE — SUT PDS BV 6-0

## (undated) DEVICE — SYR IRRIGATION BULB STER 60ML

## (undated) DEVICE — PUNCH AORTIC 4.0MM 6/CASE

## (undated) DEVICE — TAPE UMBILICAL 1/8X36IN WHITE

## (undated) DEVICE — SUT 4-0 12-18IN SILK BLACK

## (undated) DEVICE — LOOP VESSEL BLUE MAXI

## (undated) DEVICE — FOLEY BLLN 20FR 3WAY 5CC

## (undated) DEVICE — SYR ONLY LUER LOCK 20CC

## (undated) DEVICE — DRAPE INCISE IOBAN 2 23X17IN

## (undated) DEVICE — COVER LIGHT HANDLE 80/CA

## (undated) DEVICE — STOCKINETTE 2INX36

## (undated) DEVICE — PUNCH AORTIC 4.8MM

## (undated) DEVICE — APPLICATOR CHLORAPREP ORN 26ML

## (undated) DEVICE — CLIP SPRING 6MM

## (undated) DEVICE — DRAPE CORETEMP FLD WRM 56X62IN

## (undated) DEVICE — CLIP SPRING 12MM

## (undated) DEVICE — SUT PROLENE 4-0 SH BLU 36IN

## (undated) DEVICE — DRAPE STERI INSTRUMENT 1018

## (undated) DEVICE — SUT 4/0 27IN PDS II VIO MON

## (undated) DEVICE — TRAY PERIPHERAL VASCULAR OMC